# Patient Record
Sex: MALE | Race: WHITE | Employment: FULL TIME | URBAN - METROPOLITAN AREA
[De-identification: names, ages, dates, MRNs, and addresses within clinical notes are randomized per-mention and may not be internally consistent; named-entity substitution may affect disease eponyms.]

---

## 2017-02-14 ENCOUNTER — HOSPITAL ENCOUNTER (OUTPATIENT)
Dept: SURGERY | Age: 62
Discharge: HOME | End: 2017-02-14
Payer: COMMERCIAL

## 2017-02-14 VITALS
BODY MASS INDEX: 29.2 KG/M2 | WEIGHT: 204 LBS | TEMPERATURE: 98.4 F | SYSTOLIC BLOOD PRESSURE: 160 MMHG | RESPIRATION RATE: 17 BRPM | HEART RATE: 106 BPM | DIASTOLIC BLOOD PRESSURE: 94 MMHG | HEIGHT: 70 IN | OXYGEN SATURATION: 97 %

## 2017-02-14 LAB — GLUCOSE BLD STRIP.AUTO-MCNC: 108 MG/DL (ref 65–100)

## 2017-02-14 PROCEDURE — 82962 GLUCOSE BLOOD TEST: CPT

## 2017-02-14 NOTE — PERIOP NOTES
Patient verified name, , and surgery as listed in [facility]. TYPE  CASE:1B  Orders per surgeon:  Received  Labs per surgeon: none  Labs per anesthesia protocol:  POC glucose  EKG  :  NA      Patient provided with handouts including guide to surgery , transfusions, pain management and hand hygiene for the family and community. Pt verbalizes understanding of all pre-op instructions . Instructed that family must be present in building at all times. Hibiclens and instructions given per hospital policy. Instructed patient to continue  previous medications as prescribed prior to surgery and  to take the following medications the day of surgery according to anesthesia guidelines : none       Original medication prescription bottles not visualized during patient appointment. Continue all previous medications unless otherwise directed. Instructed patient to hold  the following medications prior to surgery: none    Pt also instructed to wear an oversized, button down shirt on DOS to accommodate the surgical sling post operatively. Patient verbalized understanding of all instructions and provided all medical/health information to the best of their ability.

## 2017-02-20 ENCOUNTER — ANESTHESIA EVENT (OUTPATIENT)
Dept: SURGERY | Age: 62
End: 2017-02-20
Payer: COMMERCIAL

## 2017-02-20 RX ORDER — OXYCODONE HYDROCHLORIDE 5 MG/1
5 TABLET ORAL
Status: CANCELLED | OUTPATIENT
Start: 2017-02-20 | End: 2017-02-21

## 2017-02-20 RX ORDER — SODIUM CHLORIDE, SODIUM LACTATE, POTASSIUM CHLORIDE, CALCIUM CHLORIDE 600; 310; 30; 20 MG/100ML; MG/100ML; MG/100ML; MG/100ML
75 INJECTION, SOLUTION INTRAVENOUS CONTINUOUS
Status: CANCELLED | OUTPATIENT
Start: 2017-02-20

## 2017-02-20 RX ORDER — OXYCODONE HYDROCHLORIDE 5 MG/1
10 TABLET ORAL
Status: CANCELLED | OUTPATIENT
Start: 2017-02-20 | End: 2017-02-21

## 2017-02-20 RX ORDER — HYDROMORPHONE HYDROCHLORIDE 2 MG/ML
0.5 INJECTION, SOLUTION INTRAMUSCULAR; INTRAVENOUS; SUBCUTANEOUS
Status: CANCELLED | OUTPATIENT
Start: 2017-02-20

## 2017-02-21 ENCOUNTER — ANESTHESIA (OUTPATIENT)
Dept: SURGERY | Age: 62
End: 2017-02-21
Payer: COMMERCIAL

## 2017-02-21 ENCOUNTER — HOSPITAL ENCOUNTER (OUTPATIENT)
Age: 62
Setting detail: OUTPATIENT SURGERY
Discharge: HOME | End: 2017-02-21
Attending: OPHTHALMOLOGY | Admitting: OPHTHALMOLOGY
Payer: COMMERCIAL

## 2017-02-21 VITALS
OXYGEN SATURATION: 93 % | TEMPERATURE: 98.7 F | HEIGHT: 70 IN | HEART RATE: 90 BPM | DIASTOLIC BLOOD PRESSURE: 84 MMHG | RESPIRATION RATE: 16 BRPM | WEIGHT: 199.5 LBS | BODY MASS INDEX: 28.56 KG/M2 | SYSTOLIC BLOOD PRESSURE: 138 MMHG

## 2017-02-21 LAB — GLUCOSE BLD STRIP.AUTO-MCNC: 168 MG/DL (ref 65–100)

## 2017-02-21 PROCEDURE — 77030012829 HC CAUT BPLR KIRW -B: Performed by: OPHTHALMOLOGY

## 2017-02-21 PROCEDURE — 74011250636 HC RX REV CODE- 250/636: Performed by: ANESTHESIOLOGY

## 2017-02-21 PROCEDURE — 74011250636 HC RX REV CODE- 250/636

## 2017-02-21 PROCEDURE — 77030032623 HC KT VITRCMY TOT PLS ALCN -F: Performed by: OPHTHALMOLOGY

## 2017-02-21 PROCEDURE — 77030018837 HC SOL IRR OPTH ALCN -A: Performed by: OPHTHALMOLOGY

## 2017-02-21 PROCEDURE — 74011000250 HC RX REV CODE- 250: Performed by: OPHTHALMOLOGY

## 2017-02-21 PROCEDURE — 74011250637 HC RX REV CODE- 250/637: Performed by: ANESTHESIOLOGY

## 2017-02-21 PROCEDURE — 76010000160 HC OR TIME 0.5 TO 1 HR INTENSV-TIER 1: Performed by: OPHTHALMOLOGY

## 2017-02-21 PROCEDURE — 74011250636 HC RX REV CODE- 250/636: Performed by: OPHTHALMOLOGY

## 2017-02-21 PROCEDURE — 77030017621 HC NDL OPHTH1 ALCN -B: Performed by: OPHTHALMOLOGY

## 2017-02-21 PROCEDURE — 74011000258 HC RX REV CODE- 258: Performed by: OPHTHALMOLOGY

## 2017-02-21 PROCEDURE — 82962 GLUCOSE BLOOD TEST: CPT

## 2017-02-21 PROCEDURE — 76210000063 HC OR PH I REC FIRST 0.5 HR: Performed by: OPHTHALMOLOGY

## 2017-02-21 PROCEDURE — 77030008547 HC TBNG OPHTH BD -A: Performed by: OPHTHALMOLOGY

## 2017-02-21 PROCEDURE — 76210000020 HC REC RM PH II FIRST 0.5 HR: Performed by: OPHTHALMOLOGY

## 2017-02-21 PROCEDURE — 77030018846 HC SOL IRR STRL H20 ICUM -A: Performed by: OPHTHALMOLOGY

## 2017-02-21 PROCEDURE — 77030018838 HC SOL IRR OPTH ALCN -B: Performed by: OPHTHALMOLOGY

## 2017-02-21 PROCEDURE — 76060000033 HC ANESTHESIA 1 TO 1.5 HR: Performed by: OPHTHALMOLOGY

## 2017-02-21 RX ORDER — BETAMETHASONE SODIUM PHOSPHATE AND BETAMETHASONE ACETATE 3; 3 MG/ML; MG/ML
INJECTION, SUSPENSION INTRA-ARTICULAR; INTRALESIONAL; INTRAMUSCULAR; SOFT TISSUE AS NEEDED
Status: DISCONTINUED | OUTPATIENT
Start: 2017-02-21 | End: 2017-02-21 | Stop reason: HOSPADM

## 2017-02-21 RX ORDER — FENTANYL CITRATE 50 UG/ML
INJECTION, SOLUTION INTRAMUSCULAR; INTRAVENOUS AS NEEDED
Status: DISCONTINUED | OUTPATIENT
Start: 2017-02-21 | End: 2017-02-21 | Stop reason: HOSPADM

## 2017-02-21 RX ORDER — LIDOCAINE HYDROCHLORIDE 20 MG/ML
INJECTION, SOLUTION INFILTRATION; PERINEURAL AS NEEDED
Status: DISCONTINUED | OUTPATIENT
Start: 2017-02-21 | End: 2017-02-21 | Stop reason: HOSPADM

## 2017-02-21 RX ORDER — CYCLOPENTOLATE HYDROCHLORIDE 20 MG/ML
1 SOLUTION/ DROPS OPHTHALMIC
Status: DISCONTINUED | OUTPATIENT
Start: 2017-02-21 | End: 2017-02-21 | Stop reason: HOSPADM

## 2017-02-21 RX ORDER — OFLOXACIN 3 MG/ML
1 SOLUTION/ DROPS OPHTHALMIC
Status: DISCONTINUED | OUTPATIENT
Start: 2017-02-21 | End: 2017-02-21 | Stop reason: HOSPADM

## 2017-02-21 RX ORDER — TROPICAMIDE 10 MG/ML
1 SOLUTION/ DROPS OPHTHALMIC
Status: DISCONTINUED | OUTPATIENT
Start: 2017-02-21 | End: 2017-02-21 | Stop reason: HOSPADM

## 2017-02-21 RX ORDER — PHENYLEPHRINE HYDROCHLORIDE 25 MG/ML
1 SOLUTION/ DROPS OPHTHALMIC
Status: DISCONTINUED | OUTPATIENT
Start: 2017-02-21 | End: 2017-02-21 | Stop reason: HOSPADM

## 2017-02-21 RX ORDER — FAMOTIDINE 20 MG/1
20 TABLET, FILM COATED ORAL ONCE
Status: COMPLETED | OUTPATIENT
Start: 2017-02-21 | End: 2017-02-21

## 2017-02-21 RX ORDER — TETRACAINE HYDROCHLORIDE 5 MG/ML
SOLUTION OPHTHALMIC AS NEEDED
Status: DISCONTINUED | OUTPATIENT
Start: 2017-02-21 | End: 2017-02-21 | Stop reason: HOSPADM

## 2017-02-21 RX ORDER — CEFAZOLIN SODIUM 1 G/3ML
INJECTION, POWDER, FOR SOLUTION INTRAMUSCULAR; INTRAVENOUS AS NEEDED
Status: DISCONTINUED | OUTPATIENT
Start: 2017-02-21 | End: 2017-02-21 | Stop reason: HOSPADM

## 2017-02-21 RX ORDER — PROPOFOL 10 MG/ML
INJECTION, EMULSION INTRAVENOUS AS NEEDED
Status: DISCONTINUED | OUTPATIENT
Start: 2017-02-21 | End: 2017-02-21 | Stop reason: HOSPADM

## 2017-02-21 RX ORDER — MIDAZOLAM HYDROCHLORIDE 1 MG/ML
INJECTION, SOLUTION INTRAMUSCULAR; INTRAVENOUS AS NEEDED
Status: DISCONTINUED | OUTPATIENT
Start: 2017-02-21 | End: 2017-02-21 | Stop reason: HOSPADM

## 2017-02-21 RX ORDER — SODIUM CHLORIDE, SODIUM LACTATE, POTASSIUM CHLORIDE, CALCIUM CHLORIDE 600; 310; 30; 20 MG/100ML; MG/100ML; MG/100ML; MG/100ML
75 INJECTION, SOLUTION INTRAVENOUS CONTINUOUS
Status: DISCONTINUED | OUTPATIENT
Start: 2017-02-21 | End: 2017-02-21 | Stop reason: HOSPADM

## 2017-02-21 RX ORDER — MIDAZOLAM HYDROCHLORIDE 1 MG/ML
2 INJECTION, SOLUTION INTRAMUSCULAR; INTRAVENOUS ONCE
Status: DISCONTINUED | OUTPATIENT
Start: 2017-02-21 | End: 2017-02-21 | Stop reason: HOSPADM

## 2017-02-21 RX ORDER — DEXAMETHASONE SODIUM PHOSPHATE 4 MG/ML
INJECTION, SOLUTION INTRA-ARTICULAR; INTRALESIONAL; INTRAMUSCULAR; INTRAVENOUS; SOFT TISSUE AS NEEDED
Status: DISCONTINUED | OUTPATIENT
Start: 2017-02-21 | End: 2017-02-21 | Stop reason: HOSPADM

## 2017-02-21 RX ORDER — ONDANSETRON 2 MG/ML
INJECTION INTRAMUSCULAR; INTRAVENOUS AS NEEDED
Status: DISCONTINUED | OUTPATIENT
Start: 2017-02-21 | End: 2017-02-21 | Stop reason: HOSPADM

## 2017-02-21 RX ORDER — LIDOCAINE HYDROCHLORIDE 10 MG/ML
0.1 INJECTION INFILTRATION; PERINEURAL AS NEEDED
Status: DISCONTINUED | OUTPATIENT
Start: 2017-02-21 | End: 2017-02-21 | Stop reason: HOSPADM

## 2017-02-21 RX ORDER — DEXTROSE MONOHYDRATE 100 MG/ML
INJECTION, SOLUTION INTRAVENOUS AS NEEDED
Status: DISCONTINUED | OUTPATIENT
Start: 2017-02-21 | End: 2017-02-21 | Stop reason: HOSPADM

## 2017-02-21 RX ORDER — BUPIVACAINE HYDROCHLORIDE 5 MG/ML
INJECTION, SOLUTION EPIDURAL; INTRACAUDAL AS NEEDED
Status: DISCONTINUED | OUTPATIENT
Start: 2017-02-21 | End: 2017-02-21 | Stop reason: HOSPADM

## 2017-02-21 RX ORDER — FENTANYL CITRATE 50 UG/ML
100 INJECTION, SOLUTION INTRAMUSCULAR; INTRAVENOUS ONCE
Status: DISCONTINUED | OUTPATIENT
Start: 2017-02-21 | End: 2017-02-21 | Stop reason: HOSPADM

## 2017-02-21 RX ADMIN — ONDANSETRON 4 MG: 2 INJECTION INTRAMUSCULAR; INTRAVENOUS at 09:07

## 2017-02-21 RX ADMIN — FENTANYL CITRATE 50 MCG: 50 INJECTION, SOLUTION INTRAMUSCULAR; INTRAVENOUS at 08:49

## 2017-02-21 RX ADMIN — MIDAZOLAM HYDROCHLORIDE 1 MG: 1 INJECTION, SOLUTION INTRAMUSCULAR; INTRAVENOUS at 08:49

## 2017-02-21 RX ADMIN — TROPICAMIDE 1 DROP: 10 SOLUTION/ DROPS OPHTHALMIC at 07:05

## 2017-02-21 RX ADMIN — CYCLOPENTOLATE HYDROCHLORIDE 1 DROP: 20 SOLUTION/ DROPS OPHTHALMIC at 07:04

## 2017-02-21 RX ADMIN — SODIUM CHLORIDE, SODIUM LACTATE, POTASSIUM CHLORIDE, AND CALCIUM CHLORIDE 75 ML/HR: 600; 310; 30; 20 INJECTION, SOLUTION INTRAVENOUS at 07:10

## 2017-02-21 RX ADMIN — PROPOFOL 20 MG: 10 INJECTION, EMULSION INTRAVENOUS at 08:50

## 2017-02-21 RX ADMIN — FAMOTIDINE 20 MG: 20 TABLET ORAL at 07:04

## 2017-02-21 RX ADMIN — PROPOFOL 30 MG: 10 INJECTION, EMULSION INTRAVENOUS at 08:51

## 2017-02-21 RX ADMIN — DEXAMETHASONE SODIUM PHOSPHATE 4 MG: 4 INJECTION, SOLUTION INTRA-ARTICULAR; INTRALESIONAL; INTRAMUSCULAR; INTRAVENOUS; SOFT TISSUE at 09:06

## 2017-02-21 RX ADMIN — FENTANYL CITRATE 50 MCG: 50 INJECTION, SOLUTION INTRAMUSCULAR; INTRAVENOUS at 09:02

## 2017-02-21 RX ADMIN — PHENYLEPHRINE HYDROCHLORIDE 1 DROP: 25 SOLUTION/ DROPS OPHTHALMIC at 07:05

## 2017-02-21 NOTE — ANESTHESIA PREPROCEDURE EVALUATION
Anesthetic History               Review of Systems / Medical History  Patient summary reviewed and pertinent labs reviewed    Pulmonary                   Neuro/Psych              Cardiovascular    Hypertension              Exercise tolerance: >4 METS     GI/Hepatic/Renal     GERD           Endo/Other    Diabetes: type 2         Other Findings            Physical Exam    Airway  Mallampati: II  TM Distance: > 6 cm  Neck ROM: normal range of motion   Mouth opening: Normal     Cardiovascular      Rate: normal    Murmur: Grade 2, Mitral area     Dental  No notable dental hx       Pulmonary  Breath sounds clear to auscultation               Abdominal         Other Findings            Anesthetic Plan    ASA: 3  Anesthesia type: total IV anesthesia            Anesthetic plan and risks discussed with: Patient

## 2017-02-21 NOTE — BRIEF OP NOTE
BRIEF OPERATIVE NOTE    Date of Procedure: 2/21/2017   Preoperative Diagnosis: Vitreous hemorrhage of right eye ([address] [H43.11] Proliferative Diabetic Retinopathy right eye  Postoperative Diagnosis: Vitreous hemorrhage of right eye ([address] [H43.11],  Proliferative Diabetic Retinopathy right eye     Procedure(s):  VITRECTOMY POSTERIOR 25 GAUGE/ LASER/ EPIRETINAL MEMBRANE PEEL/ RIGHT  Surgeon(s) and Role:     * Natalia Santillan MD - Primary            Surgical Staff:  Circ-1: Jesse Cesar RN  Scrub Tech-1: Lupe Vazquez  Scrub Tech-2: Irineo Landaverde  Event Time In   Incision Start 0166   Incision Close 1155     Anesthesia: MAC   Estimated Blood Loss: None  Specimens: * No specimens in log *   Findings: Extensive preretinal and vitreous hemorrhage, fibrovascular proliferation right eye   Complications: None  Implants: * No implants in log *

## 2017-02-21 NOTE — IP AVS SNAPSHOT
Galilea Romero 
 
 
 [address] [facility] [address] [facility] 
[phone] Patient: Dennis Alex MRN: [id number] :1955 You are allergic to the following No active allergies Recent Documentation Height Weight BMI Smoking Status 1.778 m 90.5 kg 28.63 kg/m2 Never Smoker Emergency Contacts Name Discharge Info Relation Home Work Mobile Aylin Olson DISCHARGE CAREGIVER [3] Spouse [3] [phone] About your hospitalization You were admitted on:  2017 You last received care in the:  [place] [address] You were discharged on:  2017 Unit phone number:  [phone] Why you were hospitalized Your primary diagnosis was:  Not on File Providers Seen During Your Hospitalizations Provider Role Specialty Primary office phone Tara Pederson MD Attending Provider Ophthalmology [phone] Your Primary Care Physician (PCP) Primary Care Physician Office Phone Office Fax Charla Armendariz [phone] Follow-up Information None Current Discharge Medication List  
  
ASK your doctor about these medications Dose & Instructions Dispensing Information Comments Morning Noon Evening Bedtime  
 dulaglutide 1.5 mg/0.5 mL sub-q pen Commonly known as:  TRULICITY Your next dose is: Today, Tomorrow Other:  _________ Dose:  1.5 mg  
0.5 mL by SubCUTAneous route every seven (7) days. Quantity:  4 Pen Refills:  5  
     
   
   
   
  
 exenatide microspheres 2 mg Serr Commonly known as:  BYDUREON Your next dose is: Today, Tomorrow Other:  _________ Subcutaneous injection once weekly Quantity:  4 Each Refills:  3 SITagliptin-metFORMIN 50-1,000 mg per tablet Commonly known as:  Kyle Guardado  
   
 Your next dose is: Today, Tomorrow Other:  _________ Dose:  1 Tab Take 1 Tab by mouth two (2) times daily (with meals). Quantity:  60 Tab Refills:  5 Discharge Instructions [facility] [address] MD Rc Mann MD Rodolfo Bowen. Fareed Huddleston MD                    Herb Hem. Phil Tolliver, [address] or [phone] or [phone] or [phone] Post Operative Instructions for Retina and Vitreous Surgery Patients The following are a few guidelines that you should observe for two weeks after surgery: 1. Avoid stooping over, lifting heavy weights or bumping your head. 2.  Special positioning: Rest on an extra pillow 3. No extensive traveling except what is necessary. If a gas air bubble was put in your eye at surgery - avoid air travel until you consult your doctor. 4.  You may shave after the third day. 5.  You may watch television, read, cook and wash dishes as long as it does not interfere 
      with special positioning instructions. 6.  Alcoholic beverages may be used in moderation. 7.  No sexual intercourse. 8.  You  may bathe the eyelids daily with cotton or a tissue moistened with warm tap 
     water. Do not bathe the eyeball itself. 9.  Some discharge from the operated eye is to be expected. This should gradually  
     improve. 10. Change the eye pad at least once daily. You may discontinue the eye pad whenever 
      comfortable to do so (usually three days after the operation). Wear the eye shield or 
      glasses at all times. 11. If you experience increasing pain, decreasing vision, or pus like discharge, call the 
      Office. 12. Medication Instructions: 
       Prednisolone 1% - one drop in the operated eye __4__ times a day. Atropine 1% - one drop in the operated eye at bedtime. Tobradex Ointment - apply in operated eye 4 times a day as needed. _Ofloxacin  (antibiotic drop) - one drop in operated eye 4 times a day. BRING ALL EYE DROPS TO YOU POSTOPERATIVE APPOINTMENT! 13. Return appointment at the [place] office on: 2/22/17 at 7:35AM 
 
Voiced or demonstrated understanding:  Beronica Hughes from Nurse PATIENT INSTRUCTIONS: 
 
After general anesthesia or intravenous sedation, for 24 hours or while taking prescription Narcotics: · Limit your activities · Do not drive and operate hazardous machinery · Do not make important personal or business decisions · Do  not drink alcoholic beverages · If you have not urinated within 8 hours after discharge, please contact your surgeon on call. *  Please give a list of your current medications to your Primary Care Provider. *  Please update this list whenever your medications are discontinued, doses are 
    changed, or new medications (including over-the-counter products) are added. *  Please carry medication information at all times in case of emergency situations. These are general instructions for a healthy lifestyle: No smoking/ No tobacco products/ Avoid exposure to second hand smoke Surgeon General's Warning:  Quitting smoking now greatly reduces serious risk to your health. Obesity, smoking, and sedentary lifestyle greatly increases your risk for illness A healthy diet, regular physical exercise & weight monitoring are important for maintaining a healthy lifestyle You may be retaining fluid if you have a history of heart failure or if you experience any of the following symptoms:  Weight gain of 3 pounds or more overnight or 5 pounds in a week, increased swelling in our hands or feet or shortness of breath while lying flat in bed.   Please call your doctor as soon as you notice any of these symptoms; do not wait until your next office visit. Recognize signs and symptoms of STROKE: 
 
F-face looks uneven A-arms unable to move or move unevenly S-speech slurred or non-existent T-time-call 911 as soon as signs and symptoms begin-DO NOT go Back to bed or wait to see if you get better-TIME IS BRAIN. Discharge Orders None Introducing [facility] SERVICES! Prasanth Rodriguez introduces [employer] patient portal. Now you can access parts of your medical record, email your doctor's office, and request medication refills online. 1. In your internet browser, go to [email or web address] 2. Click on the First Time User? Click Here link in the Sign In box. You will see the New Member Sign Up page. 3. Enter your [employer] Access Code exactly as it appears below. You will not need to use this code after youve completed the sign-up process. If you do not sign up before the expiration date, you must request a new code. · [employer] Access Code: [email or web address] Expires: 5/9/2017  2:47 PM 
 
4. Enter the last four digits of your Social Security Number (xxxx) and Date of Birth (mm/dd/yyyy) as indicated and click Submit. You will be taken to the next sign-up page. 5. Create a [employer] ID. This will be your [employer] login ID and cannot be changed, so think of one that is secure and easy to remember. 6. Create a [employer] password. You can change your password at any time. 7. Enter your Password Reset Question and Answer. This can be used at a later time if you forget your password. 8. Enter your e-mail address. You will receive e-mail notification when new information is available in [address] 9. Click Sign Up. You can now view and download portions of your medical record. 10. Click the Download Summary menu link to download a portable copy of your medical information. If you have questions, please visit the Frequently Asked Questions section of the MyChart website. Remember, MyChart is NOT to be used for urgent needs. For medical emergencies, dial 911. Now available from your iPhone and Android! General Information Please provide this summary of care documentation to your next provider. Patient Signature:  ____________________________________________________________ Date:  ____________________________________________________________  
  
[place] Provider Signature:  ____________________________________________________________ Date:  ____________________________________________________________

## 2017-02-21 NOTE — DISCHARGE INSTRUCTIONS
[facility] [address] MD Lauryn Olson MD Val Parchment. MD Pedro Fitzgerald. Valentino White MD    [phone] or [phone] or [phone] or [phone]    Post Operative Instructions for Retina and Vitreous Surgery Patients    The following are a few guidelines that you should observe for two weeks after surgery:    1. Avoid stooping over, lifting heavy weights or bumping your head. 2.  Special positioning: Rest on an extra pillow    3. No extensive traveling except what is necessary. If a gas air bubble was put in your eye at surgery - avoid air travel until you consult your doctor. 4.  You may shave after the third day. 5.  You may watch television, read, cook and wash dishes as long as it does not interfere        with special positioning instructions. 6.  Alcoholic beverages may be used in moderation. 7.  No sexual intercourse. 8.  You  may bathe the eyelids daily with cotton or a tissue moistened with warm tap       water. Do not bathe the eyeball itself. 9.  Some discharge from the operated eye is to be expected. This should gradually        improve. 10. Change the eye pad at least once daily. You may discontinue the eye pad whenever        comfortable to do so (usually three days after the operation). Wear the eye shield or        glasses at all times. 11. If you experience increasing pain, decreasing vision, or pus like discharge, call the        Office. 12. Medication Instructions:         Prednisolone 1% - one drop in the operated eye __4__ times a day. Atropine 1% - one drop in the operated eye at bedtime. Tobradex Ointment - apply in operated eye 4 times a day as needed. _Ofloxacin  (antibiotic drop) - one drop in operated eye 4 times a day.          BRING ALL EYE DROPS TO YOU POSTOPERATIVE APPOINTMENT! 13. Return appointment at the [place] office on: 2/22/17 at 7:35AM    Voiced or demonstrated understanding:  Adrianna Barbour from Nurse    PATIENT INSTRUCTIONS:    After general anesthesia or intravenous sedation, for 24 hours or while taking prescription Narcotics:  · Limit your activities  · Do not drive and operate hazardous machinery  · Do not make important personal or business decisions  · Do  not drink alcoholic beverages  · If you have not urinated within 8 hours after discharge, please contact your surgeon on call. *  Please give a list of your current medications to your Primary Care Provider. *  Please update this list whenever your medications are discontinued, doses are      changed, or new medications (including over-the-counter products) are added. *  Please carry medication information at all times in case of emergency situations. These are general instructions for a healthy lifestyle:    No smoking/ No tobacco products/ Avoid exposure to second hand smoke    Surgeon General's Warning:  Quitting smoking now greatly reduces serious risk to your health. Obesity, smoking, and sedentary lifestyle greatly increases your risk for illness    A healthy diet, regular physical exercise & weight monitoring are important for maintaining a healthy lifestyle    You may be retaining fluid if you have a history of heart failure or if you experience any of the following symptoms:  Weight gain of 3 pounds or more overnight or 5 pounds in a week, increased swelling in our hands or feet or shortness of breath while lying flat in bed. Please call your doctor as soon as you notice any of these symptoms; do not wait until your next office visit.     Recognize signs and symptoms of STROKE:    F-face looks uneven    A-arms unable to move or move unevenly    S-speech slurred or non-existent    T-time-call 911 as soon as signs and symptoms begin-DO NOT go       Back to bed or wait to see if you get better-TIME IS BRAIN.

## 2017-02-21 NOTE — ANESTHESIA POSTPROCEDURE EVALUATION
Post-Anesthesia Evaluation and Assessment    Patient: Komal Pizarro MRN: [id number]  SSN: [id number]    YOB: 1955  Age: 63 y.o. Sex: male       Cardiovascular Function/Vital Signs  Visit Vitals    /84 (BP 1 Location: Right arm, BP Patient Position: At rest)    Pulse 93    Temp 37.1 °C (98.7 °F)    Resp 16    Ht 5' 10\" (1.778 m)    Wt 90.5 kg (199 lb 8 oz)    SpO2 94%    BMI 28.63 kg/m2       Patient is status post total IV anesthesia anesthesia for Procedure(s):  VITRECTOMY POSTERIOR 25 GAUGE/ LASER/ EPIRETINAL MEMBRANE PEEL/ RIGHT. Nausea/Vomiting: None    Postoperative hydration reviewed and adequate. Pain:  Pain Scale 1: Numeric (0 - 10) (02/21/17 0952)  Pain Intensity 1: 0 (02/21/17 0952)   Managed    Neurological Status:   Neuro (WDL): Within Defined Limits (02/21/17 0952)  Neuro  LUE Motor Response: Purposeful (02/21/17 0952)  LLE Motor Response: Purposeful (02/21/17 0952)  RUE Motor Response: Purposeful (02/21/17 2810)  RLE Motor Response: Purposeful (02/21/17 1322)   At baseline    Mental Status and Level of Consciousness: Arousable    Pulmonary Status:   O2 Device: Room air (02/21/17 0349)   Adequate oxygenation and airway patent    Complications related to anesthesia: None    Post-anesthesia assessment completed.  No concerns    Signed By: Jaquan Watts MD     February 21, 2017

## 2017-02-21 NOTE — OP NOTES
[address]   OPERATIVE REPORT       Name:  Analy Lee   MR#:  [id number]   :  1955   Account #:  [de-identified]   Date of Adm:  2017       DATE OF SURGERY: 2017     PREOPERATIVE DIAGNOSES   1. Nonclearing vitreous hemorrhage of the right eye. 2. Proliferative diabetic retinopathy of the right eye. POSTOPERATIVE DIAGNOSES   1. Nonclearing vitreous hemorrhage of the right eye. 2. Proliferative diabetic retinopathy of the right eye. PROCEDURES PERFORMED     1. Trans pars plana vitrectomy with panretinal photocoagulation   of the right eye. 2. Trans pars plana vitrectomy with epiretinal membrane removal   of the right eye. SURGEON: Sandy Llanes. Judy DAVILA MD    ANESTHESIA: MAC.    COMPLICATIONS: None. INDICATIONS FOR SURGERY: The patient has experienced loss of   vision in the right eye associated with vitreous hemorrhage due   to diabetic retinopathy. Surgery was recommended to remove the   hemorrhage and treat the diabetic retinopathy. The risks and   benefits of the surgical procedure were thoroughly reviewed and   the patient wished to proceed. SUMMARY OF PROCEDURE: After appropriate preoperative evaluation   and signing of operative permit, the patient was taken to   operating room and placed in the supine fashion upon the   procedure table. Timeout was then performed and all operating   room personnel confirmed the patient's identity, the surgical   site, and the procedures to be performed. The patient was then   given IV sedation as well as a lid block on the right consisting   of 5 mL of a 50/50 solution of 2% Xylocaine and 0.75% Marcaine. This was given in a modified [employer] fashion. A retrobulbar   injection consisting of 5 mL of the same solution was then   given. This was supplemented with an additional 3 mL of the same   solution.  Once an appropriate level of akinesia of the   extraocular muscles was achieved, the patient was draped and   prepped in a sterile ophthalmic fashion with 5% Betadine   solution. The conjunctiva and periorbital tissues were carefully   prepped. A 25-gauge 4 mm infusion cannula was carefully placed 4 mm   posterior to limbus at the 8 o'clock position. Once the tip of   the cannula was visualized and posterior segment found to be   clear, it was turned to the on position and the intraocular   pressure was adjusted to 30 mmHg. Two additional cannulas were   placed at the 10 and 2 o'clock positions, 4 mm posterior to the   limbus. A handheld fiberoptic tube was inserted in the nasal   cannula site while the Microvit instrument was inserted into the   temporal cannula site. Visualization of the posterior segment   was achieved using the overhead microscope and the hand-held   contact lens. Examination of the posterior segment confirmed   significant vitreous hemorrhage and preretinal hemorrhage. Fibrovascular proliferation was noted along the inferior   temporal and superior nasal arcades. A thorough vitrectomy was performed. Care was taken to trim the   vitreous to the far periphery in all quadrants. The posterior   hyaloid face was then carefully detached from the optic nerve   region and trimmed to the areas of fibrovascular proliferation. The preretinal hemorrhage was also removed with the Microvit   instrument. The fibrovascular proliferation was then carefully   trimmed along the inferior temporal arcade. This was then   subsequently removed with the Pr-[address] [facility]. Hemostasis was achieved   using the internal diathermy instrument. Also, the fibrovascular   proliferation was removed along the superonasal arcade. Additional hemorrhage was carefully removed with a flute needle. No additional leakage was noted. Laser spots were present in the   mid periphery. An additional laser was applied using the   indirect laser in all quadrants.  A total of 773 spots were   scattered throughout the mid and far periphery in all quadrants. All instruments were then removed from the eye and the trocars   were removed. Intraocular pressure was noted to be approximately   15 mmHg. A subconjunctival injection consisting of 0.5 mL of   Ancef and 0.5 mL of betamethasone was given in the inferior   temporal quadrant. Pred Forte, Vigamox and Cyclogyl were placed   in the inferior fornix. A patch and shield were then placed over   the right eye. The patient tolerated the procedure well and was   taken to the recovery room for routine postoperative care.         MD FLETCHER Diaz III   D:  02/21/2017   09:59   T:  02/21/2017   10:31   Job #:  [id number]

## 2017-02-27 ENCOUNTER — HOSPITAL ENCOUNTER (INPATIENT)
Age: 62
LOS: 14 days | Discharge: HOME HEALTH | DRG: 001 | End: 2017-03-13
Attending: INTERNAL MEDICINE | Admitting: INTERNAL MEDICINE
Payer: COMMERCIAL

## 2017-02-27 ENCOUNTER — APPOINTMENT (OUTPATIENT)
Dept: GENERAL RADIOLOGY | Age: 62
DRG: 001 | End: 2017-02-27
Attending: EMERGENCY MEDICINE
Payer: COMMERCIAL

## 2017-02-27 ENCOUNTER — APPOINTMENT (OUTPATIENT)
Dept: GENERAL RADIOLOGY | Age: 62
DRG: 001 | End: 2017-02-27
Attending: NURSE PRACTITIONER
Payer: COMMERCIAL

## 2017-02-27 ENCOUNTER — APPOINTMENT (OUTPATIENT)
Dept: ULTRASOUND IMAGING | Age: 62
DRG: 001 | End: 2017-02-27
Attending: NURSE PRACTITIONER
Payer: COMMERCIAL

## 2017-02-27 ENCOUNTER — HOSPITAL ENCOUNTER (EMERGENCY)
Age: 62
Discharge: STILL ADMITTED OR ADMINISTRATIVE | DRG: 001 | End: 2017-02-27
Attending: EMERGENCY MEDICINE
Payer: COMMERCIAL

## 2017-02-27 ENCOUNTER — ANESTHESIA EVENT (OUTPATIENT)
Dept: SURGERY | Age: 62
DRG: 001 | End: 2017-02-27
Payer: COMMERCIAL

## 2017-02-27 VITALS
SYSTOLIC BLOOD PRESSURE: 133 MMHG | BODY MASS INDEX: 28.55 KG/M2 | HEART RATE: 96 BPM | TEMPERATURE: 97.4 F | OXYGEN SATURATION: 95 % | RESPIRATION RATE: 13 BRPM | DIASTOLIC BLOOD PRESSURE: 83 MMHG | WEIGHT: 199 LBS

## 2017-02-27 DIAGNOSIS — I50.23 SYSTOLIC CHF, ACUTE ON CHRONIC (HCC): ICD-10-CM

## 2017-02-27 DIAGNOSIS — I49.01 CARDIAC ARREST WITH VENTRICULAR FIBRILLATION (HCC): ICD-10-CM

## 2017-02-27 DIAGNOSIS — I50.9 ACUTE CONGESTIVE HEART FAILURE, UNSPECIFIED CONGESTIVE HEART FAILURE TYPE: ICD-10-CM

## 2017-02-27 DIAGNOSIS — R09.89 DEPRESSED LEFT VENTRICULAR EJECTION FRACTION: Chronic | ICD-10-CM

## 2017-02-27 DIAGNOSIS — J90 PLEURAL EFFUSION: ICD-10-CM

## 2017-02-27 DIAGNOSIS — J96.00 ACUTE RESPIRATORY FAILURE, UNSPECIFIED WHETHER WITH HYPOXIA OR HYPERCAPNIA (HCC): ICD-10-CM

## 2017-02-27 DIAGNOSIS — I24.9 ACUTE CORONARY SYNDROME (HCC): Primary | ICD-10-CM

## 2017-02-27 DIAGNOSIS — J96.01 ACUTE RESPIRATORY FAILURE WITH HYPOXIA (HCC): ICD-10-CM

## 2017-02-27 DIAGNOSIS — Z99.11 WEANING FROM RESPIRATOR (HCC): ICD-10-CM

## 2017-02-27 DIAGNOSIS — K21.9 GASTROESOPHAGEAL REFLUX DISEASE WITHOUT ESOPHAGITIS: ICD-10-CM

## 2017-02-27 DIAGNOSIS — I50.22 SYSTOLIC CHF, CHRONIC (HCC): ICD-10-CM

## 2017-02-27 DIAGNOSIS — R57.0 CARDIOGENIC SHOCK (HCC): ICD-10-CM

## 2017-02-27 DIAGNOSIS — D62 POSTOPERATIVE ANEMIA DUE TO ACUTE BLOOD LOSS: ICD-10-CM

## 2017-02-27 DIAGNOSIS — I21.4 NSTEMI (NON-ST ELEVATED MYOCARDIAL INFARCTION) (HCC): ICD-10-CM

## 2017-02-27 DIAGNOSIS — I25.5 ISCHEMIC CARDIOMYOPATHY: Chronic | ICD-10-CM

## 2017-02-27 DIAGNOSIS — Z99.11 ENCOUNTER FOR WEANING FROM VENTILATOR (HCC): ICD-10-CM

## 2017-02-27 DIAGNOSIS — R77.8 ELEVATED TROPONIN I LEVEL: ICD-10-CM

## 2017-02-27 DIAGNOSIS — I46.9 CARDIAC ARREST WITH VENTRICULAR FIBRILLATION (HCC): ICD-10-CM

## 2017-02-27 DIAGNOSIS — I25.10 CAD, MULTIPLE VESSEL: Chronic | ICD-10-CM

## 2017-02-27 DIAGNOSIS — R09.02 HYPOXIA: ICD-10-CM

## 2017-02-27 DIAGNOSIS — E11.40 CONTROLLED TYPE 2 DIABETES MELLITUS WITH DIABETIC NEUROPATHY, UNSPECIFIED LONG TERM INSULIN USE STATUS: Primary | Chronic | ICD-10-CM

## 2017-02-27 DIAGNOSIS — Z95.1 S/P CABG (CORONARY ARTERY BYPASS GRAFT): ICD-10-CM

## 2017-02-27 DIAGNOSIS — I49.01 VENTRICULAR FIBRILLATION (HCC): ICD-10-CM

## 2017-02-27 PROBLEM — I20.0 UNSTABLE ANGINA (HCC): Status: ACTIVE | Noted: 2017-02-27

## 2017-02-27 PROBLEM — H43.10: Status: ACTIVE | Noted: 2017-02-27

## 2017-02-27 PROBLEM — E78.1 HYPERTRIGLYCERIDEMIA: Status: ACTIVE | Noted: 2017-02-27

## 2017-02-27 PROBLEM — R07.9 CHEST PAIN: Status: ACTIVE | Noted: 2017-02-27

## 2017-02-27 LAB
ACT BLD: 214 SECS (ref 70–128)
ALBUMIN SERPL BCP-MCNC: 3.6 G/DL (ref 3.5–5)
ALBUMIN/GLOB SERPL: 1.1 {RATIO} (ref 1.2–3.5)
ALP SERPL-CCNC: 46 U/L (ref 50–136)
ALT SERPL-CCNC: 35 U/L (ref 12–65)
ANION GAP BLD CALC-SCNC: 10 MMOL/L (ref 7–16)
AST SERPL W P-5'-P-CCNC: 25 U/L (ref 15–37)
ATRIAL RATE: 88 BPM
BACTERIA SPEC CULT: ABNORMAL
BACTERIA SPEC CULT: ABNORMAL
BASOPHILS # BLD AUTO: 0 K/UL (ref 0–0.2)
BASOPHILS # BLD: 0 % (ref 0–2)
BILIRUB SERPL-MCNC: 0.7 MG/DL (ref 0.2–1.1)
BNP SERPL-MCNC: 514 PG/ML
BUN SERPL-MCNC: 21 MG/DL (ref 6–23)
CALCIUM SERPL-MCNC: 9.5 MG/DL (ref 8.3–10.4)
CALCULATED P AXIS, ECG09: 62 DEGREES
CALCULATED R AXIS, ECG10: 58 DEGREES
CALCULATED T AXIS, ECG11: -97 DEGREES
CHLORIDE SERPL-SCNC: 101 MMOL/L (ref 98–107)
CO2 SERPL-SCNC: 23 MMOL/L (ref 21–32)
CREAT SERPL-MCNC: 1.09 MG/DL (ref 0.8–1.5)
D DIMER PPP FEU-MCNC: 0.67 UG/ML(FEU)
DIAGNOSIS, 93000: NORMAL
DIASTOLIC BP, ECG02: NORMAL MMHG
DIFFERENTIAL METHOD BLD: ABNORMAL
EOSINOPHIL # BLD: 0 K/UL (ref 0–0.8)
EOSINOPHIL NFR BLD: 0 % (ref 0.5–7.8)
ERYTHROCYTE [DISTWIDTH] IN BLOOD BY AUTOMATED COUNT: 13.4 % (ref 11.9–14.6)
GLOBULIN SER CALC-MCNC: 3.3 G/DL (ref 2.3–3.5)
GLUCOSE BLD STRIP.AUTO-MCNC: 257 MG/DL (ref 65–100)
GLUCOSE BLD STRIP.AUTO-MCNC: 263 MG/DL (ref 65–100)
GLUCOSE SERPL-MCNC: 239 MG/DL (ref 65–100)
HCT VFR BLD AUTO: 39.7 % (ref 41.1–50.3)
HGB BLD-MCNC: 13.6 G/DL (ref 13.6–17.2)
IMM GRANULOCYTES # BLD: 0 K/UL (ref 0–0.5)
IMM GRANULOCYTES NFR BLD AUTO: 0.3 % (ref 0–5)
LIPASE SERPL-CCNC: 74 U/L (ref 73–393)
LYMPHOCYTES # BLD AUTO: 13 % (ref 13–44)
LYMPHOCYTES # BLD: 0.8 K/UL (ref 0.5–4.6)
MAGNESIUM SERPL-MCNC: 1.9 MG/DL (ref 1.8–2.4)
MCH RBC QN AUTO: 30.2 PG (ref 26.1–32.9)
MCHC RBC AUTO-ENTMCNC: 34.3 G/DL (ref 31.4–35)
MCV RBC AUTO: 88 FL (ref 79.6–97.8)
MONOCYTES # BLD: 0.2 K/UL (ref 0.1–1.3)
MONOCYTES NFR BLD AUTO: 3 % (ref 4–12)
NEUTS SEG # BLD: 5.2 K/UL (ref 1.7–8.2)
NEUTS SEG NFR BLD AUTO: 84 % (ref 43–78)
P-R INTERVAL, ECG05: 154 MS
PLATELET # BLD AUTO: 208 K/UL (ref 150–450)
PMV BLD AUTO: 10.2 FL (ref 10.8–14.1)
POTASSIUM SERPL-SCNC: 4.6 MMOL/L (ref 3.5–5.1)
PROT SERPL-MCNC: 6.9 G/DL (ref 6.3–8.2)
Q-T INTERVAL, ECG07: 374 MS
QRS DURATION, ECG06: 116 MS
QTC CALCULATION (BEZET), ECG08: 452 MS
RBC # BLD AUTO: 4.51 M/UL (ref 4.23–5.67)
SERVICE CMNT-IMP: ABNORMAL
SODIUM SERPL-SCNC: 134 MMOL/L (ref 136–145)
SYSTOLIC BP, ECG01: NORMAL MMHG
TROPONIN I BLD-MCNC: 0.45 NG/ML (ref 0–0.08)
TROPONIN I BLD-MCNC: 0.46 NG/ML (ref 0–0.08)
TROPONIN I SERPL-MCNC: 1.4 NG/ML (ref 0.02–0.05)
VENTRICULAR RATE, ECG03: 88 BPM
WBC # BLD AUTO: 6.3 K/UL (ref 4.3–11.1)

## 2017-02-27 PROCEDURE — 93459 L HRT ART/GRFT ANGIO: CPT

## 2017-02-27 PROCEDURE — C1894 INTRO/SHEATH, NON-LASER: HCPCS

## 2017-02-27 PROCEDURE — 74011250636 HC RX REV CODE- 250/636: Performed by: NURSE PRACTITIONER

## 2017-02-27 PROCEDURE — 75756 ARTERY X-RAYS CHEST: CPT

## 2017-02-27 PROCEDURE — 36415 COLL VENOUS BLD VENIPUNCTURE: CPT | Performed by: NURSE PRACTITIONER

## 2017-02-27 PROCEDURE — 5A0221D ASSISTANCE WITH CARDIAC OUTPUT USING IMPELLER PUMP, CONTINUOUS: ICD-10-PCS | Performed by: INTERNAL MEDICINE

## 2017-02-27 PROCEDURE — 74011250637 HC RX REV CODE- 250/637: Performed by: NURSE PRACTITIONER

## 2017-02-27 PROCEDURE — C1769 GUIDE WIRE: HCPCS

## 2017-02-27 PROCEDURE — B2151ZZ FLUOROSCOPY OF LEFT HEART USING LOW OSMOLAR CONTRAST: ICD-10-PCS | Performed by: INTERNAL MEDICINE

## 2017-02-27 PROCEDURE — 93458 L HRT ARTERY/VENTRICLE ANGIO: CPT

## 2017-02-27 PROCEDURE — 82962 GLUCOSE BLOOD TEST: CPT

## 2017-02-27 PROCEDURE — 33990 INSJ PERQ VAD L HRT ARTERIAL: CPT

## 2017-02-27 PROCEDURE — 77030027138 HC INCENT SPIROMETER -A

## 2017-02-27 PROCEDURE — 77030004534 HC CATH ANGI DX INFN CARD -A

## 2017-02-27 PROCEDURE — 74011250636 HC RX REV CODE- 250/636: Performed by: INTERNAL MEDICINE

## 2017-02-27 PROCEDURE — 77030013687 HC GD NDL BARD -B

## 2017-02-27 PROCEDURE — 86923 COMPATIBILITY TEST ELECTRIC: CPT | Performed by: NURSE PRACTITIONER

## 2017-02-27 PROCEDURE — 83735 ASSAY OF MAGNESIUM: CPT | Performed by: NURSE PRACTITIONER

## 2017-02-27 PROCEDURE — 77030029997 HC DEV COM RDL R BND TELE -B

## 2017-02-27 PROCEDURE — 02HA3RS INSERTION OF BIVENTRICULAR SHORT-TERM EXTERNAL HEART ASSIST SYSTEM INTO HEART, PERCUTANEOUS APPROACH: ICD-10-PCS | Performed by: INTERNAL MEDICINE

## 2017-02-27 PROCEDURE — 74011250637 HC RX REV CODE- 250/637: Performed by: INTERNAL MEDICINE

## 2017-02-27 PROCEDURE — C8929 TTE W OR WO FOL WCON,DOPPLER: HCPCS

## 2017-02-27 PROCEDURE — 93880 EXTRACRANIAL BILAT STUDY: CPT

## 2017-02-27 PROCEDURE — 36247 INS CATH ABD/L-EXT ART 3RD: CPT

## 2017-02-27 PROCEDURE — 87641 MR-STAPH DNA AMP PROBE: CPT | Performed by: NURSE PRACTITIONER

## 2017-02-27 PROCEDURE — B2111ZZ FLUOROSCOPY OF MULTIPLE CORONARY ARTERIES USING LOW OSMOLAR CONTRAST: ICD-10-PCS | Performed by: INTERNAL MEDICINE

## 2017-02-27 PROCEDURE — 77030015766

## 2017-02-27 PROCEDURE — 99152 MOD SED SAME PHYS/QHP 5/>YRS: CPT

## 2017-02-27 PROCEDURE — 86900 BLOOD TYPING SEROLOGIC ABO: CPT | Performed by: NURSE PRACTITIONER

## 2017-02-27 PROCEDURE — 77030029641 HC PMP CARD PERC IMPELLA CP ABIM -L

## 2017-02-27 PROCEDURE — 74011250636 HC RX REV CODE- 250/636

## 2017-02-27 PROCEDURE — 65610000006 HC RM INTENSIVE CARE

## 2017-02-27 PROCEDURE — 77030002996 HC SUT SLK J&J -A

## 2017-02-27 PROCEDURE — 74011000250 HC RX REV CODE- 250: Performed by: INTERNAL MEDICINE

## 2017-02-27 PROCEDURE — 76937 US GUIDE VASCULAR ACCESS: CPT

## 2017-02-27 PROCEDURE — 71010 XR CHEST SNGL V: CPT

## 2017-02-27 PROCEDURE — 4A023N7 MEASUREMENT OF CARDIAC SAMPLING AND PRESSURE, LEFT HEART, PERCUTANEOUS APPROACH: ICD-10-PCS | Performed by: INTERNAL MEDICINE

## 2017-02-27 PROCEDURE — 84484 ASSAY OF TROPONIN QUANT: CPT | Performed by: NURSE PRACTITIONER

## 2017-02-27 PROCEDURE — 99153 MOD SED SAME PHYS/QHP EA: CPT

## 2017-02-27 PROCEDURE — 93005 ELECTROCARDIOGRAM TRACING: CPT | Performed by: INTERNAL MEDICINE

## 2017-02-27 PROCEDURE — 85347 COAGULATION TIME ACTIVATED: CPT

## 2017-02-27 PROCEDURE — 74011000258 HC RX REV CODE- 258: Performed by: INTERNAL MEDICINE

## 2017-02-27 PROCEDURE — 74011636637 HC RX REV CODE- 636/637: Performed by: NURSE PRACTITIONER

## 2017-02-27 RX ORDER — HEPARIN SODIUM 10000 [USP'U]/ML
2000 INJECTION, SOLUTION INTRAVENOUS; SUBCUTANEOUS ONCE
Status: COMPLETED | OUTPATIENT
Start: 2017-02-27 | End: 2017-02-27

## 2017-02-27 RX ORDER — AMIODARONE HYDROCHLORIDE 200 MG/1
400 TABLET ORAL ONCE
Status: COMPLETED | OUTPATIENT
Start: 2017-02-27 | End: 2017-02-27

## 2017-02-27 RX ORDER — SODIUM CHLORIDE 0.9 % (FLUSH) 0.9 %
5-10 SYRINGE (ML) INJECTION EVERY 8 HOURS
Status: DISCONTINUED | OUTPATIENT
Start: 2017-02-27 | End: 2017-02-28 | Stop reason: SDUPTHER

## 2017-02-27 RX ORDER — GUAIFENESIN 100 MG/5ML
324 LIQUID (ML) ORAL
Status: COMPLETED | OUTPATIENT
Start: 2017-02-27 | End: 2017-02-27

## 2017-02-27 RX ORDER — ONDANSETRON 2 MG/ML
4 INJECTION INTRAMUSCULAR; INTRAVENOUS
Status: DISCONTINUED | OUTPATIENT
Start: 2017-02-27 | End: 2017-03-03

## 2017-02-27 RX ORDER — HEPARIN SODIUM 5000 [USP'U]/100ML
12-25 INJECTION, SOLUTION INTRAVENOUS
Status: DISCONTINUED | OUTPATIENT
Start: 2017-02-27 | End: 2017-02-28 | Stop reason: SDUPTHER

## 2017-02-27 RX ORDER — PREDNISOLONE ACETATE 10 MG/ML
1 SUSPENSION/ DROPS OPHTHALMIC 4 TIMES DAILY
Status: DISCONTINUED | OUTPATIENT
Start: 2017-02-27 | End: 2017-02-28

## 2017-02-27 RX ORDER — SODIUM CHLORIDE 0.9 % (FLUSH) 0.9 %
5-10 SYRINGE (ML) INJECTION AS NEEDED
Status: DISCONTINUED | OUTPATIENT
Start: 2017-02-27 | End: 2017-02-27 | Stop reason: HOSPADM

## 2017-02-27 RX ORDER — CEFAZOLIN SODIUM IN 0.9 % NACL 2 G/50 ML
2 INTRAVENOUS SOLUTION, PIGGYBACK (ML) INTRAVENOUS
Status: COMPLETED | OUTPATIENT
Start: 2017-02-27 | End: 2017-02-28

## 2017-02-27 RX ORDER — INSULIN GLARGINE 100 [IU]/ML
20 INJECTION, SOLUTION SUBCUTANEOUS ONCE
Status: DISCONTINUED | OUTPATIENT
Start: 2017-02-27 | End: 2017-02-27

## 2017-02-27 RX ORDER — SODIUM CHLORIDE 9 MG/ML
250 INJECTION, SOLUTION INTRAVENOUS CONTINUOUS
Status: DISPENSED | OUTPATIENT
Start: 2017-02-27 | End: 2017-02-27

## 2017-02-27 RX ORDER — HEPARIN SODIUM 5000 [USP'U]/100ML
12-25 INJECTION, SOLUTION INTRAVENOUS
Status: DISCONTINUED | OUTPATIENT
Start: 2017-02-27 | End: 2017-02-27

## 2017-02-27 RX ORDER — MIDAZOLAM HYDROCHLORIDE 1 MG/ML
.5-5 INJECTION, SOLUTION INTRAMUSCULAR; INTRAVENOUS
Status: DISCONTINUED | OUTPATIENT
Start: 2017-02-27 | End: 2017-02-27 | Stop reason: HOSPADM

## 2017-02-27 RX ORDER — MUPIROCIN 20 MG/G
1 OINTMENT TOPICAL 2 TIMES DAILY
Status: DISCONTINUED | OUTPATIENT
Start: 2017-02-27 | End: 2017-02-28 | Stop reason: HOSPADM

## 2017-02-27 RX ORDER — SODIUM CHLORIDE 0.9 % (FLUSH) 0.9 %
5-10 SYRINGE (ML) INJECTION AS NEEDED
Status: DISCONTINUED | OUTPATIENT
Start: 2017-02-27 | End: 2017-02-28 | Stop reason: SDUPTHER

## 2017-02-27 RX ORDER — INSULIN LISPRO 100 [IU]/ML
INJECTION, SOLUTION INTRAVENOUS; SUBCUTANEOUS
Status: DISCONTINUED | OUTPATIENT
Start: 2017-02-27 | End: 2017-03-03

## 2017-02-27 RX ORDER — NITROGLYCERIN 0.4 MG/1
0.4 TABLET SUBLINGUAL
Status: DISCONTINUED | OUTPATIENT
Start: 2017-02-27 | End: 2017-03-03

## 2017-02-27 RX ORDER — MOXIFLOXACIN 5 MG/ML
1 SOLUTION/ DROPS OPHTHALMIC 3 TIMES DAILY
Status: ON HOLD | COMMUNITY
End: 2017-03-01 | Stop reason: ALTCHOICE

## 2017-02-27 RX ORDER — LIDOCAINE HYDROCHLORIDE AND EPINEPHRINE 10; 10 MG/ML; UG/ML
1-20 INJECTION, SOLUTION INFILTRATION; PERINEURAL
Status: DISCONTINUED | OUTPATIENT
Start: 2017-02-27 | End: 2017-02-28 | Stop reason: HOSPADM

## 2017-02-27 RX ORDER — AMIODARONE HYDROCHLORIDE 200 MG/1
400 TABLET ORAL ONCE
Status: COMPLETED | OUTPATIENT
Start: 2017-02-28 | End: 2017-02-28

## 2017-02-27 RX ORDER — MORPHINE SULFATE 10 MG/ML
5 INJECTION, SOLUTION INTRAMUSCULAR; INTRAVENOUS
Status: DISCONTINUED | OUTPATIENT
Start: 2017-02-27 | End: 2017-02-28 | Stop reason: SDUPTHER

## 2017-02-27 RX ORDER — HEPARIN SODIUM 200 [USP'U]/100ML
3 INJECTION, SOLUTION INTRAVENOUS CONTINUOUS
Status: DISCONTINUED | OUTPATIENT
Start: 2017-02-27 | End: 2017-02-27

## 2017-02-27 RX ORDER — MORPHINE SULFATE 2 MG/ML
2 INJECTION, SOLUTION INTRAMUSCULAR; INTRAVENOUS
Status: DISCONTINUED | OUTPATIENT
Start: 2017-02-27 | End: 2017-02-27

## 2017-02-27 RX ORDER — ATORVASTATIN CALCIUM 40 MG/1
80 TABLET, FILM COATED ORAL
Status: DISCONTINUED | OUTPATIENT
Start: 2017-02-27 | End: 2017-02-28 | Stop reason: SDUPTHER

## 2017-02-27 RX ORDER — GUAIFENESIN 100 MG/5ML
81 LIQUID (ML) ORAL DAILY
Status: DISCONTINUED | OUTPATIENT
Start: 2017-02-28 | End: 2017-02-28 | Stop reason: SDUPTHER

## 2017-02-27 RX ORDER — LISINOPRIL 5 MG/1
5 TABLET ORAL DAILY
Status: DISCONTINUED | OUTPATIENT
Start: 2017-02-27 | End: 2017-03-03

## 2017-02-27 RX ORDER — METOPROLOL TARTRATE 25 MG/1
25 TABLET, FILM COATED ORAL EVERY 6 HOURS
Status: DISCONTINUED | OUTPATIENT
Start: 2017-02-27 | End: 2017-02-27

## 2017-02-27 RX ORDER — CARVEDILOL 3.12 MG/1
3.12 TABLET ORAL 2 TIMES DAILY WITH MEALS
Status: DISCONTINUED | OUTPATIENT
Start: 2017-02-27 | End: 2017-03-01

## 2017-02-27 RX ORDER — HEPARIN SODIUM 5000 [USP'U]/ML
4000 INJECTION, SOLUTION INTRAVENOUS; SUBCUTANEOUS
Status: COMPLETED | OUTPATIENT
Start: 2017-02-27 | End: 2017-02-27

## 2017-02-27 RX ORDER — SODIUM CHLORIDE 0.9 % (FLUSH) 0.9 %
5-10 SYRINGE (ML) INJECTION EVERY 8 HOURS
Status: DISCONTINUED | OUTPATIENT
Start: 2017-02-27 | End: 2017-02-27 | Stop reason: HOSPADM

## 2017-02-27 RX ORDER — LIDOCAINE HYDROCHLORIDE 20 MG/ML
1-20 INJECTION, SOLUTION INFILTRATION; PERINEURAL
Status: DISCONTINUED | OUTPATIENT
Start: 2017-02-27 | End: 2017-02-27 | Stop reason: HOSPADM

## 2017-02-27 RX ORDER — HEPARIN SODIUM 5000 [USP'U]/100ML
12-25 INJECTION, SOLUTION INTRAVENOUS
Status: DISCONTINUED | OUTPATIENT
Start: 2017-02-27 | End: 2017-02-27 | Stop reason: HOSPADM

## 2017-02-27 RX ORDER — PREDNISOLONE ACETATE 10 MG/ML
1 SUSPENSION/ DROPS OPHTHALMIC 4 TIMES DAILY
COMMUNITY
End: 2017-04-11

## 2017-02-27 RX ORDER — FENTANYL CITRATE 50 UG/ML
25-100 INJECTION, SOLUTION INTRAMUSCULAR; INTRAVENOUS
Status: DISCONTINUED | OUTPATIENT
Start: 2017-02-27 | End: 2017-02-27 | Stop reason: HOSPADM

## 2017-02-27 RX ORDER — ONDANSETRON 2 MG/ML
INJECTION INTRAMUSCULAR; INTRAVENOUS
Status: COMPLETED
Start: 2017-02-27 | End: 2017-02-27

## 2017-02-27 RX ORDER — SODIUM CHLORIDE 9 MG/ML
75 INJECTION, SOLUTION INTRAVENOUS CONTINUOUS
Status: DISCONTINUED | OUTPATIENT
Start: 2017-02-27 | End: 2017-02-27

## 2017-02-27 RX ORDER — HEPARIN SODIUM 10000 [USP'U]/ML
5000 INJECTION, SOLUTION INTRAVENOUS; SUBCUTANEOUS ONCE
Status: COMPLETED | OUTPATIENT
Start: 2017-02-27 | End: 2017-02-27

## 2017-02-27 RX ORDER — MOXIFLOXACIN 5 MG/ML
1 SOLUTION/ DROPS OPHTHALMIC 3 TIMES DAILY
Status: DISCONTINUED | OUTPATIENT
Start: 2017-02-27 | End: 2017-02-28

## 2017-02-27 RX ORDER — SODIUM CHLORIDE 0.9 % (FLUSH) 0.9 %
5-10 SYRINGE (ML) INJECTION AS NEEDED
Status: DISCONTINUED | OUTPATIENT
Start: 2017-02-27 | End: 2017-03-03

## 2017-02-27 RX ADMIN — HEPARIN SODIUM 3 ML/HR: 200 INJECTION, SOLUTION INTRAVENOUS at 15:05

## 2017-02-27 RX ADMIN — SODIUM CHLORIDE 75 ML/HR: 900 INJECTION, SOLUTION INTRAVENOUS at 14:06

## 2017-02-27 RX ADMIN — HEPARIN SODIUM AND DEXTROSE 12 UNITS/KG/HR: 5000; 5 INJECTION INTRAVENOUS at 23:30

## 2017-02-27 RX ADMIN — Medication 10 ML: at 18:41

## 2017-02-27 RX ADMIN — METOPROLOL TARTRATE 25 MG: 25 TABLET ORAL at 14:09

## 2017-02-27 RX ADMIN — AMIODARONE HYDROCHLORIDE 400 MG: 200 TABLET ORAL at 17:20

## 2017-02-27 RX ADMIN — HEPARIN SODIUM 4000 UNITS: 5000 INJECTION, SOLUTION INTRAVENOUS; SUBCUTANEOUS at 10:03

## 2017-02-27 RX ADMIN — PERFLUTREN 1 ML: 6.52 INJECTION, SUSPENSION INTRAVENOUS at 18:11

## 2017-02-27 RX ADMIN — Medication 2 MG: at 17:00

## 2017-02-27 RX ADMIN — INSULIN LISPRO 6 UNITS: 100 INJECTION, SOLUTION INTRAVENOUS; SUBCUTANEOUS at 22:24

## 2017-02-27 RX ADMIN — ONDANSETRON 4 MG: 2 INJECTION INTRAMUSCULAR; INTRAVENOUS at 20:18

## 2017-02-27 RX ADMIN — Medication 5 ML: at 08:38

## 2017-02-27 RX ADMIN — ASPIRIN 81 MG CHEWABLE TABLET 324 MG: 81 TABLET CHEWABLE at 08:38

## 2017-02-27 RX ADMIN — INSULIN LISPRO 6 UNITS: 100 INJECTION, SOLUTION INTRAVENOUS; SUBCUTANEOUS at 18:40

## 2017-02-27 RX ADMIN — HEPARIN SODIUM 12.5 ML/HR: 1000 INJECTION, SOLUTION INTRAVENOUS; SUBCUTANEOUS at 20:16

## 2017-02-27 RX ADMIN — HEPARIN SODIUM 2000 UNITS: 10000 INJECTION, SOLUTION INTRAVENOUS; SUBCUTANEOUS at 15:40

## 2017-02-27 RX ADMIN — NITROGLYCERIN 1 INCH: 20 OINTMENT TOPICAL at 08:37

## 2017-02-27 RX ADMIN — LIDOCAINE HYDROCHLORIDE AND EPINEPHRINE 150 MG: 10; 10 INJECTION, SOLUTION INFILTRATION; PERINEURAL at 15:00

## 2017-02-27 RX ADMIN — NITROGLYCERIN 1 INCH: 20 OINTMENT TOPICAL at 14:08

## 2017-02-27 RX ADMIN — PREDNISOLONE ACETATE 1 DROP: 10 SUSPENSION/ DROPS OPHTHALMIC at 22:30

## 2017-02-27 RX ADMIN — CARVEDILOL 3.12 MG: 3.12 TABLET, FILM COATED ORAL at 17:20

## 2017-02-27 RX ADMIN — LISINOPRIL 5 MG: 5 TABLET ORAL at 14:09

## 2017-02-27 RX ADMIN — MUPIROCIN 1 G: 20 OINTMENT TOPICAL at 18:40

## 2017-02-27 RX ADMIN — Medication 10 ML: at 22:32

## 2017-02-27 RX ADMIN — HEPARIN SODIUM 2 ML: 10000 INJECTION, SOLUTION INTRAVENOUS; SUBCUTANEOUS at 15:05

## 2017-02-27 RX ADMIN — MOXIFLOXACIN 1 DROP: 5 SOLUTION/ DROPS OPHTHALMIC at 22:28

## 2017-02-27 RX ADMIN — HEPARIN SODIUM AND DEXTROSE 12 UNITS/KG/HR: 5000; 5 INJECTION INTRAVENOUS at 10:03

## 2017-02-27 RX ADMIN — LIDOCAINE HYDROCHLORIDE 80 MG: 20 INJECTION, SOLUTION INFILTRATION; PERINEURAL at 15:21

## 2017-02-27 RX ADMIN — MIDAZOLAM HYDROCHLORIDE 2 MG: 1 INJECTION, SOLUTION INTRAMUSCULAR; INTRAVENOUS at 15:03

## 2017-02-27 RX ADMIN — LIDOCAINE HYDROCHLORIDE 60 MG: 20 INJECTION, SOLUTION INFILTRATION; PERINEURAL at 15:04

## 2017-02-27 RX ADMIN — HEPARIN SODIUM 5000 UNITS: 10000 INJECTION, SOLUTION INTRAVENOUS; SUBCUTANEOUS at 15:28

## 2017-02-27 RX ADMIN — ATORVASTATIN CALCIUM 80 MG: 40 TABLET, FILM COATED ORAL at 22:25

## 2017-02-27 RX ADMIN — NITROGLYCERIN 1 INCH: 20 OINTMENT TOPICAL at 20:21

## 2017-02-27 RX ADMIN — FENTANYL CITRATE 50 MCG: 50 INJECTION, SOLUTION INTRAMUSCULAR; INTRAVENOUS at 15:03

## 2017-02-27 NOTE — PROGRESS NOTES
CTS- NSTEMI with severe MVCAD and profoundly depressed LV function, Impella placed in cath lab, denied CP, CABG in am, all questions answered

## 2017-02-27 NOTE — PROCEDURES
Brief Cardiac Procedure Note    Patient: Claudia Gaston MRN: [id number]  SSN: [id number]    YOB: 1955  Age: 63 y.o. Sex: male      Date of Procedure: 2/27/2017     Pre-procedure Diagnosis: Congestive Heart Failure and Coronary Artery Disease    Post-procedure Diagnosis: Congestive Heart Failure and Coronary Artery Disease    Procedure: Left Heart Catheterization    Brief Description of Procedure: See note    Performed By: Karlie Menjivar MD     Assistants: None    Anesthesia: Moderate Sedation    Estimated Blood Loss: Less than 10 mL      Specimens: None    Implants: None    Findings:   LV:  EF 20%  LM:  NML  LAD:  95% prox  LCx:  95% OM1, 99%dLCx (L)dominant  RCA:  100% small non-dominant  LIMA - Patent    Placed Impella CP    Complications: None    Recommendations: Continue medical therapy.     Signed By: Karlie Menjivar MD     February 27, 2017

## 2017-02-27 NOTE — IP AVS SNAPSHOT
Alton Marek 
 
 
 [address] [facility] [address] 
[phone] Patient: Ray Talley MRN: [id number] :1955 You are allergic to the following Allergen Reactions Tomato Swelling NO FRESH TOMATO, CAN EAT KETCHUP AND ANY CANNED/COOKED TOMATO PRODUCTS Immunizations Administered for This Admission Name Date  
 TB Skin Test (PPD) Intradermal 2017 Recent Documentation Height Weight BMI Smoking Status 1.778 m 90.9 kg 28.74 kg/m2 Never Smoker Unresulted Labs Order Current Status AFB CULTURE + SMEAR W/RFLX ID FROM CULTURE Preliminary result FUNGUS, CULTURE, MISC SOURCE Preliminary result Emergency Contacts Name Discharge Info Relation Home Work Mobile Aylin Olson DISCHARGE CAREGIVER [3] Spouse [3] [phone] About your hospitalization You were admitted on:  2017 You last received care in the:  [facility] 2 CV STEPDOWN You were discharged on:  2017 Unit phone number:  [phone] Why you were hospitalized Your primary diagnosis was:  Nstemi (Non-St Elevated Myocardial Infarction) ([facility]) Your diagnoses also included:  Essential Hypertension, Benign, Dm (Diabetes Mellitus) Type Ii Controlled, Neurological Manifestation ([facility]), Chest Pain, Elevated Troponin, Unstable Angina ([facility]), Vitreous Hemorrhage, Unspecified Eye ([facility]), Hypertriglyceridemia, Depressed Left Ventricular Ejection Fraction, Ischemic Cardiomyopathy, Cad, Multiple Vessel, Encounter For Weaning From Ventilator ([facility]), S/P Cabg (Coronary Artery Bypass Graft), Postoperative Anemia Due To Acute Blood Loss, Lvad (Left Ventricular Assist Device) Present ([facility]), Pleural Effusion, Hypoxia, Cardiogenic Shock ([facility]), Systolic Chf, Acute On Chronic ([facility]), Cardiac Arrest With Ventricular Fibrillation ([facility]), Icd (Implantable Cardioverter-Defibrillator) In Place Providers Seen During Your Hospitalizations Provider Role Specialty Primary office phone Pool Tejada MD Attending Provider Cardiology [phone] Jewell Gann MD Attending Provider Cardiothoracic Surgery [phone] Your Primary Care Physician (PCP) Primary Care Physician Office Phone Office Fax Antoni Logan [phone] Follow-up Information Follow up With Details Comments Contact Info [address]  Will call you to schedule visit for nursing and physical therapy within 48 hours [facility] [address] [facility] [address] 
[phone] Jewell Gann MD On 4/5/2017 at 2:10 [address] [facility] [address] [facility] CARDIOVASC THORACIC [address] [address] [phone] Jose Mcmillan MD On 3/21/2017 at [address] [facility] [address] 
[phone] Ene Ibanze MD  As needed [facility] [id number] 
[phone] [facility]  will contact you for appointment [address] [place] [address] [phone] Your Appointments Tuesday March 21, 2017  4:15 PM EDT TRANSITIONAL CARE MANAGEMENT with Jose Mcmillan MD  
[facility] ([address] [facility]) [address]  
[address] [place] [address]  
[phone] Wednesday April 05, 2017  2:10 PM EDT Discharge Followup with Jewell Gann MD  
[address] [facility] ([facility] [address] [place] [address] [address] [phone] Current Discharge Medication List  
  
START taking these medications Dose & Instructions Dispensing Information Comments Morning Noon Evening Bedtime  
 acetaminophen 325 mg tablet Commonly known as:  TYLENOL Your next dose is:  As needed for pain Over the counter medicine - take as needed for pain as directed on label Quantity:  1 Tab Refills:  0  
     
   
   
   
  
 aspirin delayed-release 81 mg tablet Your next dose is:  Tomorrow morning Dose:  81 mg Take 1 Tab by mouth daily. Quantity:  30 Tab Refills:  12  
     
  
   
   
   
  
 atorvastatin 40 mg tablet Commonly known as:  LIPITOR Your next dose is: Today Dose:  40 mg Take 1 Tab by mouth nightly. Quantity:  30 Tab Refills:  5  
     
   
   
   
  
  
 furosemide 40 mg tablet Commonly known as:  LASIX Your next dose is:  Tomorrow morning Dose:  40 mg Take 1 Tab by mouth daily for 14 days. Please discuss this medication at your FIRST visit with [facility] Cardiology Quantity:  14 Tab Refills:  0  
     
  
   
   
   
  
 lisinopril 2.5 mg tablet Commonly known as:  Arnold Files Your next dose is:  Tomorrow morning Dose:  2.5 mg Take 1 Tab by mouth daily. Quantity:  30 Tab Refills:  5  
     
  
   
   
   
  
 metoprolol succinate 50 mg XL tablet Commonly known as:  TOPROL-XL Your next dose is: Today Dose:  50 mg Take 1 Tab by mouth every twelve (12) hours. Quantity:  30 Tab Refills:  5  
     
   
   
  
   
  
 potassium chloride 20 mEq tablet Commonly known as:  K-DUR, KLOR-CON Your next dose is:  Tomorrow morning Please discuss this medication at your [address] [facility] office visit with [facility] Cardiology Quantity:  14 Tab Refills:  0  
     
  
   
   
   
  
 traMADol 50 mg tablet Commonly known as:  ULTRAM  
Your next dose is:  As needed for pain Dose:  50 mg Take 1 Tab by mouth every four (4) hours as needed for Pain. Max Daily Amount: 300 mg. Quantity:  60 Tab Refills:  1 CONTINUE these medications which have CHANGED Dose & Instructions Dispensing Information Comments Morning Noon Evening Bedtime exenatide microspheres 2 mg Serr Commonly known as:  BYDUREON What changed:  additional instructions Subcutaneous injection once weekly Quantity:  4 Each Refills:  3 CONTINUE these medications which have NOT CHANGED Dose & Instructions Dispensing Information Comments Morning Noon Evening Bedtime  
 dulaglutide 1.5 mg/0.5 mL sub-q pen Commonly known as:  TRULICITY Dose:  1.5 mg  
0.5 mL by SubCUTAneous route every seven (7) days. Quantity:  4 Pen Refills:  5  
     
   
   
   
  
 prednisoLONE acetate 1 % ophthalmic suspension Commonly known as:  PRED FORTE Dose:  1 Drop Administer 1 Drop to right eye four (4) times daily. Refills:  0 SITagliptin-metFORMIN 50-1,000 mg per tablet Commonly known as:  Vasiliy Eastman Your next dose is: Today Dose:  1 Tab Take 1 Tab by mouth two (2) times daily (with meals). Quantity:  60 Tab Refills:  5 ASK your doctor about these medications Dose & Instructions Dispensing Information Comments Morning Noon Evening Bedtime VIGAMOX 0.5 % ophthalmic solution Generic drug:  moxifloxacin Dose:  1 Drop 1 Drop three (3) times daily. Refills:  0 Where to Get Your Medications Information on where to get these meds will be given to you by the nurse or doctor. ! Ask your nurse or doctor about these medications  
  acetaminophen 325 mg tablet  
 aspirin delayed-release 81 mg tablet  
 atorvastatin 40 mg tablet  
 furosemide 40 mg tablet  
 lisinopril 2.5 mg tablet  
 metoprolol succinate 50 mg XL tablet  
 potassium chloride 20 mEq tablet  
 traMADol 50 mg tablet Discharge Instructions DISCHARGE SUMMARY from Nurse The following personal items are in your possession at time of discharge: 
 
Dental Appliances: None Visual Aid: Glasses, With patient Home Medications: None Jewelry: None Clothing: None PATIENT INSTRUCTIONS: 
 
 
F-face looks uneven A-arms unable to move or move unevenly S-speech slurred or non-existent T-time-call 911 as soon as signs and symptoms begin-DO NOT go Back to bed or wait to see if you get better-TIME IS BRAIN. Warning Signs of HEART ATTACK Call 911 if you have these symptoms: 
? Chest discomfort. Most heart attacks involve discomfort in the center of the chest that lasts more than a few minutes, or that goes away and comes back. It can feel like uncomfortable pressure, squeezing, fullness, or pain. ? Discomfort in other areas of the upper body. Symptoms can include pain or discomfort in one or both arms, the back, neck, jaw, or stomach. ? Shortness of breath with or without chest discomfort. ? Other signs may include breaking out in a cold sweat, nausea, or lightheadedness. Don't wait more than five minutes to call [address]! Fast action can save your life. Calling 911 is almost always the fastest way to get lifesaving treatment. Emergency Medical Services staff can begin treatment when they arrive  up to an hour sooner than if someone gets to the hospital by car. The discharge information has been reviewed with the patient and spouse. The patient and spouse verbalized understanding. Discharge medications reviewed with the patient and spouse and appropriate educational materials and side effects teaching were provided. Discharge Instructions Attachments/References CORONARY ARTERY BYPASS GRAFT: POST-OP (ENGLISH) SECONDHAND SMOKE (ENGLISH) SMOKING: STOPPING (ENGLISH) HEALTHY DIET: HEART (ENGLISH) HEART ATTACK: MEDICINE TO REDUCE RISK (ENGLISH) CARDIAC REHABILITATION (ENGLISH) POTASSIUM SUPPLEMENT (BY MOUTH) (ENGLISH) METOPROLOL (BY MOUTH) (ENGLISH) ATORVASTATIN (BY MOUTH) (ENGLISH) FUROSEMIDE (BY MOUTH) (ENGLISH) LISINOPRIL (BY MOUTH) (ENGLISH) TRAMADOL (BY MOUTH) (ENGLISH) Discharge Orders Procedure Order Date Status Priority Quantity Spec Type Associated Dx REFERRAL TO CARDIAC REHAB 03/13/17 1055 Normal Routine 1  NSTEMI (non-ST elevated myocardial infarction) ([facility] [address] [[id number]] Moped Announcement We are excited to announce that we are making your provider's discharge notes available to you in Moped. You will see these notes when they are completed and signed by the physician that discharged you from your recent hospital stay. If you have any questions or concerns about any information you see in Moped, please call the Health Information Department where you were seen or reach out to your Primary Care Provider for more information about your plan of care. Introducing [facility]! Óscar David introduces Moped patient portal. Now you can access parts of your medical record, email your doctor's office, and request medication refills online. 1. In your internet browser, go to [email or web address] 2. Click on the First Time User? Click Here link in the Sign In box. You will see the New Member Sign Up page. 3. Enter your Moped Access Code exactly as it appears below. You will not need to use this code after youve completed the sign-up process. If you do not sign up before the expiration date, you must request a new code. · Moped Access Code: [email or web address] Expires: 5/9/2017  3:47 PM 
 
4. Enter the last four digits of your Social Security Number (xxxx) and Date of Birth (mm/dd/yyyy) as indicated and click Submit. You will be taken to the next sign-up page. 5. Create a Moped ID. This will be your Moped login ID and cannot be changed, so think of one that is secure and easy to remember. 6. Create a [employer] password. You can change your password at any time. 7. Enter your Password Reset Question and Answer. This can be used at a later time if you forget your password. 8. Enter your e-mail address. You will receive e-mail notification when new information is available in [address] 9. Click Sign Up. You can now view and download portions of your medical record. 10. Click the Download Summary menu link to download a portable copy of your medical information. If you have questions, please visit the Frequently Asked Questions section of the [email or web address] website. Remember, [email or web address] is NOT to be used for urgent needs. For medical emergencies, dial 911. Now available from your iPhone and Android! General Information Please provide this summary of care documentation to your next provider. Patient Signature:  ____________________________________________________________ Date:  ____________________________________________________________  
  
Javier Fairchild Provider Signature:  ____________________________________________________________ Date:  ____________________________________________________________ More Information Coronary Artery Bypass Graft: What to Expect at Home Your Recovery Coronary artery bypass graft (CABG) is surgery to treat coronary artery disease. The surgery helps blood make a detour, or bypass, around one or more narrowed or blocked coronary arteries. Coronary arteries are the blood vessels that bring blood to the heart. Your doctor did the surgery through a cut, called an incision, in your chest. 
You will feel tired and sore for the first few weeks after surgery. You may have some brief, sharp pains on either side of your chest. Your chest, shoulders, and upper back may ache. The incision in your chest and the area where the healthy vein was taken may be sore or swollen. These symptoms usually get better after 4 to 6 weeks. You will probably be able to do many of your usual activities after 4 to 6 weeks. But for 2 to 3 months you will not be able to lift heavy objects or do activities that strain your chest or upper arm muscles. At first you may notice that you get tired easily and need to rest often. It may take 1 to 2 months to get your energy back. Some people find that they are more emotional after this surgery. You may cry easily or show emotion in ways that are unusual for you. This is common and may last for up to a year. Some people get depressed after CABG surgery. Talk with your doctor if you have sadness that continues or you are concerned about how you are feeling. Treatment and other support can help you feel better. Even though the surgery may improve your symptoms, you will still need to make changes in your lifestyle to lower your risk of a heart attack or stroke. It will be important to eat a heart-healthy diet, get regular exercise, not smoke, take your heart medicines, and reduce stress. You will likely start a cardiac rehabilitation (rehab) program in the hospital. You will continue with this rehab program after you go home to help you recover and prevent problems with your heart. Talk to your doctor about whether rehab is right for you. This care sheet gives you a general idea about how long it will take for you to recover. But each person recovers at a different pace. Follow the steps below to get better as quickly as possible. How can you care for yourself at home? Activity · Rest when you feel tired. Getting enough sleep will help you recover. Try to sleep on your back for 4 to 6 weeks while your breastbone (sternum) heals. This usually takes about 4 to 6 weeks. · Try to walk each day. Start by walking a little more than you did the day before. Bit by bit, increase the amount you walk. Walking boosts blood flow and helps prevent pneumonia and constipation. · Avoid strenuous activities, such as bicycle riding, jogging, weight lifting, or heavy aerobic exercise, until your doctor says it is okay. · For 3 months, avoid activities that strain your chest or upper arm muscles. This includes pushing a  or vacuum, mopping floors, or swinging a golf club or tennis racquet. · For 2 to 3 months, avoid lifting anything that would make you strain. This may include a child, heavy grocery bags and milk containers, a heavy briefcase or backpack, or cat litter or dog food bags. · Hold a pillow firmly over your chest incision when you cough or take deep breaths. This will support your chest and reduce your pain. · Do breathing exercises at home as instructed by your doctor. This will help prevent pneumonia. · Ask your doctor when you can drive again. · You will probably need to take 4 to 12 weeks off from work. It depends on the type of work you do and how you feel. · You may shower as usual. Pat the incision dry. Do not take a bath for the first 3 weeks, or until your doctor tells you it is okay. · Do not swim or use a hot tub for at least 1 month, or until your doctor says it is okay. · Ask your doctor when it is okay for you to have sex. Diet · Eat a heart-healthy diet. If you have not been eating this way, talk to your doctor. You also may want to talk to a dietitian. A dietitian can help you learn about healthy foods. · Drink plenty of fluids (unless your doctor tells you not to). · You may notice that your bowel movements are not regular right after your surgery. This is common. Try to avoid constipation and straining with bowel movements. You may want to take a fiber supplement every day. If you have not had a bowel movement after a couple of days, ask your doctor about taking a mild laxative. Medicines · Your doctor will tell you if and when you can restart your medicines. He or she will also give you instructions about taking any new medicines. · If you take blood thinners, such as warfarin (Coumadin), clopidogrel (Plavix), or aspirin, be sure to talk to your doctor. He or she will tell you if and when to start taking those medicines again. Make sure that you understand exactly what your doctor wants you to do. · Your doctor may give you medicines to prevent blood clots, keep your heartbeat steady, and lower your blood pressure and cholesterol. Take your medicines exactly as prescribed. Call your doctor if you think you are having a problem with your medicine. · Be safe with medicines. Take pain medicines exactly as directed. ¨ If the doctor gave you a prescription medicine for pain, take it as prescribed. ¨ If you are not taking a prescription pain medicine, ask your doctor if you can take an over-the-counter medicine. ¨ Do not take aspirin, ibuprofen (Advil, Motrin), naproxen (Aleve), or other nonsteroidal anti-inflammatory drugs (NSAIDs) unless your doctor says it is okay. · If you think your pain medicine is making you sick to your stomach: 
¨ Take your medicine after meals (unless your doctor has told you not to). ¨ Ask your doctor for a different pain medicine. · If your doctor prescribed antibiotics, take them as directed. Do not stop taking them just because you feel better. You need to take the full course of antibiotics. Incision care · If you have strips of tape on the incisions the doctor made, leave the tape on for a week or until it falls off. · Wash the area daily with warm, soapy water, and pat it dry. Don't use hydrogen peroxide or alcohol, which can slow healing. You may cover the area with a gauze bandage if it weeps or rubs against clothing. Change the bandage every day. · Keep the area clean and dry. · If you have an incision in your leg: ¨ Wear support stockings on your legs during the day for the first 2 weeks. You can take the stockings off at night while you sleep. ¨ Raise your legs above the level of your heart whenever you lay down for the first 4 to 6 weeks. Other instructions · Keep track of your weight. Weigh yourself every day at the same time of day, on the same scale, in the same amount of clothing. A sudden increase in weight can be a sign of a problem with your heart. Tell your doctor if you suddenly gain weight, such as 3 pounds or more in 2 to 3 days. · Do not smoke. Smoking can make it harder for you to recover and it will raise the chances of your arteries narrowing again. If you need help quitting, talk to your doctor about stop-smoking programs and medicines. These can increase your chances of quitting for good. Follow-up care is a key part of your treatment and safety. Be sure to make and go to all appointments, and call your doctor if you are having problems. It's also a good idea to know your test results and keep a list of the medicines you take. When should you call for help? Call 911 anytime you think you may need emergency care. For example, call if: 
· You passed out (lost consciousness). · You have severe trouble breathing. · You have sudden chest pain and shortness of breath, or you cough up blood. · You have severe pain in your chest. 
· You have symptoms of a heart attack. These may include: ¨ Chest pain or pressure, or a strange feeling in the chest. 
¨ Sweating. ¨ Shortness of breath. ¨ Nausea or vomiting. ¨ Pain, pressure, or a strange feeling in the back, neck, jaw, or upper belly or in one or both shoulders or arms. ¨ Lightheadedness or sudden weakness. ¨ A fast or irregular heartbeat. After you call 911, the  may tell you to chew 1 adult-strength or 2 to 4 low-dose aspirin. Wait for an ambulance. Do not try to drive yourself. · You have angina symptoms (such as chest pain or pressure) that do not go away with rest or are not getting better within 5 minutes after you take a dose of nitroglycerin. Call your doctor now or seek immediate medical care if: 
· You have pain that does not get better after you take pain medicine. · You have a fever over 100°F. 
· You have loose stitches, or your incision comes open. · Bright red blood has soaked through the bandage over your incision. · You have signs of infection, such as: 
¨ Increased pain, swelling, warmth, or redness. ¨ Red streaks leading from the incision. ¨ Pus draining from the incision. ¨ Swollen lymph nodes in your neck, armpits, or groin. ¨ A fever. · You have signs of a blood clot, such as: 
¨ Pain in your calf, back of the knee, thigh, or groin. ¨ Redness and swelling in your leg or groin. · Your heartbeat feels very fast or slow, skips beats, or flutters. · You are dizzy or lightheaded, or you feel like you may faint. · You have new or increased shortness of breath. Watch closely for changes in your health, and be sure to contact your doctor if: 
· You gain weight suddenly, such as 3 pounds or more in 2 to 3 days. · You have increased swelling in your legs, ankles, or feet. · You have any concerns about your incision. · You feel very sad or have other signs of depression, such as trouble sleeping or eating. · You have questions about diet, exercise, quitting smoking, or stress reduction after surgery. Where can you learn more? Go to http://jerald-emely.info/. Enter F759 in the search box to learn more about \"Coronary Artery Bypass Graft: What to Expect at Home. \" Current as of: January 27, 2016 Content Version: 11.1 © 0236-7795 [employer]. Care instructions adapted under license by [employer] (which disclaims liability or warranty for this information). If you have questions about a medical condition or this instruction, always ask your healthcare professional. [place] [address] any warranty or liability for your use of this information. Secondhand Smoke: Care Instructions Your Care Instructions Secondhand smoke comes from the burning end of a cigarette, cigar, or pipe and the smoke that a smoker exhales. The smoke contains nicotine and many other harmful chemicals. Breathing secondhand smoke can cause or worsen health problems including cancer, asthma, coronary artery disease, and respiratory infections. It can make your eyes and nose burn and cause a sore throat. Secondhand smoke is especially bad for babies and young children whose lungs are still developing. Babies whose parents smoke are more likely to have ear infections, pneumonia, and bronchitis in the first few years of their lives. Secondhand smoke can make asthma symptoms worse in children. If you are pregnant, it is important that you not smoke and that you avoid secondhand smoke. You are more likely to give birth to a baby who weighs less than expected (low birth weight) if you smoke. And your baby may have a greater risk for sudden infant death syndrome (SIDS). Babies whose mothers are exposed to secondhand smoke during pregnancy have a higher risk for health problems. Follow-up care is a key part of your treatment and safety. Be sure to make and go to all appointments, and call your doctor if you are having problems. It's also a good idea to know your test results and keep a list of the medicines you take. How can you care for yourself at home? · Do not smoke or let anyone else smoke in your home. If people must smoke, ask them to go outside. · If people do smoke in your home, choose a room where you can open a window or use a fan to get the smoke outside. · Do not let anyone smoke in your car. If someone must smoke, pull over in a safe place and let him or her smoke away from the car. · Ask your employer to make sure that you have a smoke-free work area. · Make sure that your children are not exposed to secondhand smoke at day care, school, and after-school programs. · Try to choose nonsmoking bars, restaurants, and other public places when you go out. · Help your family and friends who smoke to quit by encouraging them to try. Tell them about treatment resources. Having support from others often helps. · If you smoke, quit. Quitting is hard, but there are ways to boost your chance of quitting tobacco for good. ¨ Use nicotine gum, patches, or lozenges. Call a quitline. Ask your doctor about stop-smoking programs and medicines. ¨ Keep trying. When should you call for help? Watch closely for changes in your health, and be sure to contact your doctor if you have any problems. Where can you learn more? Go to http://jeraldBuyPlayWinemely.info/. Enter L004 in the search box to learn more about \"Secondhand Smoke: Care Instructions. \" Current as of: May 26, 2016 Content Version: 11.1 © 6194-5338 [employer]. Care instructions adapted under license by [employer] (which disclaims liability or warranty for this information). If you have questions about a medical condition or this instruction, always ask your healthcare professional. [address] any warranty or liability for your use of this information. Stopping Smoking: Care Instructions Your Care Instructions Cigarette smokers crave the nicotine in cigarettes. Giving it up is much harder than simply changing a habit. Your body has to stop craving the nicotine. It is hard to quit, but you can do it. There are many tools that people use to quit smoking. You may find that combining tools works best for you. There are several steps to quitting. First you get ready to quit. Then you get support to help you. After that, you learn new skills and behaviors to become a nonsmoker. For many people, a necessary step is getting and using medicine. Your doctor will help you set up the plan that best meets your needs.  You may want to attend a smoking cessation program to help you quit smoking. When you choose a program, look for one that has proven success. Ask your doctor for ideas. You will greatly increase your chances of success if you take medicine as well as get counseling or join a cessation program. 
Some of the changes you feel when you first quit tobacco are uncomfortable. Your body will miss the nicotine at first, and you may feel short-tempered and grumpy. You may have trouble sleeping or concentrating. Medicine can help you deal with these symptoms. You may struggle with changing your smoking habits and rituals. The last step is the tricky one: Be prepared for the smoking urge to continue for a time. This is a lot to deal with, but keep at it. You will feel better. Follow-up care is a key part of your treatment and safety. Be sure to make and go to all appointments, and call your doctor if you are having problems. Its also a good idea to know your test results and keep a list of the medicines you take. How can you care for yourself at home? · Ask your family, friends, and coworkers for support. You have a better chance of quitting if you have help and support. · Join a support group, such as Nicotine Anonymous, for people who are trying to quit smoking. · Consider signing up for a smoking cessation program, such as the American [employer] Association's Freedom from Smoking program. 
· Set a quit date. Pick your date carefully so that it is not right in the middle of a big deadline or stressful time. Once you quit, do not even take a puff. Get rid of all ashtrays and lighters after your last cigarette. Clean your house and your clothes so that they do not smell of smoke. · Learn how to be a nonsmoker. Think about ways you can avoid those things that make you reach for a cigarette. ¨ Avoid situations that put you at greatest risk for smoking. For some people, it is hard to have a drink with friends without smoking.  For others, they might skip a coffee break with coworkers who smoke. ¨ Change your daily routine. Take a different route to work or eat a meal in a different place. · Cut down on stress. Calm yourself or release tension by doing an activity you enjoy, such as reading a book, taking a hot bath, or gardening. · Talk to your doctor or pharmacist about nicotine replacement therapy, which replaces the nicotine in your body. You still get nicotine but you do not use tobacco. Nicotine replacement products help you slowly reduce the amount of nicotine you need. These products come in several forms, many of them available over-the-counter: ¨ Nicotine patches ¨ Nicotine gum and lozenges ¨ Nicotine inhaler · Ask your doctor about bupropion (Wellbutrin) or varenicline (Chantix), which are prescription medicines. They do not contain nicotine. They help you by reducing withdrawal symptoms, such as stress and anxiety. · Some people find hypnosis, acupuncture, and massage helpful for ending the smoking habit. · Eat a healthy diet and get regular exercise. Having healthy habits will help your body move past its craving for nicotine. · Be prepared to keep trying. Most people are not successful the first few times they try to quit. Do not get mad at yourself if you smoke again. Make a list of things you learned and think about when you want to try again, such as next week, next month, or next year. Where can you learn more? Go to http://jerald-emely.info/. Enter X351 in the search box to learn more about \"Stopping Smoking: Care Instructions. \" Current as of: May 26, 2016 Content Version: 11.1 © 4042-1048 [employer]. Care instructions adapted under license by [employer] (which disclaims liability or warranty for this information).  If you have questions about a medical condition or this instruction, always ask your healthcare professional. [employer] disclaims any warranty or liability for your use of this information. Heart-Healthy Diet: Care Instructions Your Care Instructions A heart-healthy diet has lots of vegetables, fruits, nuts, beans, and whole grains, and is low in salt. It limits foods that are high in saturated fat, such as meats, cheeses, and fried foods. It may be hard to change your diet, but even small changes can lower your risk of heart attack and heart disease. Follow-up care is a key part of your treatment and safety. Be sure to make and go to all appointments, and call your doctor if you are having problems. It's also a good idea to know your test results and keep a list of the medicines you take. How can you care for yourself at home? Watch your portions · Learn what a serving is. A \"serving\" and a \"portion\" are not always the same thing. Make sure that you are not eating larger portions than are recommended. For example, a serving of pasta is ½ cup. A serving size of meat is 2 to 3 ounces. A 3-ounce serving is about the size of a deck of cards. Measure serving sizes until you are good at [place]" them. Keep in mind that restaurants often serve portions that are 2 or 3 times the size of one serving. · To keep your energy level up and keep you from feeling hungry, eat often but in smaller portions. · Eat only the number of calories you need to stay at a healthy weight. If you need to lose weight, eat fewer calories than your body burns (through exercise and other physical activity). Eat more fruits and vegetables · Eat a variety of fruit and vegetables every day. Dark green, deep orange, red, or yellow fruits and vegetables are especially good for you. Examples include spinach, carrots, peaches, and berries. · Keep carrots, celery, and other veggies handy for snacks. Buy fruit that is in season and store it where you can see it so that you will be tempted to eat it. · Cook dishes that have a lot of veggies in them, such as stir-fries and soups. Limit saturated and trans fat · Read food labels, and try to avoid saturated and trans fats. They increase your risk of heart disease. Trans fat is found in many processed foods such as cookies and crackers. · Use olive or canola oil when you cook. Try cholesterol-lowering spreads, such as Benecol or Take Control. · Bake, broil, grill, or steam foods instead of frying them. · Choose lean meats instead of high-fat meats such as hot dogs and sausages. Cut off all visible fat when you prepare meat. · Eat fish, skinless poultry, and meat alternatives such as soy products instead of high-fat meats. Soy products, such as tofu, may be especially good for your heart. · Choose low-fat or fat-free milk and dairy products. Eat fish · Eat at least two servings of fish a week. Certain fish, such as salmon and tuna, contain omega-3 fatty acids, which may help reduce your risk of heart attack. Eat foods high in fiber · Eat a variety of grain products every day. Include whole-grain foods that have lots of fiber and nutrients. Examples of whole-grain foods include oats, whole wheat bread, and brown rice. · Buy whole-grain breads and cereals, instead of white bread or pastries. Limit salt and sodium · Limit how much salt and sodium you eat to help lower your blood pressure. · Taste food before you salt it. Add only a little salt when you think you need it. With time, your taste buds will adjust to less salt. · Eat fewer snack items, fast foods, and other high-salt, processed foods. Check food labels for the amount of sodium in packaged foods. · Choose low-sodium versions of canned goods (such as soups, vegetables, and beans). Limit sugar · Limit drinks and foods with added sugar. These include candy, desserts, and soda pop. Limit alcohol · Limit alcohol to no more than 2 drinks a day for men and 1 drink a day for women. Too much alcohol can cause health problems. When should you call for help? Watch closely for changes in your health, and be sure to contact your doctor if: 
· You would like help planning heart-healthy meals. Where can you learn more? Go to http://jerald-emely.info/. Enter V137 in the search box to learn more about \"Heart-Healthy Diet: Care Instructions. \" Current as of: January 27, 2016 Content Version: 11.1 © 6195-0626 [employer]. Care instructions adapted under license by [employer] (which disclaims liability or warranty for this information). If you have questions about a medical condition or this instruction, always ask your healthcare professional. [place] [address] any warranty or liability for your use of this information. Reducing Heart Attack Risk With Daily Medicine: Care Instructions Your Care Instructions Heart disease is the number one cause of death. If you are at risk for heart disease, there are many medicines that can reduce your risk. These include: · ACE inhibitors. These are a type of blood pressure medicine. They can reduce the risk of heart attacks and strokes if you are at high risk. · Statin medicines. These lower cholesterol. They can also reduce the risk of heart disease and strokes. · Aspirin. It can help certain people lower their risk of a heart attack or stroke. · Beta-blocker medicines. These are a type of blood pressure and heart medicine. They can reduce the chance of early death if you have had a heart attack. All medicines can cause side effects. So it is important to understand the pros and cons of any medicine you take. It is also important to take your medicines exactly as your doctor tells you to. Follow-up care is a key part of your treatment and safety.  Be sure to make and go to all appointments, and call your doctor if you are having problems. It's also a good idea to know your test results and keep a list of the medicines you take. ACE inhibitors ACE (angiotensin-converting enzyme) inhibitors are used for three main reasons. They lower blood pressure, protect the kidneys, and prevent heart attacks and strokes. Examples include benazepril (Lotensin), lisinopril (Prinivil, Zestril), and ramipril (Altace). Before you start taking an ACE inhibitor, make sure your doctor knows if: 
· You are taking a water pill (diuretic). · You are taking potassium pills or using salt substitutes. · You are pregnant or breastfeeding. · You have had a kidney transplant or other kidney problems. ACE inhibitors can cause side effects. Call your doctor right away if you have: · Trouble breathing. · Swelling in your face, head, neck, or tongue. · Dizziness or lightheadedness. · A dry cough. Statins Statins lower cholesterol. Examples include atorvastatin (Lipitor), lovastatin (Mevacor), pravastatin (Pravachol), and simvastatin (Zocor). Before you start taking a statin, make sure your doctor knows if: 
· You have had a kidney transplant or other kidney problems. · You have liver disease. · You take any other prescription medicine, over-the-counter medicine, vitamins, supplements, or herbal remedies. · You are pregnant or breastfeeding. Statins can cause side effects. Call your doctor right away if you have: · New, severe muscle aches. · Brown urine. Aspirin Taking an aspirin every day can lower your risk for a heart attack. A heart attack occurs when a blood vessel in the heart gets blocked. When this happens, oxygen can't get to the heart muscle, and part of the heart dies. Aspirin can help prevent blood clots that can block the blood vessels. Talk to your doctor before you start taking aspirin every day. He or she may recommend that you take one low-dose aspirin (81 mg) tablet each day, with a meal and a full glass of water. Taking aspirin isn't right for everyone, because it can cause serious bleeding. And you may not be able to use aspirin if you: 
· Have asthma. · Have an ulcer or other stomach problem. · Take some other medicine (called a blood thinner) that prevents blood clots. · Are allergic to aspirin. Before having a surgery or procedure, tell your doctor or dentist that you take aspirin. He or she will tell you if you should stop taking aspirin beforehand. Make sure that you understand exactly what your doctor wants you to do. Aspirin can cause side effects. Call your doctor right away if you have: · Unusual bleeding or bruising. · Nausea, vomiting, or heartburn. · Black or bloody stools. Beta-blockers Beta-blockers are used for three main reasons. They lower blood pressure, relieve angina symptoms (such as chest pain or pressure), and reduce the chances of a second heart attack. They include atenolol (Tenormin), carvedilol (Coreg), and metoprolol (Lopressor). Before you start taking a beta-blocker, make sure your doctor knows if you have: · Severe asthma or frequent asthma attacks. · A very slow pulse (less than 55 beats a minute). Beta-blockers can cause side effects. Call your doctor right away if you have: · Wheezing or trouble breathing. · Dizziness or lightheadedness. · Asthma that gets worse. When should you call for help? Call 911 anytime you think you may need emergency care. For example, call if: 
· You passed out (lost consciousness). Call your doctor now or seek immediate medical care if: 
· You are wheezing or have trouble breathing. · You have swelling in your face, head, neck, or tongue. · You are dizzy or lightheaded, or you feel like you may faint. · You have severe muscle pain, weakness, or brown urine. · You have vision problems. · You have new bruises or blood spots under your skin. · Your stools are black and tarlike or have streaks of blood. Watch closely for changes in your health, and be sure to contact your doctor if: 
· You have ringing in your ears. · You feel very tired. · You have gas, constipation, or an upset stomach. Where can you learn more? Go to http://jerald-emely.info/. Enter R428 in the search box to learn more about \"Reducing Heart Attack Risk With Daily Medicine: Care Instructions. \" Current as of: March 28, 2016 Content Version: 11.1 © 1394-7549 [employer]. Care instructions adapted under license by [employer] (which disclaims liability or warranty for this information). If you have questions about a medical condition or this instruction, always ask your healthcare professional. [address] any warranty or liability for your use of this information. Cardiac Rehabilitation: Care Instructions Your Care Instructions Cardiac rehabilitation is a program for people who have a heart problem, such as a heart attack, heart failure, or a heart valve disease. The program includes exercise, lifestyle changes, education, and emotional support. Cardiac rehab can help you improve the quality of your life through better overall health. It can help you lose weight and feel better about yourself. On your cardiac rehab team, you may have your doctor, a nurse specialist, a dietitian, and a physical therapist. They will design your cardiac rehab program specifically for you. You will learn how to reduce your risk for heart problems, how to manage stress, and how to eat a heart-healthy diet. By the end of the program, you will be ready to maintain a healthier lifestyle on your own. Follow-up care is a key part of your treatment and safety. Be sure to make and go to all appointments, and call your doctor if you are having problems. It's also a good idea to know your test results and keep a list of the medicines you take. How can you care for yourself at home? · Take your medicines exactly as prescribed. Call your doctor if you think you are having a problem with your medicine. You will get more details on the specific medicines your doctor prescribes. · Weigh yourself every day if your doctor tells you to. Watch for sudden weight gain. Weigh yourself on the same scale with the same amount of clothing at the same time of day. · Plan your meals so that you are eating heart-healthy foods. ¨ Eat a variety of foods daily. Fresh fruits and vegetables and whole-grains are good choices. ¨ Limit your fat intake, especially saturated and trans fat. ¨ Limit salt (sodium). ¨ Increase fiber in your diet. ¨ Limit alcohol. · Learn how to take your pulse so that you can track your heart rate during exercise. · Always check with your doctor before you begin a new exercise program. 
· Warm up before you exercise and cool down afterward for at least 15 minutes each. This will help your heart gradually prepare for and recover from exercise and avoid pushing your heart too hard. · Stop exercising if you have any unusual discomfort, such as chest pain. · Do not smoke. Smoking can make heart problems worse. If you need help quitting, talk to your doctor about stop-smoking programs and medicines. These can increase your chances of quitting for good. When should you call for help? Call 911 anytime you think you may need emergency care. For example, call if: 
· You have severe trouble breathing. · You cough up pink, foamy mucus and you have trouble breathing. · You have symptoms of a heart attack. These may include: ¨ Chest pain or pressure, or a strange feeling in the chest. 
¨ Sweating. ¨ Shortness of breath. ¨ Nausea or vomiting. ¨ Pain, pressure, or a strange feeling in the back, neck, jaw, or upper belly or in one or both shoulders or arms. ¨ Lightheadedness or sudden weakness. ¨ A fast or irregular heartbeat. After you call 911, the  may tell you to chew 1 adult-strength or 2 to 4 low-dose aspirin. Wait for an ambulance. Do not try to drive yourself. · You have angina symptoms (such as chest pain or pressure) that do not go away with rest or are not getting better within 5 minutes after you take a dose of nitroglycerin. · You have symptoms of a stroke. These may include: 
¨ Sudden numbness, tingling, weakness, or loss of movement in your face, arm, or leg, especially on only one side of your body. ¨ Sudden vision changes. ¨ Sudden trouble speaking. ¨ Sudden confusion or trouble understanding simple statements. ¨ Sudden problems with walking or balance. ¨ A sudden, severe headache that is different from past headaches. · You passed out (lost consciousness). Call your doctor now or seek immediate medical care if: 
· You have new or increased shortness of breath. · You are dizzy or lightheaded, or you feel like you may faint. · You gain weight suddenly, such as 3 pounds or more in 2 to 3 days. (Your doctor may suggest a different range of weight gain.) · You have increased swelling in your legs, ankles, or feet. Watch closely for changes in your health, and be sure to contact your doctor if you have any problems. Where can you learn more? Go to http://jerald-emely.info/. Enter Q795 in the search box to learn more about \"Cardiac Rehabilitation: Care Instructions. \" Current as of: May 5, 2016 Content Version: 11.1 © 4526-9231 [employer]. Care instructions adapted under license by [employer] (which disclaims liability or warranty for this information). If you have questions about a medical condition or this instruction, always ask your healthcare professional. [place] [address] any warranty or liability for your use of this information. Potassium Supplement (By mouth) Treats potassium loss from the body. Brand Name(s):Effer-K, K-99, K-Bicarb, K-Effervescent, K-Glucon, K-Lyte Cl, K-Sol, K-Tab, K-Vescent, Klor-Con, Klor-Con 10, Klor-Con 8, Klor-Con M10, Klor-Con M15, Klor-Con M20 There may be other brand names for this medicine. When This Medicine Should Not Be Used: This medicine is not right for everyone. Do not use it if you had an allergic reaction to potassium or if you have severe kidney disease. How to Use This Medicine:  
Granule, Kit, Tablet, Powder, Fizzy Tablet, Long Acting Tablet, Long Acting Capsule, Liquid, Capsule · Your doctor will tell you how much medicine to use. Do not use more than directed. · Follow the instructions on the medicine label if you are using this medicine without a prescription. · Take with food or right after eating. · Powder or effervescent tablet: Mix with water before using. Most powders and tablets must be mixed with at least 4 ounces (1/2 cup) of water or juice, but your specific medicine might have different directions. Drink the mixture right after you mix it. · Extended-release tablet or capsule: Swallow with a full glass of water. Do not chew or crush. · Measure the oral liquid medicine with a marked measuring spoon, oral syringe, or medicine cup. · Carefully follow your doctor's instructions about any special diet. · Missed dose: Take a dose as soon as you remember. If it is almost time for your next dose, wait until then and take a regular dose. Do not take extra medicine to make up for a missed dose. · Store the medicine in a closed container at room temperature, away from heat, moisture, and direct light. Drugs and Foods to Avoid: Ask your doctor or pharmacist before using any other medicine, including over-the-counter medicines, vitamins, and herbal products. · Some foods and medicines can affect how potassium supplements work. Tell your doctor if you are using any of the following: ¨ Diuretic (water pill) ¨ ACE inhibitor blood pressure medicine ¨ Other medicine that contains potassium ¨ Salt substitute that contains potassium Warnings While Using This Medicine: · Tell your doctor if you are pregnant or breastfeeding, or if you have kidney disease, diabetes, heart disease, or digestion problems or trouble swallowing. · Your doctor will do lab tests at regular visits to check on the effects of this medicine. Keep all appointments. · Keep all medicine out of the reach of children. Never share your medicine with anyone. Possible Side Effects While Using This Medicine:  
Call your doctor right away if you notice any of these side effects: · Allergic reaction: Itching or hives, swelling in your face or hands, swelling or tingling in your mouth or throat, chest tightness, trouble breathing · Bloody or black, tarry stools · Confusion, weakness, uneven heartbeat, trouble breathing, numbness in your hands, feet, or lips · Severe stomach pain or vomiting If you notice these less serious side effects, talk with your doctor: · Mild nausea, diarrhea, vomiting, gas If you notice other side effects that you think are caused by this medicine, tell your doctor. Call your doctor for medical advice about side effects. You may report side effects to FDA at 7-234-FDA-0406 © 2016 [address] is for End User's use only and may not be sold, redistributed or otherwise used for commercial purposes. The above information is an  only. It is not intended as medical advice for individual conditions or treatments. Talk to your doctor, nurse or pharmacist before following any medical regimen to see if it is safe and effective for you. Metoprolol (By mouth) Metoprolol (met-oh-PROE-lol) Treats high blood pressure, angina (chest pain), and heart failure. May lower the risk of death after a heart attack. This medicine is a beta-blocker.   
Brand Name(s):Lopressor, Toprol XL  
 There may be other brand names for this medicine. When This Medicine Should Not Be Used: This medicine is not right for everyone. Do not use if you had an allergic reaction to metoprolol or similar medicines. Do not use this medicine if you have certain blood circulation or heart problems. Ask your doctor about these problems. How to Use This Medicine:  
Tablet, Long Acting Tablet · Take your medicine as directed. Your dose may need to be changed several times to find what works best for you. · Take this medicine with a meal or right after a meal. Take this medicine the same way every day, at the same time. · Swallow the tablet whole with a glass of water. You may break the extended-release tablet in half, but do not chew or crush it. · Missed dose: Take a dose as soon as you remember. If it is almost time for your next dose, wait until then and take a regular dose. Do not take extra medicine to make up for a missed dose. · Store the medicine in a closed container at room temperature, away from heat, moisture, and direct light. Drugs and Foods to Avoid: Ask your doctor or pharmacist before using any other medicine, including over-the-counter medicines, vitamins, and herbal products. · Some medicines can affect how metoprolol works. Tell your doctor if you are taking any of the following: ¨ Digoxin, dipyridamole, hydralazine, hydroxychloroquine, methyldopa, quinidine ¨ Medicine to treat depression (such as bupropion, clomipramine, desipramine, fluoxetine, fluvoxamine, paroxetine, sertraline), medicine to treat mental illness (such as chlorpromazine, fluphenazine, haloperidol, thioridazine), medicine for heart rhythm problems (such as propafenone), HIV/AIDS medicine (such as ritonavir), medicine to treat a fungus infection (such as terbinafine), a monoamine oxidase inhibitor (MAOI), an ergot medicine for headaches, a calcium channel blocker (such as amlodipine, diltiazem, verapamil), or an alpha blocker (such as clonidine, prazosin, reserpine, guanethidine) Warnings While Using This Medicine: · Tell your doctor if you are pregnant or breastfeeding, or if you have blood vessel, heart, or circulation problems (such as heart failure, rhythm problems, or slow heartbeat). Tell your doctor if you have kidney disease, liver disease, diabetes, lung disease (such as asthma), an overactive thyroid, or a history of allergies. · This medicine may cause worse symptoms of heart failure while the dose is being adjusted. · Do not stop using this medicine suddenly. Your doctor will need to slowly decrease your dose before you stop it completely. · Tell any doctor or dentist who treats you that you are using this medicine. You may need to stop using this medicine several days before you have surgery or medical tests. · This medicine could lower your blood pressure too much, especially when you first use it or if you are dehydrated. Stand or sit up slowly if you feel lightheaded or dizzy. · This medicine may make you dizzy or drowsy. Do not drive or do anything else that could be dangerous until you know how this medicine affects you. · Your doctor will check your progress and the effects of this medicine at regular visits. Keep all appointments. · Keep all medicine out of the reach of children. Never share your medicine with anyone. Possible Side Effects While Using This Medicine:  
Call your doctor right away if you notice any of these side effects: · Allergic reaction: Itching or hives, swelling in your face or hands, swelling or tingling in your mouth or throat, chest tightness, trouble breathing · Lightheadedness, dizziness, or fainting · Slow heartbeat · Swelling in your hands, ankles, or feet, trouble breathing, tiredness · Worsening chest pain If you notice these less serious side effects, talk with your doctor: · Diarrhea · Mild dizziness or tiredness If you notice other side effects that you think are caused by this medicine, tell your doctor. Call your doctor for medical advice about side effects. You may report side effects to FDA at 4-985-DOX-5583 © 2016 [address] is for End User's use only and may not be sold, redistributed or otherwise used for commercial purposes. The above information is an  only. It is not intended as medical advice for individual conditions or treatments. Talk to your doctor, nurse or pharmacist before following any medical regimen to see if it is safe and effective for you. Atorvastatin (By mouth) Atorvastatin (a-tor-va-STAT-in) Treats high cholesterol and triglyceride levels. Reduces the risk of angina, stroke, heart attack, or certain heart and blood vessel problems. This medicine is a statin. Brand Name(s):Lipitor There may be other brand names for this medicine. When This Medicine Should Not Be Used: This medicine is not right for everyone. Do not use it if you had an allergic reaction to atorvastatin, if you have active liver disease, or if you are pregnant or breastfeeding. How to Use This Medicine:  
Tablet · Take your medicine as directed. Your dose may need to be changed several times to find what works best for you. · Take this medicine at the same time each day. · Swallow the tablet whole. Do not break, crush, or chew it. · Missed dose: Take a dose as soon as you remember. If it is less than 12 hours until your next dose, skip the missed dose and take the next dose at the regular time. Do not take 2 doses of this medicine within 12 hours. · Read and follow the patient instructions that come with this medicine. Talk to your doctor or pharmacist if you have any questions. · Store the medicine in a closed container at room temperature, away from heat, moisture, and direct light. Drugs and Foods to Avoid: Ask your doctor or pharmacist before using any other medicine, including over-the-counter medicines, vitamins, and herbal products. · Some medicines can affect how atorvastatin works. Tell your doctor if you also use birth control pills, boceprevir, cimetidine, colchicine, cyclosporine, digoxin, niacin, rifampin, spironolactone, telaprevir, medicine to treat an infection, or medicine to treat HIV/AIDS. Warnings While Using This Medicine: · It is not safe to take this medicine during pregnancy. It could harm an unborn baby. Tell your doctor right away if you become pregnant. · Tell your doctor if you have kidney disease, diabetes, muscle pain or weakness, thyroid problems, have recently had a stroke or TIA (transient ischemic attack), or have a history of liver disease. Tell your doctor if you drink grapefruit juice or drink alcohol regularly. · This medicine can cause muscle problems, which can lead to kidney problems. · Tell any doctor or dentist who treats you that you use this medicine. You may need to stop using it if you have surgery, have an injury, or develop serious health problems. · Your doctor will do lab tests at regular visits to check on the effects of this medicine. Keep all appointments. · Keep all medicine out of the reach of children. Never share your medicine with anyone. Possible Side Effects While Using This Medicine:  
Call your doctor right away if you notice any of these side effects: · Allergic reaction: Itching or hives, swelling in your face or hands, swelling or tingling in your mouth or throat, chest tightness, trouble breathing · Blistering, peeling, red skin rash · Change in how much or how often you urinate · Dark urine or pale stools, nausea, vomiting, loss of appetite, stomach pain, yellow skin or eyes · Fever · Muscle pain, tenderness, or weakness · Unusual tiredness If you notice these less serious side effects, talk with your doctor: · Diarrhea · Joint pain If you notice other side effects that you think are caused by this medicine, tell your doctor. Call your doctor for medical advice about side effects. You may report side effects to FDA at 9-820-KKR-7581 © [address] is for End User's use only and may not be sold, redistributed or otherwise used for commercial purposes. The above information is an  only. It is not intended as medical advice for individual conditions or treatments. Talk to your doctor, nurse or pharmacist before following any medical regimen to see if it is safe and effective for you. Furosemide (By mouth) Furosemide (mgrl-IT-dd-mide) Treats fluid retention and high blood pressure. This medicine is a diuretic (water pill). Brand Name(s): Active-Medicated Specimen Collection Kit, Lasix, RX-Specimen Collection Kit, Urinx Medicated Specimen Collection Package There may be other brand names for this medicine. When This Medicine Should Not Be Used: This medicine is not right for everyone. Do not use it if you had an allergic reaction to furosemide or if you are not able to urinate. How to Use This Medicine:  
Liquid, Tablet · Take your medicine as directed. Your dose may need to be changed several times to find what works best for you. · Measure the oral liquid medicine with a marked measuring spoon, oral syringe, or medicine cup. · You may take this medicine with food if it upsets your stomach. · Missed dose: Take a dose as soon as you remember. If it is almost time for your next dose, wait until then and take a regular dose. Do not take extra medicine to make up for a missed dose. · Store the medicine in a closed container at room temperature, away from heat, moisture, and direct light. Drugs and Foods to Avoid: Ask your doctor or pharmacist before using any other medicine, including over-the-counter medicines, vitamins, and herbal products. · Some medicines can affect how furosemide works. Tell your doctor if you are also using cisplatin, cyclosporine, digoxin, ethacrynic acid, indomethacin, licorice, lithium, mecamylamine, methotrexate, or phenytoin. · Also tell your doctor if you are also using an adrenocorticotropic hormone (ACTH), a laxative, medicine to treat an infection, other medicine for high blood pressure, a steroid medicine (such as hydrocortisone, methylprednisolone, prednisone, prednisolone, dexamethasone), or an NSAID pain or arthritis medicine (such as aspirin, diclofenac, ibuprofen, naproxen). · If you also take sucralfate, allow at least 2 hours between the time you take furosemide and the time you take sucralfate. Warnings While Using This Medicine: · Tell your doctor if you are pregnant or breastfeeding, or if you have kidney disease, liver disease, anemia, diabetes, gout, hearing problems, low blood pressure, lupus, an enlarged prostate, trouble urinating, or an allergy to sulfa drugs. Tell your doctor if you drink alcohol. · This medicine may cause the following problems:  
¨ Hypokalemia (low potassium in your blood) ¨ Changes in blood sugar levels ¨ Hearing problems · Make sure any doctor or dentist who treats you knows that you are using this medicine. · This medicine could lower your blood pressure too much, especially when you first use it or if you are dehydrated. Stand or sit up slowly if you feel lightheaded or dizzy. · Drink plenty of fluids if you exercise, sweat more than usual, or have diarrhea or vomiting. · This medicine may make your skin more sensitive to sunlight. Wear sunscreen. Do not use sunlamps or tanning beds. · Your doctor will do lab tests at regular visits to check on the effects of this medicine. Keep all appointments. · Keep all medicine out of the reach of children. Never share your medicine with anyone. Possible Side Effects While Using This Medicine: Call your doctor right away if you notice any of these side effects: · Allergic reaction: Itching or hives, swelling in your face or hands, swelling or tingling in your mouth or throat, chest tightness, trouble breathing · Blistering, peeling, red skin rash · Chest pain, shortness of breath · Confusion, weakness, muscle twitching · Dry mouth, increased thirst, muscle cramps, nausea or vomiting, uneven heartbeat · Sudden and severe stomach pain, nausea, vomiting, fever, lightheadedness · Hearing loss, ringing in the ears · Lightheadedness, dizziness, fainting · Severe diarrhea · Unusual bleeding or bruising · Yellow skin or eyes If you notice these less serious side effects, talk with your doctor: · Loss of appetite, stomach cramps If you notice other side effects that you think are caused by this medicine, tell your doctor. Call your doctor for medical advice about side effects. You may report side effects to FDA at 6-768-FDA-7405 © [address] is for End User's use only and may not be sold, redistributed or otherwise used for commercial purposes. The above information is an  only. It is not intended as medical advice for individual conditions or treatments. Talk to your doctor, nurse or pharmacist before following any medical regimen to see if it is safe and effective for you. Lisinopril (By mouth) Lisinopril (lye-SIN-oh-pril) Treats high blood pressure and heart failure. Also given to reduce the risk of death after a heart attack. This medicine is an ACE inhibitor. Brand Name(s):Prinivil, Zestril There may be other brand names for this medicine. When This Medicine Should Not Be Used: This medicine is not right for everyone. Do not use it if you had an allergic reaction to lisinopril or another ACE inhibitor, or if you are pregnant. How to Use This Medicine:  
Tablet · Take your medicine as directed. Your dose may need to be changed several times to find what works best for you. · Missed dose: Take a dose as soon as you remember. If it is almost time for your next dose, wait until then and take a regular dose. Do not take extra medicine to make up for a missed dose. · Store the medicine in a closed container at room temperature, away from heat, moisture, and direct light. Drugs and Foods to Avoid: Ask your doctor or pharmacist before using any other medicine, including over-the-counter medicines, vitamins, and herbal products. · Do not use this medicine together with aliskiren if you have diabetes. · Some foods and medicines may affect how lisinopril works. Tell your doctor if you are using any of the following: ¨ Aliskiren, everolimus, lithium, sirolimus, temsirolimus ¨ Another blood pressure medicine, including an angiotensin receptor blocker (ARB) ¨ Insulin or diabetes medicine ¨ Diuretic (water pills, including amiloride, spironolactone, triamterene) ¨ NSAID pain or arthritis medicine (including aspirin, celecoxib, diclofenac, ibuprofen, naproxen) · Ask your doctor before you use any medicine, supplement, or salt substitute that contains potassium. Warnings While Using This Medicine: · It is not safe to take this medicine during pregnancy. It could harm an unborn baby. Tell your doctor right away if you become pregnant. · Tell your doctor if you are breastfeeding, or if you have kidney disease, liver disease, diabetes, or heart or blood vessel disease. · This medicine may cause the following problems: ¨ Angioedema (severe swelling) ¨ Kidney problems ¨ Serious liver problems · This medicine could lower your blood pressure too much, especially when you first use it or if you are dehydrated. Stand or sit up slowly if you feel lightheaded or dizzy.  
· Do not stop using this medicine without asking your doctor, even if you feel well. This medicine will not cure your high blood pressure, but it will help keep it in a normal range. You may have to take blood pressure medicine for the rest of your life. · Tell any doctor or dentist who treats you that you are using this medicine. · Your doctor will do lab tests at regular visits to check on the effects of this medicine. Keep all appointments. · Keep all medicine out of the reach of children. Never share your medicine with anyone. Possible Side Effects While Using This Medicine:  
Call your doctor right away if you notice any of these side effects: · Allergic reaction: Itching or hives, swelling in your face or hands, swelling or tingling in your mouth or throat, chest tightness, trouble breathing · Blistering, peeling, or red skin rash · Change in how much or how often you urinate · Confusion, weakness, uneven heartbeat, trouble breathing, numbness or tingling in your hands, feet, or lips · Dark urine or pale stools, nausea, vomiting, loss of appetite, stomach pain, yellow skin or eyes · Fever, chills, sore throat, body aches · Lightheadedness, dizziness, fainting · Severe stomach pain (with or without nausea or vomiting) If you notice these less serious side effects, talk with your doctor: · Dry cough If you notice other side effects that you think are caused by this medicine, tell your doctor. Call your doctor for medical advice about side effects. You may report side effects to FDA at 0-564-FDA-9743 © [address] is for End User's use only and may not be sold, redistributed or otherwise used for commercial purposes. The above information is an  only. It is not intended as medical advice for individual conditions or treatments. Talk to your doctor, nurse or pharmacist before following any medical regimen to see if it is safe and effective for you. Tramadol (By mouth) Tramadol (TRAM-a-dol) Treats moderate to severe pain. This medicine is a narcotic pain reliever. Brand Name(s):ConZip, FusePaq Synapryn, Ryzolt, Ultram, Ultram ER, traMADol HCl There may be other brand names for this medicine. When This Medicine Should Not Be Used: This medicine is not right for everyone. Do not use if you had an allergic reaction to tramadol or other narcotic medicine. Tell your doctor if you have severe asthma or other breathing problems. How to Use This Medicine:  
Liquid, Capsule, Tablet, Dissolving Tablet, Long Acting Tablet · Take your medicine as directed. Your dose may need to be changed several times to find what works best for you. · Make sure your hands are dry before you handle the disintegrating tablet. Peel back the foil from the blister pack, then remove the tablet. Do not push the tablet through the foil. Place the tablet in your mouth. After it has melted, swallow or take a drink of water. · Swallow the extended-release tablet whole. Do not crush, break, or chew it. · Drink plenty of liquids to help avoid constipation. · Missed dose: Take a dose as soon as you remember. If it is almost time for your next dose, wait until then and take a regular dose. Do not take extra medicine to make up for a missed dose. · Store the medicine in a closed container at room temperature, away from heat, moisture, and direct light. Drugs and Foods to Avoid: Ask your doctor or pharmacist before using any other medicine, including over-the-counter medicines, vitamins, and herbal products. · Some medicines and foods can affect how tramadol works. Tell your doctor if you are using an MAO inhibitor or medicine for depression (such as amitriptyline, fluoxetine, paroxetine). · Tell your doctor if you are also using any of the following: 
¨ Raj's wort, digoxin, erythromycin, ketoconazole, linezolid, lithium, quinidine, rifampin, promethazine, carbamazepine, or cyclobenzaprine ¨ Blood thinner (such as warfarin), medicine for migraine headaches, or another narcotic medicine · Tell your doctor if you use anything else that makes you sleepy. Some examples are allergy medicine, narcotic pain medicine, and alcohol. · Do not drink alcohol while you are using this medicine. Warnings While Using This Medicine: · Tell your doctor if you are pregnant or breastfeeding, or if you have kidney disease, liver disease (including cirrhosis), or breathing problems. Tell your doctor if you have a history of head injury, seizures, drug addiction, or depression or similar emotional problems. · This medicine may cause the following problems: 
¨ High risk of overdose ¨ Serotonin syndrome · This medicine may make you dizzy or drowsy. Do not drive or doing anything else that could be dangerous until you know how this medicine affects you. · This medicine can be habit-forming. Do not use more than your prescribed dose. Call your doctor if you think your medicine is not working. · Do not stop using this medicine suddenly. Your doctor will need to slowly decrease your dose before you stop it completely. · Tell any doctor or dentist who treats you that you are using this medicine. · Some forms of this medicine contain phenylalanine (aspartame). · This medicine may cause constipation, especially with long-term use. Ask your doctor if you should use a laxative to prevent and treat constipation. · Keep all medicine out of the reach of children. Never share your medicine with anyone. Possible Side Effects While Using This Medicine:  
Call your doctor right away if you notice any of these side effects: · Allergic reaction: Itching or hives, swelling in your face or hands, swelling or tingling in your mouth or throat, chest tightness, trouble breathing · Anxiety, restlessness, fast heartbeat, fever, sweating, muscle spasms, nausea, vomiting, diarrhea, seeing or hearing things that are not there · Blistering, peeling, or red skin rash · Lightheadedness, dizziness, or fainting · Seizures · Severe sleepiness or unusual drowsiness · Trouble breathing If you notice these less serious side effects, talk with your doctor: · Feeling nervous or shaky · Headache · Mild itching · Nausea, vomiting, constipation, loss of appetite · Weakness If you notice other side effects that you think are caused by this medicine, tell your doctor. Call your doctor for medical advice about side effects. You may report side effects to FDA at 9-277-USC-3566 © [address] is for End User's use only and may not be sold, redistributed or otherwise used for commercial purposes. The above information is an  only. It is not intended as medical advice for individual conditions or treatments. Talk to your doctor, nurse or pharmacist before following any medical regimen to see if it is safe and effective for you.

## 2017-02-27 NOTE — H&P
[address] Cardiology H&P    Admitting Cardiologist:Dr. Sandra Sykes     Primary Cardiologist:none    Primary Care Physician:Dr. Shayy Blanchard    Subjective:     Sim Whalen is a 63 y.o. male with history of DM, who presented to [facility] early this am with one day history of chest fullness, associated with shortness of breath, N/ vomited x 1 last pm. Pain continued throughout night and was relieved by topical ntg in ER. ECG notes NSr with incomplete LBBB. Troponin levels are slightly elevated as well as d dimer. He denies prior cardiac hx, no prior episodes of CP, nonsmoker, no ETOH, , . Had testicular cancer in 1979 with orchiectomy and had mets to abdomen and left lung --both sites were treated with massive radiation by [employer]. Had vitreous hemmhorage with vitrectomy on 2/21--spoke with Opthamologist--DR. Anguiano--OK to heparinize. Strong family hx of CAD.       Past Medical History:   Diagnosis Date    Allergic rhinitis, cause unspecified 3/7/2014    Cancer ([facility] [address] 1979    testicular- tx with radiation    Contact dermatitis and other eczema, due to unspecified cause 3/7/2014    Esophageal reflux 3/7/2014    GERD (gastroesophageal reflux disease)     OTC pepcid 1Xd controls it    Other and unspecified hyperlipidemia 3/7/2014    Other specified idiopathic peripheral neuropathy 3/7/2014    Retinopathy of both eyes     Type II or unspecified type diabetes mellitus with neurological manifestations, uncontrolled 3/7/2014    avg fasting- 120-200; last A1C= 8.7 in Oct 2016- down from 11.4    Undiagnosed cardiac murmurs 1/1/9681    II/VI systolic murmur    Unspecified essential hypertension 3/7/2014    pt says he has never been on meds      Past Surgical History:   Procedure Laterality Date    HX HEENT  2003    SCC removal, nose    HX UROLOGICAL Left 1979    malignant tumor of testis/RAT    HX UROLOGICAL Left 2010    groin- cyst      No current facility-administered medications for this encounter. No Known Allergies   Social History   Substance Use Topics    Smoking status: Never Smoker    Smokeless tobacco: Never Used    Alcohol use No      Family History   Problem Relation Age of Onset    Diabetes Mother      type 2 -insulin dep    Heart Attack Mother 76    Heart Disease Mother     Diabetes Father      type 2; insulin dep    Heart Disease Father      CAD-pacer-carotid endart    Stroke Father     Heart Attack Father 67    Diabetes Other      grandmother    Cancer Sister      breast    No Known Problems Brother     No Known Problems Sister         Review of Systems  Gen: Denies fever, chills, malaise or fatigue. Appetite good. HEENT: Denies frequent headaches, dizzyness, visual disturbances, Neck pain or swallowing difficulty  Lungs: Denies shortness of breath, hx of COPD, breathing problems  Cardiovascular: Denies chest pain, orthopnea, PND, no syncope or near syncope  GI: Denies hememesis, dark tarry stools, No prior Hx of GI bleed, Denies constipation  : Denies dysuria, no complaints of frequency, nocturia  Heme: No prior bleeding disorders, no prior Cancer  Neuro: Denies prior CVA, TIA. Endocrine: no  thyroid disorders--+DM  Psychiatric: Denies anxiety, or other psychiatric illnesses. Objective:     Visit Vitals    /89 (BP 1 Location: Right arm, BP Patient Position: Sitting)    Pulse (!) 117    Temp 98.1 °F (36.7 °C)    Resp 20    Ht 5' 10\" (1.778 m)    Wt 90.6 kg (199 lb 12.8 oz)    SpO2 95%    BMI 28.67 kg/m2     General:Alert, cooperative, no distress, appears stated age  Head: Normocephalic, without obvious abnormality, atraumatic. Eyes: Conjunctivae/corneas clear. PERRL, EOMs intact  Nose:Nares normal. Septum midline. Mucosa normal. No drainage or sinus tenderness. Throat: Lips, mucosa, and tongue normal. Teeth and gums normal.   Neck: Supple, symmetrical, trachea midline,  no carotid bruit and no JVD.    Lungs:Clear to auscultation bilaterally. Chest wall: No tenderness or deformity. Heart: Regular rate and rhythm, S1, S2 normal, no murmur, click, rub or gallop. Abdomen:Soft, non-tender. Bowel sounds normal. No masses, No organomegaly. Extremities: Extremities normal, atraumatic, no cyanosis or edema. Pulses: 2+ and symmetric all extremities. Skin: Skin color, texture, turgor normal. No rashes or lesions  Lymph nodes: Cervical, supraclavicular, and axillary nodes normal  Neurologic:No focal deficits identified                 ECG:SR with incomplete LBBB    Data Review:     Recent Results (from the past 24 hour(s))   CBC WITH AUTOMATED DIFF    Collection Time: 02/27/17  8:00 AM   Result Value Ref Range    WBC 6.3 4.3 - 11.1 K/uL    RBC 4.51 4.23 - 5.67 M/uL    HGB 13.6 13.6 - 17.2 g/dL    HCT 39.7 (L) 41.1 - 50.3 %    MCV 88.0 79.6 - 97.8 FL    MCH 30.2 26.1 - 32.9 PG    MCHC 34.3 31.4 - 35.0 g/dL    RDW 13.4 11.9 - 14.6 %    PLATELET 721 527 - 564 K/uL    MPV 10.2 (L) 10.8 - 14.1 FL    DF AUTOMATED      NEUTROPHILS 84 (H) 43 - 78 %    LYMPHOCYTES 13 13 - 44 %    MONOCYTES 3 (L) 4.0 - 12.0 %    EOSINOPHILS 0 (L) 0.5 - 7.8 %    BASOPHILS 0 0.0 - 2.0 %    IMMATURE GRANULOCYTES 0.3 0.0 - 5.0 %    ABS. NEUTROPHILS 5.2 1.7 - 8.2 K/UL    ABS. LYMPHOCYTES 0.8 0.5 - 4.6 K/UL    ABS. MONOCYTES 0.2 0.1 - 1.3 K/UL    ABS. EOSINOPHILS 0.0 0.0 - 0.8 K/UL    ABS. BASOPHILS 0.0 0.0 - 0.2 K/UL    ABS. IMM.  GRANS. 0.0 0.0 - 0.5 K/UL   METABOLIC PANEL, COMPREHENSIVE    Collection Time: 02/27/17  8:00 AM   Result Value Ref Range    Sodium 134 (L) 136 - 145 mmol/L    Potassium 4.6 3.5 - 5.1 mmol/L    Chloride 101 98 - 107 mmol/L    CO2 23 21 - 32 mmol/L    Anion gap 10 7 - 16 mmol/L    Glucose 239 (H) 65 - 100 mg/dL    BUN 21 6 - 23 MG/DL    Creatinine 1.09 0.8 - 1.5 MG/DL    GFR est AA >60 >60 ml/min/1.73m2    GFR est non-AA >60 >60 ml/min/1.73m2    Calcium 9.5 8.3 - 10.4 MG/DL    Bilirubin, total 0.7 0.2 - 1.1 MG/DL    ALT (SGPT) 35 12 - 65 U/L AST (SGOT) 25 15 - 37 U/L    Alk. phosphatase 46 (L) 50 - 136 U/L    Protein, total 6.9 6.3 - 8.2 g/dL    Albumin 3.6 3.5 - 5.0 g/dL    Globulin 3.3 2.3 - 3.5 g/dL    A-G Ratio 1.1 (L) 1.2 - 3.5     LIPASE    Collection Time: 02/27/17  8:00 AM   Result Value Ref Range    Lipase 74 73 - 393 U/L   D DIMER    Collection Time: 02/27/17  8:00 AM   Result Value Ref Range    D DIMER 0.67 (HH) <0.55 ug/ml(FEU)   BNP    Collection Time: 02/27/17  8:00 AM   Result Value Ref Range     pg/mL   POC TROPONIN-I    Collection Time: 02/27/17  8:09 AM   Result Value Ref Range    Troponin-I (POC) 0.45 (H) 0.0 - 0.08 ng/ml   EKG, 12 LEAD, INITIAL    Collection Time: 02/27/17  8:23 AM   Result Value Ref Range    Systolic BP  mmHg    Diastolic BP  mmHg    Ventricular Rate 88 BPM    Atrial Rate 88 BPM    P-R Interval 154 ms    QRS Duration 116 ms    Q-T Interval 374 ms    QTC Calculation (Bezet) 452 ms    Calculated P Axis 62 degrees    Calculated R Axis 58 degrees    Calculated T Axis -97 degrees    Diagnosis       !! AGE AND GENDER SPECIFIC ECG ANALYSIS !! Normal sinus rhythm  Incomplete left bundle branch block  ST & T wave abnormality, consider inferior ischemia  ST & T wave abnormality, consider anterolateral ischemia  Abnormal ECG  When compared with ECG of 12-JUL-2007 08:18,  Incomplete left bundle branch block is now Present     POC TROPONIN-I    Collection Time: 02/27/17 11:09 AM   Result Value Ref Range    Troponin-I (POC) 0.46 (H) 0.0 - 0.08 ng/ml         Assessment / Plan     Principal Problem:    Chest pain (2/27/2017)--with elevated troponin levels and multiple risk factors--noted elevated BNP but no orthopnea or SOB at rest. Will plan for [facility] possible PCI today.  Also in differential to include possible PE with elevated d dimer but no hypoxia--if cath negative may need to pursue CT chest.     Active Problems:    DM (diabetes mellitus) type II controlled, neurological manifestation ([facility] [address] (3/7/2014)on Janumet at home--will place on sliding scale       Essential hypertension, benign (3/7/2014)--not on antiHTN meds at home but will start low dose ACE given DM, BB given possible ACS      Retinopathy of both eyes ()--as above, monitor vision closely. Overview: Recent surgery 2/22/17      Elevated troponin (2/27/2017)    Chilo Giles NP    Patient seen and examined by me. Agree with above note by physician extender.   Key findings are:  NSTEMI  CV- RRR without MRG  Lungs- Clear bilaterally  Abdomen- soft, non-tender, normal bowel sounds  Extremities- no edema    Plan:  [facility] today      Aida De Souza MD

## 2017-02-27 NOTE — PROGRESS NOTES
TRANSFER - OUT REPORT:    Verbal report given to rn(name) on Jean Vann  being transferred to cvicu(unit) for routine progression of care       Report consisted of patients Situation, Background, Assessment and   Recommendations(SBAR). Information from the following report(s) SBAR was reviewed with the receiving nurse. Opportunity for questions and clarification was provided.       Procedure: [facility]   Finding Summary: cabg consult/impella insertion(cath/pci/pacer settings)  Location: right groin right wrist    Closure Device: r band 10ml(yes/no/description)  Post Site Assessment: no bleeding or hematoma     Pre Procedure Meds:(if any)      Intra Procedure Meds:    Versed: 2mg  Fentanyl: 50mcg  Heparin: 7000units  Angiomax Stop Time: na  Reopro:  na  Integrelin: na  Antiplatelet: na             Peripheral IV 02/27/17 Left (Active)   Site Assessment Clean, dry, & intact 2/27/2017 12:30 PM   Phlebitis Assessment 0 2/27/2017 12:30 PM   Infiltration Assessment 0 2/27/2017 12:30 PM   Dressing Status Clean, dry, & intact 2/27/2017 12:30 PM   Dressing Type Tape;Transparent 2/27/2017 12:30 PM   Hub Color/Line Status Flushed 2/27/2017 12:30 PM       Peripheral IV 02/27/17 Left Hand (Active)   Site Assessment Clean, dry, & intact 2/27/2017 12:30 PM   Phlebitis Assessment 0 2/27/2017 12:30 PM   Infiltration Assessment 0 2/27/2017 12:30 PM   Dressing Status Clean, dry, & intact 2/27/2017 12:30 PM   Dressing Type Tape;Transparent 2/27/2017 12:30 PM   Hub Color/Line Status Infusing 2/27/2017 12:30 PM        Post-Procedure Site Assessment (1)  Wound Type: Catheter entry/exit  Location: Wrist  Orientation : Right  Hemostasis : TR Band (10ml)  Site Assessment: No bleeding, No hematoma, Dry/intact  Additional Site Assessments: Yes  Post-Procedure Site Assessment (2)  Wound Type: Catheter entry/exit  Location : Groin  Orientation : Right  Site Assessment : No bleeding, No hematoma, Dry/intact, Transparent (sutured and stat locked)                    has No Known Allergies.     Past Medical History:   Diagnosis Date    Allergic rhinitis, cause unspecified 3/7/2014    Cancer ([facility]    testicular- tx with radiation    Contact dermatitis and other eczema, due to unspecified cause 3/7/2014    Esophageal reflux 3/7/2014    GERD (gastroesophageal reflux disease)     OTC pepcid 1Xd controls it    Other and unspecified hyperlipidemia 3/7/2014    Other specified idiopathic peripheral neuropathy 3/7/2014    Retinopathy of both eyes     Type II or unspecified type diabetes mellitus with neurological manifestations, uncontrolled 3/7/2014    avg fasting- 120-200; last A1C= 8.7 in Oct 2016- down from 11.4    Undiagnosed cardiac murmurs 1/1/9329    II/VI systolic murmur    Unspecified essential hypertension 3/7/2014    pt says he has never been on meds    Vitreous hemorrhage, unspecified eye ([facility] 2/27/2017    2/21s/p vitrectomy (Dr Letha Goldmann)     Visit Vitals    BP (!) 87/69    Pulse 71    Temp 98.1 °F (36.7 °C)    Resp 16    Ht 5' 10\" (1.778 m)    Wt 90.6 kg (199 lb 12.8 oz)    SpO2 95%    BMI 28.67 kg/m2

## 2017-02-27 NOTE — CONSULTS
Cardiovascular Surgery Consult    Patient: Larry Black MRN: [id number]  SSN: [id number]    YOB: 1955  Age: 63 y.o. Sex: male      Subjective:      Larry Black is a 63 y.o. male DM, never smoker admitted today after a one day history of chest fullness, N/V and SOB. We are asked in Consultation for Coronary Artery Bypass Grafting. The patient underwent LHC with Impella placement today for admission for work up of NSTEMI/CHF and CAD. Findings from catheterization include EF 20%, critical disease of the LAD, LCx (dominant), RCA(non dominant) with a normal L main. Images reviewed by Dr Keke Guerra, Echo pending. Risk Factors include: poorly controlled Diabetes mellitus (A1c 9), Recent Vitrectomy OD, Hypertriglyceridemia untreated, HTN, History of testicular Cancer 1997 s/p orchiectomy and Rad tx along Left body neck/pelvis, Strong Family history of CAD involving both parents.          Past Medical History:   Diagnosis Date    Allergic rhinitis, cause unspecified 3/7/2014    Cancer ([address] 1979    testicular- tx with radiation    Contact dermatitis and other eczema, due to unspecified cause 3/7/2014    Depressed left ventricular ejection fraction 2/27/2017 2/27/17 20%    Esophageal reflux 3/7/2014    GERD (gastroesophageal reflux disease)     OTC pepcid 1Xd controls it    NSTEMI (non-ST elevated myocardial infarction) ([address] 2/27/2017    Other and unspecified hyperlipidemia 3/7/2014    Other specified idiopathic peripheral neuropathy 3/7/2014    Retinopathy of both eyes     Type II or unspecified type diabetes mellitus with neurological manifestations, uncontrolled 3/7/2014    avg fasting- 120-200; last A1C= 8.7 in Oct 2016- down from 11.4    Undiagnosed cardiac murmurs 0/2/1241    II/VI systolic murmur    Unspecified essential hypertension 3/7/2014    pt says he has never been on meds    Vitreous hemorrhage, unspecified eye ([address] 2/27/2017 2/21s/p vitrectomy (Dr Billy Webb) Past Surgical History:   Procedure Laterality Date    HX HEENT  2003    SCC removal, nose    HX UROLOGICAL Left 1979    malignant tumor of testis/RAT    HX UROLOGICAL Left 2010    groin- cyst      Family History   Problem Relation Age of Onset    Diabetes Mother      type 2 -insulin dep    Heart Attack Mother 76    Heart Disease Mother     Diabetes Father      type 2; insulin dep    Heart Disease Father      CAD-pacer-carotid endart    Stroke Father     Heart Attack Father 67    Diabetes Other      grandmother    Cancer Sister      breast    No Known Problems Brother     No Known Problems Sister      Social History   Substance Use Topics    Smoking status: Never Smoker    Smokeless tobacco: Never Used    Alcohol use No      Current Facility-Administered Medications   Medication Dose Route Frequency    moxifloxacin (VIGAMOX) 0.5 % ophthalmic solution 1 Drop  1 Drop Right Eye TID    prednisoLONE acetate (PRED FORTE) 1 % ophthalmic suspension 1 Drop  1 Drop Both Eyes QID    insulin lispro (HUMALOG) injection   SubCUTAneous AC&HS    lisinopril (PRINIVIL, ZESTRIL) tablet 5 mg  5 mg Oral DAILY    sodium chloride (NS) flush 5-10 mL  5-10 mL IntraVENous Q8H    sodium chloride (NS) flush 5-10 mL  5-10 mL IntraVENous PRN    [START ON 2/28/2017] aspirin chewable tablet 81 mg  81 mg Oral DAILY    nitroglycerin (NITROBID) 2 % ointment 1 Inch  1 Inch Topical Q6H    nitroglycerin (NITROSTAT) tablet 0.4 mg  0.4 mg SubLINGual Q5MIN PRN    morphine injection 2 mg  2 mg IntraVENous Q4H PRN    atorvastatin (LIPITOR) tablet 80 mg  80 mg Oral QHS    fentaNYL citrate (PF) injection  mcg   mcg IntraVENous Multiple    lidocaine (XYLOCAINE) 20 mg/mL (2 %) injection  mg  1-20 mL IntraDERMal Multiple    midazolam (VERSED) injection 0.5-5 mg  0.5-5 mg IntraVENous Multiple    ioversol (OPTIRAY) 350 mg iodine/mL contrast solution 50 mL  50 mL IntraarTERial ONCE    amiodarone (CORDARONE) tablet 400 mg  400 mg Oral ONCE    [START ON 2017] amiodarone (CORDARONE) tablet 400 mg  400 mg Oral ONCE    ceFAZolin in 0.9% NS (ANCEF) IVPB soln 2 g  2 g IntraVENous ON CALL TO OR    carvedilol (COREG) tablet 3.125 mg  3.125 mg Oral BID WITH MEALS      No Known Allergies    Review of Systems:  Pertinent items are noted in the History of Present Illness. No difficulties with General Anesthesia  Constitutional:   Negative for fevers and unexplained weight loss. Eyes:   Negative for visual disturbance. ENT:   Negative for significant hearing loss and tinnitus. No loose or infected teeth. Respiratory:   Negative for hemoptysis. Cardiovascular:   Negative except as noted in HPI. Gastrointestinal:   Negative for melena and abdominal pain. Genitourinary:   Negative for hematuria, renal stones, hx orchiectomy   Integumentary:   Negative for rash or non-healing wounds. Radiation therapy to left body   Hematologic/Lymphatic:   Negative for excessive bleeding hx or clotting disorder. No history of DVT or PE  Musculoskeletal:  Negative for active, unexplained/severe joint pain. Neurological:   Negative for stroke or TIA.    Behavioral/Psych:   Negative for suicidal ideations.    Endocrine:   Negative for uncontrolled diabetic symptoms including polyuria, polydipsia and poor wound healing. Objective:   LHC and Echo images reviewed per Dr Jackson Ward for the past 8 hrs:   BP Temp Pulse Resp SpO2 Height Weight   17 1500 (!) 87/69 - 71 16 95 % - -   17 1200 152/89 98.1 °F (36.7 °C) (!) 117 20 95 % 5' 10\" (1.778 m) 199 lb 12.8 oz (90.6 kg)     Temp (24hrs), Av.8 °F (36.6 °C), Min:97.4 °F (36.3 °C), Max:98.1 °F (36.7 °C)    Body mass index is 28.67 kg/(m^2). Physical Exam:  GENERAL: alert, cooperative, no distress, EYE: conjunctivae/corneas clear. PERRL, EOM's intact with resolving ecchymosis R,  THROAT & NECK: normal, no erythema or exudates noted.   and neck supple and symmetrical.  no JVD, no carotid bruits, LUNG: clear to auscultation bilaterally, HEART: regular rate and rhythm, ABDOMEN: soft, non-tender. Bowel sounds normal. No masses,  no organomegaly, EXTREMITIES: upper with palpable radial and brachial pulses, R groin sheath intact,  extremities atraumatic, no cyanosis or edema, feet without  SKIN: no rash or abnormalities, NEUROLOGIC: AOx3. Gait not evaluated,  Motor strength normal and symmetric. Cranial nerves 2-12, speech fluent grossly intact., PSYCHIATRIC: non focal    Assessment:     63year old male diabetic with NSTEMI and critical multi- vessel CAD, CHF with EF 20% on admission    Plan:     Dr Val Matos has seen the patient in consultation today and is recommending Coronary Artery Bypass Grafting in the AM. Will follow up ECHO. Indications for surgery as well as potential risks and complications discussed with patient who agrees with proceeding. Discussion with patient regarding STS Mortality Risk score: 2.3%. Pre op CABG checklist in progress. Patient to view pre op DVD. Wife updated with plan. Continue Heparin until on call to OR.      Signed By: Seth Naylor NP     February 27, 2017

## 2017-02-27 NOTE — PROGRESS NOTES
TRANSFER - OUT REPORT:    Verbal report given to cath lab RN on Jean Vann  being transferred to cath lab for ordered procedure       Report consisted of patients Situation, Background, Assessment and   Recommendations(SBAR). Information from the following report(s) Kardex, Intake/Output and MAR was reviewed with the receiving nurse. Lines:   Peripheral IV 02/27/17 Left (Active)   Site Assessment Clean, dry, & intact 2/27/2017  8:13 AM   Phlebitis Assessment 0 2/27/2017  8:13 AM   Infiltration Assessment 0 2/27/2017  8:13 AM       Peripheral IV 02/27/17 Left Hand (Active)   Site Assessment Clean, dry, & intact 2/27/2017 11:13 AM   Phlebitis Assessment 0 2/27/2017 11:13 AM   Infiltration Assessment 0 2/27/2017 11:13 AM        Opportunity for questions and clarification was provided.

## 2017-02-27 NOTE — PROGRESS NOTES
TRANSFER - IN REPORT:    Verbal report received from [facility] on Roula Hernandez  being received from [facility](unit) for urgent transfer      Report consisted of patients Situation, Background, Assessment and   Recommendations(SBAR). Information from the following report(s) SBAR, Kardex, Procedure Summary, Intake/Output, MAR, Recent Results, Med Rec Status and Cardiac Rhythm nsr was reviewed with the receiving nurse. Opportunity for questions and clarification was provided. Assessment completed upon patients arrival to unit and care assumed.

## 2017-02-27 NOTE — PROGRESS NOTES
TRANSFER - OUT REPORT:    Verbal report given to [address] on Larry Black  being transferred to [facility] for urgent transfer       Report consisted of patients Situation, Background, Assessment and   Recommendations(SBAR). Information from the following report(s) Kardex, Intake/Output and MAR was reviewed with the receiving nurse. Lines:   Peripheral IV 02/27/17 Left (Active)   Site Assessment Clean, dry, & intact 2/27/2017 12:30 PM   Phlebitis Assessment 0 2/27/2017 12:30 PM   Infiltration Assessment 0 2/27/2017 12:30 PM   Dressing Status Clean, dry, & intact 2/27/2017 12:30 PM   Dressing Type Tape;Transparent 2/27/2017 12:30 PM   Hub Color/Line Status Flushed 2/27/2017 12:30 PM       Peripheral IV 02/27/17 Left Hand (Active)   Site Assessment Clean, dry, & intact 2/27/2017 12:30 PM   Phlebitis Assessment 0 2/27/2017 12:30 PM   Infiltration Assessment 0 2/27/2017 12:30 PM   Dressing Status Clean, dry, & intact 2/27/2017 12:30 PM   Dressing Type Tape;Transparent 2/27/2017 12:30 PM   Hub Color/Line Status Infusing 2/27/2017 12:30 PM        Opportunity for questions and clarification was provided.

## 2017-02-27 NOTE — ED TRIAGE NOTES
Pt states he started feeling SOB yesterday. Pt states he feels \"bloated yet I have had 3 BM\". Pt states he feels like he has to work to breathe and feels slightly dizzy when he stands.

## 2017-02-27 NOTE — ED PROVIDER NOTES
Patient is a 63 y.o. male presenting with shortness of breath. The history is provided by the patient. Shortness of Breath   This is a new problem. The average episode lasts 1 day. The problem occurs continuously. The current episode started 12 to 24 hours ago. The problem has not changed since onset. Associated symptoms include orthopnea and abdominal pain (fullness in  the upper quadrants). Pertinent negatives include no fever, no headaches, no coryza, no rhinorrhea, no sore throat, no swollen glands, no ear pain, no neck pain, no cough, no sputum production, no hemoptysis, no wheezing, no PND, no chest pain, no syncope, no vomiting, no rash, no leg pain, no leg swelling and no claudication. It is unknown what precipitated the problem. He has tried nothing for the symptoms. The treatment provided no relief. He has had no prior hospitalizations. He has had no prior ED visits. He has had no prior ICU admissions. Associated medical issues include recent surgery (right eye surgery one week ago). Associated medical issues do not include asthma, COPD, pneumonia, chronic lung disease, PE, CAD, heart failure, past MI or DVT.         Past Medical History:   Diagnosis Date    Allergic rhinitis, cause unspecified 3/7/2014    Cancer ([facility] [address] 1979    testicular- tx with radiation    Contact dermatitis and other eczema, due to unspecified cause 3/7/2014    Esophageal reflux 3/7/2014    GERD (gastroesophageal reflux disease)     OTC pepcid 1Xd controls it    Other and unspecified hyperlipidemia 3/7/2014    Other specified idiopathic peripheral neuropathy 3/7/2014    Retinopathy of both eyes     Type II or unspecified type diabetes mellitus with neurological manifestations, uncontrolled 3/7/2014    avg fasting- 120-200; last A1C= 8.7 in Oct 2016- down from 11.4    Undiagnosed cardiac murmurs 4/6/2212    II/VI systolic murmur    Unspecified essential hypertension 3/7/2014    pt says he has never been on meds       Past Surgical History:   Procedure Laterality Date    HX HEENT  2003    SCC removal, nose    HX UROLOGICAL Left 1979    malignant tumor of testis/RAT    HX UROLOGICAL Left 2010    groin- cyst         Family History:   Problem Relation Age of Onset    Diabetes Mother      type 2 -insulin dep    Heart Attack Mother 76    Heart Disease Mother     Diabetes Father      type 2; insulin dep    Heart Disease Father      CAD-pacer-carotid endart    Stroke Father     Heart Attack Father 67    Diabetes Other      grandmother    Cancer Sister      breast    No Known Problems Brother     No Known Problems Sister        Social History     Social History    Marital status:      Spouse name: Zuleyka Riley Number of children: 1    Years of education: N/A     Occupational History          [employer]     Social History Main Topics    Smoking status: Never Smoker    Smokeless tobacco: Never Used    Alcohol use No    Drug use: No    Sexual activity: Not on file     Other Topics Concern    Not on file     Social History Narrative    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 Review of Systems   Constitutional: Negative for chills and fever. HENT: Negative for ear pain, rhinorrhea and sore throat. Respiratory: Positive for shortness of breath. Negative for cough, hemoptysis, sputum production and wheezing. Cardiovascular: Positive for orthopnea. Negative for chest pain, claudication, leg swelling, syncope and PND. Gastrointestinal: Positive for abdominal pain (fullness in  the upper quadrants). Negative for vomiting. Musculoskeletal: Negative for neck pain. Skin: Negative for rash. Neurological: Negative for headaches. All other systems reviewed and are negative.       Vitals:    02/27/17 0719   BP: 140/80   Pulse: (!) 106   Resp: 20   Temp: 97.4 °F (36.3 °C) SpO2: 97%            Physical Exam   Constitutional: He is oriented to person, place, and time. He appears well-developed and well-nourished. No distress. HENT:   Head: Normocephalic and atraumatic. Right Ear: Tympanic membrane and external ear normal.   Left Ear: Tympanic membrane and external ear normal.   Mouth/Throat: Oropharynx is clear and moist.   Eyes: Conjunctivae and EOM are normal. Pupils are equal, round, and reactive to light. Neck: Normal range of motion. Neck supple. No tracheal deviation present. Cardiovascular: Regular rhythm and intact distal pulses. Tachycardia present. Exam reveals no gallop and no friction rub. Murmur heard. Crescendo systolic murmur is present with a grade of 4/6   Pulmonary/Chest: Effort normal and breath sounds normal. No respiratory distress. He has no wheezes. Abdominal: Soft. Bowel sounds are normal. He exhibits no distension and no mass. There is no hepatosplenomegaly. There is no tenderness. There is no rebound and no guarding. Musculoskeletal: Normal range of motion. He exhibits no edema. Lymphadenopathy:     He has no cervical adenopathy. Neurological: He is alert and oriented to person, place, and time. He displays normal reflexes. No cranial nerve deficit. Skin: Skin is warm and dry. No rash noted. He is not diaphoretic. No erythema. Psychiatric: He has a normal mood and affect. Nursing note and vitals reviewed.        MDM  Number of Diagnoses or Management Options     Amount and/or Complexity of Data Reviewed  Clinical lab tests: ordered and reviewed  Tests in the radiology section of CPT®: ordered and reviewed  Decide to obtain previous medical records or to obtain history from someone other than the patient: yes  Review and summarize past medical records: yes (Brief Op Note  Date of Service: 02/21/17 Gio Goodson 64., MD   Ophthalmology        Hide copied text  Hover for attribution information     BRIEF OPERATIVE NOTE     Date of Procedure: 2/21/2017   Preoperative Diagnosis: Vitreous hemorrhage of right eye (HCC) (H43.11) Proliferative Diabetic Retinopathy right eye  Postoperative Diagnosis: Vitreous hemorrhage of right eye (HCC) (H43.11), Proliferative Diabetic Retinopathy right eye  Procedure(s):  VITRECTOMY POSTERIOR 25 GAUGE/ LASER/ EPIRETINAL MEMBRANE PEEL/ RIGHT  Surgeon(s) and Role:  * Esther Romero III, MD - Primary         Surgical Staff:  Circ-1: Ingrid Mckeon RN  Scrub Tech-1: Laurie Mayes  Scrub Tech-2: Norman Mensah    Event Time In  Incision Start 5375  Incision Close 5384     Anesthesia: MAC   Estimated Blood Loss: None  Specimens: * No specimens in log *   Findings: Extensive preretinal and vitreous hemorrhage, fibrovascular proliferation right eye   Complications: None  Implants: * No implants in log *      )  Discuss the patient with other providers: yes  Independent visualization of images, tracings, or specimens: yes    Risk of Complications, Morbidity, and/or Mortality  Presenting problems: high  Diagnostic procedures: moderate  Management options: high    Patient Progress  Patient progress: stable    ED Course       Procedures    The patient was observed in the ED. Symptoms improved with nitroglycerin Application to the chest wall. Cardiology review the EKG and did not feel the patient was currently suffering from a STEMI, will accept in transfer downtown for possible intervention. As the patient has no lower extremity swelling, and symptoms are relieved by nitroglycerin, likelihood of pulmonary embolus is low.     Results Reviewed:      Recent Results (from the past 24 hour(s))   CBC WITH AUTOMATED DIFF    Collection Time: 02/27/17  8:00 AM   Result Value Ref Range    WBC 6.3 4.3 - 11.1 K/uL    RBC 4.51 4.23 - 5.67 M/uL    HGB 13.6 13.6 - 17.2 g/dL    HCT 39.7 (L) 41.1 - 50.3 %    MCV 88.0 79.6 - 97.8 FL    MCH 30.2 26.1 - 32.9 PG    MCHC 34.3 31.4 - 35.0 g/dL    RDW 13.4 11.9 - 14.6 %    PLATELET 246 381 - 450 K/uL    MPV 10.2 (L) 10.8 - 14.1 FL    DF AUTOMATED      NEUTROPHILS 84 (H) 43 - 78 %    LYMPHOCYTES 13 13 - 44 %    MONOCYTES 3 (L) 4.0 - 12.0 %    EOSINOPHILS 0 (L) 0.5 - 7.8 %    BASOPHILS 0 0.0 - 2.0 %    IMMATURE GRANULOCYTES 0.3 0.0 - 5.0 %    ABS. NEUTROPHILS 5.2 1.7 - 8.2 K/UL    ABS. LYMPHOCYTES 0.8 0.5 - 4.6 K/UL    ABS. MONOCYTES 0.2 0.1 - 1.3 K/UL    ABS. EOSINOPHILS 0.0 0.0 - 0.8 K/UL    ABS. BASOPHILS 0.0 0.0 - 0.2 K/UL    ABS. IMM. GRANS. 0.0 0.0 - 0.5 K/UL   METABOLIC PANEL, COMPREHENSIVE    Collection Time: 02/27/17  8:00 AM   Result Value Ref Range    Sodium 134 (L) 136 - 145 mmol/L    Potassium 4.6 3.5 - 5.1 mmol/L    Chloride 101 98 - 107 mmol/L    CO2 23 21 - 32 mmol/L    Anion gap 10 7 - 16 mmol/L    Glucose 239 (H) 65 - 100 mg/dL    BUN 21 6 - 23 MG/DL    Creatinine 1.09 0.8 - 1.5 MG/DL    GFR est AA >60 >60 ml/min/1.73m2    GFR est non-AA >60 >60 ml/min/1.73m2    Calcium 9.5 8.3 - 10.4 MG/DL    Bilirubin, total 0.7 0.2 - 1.1 MG/DL    ALT (SGPT) 35 12 - 65 U/L    AST (SGOT) 25 15 - 37 U/L    Alk.  phosphatase 46 (L) 50 - 136 U/L    Protein, total 6.9 6.3 - 8.2 g/dL    Albumin 3.6 3.5 - 5.0 g/dL    Globulin 3.3 2.3 - 3.5 g/dL    A-G Ratio 1.1 (L) 1.2 - 3.5     LIPASE    Collection Time: 02/27/17  8:00 AM   Result Value Ref Range    Lipase 74 73 - 393 U/L   D DIMER    Collection Time: 02/27/17  8:00 AM   Result Value Ref Range    D DIMER 0.67 (HH) <0.55 ug/ml(FEU)   BNP    Collection Time: 02/27/17  8:00 AM   Result Value Ref Range     pg/mL   POC TROPONIN-I    Collection Time: 02/27/17  8:09 AM   Result Value Ref Range    Troponin-I (POC) 0.45 (H) 0.0 - 0.08 ng/ml

## 2017-02-27 NOTE — PROGRESS NOTES
[facility] to room from [facility] [place] [address] [facility]. Placed on monitor, NSR, 90. No current complaints of pain or shortness of breath. Nitropaste noted to left chest and heparin infusing into IV in left hand at 12 units/kg/hr. Assessment per doc flowsheet. Patient oriented to room and instructed to call for assistance; verbalized understanding. Will continue to monitor. Dual skin assessment with another RN reveals: intact skin, no breakdown noted.

## 2017-02-27 NOTE — PROGRESS NOTES
Applied R-band to rught wrist with 10 mL of air in band. Site without bleeding or hematoma. Capillary refill distal to the site was less than 2 seconds. Patient instructed to limit movement of affected wrist. Patient verbalized understanding. Right groin site covered with sterile tegaderm, noted to be clean, dry, and intact without bleeding or hematoma. Patient instructed to keep right leg straight and still and head on pillow. Patient verbalizes understanding.

## 2017-02-27 NOTE — PROGRESS NOTES
15 ml of Lidocaine with epi was placed at the Impella site with a reduction of oozing from a steady light flow to a very slight ooze. The patient has no additional complaints at this time. The boarders were marked by CVICU staff with pressure also being applied by staff. With no additional help needed at this time cath lab staff left CVICU.

## 2017-02-27 NOTE — IP AVS SNAPSHOT
Current Discharge Medication List  
  
Take these medications at their scheduled times Dose & Instructions Dispensing Information Comments Morning Noon Evening Bedtime  
 aspirin delayed-release 81 mg tablet Dose:  81 mg Take 1 Tab by mouth daily. Quantity:  30 Tab Refills:  12  
     
  
   
   
   
  
 atorvastatin 40 mg tablet Commonly known as:  LIPITOR Dose:  40 mg Take 1 Tab by mouth nightly. Quantity:  30 Tab Refills:  5  
     
   
   
   
  
  
 dulaglutide 1.5 mg/0.5 mL sub-q pen Commonly known as:  TRULICITY Dose:  1.5 mg  
0.5 mL by SubCUTAneous route every seven (7) days. Quantity:  4 Pen Refills:  5  
     
   
   
   
  
 furosemide 40 mg tablet Commonly known as:  LASIX Dose:  40 mg Take 1 Tab by mouth daily for 14 days. Please discuss this medication at your FIRST visit with [facility] [address] [facility] [address] Cardiology Quantity:  14 Tab Refills:  0  
     
  
   
   
   
  
 lisinopril 2.5 mg tablet Commonly known as:  [place] Dose:  2.5 mg Take 1 Tab by mouth daily. Quantity:  30 Tab Refills:  5  
     
  
   
   
   
  
 metoprolol succinate 50 mg XL tablet Commonly known as:  TOPROL-XL Dose:  50 mg Take 1 Tab by mouth every twelve (12) hours. Quantity:  30 Tab Refills:  5  
     
   
   
  
   
  
 prednisoLONE acetate 1 % ophthalmic suspension Commonly known as:  PRED FORTE Dose:  1 Drop Administer 1 Drop to right eye four (4) times daily. Refills:  0 SITagliptin-metFORMIN 50-1,000 mg per tablet Commonly known as:  Phuc De Leon Dose:  1 Tab Take 1 Tab by mouth two (2) times daily (with meals). Quantity:  60 Tab Refills:  5 Take these medications as needed Dose & Instructions Dispensing Information Comments Morning Noon Evening Bedtime  
 traMADol 50 mg tablet Commonly known as:  ULTRAM  
   
 Dose:  50 mg  
 Take 1 Tab by mouth every four (4) hours as needed for Pain. Max Daily Amount: 300 mg. Quantity:  60 Tab Refills:  1 Take these medications as directed Dose & Instructions Dispensing Information Comments Morning Noon Evening Bedtime  
 acetaminophen 325 mg tablet Commonly known as:  TYLENOL Over the counter medicine - take as needed for pain as directed on label Quantity:  1 Tab Refills:  0  
     
   
   
   
  
 exenatide microspheres 2 mg Serr Commonly known as:  BYDUREON Subcutaneous injection once weekly Quantity:  4 Each Refills:  3  
     
   
   
   
  
 potassium chloride 20 mEq tablet Commonly known as:  K-DUR, KLOR-CON Please discuss this medication at your [address] [facility] office visit with [facility] Cardiology Quantity:  14 Tab Refills:  0 ASK your doctor about these medications Dose & Instructions Dispensing Information Comments Morning Noon Evening Bedtime VIGAMOX 0.5 % ophthalmic solution Generic drug:  moxifloxacin Dose:  1 Drop 1 Drop three (3) times daily. Refills:  0 Where to Get Your Medications Information about where to get these medications is not yet available ! Ask your nurse or doctor about these medications  
  acetaminophen 325 mg tablet  
 aspirin delayed-release 81 mg tablet  
 atorvastatin 40 mg tablet  
 furosemide 40 mg tablet  
 lisinopril 2.5 mg tablet  
 metoprolol succinate 50 mg XL tablet  
 potassium chloride 20 mEq tablet  
 traMADol 50 mg tablet

## 2017-02-28 ENCOUNTER — ANESTHESIA (OUTPATIENT)
Dept: SURGERY | Age: 62
DRG: 001 | End: 2017-02-28
Payer: COMMERCIAL

## 2017-02-28 ENCOUNTER — APPOINTMENT (OUTPATIENT)
Dept: GENERAL RADIOLOGY | Age: 62
DRG: 001 | End: 2017-02-28
Attending: THORACIC SURGERY (CARDIOTHORACIC VASCULAR SURGERY)
Payer: COMMERCIAL

## 2017-02-28 PROBLEM — I25.5 ISCHEMIC CARDIOMYOPATHY: Chronic | Status: ACTIVE | Noted: 2017-02-28

## 2017-02-28 PROBLEM — I25.10 CAD, MULTIPLE VESSEL: Chronic | Status: ACTIVE | Noted: 2017-02-28

## 2017-02-28 PROBLEM — Z95.1 S/P CABG (CORONARY ARTERY BYPASS GRAFT): Status: ACTIVE | Noted: 2017-02-28

## 2017-02-28 PROBLEM — I25.5 ISCHEMIC CARDIOMYOPATHY: Status: ACTIVE | Noted: 2017-02-28

## 2017-02-28 PROBLEM — Z99.11 WEANING FROM RESPIRATOR (HCC): Status: ACTIVE | Noted: 2017-02-28

## 2017-02-28 PROBLEM — E78.1 HYPERTRIGLYCERIDEMIA: Chronic | Status: ACTIVE | Noted: 2017-02-27

## 2017-02-28 LAB
ANION GAP BLD CALC-SCNC: 12 MMOL/L (ref 7–16)
ANION GAP BLD CALC-SCNC: 13 MMOL/L (ref 7–16)
APTT PPP: 29.9 SEC (ref 23.5–31.7)
APTT PPP: 34.9 SEC (ref 23.5–31.7)
ARTERIAL PATENCY WRIST A: ABNORMAL
ARTERIAL PATENCY WRIST A: ABNORMAL
ATRIAL RATE: 100 BPM
ATRIAL RATE: 76 BPM
BASE DEFICIT BLD-SCNC: 3 MMOL/L
BASE DEFICIT BLD-SCNC: 4 MMOL/L
BASE DEFICIT BLD-SCNC: 4 MMOL/L
BASE DEFICIT BLD-SCNC: 5 MMOL/L
BASE DEFICIT BLD-SCNC: 5 MMOL/L
BASE DEFICIT BLDA-SCNC: 5.5 MMOL/L (ref 0–2)
BASE DEFICIT BLDA-SCNC: 8 MMOL/L (ref 0–2)
BASE EXCESS BLD CALC-SCNC: 1 MMOL/L
BASOPHILS # BLD AUTO: 0 K/UL (ref 0–0.2)
BASOPHILS # BLD: 0 % (ref 0–2)
BDY SITE: ABNORMAL
BDY SITE: ABNORMAL
BNP SERPL-MCNC: 616 PG/ML
BUN SERPL-MCNC: 23 MG/DL (ref 6–23)
BUN SERPL-MCNC: 27 MG/DL (ref 6–23)
CA-I BLD-MCNC: 1.01 MMOL/L (ref 1.12–1.32)
CA-I BLD-MCNC: 1.02 MMOL/L (ref 1.12–1.32)
CA-I BLD-MCNC: 1.05 MMOL/L (ref 1.12–1.32)
CA-I BLD-MCNC: 1.14 MMOL/L (ref 1.12–1.32)
CA-I BLD-MCNC: 1.19 MMOL/L (ref 1.12–1.32)
CA-I BLD-MCNC: 1.19 MMOL/L (ref 1.12–1.32)
CA-I BLD-SCNC: 1.19 MMOL/L (ref 1–1.3)
CALCIUM SERPL-MCNC: 7.8 MG/DL (ref 8.3–10.4)
CALCIUM SERPL-MCNC: 8.8 MG/DL (ref 8.3–10.4)
CALCULATED P AXIS, ECG09: 64 DEGREES
CALCULATED P AXIS, ECG09: 70 DEGREES
CALCULATED R AXIS, ECG10: 71 DEGREES
CALCULATED R AXIS, ECG10: 77 DEGREES
CALCULATED T AXIS, ECG11: -100 DEGREES
CALCULATED T AXIS, ECG11: -149 DEGREES
CHLORIDE BLDA-SCNC: 107 MMOL/L (ref 98–106)
CHLORIDE SERPL-SCNC: 108 MMOL/L (ref 98–107)
CHLORIDE SERPL-SCNC: 113 MMOL/L (ref 98–107)
CHOLEST SERPL-MCNC: 143 MG/DL
CO2 SERPL-SCNC: 21 MMOL/L (ref 21–32)
CO2 SERPL-SCNC: 23 MMOL/L (ref 21–32)
COHGB MFR BLD: 0.3 % (ref 0.5–1.5)
COHGB MFR BLD: 0.3 % (ref 0.5–1.5)
CREAT SERPL-MCNC: 1.38 MG/DL (ref 0.8–1.5)
CREAT SERPL-MCNC: 1.49 MG/DL (ref 0.8–1.5)
DIAGNOSIS, 93000: NORMAL
DIAGNOSIS, 93000: NORMAL
DIASTOLIC BP, ECG02: NORMAL MMHG
DIASTOLIC BP, ECG02: NORMAL MMHG
DIFFERENTIAL METHOD BLD: ABNORMAL
DO-HGB BLD-MCNC: 4 % (ref 0–5)
DO-HGB BLD-MCNC: 4 % (ref 0–5)
EOSINOPHIL # BLD: 0 K/UL (ref 0–0.8)
EOSINOPHIL NFR BLD: 0 % (ref 0.5–7.8)
ERYTHROCYTE [DISTWIDTH] IN BLOOD BY AUTOMATED COUNT: 13.6 % (ref 11.9–14.6)
ERYTHROCYTE [DISTWIDTH] IN BLOOD BY AUTOMATED COUNT: 13.6 % (ref 11.9–14.6)
FIBRINOGEN PPP-MCNC: 178 MG/DL (ref 172–437)
FIO2 ON VENT: 45 %
FIO2 ON VENT: 60 %
GAS FLOW.O2 O2 DELIVERY SYS: 40 L/MIN
GLUCOSE BLD STRIP.AUTO-MCNC: 109 MG/DL (ref 65–100)
GLUCOSE BLD STRIP.AUTO-MCNC: 121 MG/DL (ref 65–100)
GLUCOSE BLD STRIP.AUTO-MCNC: 122 MG/DL (ref 65–100)
GLUCOSE BLD STRIP.AUTO-MCNC: 134 MG/DL (ref 65–100)
GLUCOSE BLD STRIP.AUTO-MCNC: 137 MG/DL (ref 65–100)
GLUCOSE BLD STRIP.AUTO-MCNC: 140 MG/DL (ref 65–100)
GLUCOSE BLD STRIP.AUTO-MCNC: 149 MG/DL (ref 65–100)
GLUCOSE BLD STRIP.AUTO-MCNC: 154 MG/DL (ref 65–100)
GLUCOSE BLD STRIP.AUTO-MCNC: 156 MG/DL (ref 65–100)
GLUCOSE BLD STRIP.AUTO-MCNC: 158 MG/DL (ref 65–100)
GLUCOSE BLD STRIP.AUTO-MCNC: 165 MG/DL (ref 65–100)
GLUCOSE BLD STRIP.AUTO-MCNC: 176 MG/DL (ref 70–105)
GLUCOSE BLD STRIP.AUTO-MCNC: 185 MG/DL (ref 70–105)
GLUCOSE BLD STRIP.AUTO-MCNC: 193 MG/DL (ref 70–105)
GLUCOSE BLD STRIP.AUTO-MCNC: 194 MG/DL (ref 65–100)
GLUCOSE BLD STRIP.AUTO-MCNC: 198 MG/DL (ref 70–105)
GLUCOSE BLD STRIP.AUTO-MCNC: 199 MG/DL (ref 70–105)
GLUCOSE BLD STRIP.AUTO-MCNC: 207 MG/DL (ref 70–105)
GLUCOSE SERPL-MCNC: 157 MG/DL (ref 65–100)
GLUCOSE SERPL-MCNC: 198 MG/DL (ref 65–100)
HCO3 BLD-SCNC: 20.8 MMOL/L (ref 22–26)
HCO3 BLD-SCNC: 21.2 MMOL/L (ref 22–26)
HCO3 BLD-SCNC: 21.9 MMOL/L (ref 22–26)
HCO3 BLD-SCNC: 22.4 MMOL/L (ref 22–26)
HCO3 BLD-SCNC: 22.7 MMOL/L (ref 22–26)
HCO3 BLD-SCNC: 25.7 MMOL/L (ref 22–26)
HCO3 BLDA-SCNC: 18 MMOL/L (ref 22–26)
HCO3 BLDA-SCNC: 19 MMOL/L (ref 22–26)
HCT VFR BLD AUTO: 23.6 % (ref 41.1–50.3)
HCT VFR BLD AUTO: 27.7 % (ref 41.1–50.3)
HCT VFR BLD AUTO: 30.1 % (ref 41.1–50.3)
HCT VFR BLD AUTO: 36.2 % (ref 41.1–50.3)
HDLC SERPL-MCNC: 33 MG/DL (ref 40–60)
HDLC SERPL: 4.3 {RATIO}
HGB BLD-MCNC: 12.5 G/DL (ref 13.6–17.2)
HGB BLD-MCNC: 7.9 G/DL (ref 13.6–17.2)
HGB BLD-MCNC: 9.3 G/DL (ref 13.6–17.2)
HGB BLD-MCNC: 9.9 G/DL (ref 13.6–17.2)
HGB BLDMV-MCNC: 10.9 GM/DL (ref 11.7–15)
HGB BLDMV-MCNC: 8.7 GM/DL (ref 11.7–15)
IMM GRANULOCYTES # BLD: 0 K/UL (ref 0–0.5)
IMM GRANULOCYTES NFR BLD AUTO: 0.5 % (ref 0–5)
INR PPP: 1.1 (ref 0.9–1.2)
LDLC SERPL CALC-MCNC: 71 MG/DL
LIPID PROFILE,FLP: ABNORMAL
LYMPHOCYTES # BLD AUTO: 12 % (ref 13–44)
LYMPHOCYTES # BLD: 1 K/UL (ref 0.5–4.6)
MAGNESIUM SERPL-MCNC: 2 MG/DL (ref 1.8–2.4)
MAGNESIUM SERPL-MCNC: 2.3 MG/DL (ref 1.8–2.4)
MAGNESIUM SERPL-MCNC: 2.5 MG/DL (ref 1.8–2.4)
MAGNESIUM SERPL-MCNC: 3.1 MG/DL (ref 1.8–2.4)
MCH RBC QN AUTO: 29.1 PG (ref 26.1–32.9)
MCH RBC QN AUTO: 30.9 PG (ref 26.1–32.9)
MCHC RBC AUTO-ENTMCNC: 32.9 G/DL (ref 31.4–35)
MCHC RBC AUTO-ENTMCNC: 34.5 G/DL (ref 31.4–35)
MCV RBC AUTO: 88.5 FL (ref 79.6–97.8)
MCV RBC AUTO: 89.4 FL (ref 79.6–97.8)
METHGB MFR BLD: 0.5 % (ref 0–1.5)
METHGB MFR BLD: 0.7 % (ref 0–1.5)
MONOCYTES # BLD: 0.9 K/UL (ref 0.1–1.3)
MONOCYTES NFR BLD AUTO: 10 % (ref 4–12)
NEUTS SEG # BLD: 6.6 K/UL (ref 1.7–8.2)
NEUTS SEG NFR BLD AUTO: 78 % (ref 43–78)
OXYHGB MFR BLDA: 95.2 % (ref 94–97)
OXYHGB MFR BLDA: 95.7 % (ref 94–97)
P-R INTERVAL, ECG05: 142 MS
P-R INTERVAL, ECG05: 160 MS
PCO2 BLD: 38.1 MMHG (ref 35–45)
PCO2 BLD: 39.8 MMHG (ref 35–45)
PCO2 BLD: 41.6 MMHG (ref 35–45)
PCO2 BLD: 42.2 MMHG (ref 35–45)
PCO2 BLD: 43.4 MMHG (ref 35–45)
PCO2 BLD: 45.7 MMHG (ref 35–45)
PCO2 BLDA: 33 MMHG (ref 35–45)
PCO2 BLDA: 39 MMHG (ref 35–45)
PEEP RESPIRATORY: 8 CM[H2O]
PH BLD: 7.3 [PH] (ref 7.35–7.45)
PH BLD: 7.31 [PH] (ref 7.35–7.45)
PH BLD: 7.31 [PH] (ref 7.35–7.45)
PH BLD: 7.35 [PH] (ref 7.35–7.45)
PH BLD: 7.38 [PH] (ref 7.35–7.45)
PH BLD: 7.38 [PH] (ref 7.35–7.45)
PH BLDA: 7.29 [PH] (ref 7.35–7.45)
PH BLDA: 7.38 [PH] (ref 7.35–7.45)
PLATELET # BLD AUTO: 131 K/UL (ref 150–450)
PLATELET # BLD AUTO: 205 K/UL (ref 150–450)
PMV BLD AUTO: 9.5 FL (ref 10.8–14.1)
PMV BLD AUTO: 9.8 FL (ref 10.8–14.1)
PO2 BLD: 256 MMHG (ref 80–105)
PO2 BLD: 286 MMHG (ref 80–105)
PO2 BLD: 292 MMHG (ref 80–105)
PO2 BLD: 347 MMHG (ref 80–105)
PO2 BLD: 412 MMHG (ref 80–105)
PO2 BLD: 89 MMHG (ref 80–105)
PO2 BLDA: 104 MMHG (ref 75–100)
PO2 BLDA: 99 MMHG (ref 75–100)
POTASSIUM BLD-SCNC: 3.4 MMOL/L (ref 3.5–5.1)
POTASSIUM BLD-SCNC: 3.8 MMOL/L (ref 3.5–5.1)
POTASSIUM BLD-SCNC: 4.4 MMOL/L (ref 3.5–5.1)
POTASSIUM BLD-SCNC: 4.7 MMOL/L (ref 3.5–5.1)
POTASSIUM BLD-SCNC: 4.9 MMOL/L (ref 3.5–5.1)
POTASSIUM BLD-SCNC: 5.1 MMOL/L (ref 3.5–5.1)
POTASSIUM BLDA-SCNC: 3.5 MMOL/L (ref 3.5–5.3)
POTASSIUM SERPL-SCNC: 3.5 MMOL/L (ref 3.5–5.1)
POTASSIUM SERPL-SCNC: 3.9 MMOL/L (ref 3.5–5.1)
POTASSIUM SERPL-SCNC: 4 MMOL/L (ref 3.5–5.1)
POTASSIUM SERPL-SCNC: 4.5 MMOL/L (ref 3.5–5.1)
PROTHROMBIN TIME: 12.2 SEC (ref 9.6–12)
Q-T INTERVAL, ECG07: 380 MS
Q-T INTERVAL, ECG07: 438 MS
QRS DURATION, ECG06: 112 MS
QRS DURATION, ECG06: 114 MS
QTC CALCULATION (BEZET), ECG08: 490 MS
QTC CALCULATION (BEZET), ECG08: 492 MS
RBC # BLD AUTO: 3.4 M/UL (ref 4.23–5.67)
RBC # BLD AUTO: 4.05 M/UL (ref 4.23–5.67)
RESP RATE: 15
SAO2 % BLD: 100 % (ref 95–98)
SAO2 % BLD: 96 % (ref 92–98.5)
SAO2 % BLD: 97 % (ref 92–98.5)
SAO2 % BLD: 97 % (ref 95–98)
SERVICE CMNT-IMP: ABNORMAL
SERVICE CMNT-IMP: ABNORMAL
SODIUM BLD-SCNC: 135 MMOL/L (ref 138–146)
SODIUM BLD-SCNC: 137 MMOL/L (ref 138–146)
SODIUM BLD-SCNC: 138 MMOL/L (ref 138–146)
SODIUM BLD-SCNC: 139 MMOL/L (ref 138–146)
SODIUM BLD-SCNC: 142 MMOL/L (ref 138–146)
SODIUM BLD-SCNC: 143 MMOL/L (ref 138–146)
SODIUM BLDA-SCNC: 141.3 MMOL/L (ref 135–148)
SODIUM SERPL-SCNC: 141 MMOL/L (ref 136–145)
SODIUM SERPL-SCNC: 149 MMOL/L (ref 136–145)
SYSTOLIC BP, ECG01: NORMAL MMHG
SYSTOLIC BP, ECG01: NORMAL MMHG
TRIGL SERPL-MCNC: 195 MG/DL (ref 35–150)
VENTILATION MODE VENT: ABNORMAL
VENTILATION MODE VENT: ABNORMAL
VENTRICULAR RATE, ECG03: 100 BPM
VENTRICULAR RATE, ECG03: 76 BPM
VLDLC SERPL CALC-MCNC: 39 MG/DL (ref 6–23)
VT SETTING VENT: 500 ML
WBC # BLD AUTO: 17.2 K/UL (ref 4.3–11.1)
WBC # BLD AUTO: 8.5 K/UL (ref 4.3–11.1)

## 2017-02-28 PROCEDURE — 74011250637 HC RX REV CODE- 250/637: Performed by: NURSE PRACTITIONER

## 2017-02-28 PROCEDURE — 77030025646 HC AUTOTRNSFUS KT TERU -C: Performed by: THORACIC SURGERY (CARDIOTHORACIC VASCULAR SURGERY)

## 2017-02-28 PROCEDURE — 83735 ASSAY OF MAGNESIUM: CPT | Performed by: NURSE PRACTITIONER

## 2017-02-28 PROCEDURE — 77030002966 HC SUT PDS J&J -A: Performed by: THORACIC SURGERY (CARDIOTHORACIC VASCULAR SURGERY)

## 2017-02-28 PROCEDURE — C1751 CATH, INF, PER/CENT/MIDLINE: HCPCS | Performed by: ANESTHESIOLOGY

## 2017-02-28 PROCEDURE — 77030006994: Performed by: THORACIC SURGERY (CARDIOTHORACIC VASCULAR SURGERY)

## 2017-02-28 PROCEDURE — 74011000250 HC RX REV CODE- 250: Performed by: THORACIC SURGERY (CARDIOTHORACIC VASCULAR SURGERY)

## 2017-02-28 PROCEDURE — 85027 COMPLETE CBC AUTOMATED: CPT | Performed by: THORACIC SURGERY (CARDIOTHORACIC VASCULAR SURGERY)

## 2017-02-28 PROCEDURE — 77030018548 HC SUT ETHBND2 J&J -B: Performed by: THORACIC SURGERY (CARDIOTHORACIC VASCULAR SURGERY)

## 2017-02-28 PROCEDURE — 77030018571 HC SUT PROL1 J&J -B: Performed by: THORACIC SURGERY (CARDIOTHORACIC VASCULAR SURGERY)

## 2017-02-28 PROCEDURE — 80051 ELECTROLYTE PANEL: CPT

## 2017-02-28 PROCEDURE — 74011000250 HC RX REV CODE- 250

## 2017-02-28 PROCEDURE — 85730 THROMBOPLASTIN TIME PARTIAL: CPT | Performed by: THORACIC SURGERY (CARDIOTHORACIC VASCULAR SURGERY)

## 2017-02-28 PROCEDURE — 74011000272 HC RX REV CODE- 272: Performed by: THORACIC SURGERY (CARDIOTHORACIC VASCULAR SURGERY)

## 2017-02-28 PROCEDURE — 83735 ASSAY OF MAGNESIUM: CPT | Performed by: THORACIC SURGERY (CARDIOTHORACIC VASCULAR SURGERY)

## 2017-02-28 PROCEDURE — 36415 COLL VENOUS BLD VENIPUNCTURE: CPT | Performed by: NURSE PRACTITIONER

## 2017-02-28 PROCEDURE — 77030002996 HC SUT SLK J&J -A: Performed by: THORACIC SURGERY (CARDIOTHORACIC VASCULAR SURGERY)

## 2017-02-28 PROCEDURE — 77030005424 HC CATH THOR GTNG -A: Performed by: THORACIC SURGERY (CARDIOTHORACIC VASCULAR SURGERY)

## 2017-02-28 PROCEDURE — 83880 ASSAY OF NATRIURETIC PEPTIDE: CPT | Performed by: INTERNAL MEDICINE

## 2017-02-28 PROCEDURE — 77030034927 HC PK PROC CPB INSPIRE PERF LIVA -F: Performed by: THORACIC SURGERY (CARDIOTHORACIC VASCULAR SURGERY)

## 2017-02-28 PROCEDURE — 76060000042 HC ANESTHESIA 5.5 TO 6 HR: Performed by: THORACIC SURGERY (CARDIOTHORACIC VASCULAR SURGERY)

## 2017-02-28 PROCEDURE — 74011000258 HC RX REV CODE- 258: Performed by: THORACIC SURGERY (CARDIOTHORACIC VASCULAR SURGERY)

## 2017-02-28 PROCEDURE — 77030016564 HC BLD STRNL SAW4 CNMD -B: Performed by: THORACIC SURGERY (CARDIOTHORACIC VASCULAR SURGERY)

## 2017-02-28 PROCEDURE — 77030011640 HC PAD GRND REM COVD -A: Performed by: THORACIC SURGERY (CARDIOTHORACIC VASCULAR SURGERY)

## 2017-02-28 PROCEDURE — 82962 GLUCOSE BLOOD TEST: CPT

## 2017-02-28 PROCEDURE — 77030002986 HC SUT PROL J&J -A: Performed by: THORACIC SURGERY (CARDIOTHORACIC VASCULAR SURGERY)

## 2017-02-28 PROCEDURE — 99254 IP/OBS CNSLTJ NEW/EST MOD 60: CPT | Performed by: INTERNAL MEDICINE

## 2017-02-28 PROCEDURE — 5A1221Z PERFORMANCE OF CARDIAC OUTPUT, CONTINUOUS: ICD-10-PCS | Performed by: THORACIC SURGERY (CARDIOTHORACIC VASCULAR SURGERY)

## 2017-02-28 PROCEDURE — 77030003010 HC SUT SURG STL J&J -B: Performed by: THORACIC SURGERY (CARDIOTHORACIC VASCULAR SURGERY)

## 2017-02-28 PROCEDURE — 77030020407 HC IV BLD WRMR ST 3M -A: Performed by: ANESTHESIOLOGY

## 2017-02-28 PROCEDURE — 77030005521 HC CATH URETH FOL38 BARD -B: Performed by: THORACIC SURGERY (CARDIOTHORACIC VASCULAR SURGERY)

## 2017-02-28 PROCEDURE — 77030019908 HC STETH ESOPH SIMS -A: Performed by: ANESTHESIOLOGY

## 2017-02-28 PROCEDURE — P9045 ALBUMIN (HUMAN), 5%, 250 ML: HCPCS | Performed by: THORACIC SURGERY (CARDIOTHORACIC VASCULAR SURGERY)

## 2017-02-28 PROCEDURE — 74011250636 HC RX REV CODE- 250/636: Performed by: THORACIC SURGERY (CARDIOTHORACIC VASCULAR SURGERY)

## 2017-02-28 PROCEDURE — 021109W BYPASS CORONARY ARTERY, TWO ARTERIES FROM AORTA WITH AUTOLOGOUS VENOUS TISSUE, OPEN APPROACH: ICD-10-PCS | Performed by: THORACIC SURGERY (CARDIOTHORACIC VASCULAR SURGERY)

## 2017-02-28 PROCEDURE — 85730 THROMBOPLASTIN TIME PARTIAL: CPT | Performed by: INTERNAL MEDICINE

## 2017-02-28 PROCEDURE — 77030018547 HC SUT ETHBND1 J&J -B: Performed by: THORACIC SURGERY (CARDIOTHORACIC VASCULAR SURGERY)

## 2017-02-28 PROCEDURE — 77030010939 HC CLP LIG TELE -B: Performed by: THORACIC SURGERY (CARDIOTHORACIC VASCULAR SURGERY)

## 2017-02-28 PROCEDURE — P9047 ALBUMIN (HUMAN), 25%, 50ML: HCPCS

## 2017-02-28 PROCEDURE — 85610 PROTHROMBIN TIME: CPT | Performed by: THORACIC SURGERY (CARDIOTHORACIC VASCULAR SURGERY)

## 2017-02-28 PROCEDURE — 77030010813: Performed by: THORACIC SURGERY (CARDIOTHORACIC VASCULAR SURGERY)

## 2017-02-28 PROCEDURE — 85025 COMPLETE CBC W/AUTO DIFF WBC: CPT | Performed by: NURSE PRACTITIONER

## 2017-02-28 PROCEDURE — 77030018836 HC SOL IRR NACL ICUM -A: Performed by: THORACIC SURGERY (CARDIOTHORACIC VASCULAR SURGERY)

## 2017-02-28 PROCEDURE — 76010000117 HC CV SURG 5.5 TO 6 HR: Performed by: THORACIC SURGERY (CARDIOTHORACIC VASCULAR SURGERY)

## 2017-02-28 PROCEDURE — 77030016687: Performed by: THORACIC SURGERY (CARDIOTHORACIC VASCULAR SURGERY)

## 2017-02-28 PROCEDURE — 93005 ELECTROCARDIOGRAM TRACING: CPT | Performed by: THORACIC SURGERY (CARDIOTHORACIC VASCULAR SURGERY)

## 2017-02-28 PROCEDURE — 36600 WITHDRAWAL OF ARTERIAL BLOOD: CPT

## 2017-02-28 PROCEDURE — 77030003037 HC SUT WRE STRNOTMY AEMC -B: Performed by: THORACIC SURGERY (CARDIOTHORACIC VASCULAR SURGERY)

## 2017-02-28 PROCEDURE — 77030002970 HC SUT PLEDG TELE -A: Performed by: THORACIC SURGERY (CARDIOTHORACIC VASCULAR SURGERY)

## 2017-02-28 PROCEDURE — 77030013064 HC TRNSF BLD FENW -A

## 2017-02-28 PROCEDURE — 74011000302 HC RX REV CODE- 302: Performed by: THORACIC SURGERY (CARDIOTHORACIC VASCULAR SURGERY)

## 2017-02-28 PROCEDURE — 77030012890

## 2017-02-28 PROCEDURE — 77030005537 HC CATH URETH BARD -A: Performed by: THORACIC SURGERY (CARDIOTHORACIC VASCULAR SURGERY)

## 2017-02-28 PROCEDURE — 65610000006 HC RM INTENSIVE CARE

## 2017-02-28 PROCEDURE — 77030008771 HC TU NG SALEM SUMP -A: Performed by: ANESTHESIOLOGY

## 2017-02-28 PROCEDURE — 87086 URINE CULTURE/COLONY COUNT: CPT | Performed by: NURSE PRACTITIONER

## 2017-02-28 PROCEDURE — C1729 CATH, DRAINAGE: HCPCS | Performed by: THORACIC SURGERY (CARDIOTHORACIC VASCULAR SURGERY)

## 2017-02-28 PROCEDURE — 86580 TB INTRADERMAL TEST: CPT | Performed by: THORACIC SURGERY (CARDIOTHORACIC VASCULAR SURGERY)

## 2017-02-28 PROCEDURE — 77030034888 HC SUT PROL 2 J&J -B: Performed by: THORACIC SURGERY (CARDIOTHORACIC VASCULAR SURGERY)

## 2017-02-28 PROCEDURE — 74011000258 HC RX REV CODE- 258

## 2017-02-28 PROCEDURE — 77030010512 HC APPL CLP LIG J&J -C: Performed by: THORACIC SURGERY (CARDIOTHORACIC VASCULAR SURGERY)

## 2017-02-28 PROCEDURE — 77030020751 HC FLTR TBNG TRNSFUS HAEM -A: Performed by: ANESTHESIOLOGY

## 2017-02-28 PROCEDURE — 77030013292 HC BOWL MX PRSM J&J -A: Performed by: ANESTHESIOLOGY

## 2017-02-28 PROCEDURE — 80061 LIPID PANEL: CPT | Performed by: NURSE PRACTITIONER

## 2017-02-28 PROCEDURE — 80048 BASIC METABOLIC PNL TOTAL CA: CPT | Performed by: NURSE PRACTITIONER

## 2017-02-28 PROCEDURE — B24BZZ4 ULTRASONOGRAPHY OF HEART WITH AORTA, TRANSESOPHAGEAL: ICD-10-PCS | Performed by: THORACIC SURGERY (CARDIOTHORACIC VASCULAR SURGERY)

## 2017-02-28 PROCEDURE — 74011250636 HC RX REV CODE- 250/636

## 2017-02-28 PROCEDURE — 85384 FIBRINOGEN ACTIVITY: CPT | Performed by: THORACIC SURGERY (CARDIOTHORACIC VASCULAR SURGERY)

## 2017-02-28 PROCEDURE — 77030032489 HC SLV COMPR SCD FT CUF COVD -B

## 2017-02-28 PROCEDURE — 84132 ASSAY OF SERUM POTASSIUM: CPT | Performed by: THORACIC SURGERY (CARDIOTHORACIC VASCULAR SURGERY)

## 2017-02-28 PROCEDURE — 77030015758: Performed by: THORACIC SURGERY (CARDIOTHORACIC VASCULAR SURGERY)

## 2017-02-28 PROCEDURE — 06BQ0ZZ EXCISION OF LEFT SAPHENOUS VEIN, OPEN APPROACH: ICD-10-PCS | Performed by: THORACIC SURGERY (CARDIOTHORACIC VASCULAR SURGERY)

## 2017-02-28 PROCEDURE — 77030006689 HC BLD OPHTH BVR BD -A: Performed by: THORACIC SURGERY (CARDIOTHORACIC VASCULAR SURGERY)

## 2017-02-28 PROCEDURE — 74011250636 HC RX REV CODE- 250/636: Performed by: NURSE PRACTITIONER

## 2017-02-28 PROCEDURE — 77030031139 HC SUT VCRL2 J&J -A: Performed by: THORACIC SURGERY (CARDIOTHORACIC VASCULAR SURGERY)

## 2017-02-28 PROCEDURE — 77030012489 HC INSRTN PERC EDWD -B: Performed by: THORACIC SURGERY (CARDIOTHORACIC VASCULAR SURGERY)

## 2017-02-28 PROCEDURE — 77030013797 HC KT TRNSDUC PRSSR EDWD -A: Performed by: THORACIC SURGERY (CARDIOTHORACIC VASCULAR SURGERY)

## 2017-02-28 PROCEDURE — 77010033711 HC HIGH FLOW OXYGEN

## 2017-02-28 PROCEDURE — 77030018729 HC ELECTRD DEFIB PAD CARD -B: Performed by: THORACIC SURGERY (CARDIOTHORACIC VASCULAR SURGERY)

## 2017-02-28 PROCEDURE — 77030032994 HC SOL INJ SOD CL 0.9% LFCR 500ML

## 2017-02-28 PROCEDURE — C1769 GUIDE WIRE: HCPCS | Performed by: ANESTHESIOLOGY

## 2017-02-28 PROCEDURE — 77030014007 HC SPNG HEMSTAT J&J -B: Performed by: THORACIC SURGERY (CARDIOTHORACIC VASCULAR SURGERY)

## 2017-02-28 PROCEDURE — 77030013861 HC PNCH AORT CLNCUT QUES -B: Performed by: THORACIC SURGERY (CARDIOTHORACIC VASCULAR SURGERY)

## 2017-02-28 PROCEDURE — 85018 HEMOGLOBIN: CPT | Performed by: THORACIC SURGERY (CARDIOTHORACIC VASCULAR SURGERY)

## 2017-02-28 PROCEDURE — 80048 BASIC METABOLIC PNL TOTAL CA: CPT | Performed by: THORACIC SURGERY (CARDIOTHORACIC VASCULAR SURGERY)

## 2017-02-28 PROCEDURE — 71010 XR CHEST SNGL V: CPT

## 2017-02-28 PROCEDURE — 82803 BLOOD GASES ANY COMBINATION: CPT

## 2017-02-28 PROCEDURE — 82947 ASSAY GLUCOSE BLOOD QUANT: CPT

## 2017-02-28 PROCEDURE — 77030002987 HC SUT PROL J&J -B: Performed by: THORACIC SURGERY (CARDIOTHORACIC VASCULAR SURGERY)

## 2017-02-28 PROCEDURE — 77030008477 HC STYL SATN SLP COVD -A: Performed by: ANESTHESIOLOGY

## 2017-02-28 PROCEDURE — P9016 RBC LEUKOCYTES REDUCED: HCPCS | Performed by: NURSE PRACTITIONER

## 2017-02-28 PROCEDURE — 74011636637 HC RX REV CODE- 636/637: Performed by: THORACIC SURGERY (CARDIOTHORACIC VASCULAR SURGERY)

## 2017-02-28 PROCEDURE — 36430 TRANSFUSION BLD/BLD COMPNT: CPT

## 2017-02-28 PROCEDURE — 77030003034 HC SUT WRE CRD J&J -B: Performed by: THORACIC SURGERY (CARDIOTHORACIC VASCULAR SURGERY)

## 2017-02-28 PROCEDURE — 74011250637 HC RX REV CODE- 250/637: Performed by: THORACIC SURGERY (CARDIOTHORACIC VASCULAR SURGERY)

## 2017-02-28 PROCEDURE — 51798 US URINE CAPACITY MEASURE: CPT

## 2017-02-28 PROCEDURE — 5A1223Z PERFORMANCE OF CARDIAC PACING, CONTINUOUS: ICD-10-PCS | Performed by: THORACIC SURGERY (CARDIOTHORACIC VASCULAR SURGERY)

## 2017-02-28 PROCEDURE — 02100Z9 BYPASS CORONARY ARTERY, ONE ARTERY FROM LEFT INTERNAL MAMMARY, OPEN APPROACH: ICD-10-PCS | Performed by: THORACIC SURGERY (CARDIOTHORACIC VASCULAR SURGERY)

## 2017-02-28 PROCEDURE — 76010000155 HC AUTO TRANSFUSION/CELL SAVER: Performed by: THORACIC SURGERY (CARDIOTHORACIC VASCULAR SURGERY)

## 2017-02-28 PROCEDURE — 77030009995: Performed by: THORACIC SURGERY (CARDIOTHORACIC VASCULAR SURGERY)

## 2017-02-28 PROCEDURE — 77030004870 HC CATH CV SWN EDWD -C: Performed by: ANESTHESIOLOGY

## 2017-02-28 PROCEDURE — 77030013794 HC KT TRNSDUC BLD EDWD -B: Performed by: ANESTHESIOLOGY

## 2017-02-28 PROCEDURE — 77030008703 HC TU ET UNCUF COVD -A: Performed by: ANESTHESIOLOGY

## 2017-02-28 PROCEDURE — 84132 ASSAY OF SERUM POTASSIUM: CPT

## 2017-02-28 PROCEDURE — 77030002888 HC SUT CHRMC J&J -A: Performed by: THORACIC SURGERY (CARDIOTHORACIC VASCULAR SURGERY)

## 2017-02-28 PROCEDURE — 77030002520 HC INSRT CLMP LATIS STLTH AMR -B: Performed by: THORACIC SURGERY (CARDIOTHORACIC VASCULAR SURGERY)

## 2017-02-28 PROCEDURE — 77030009355 HC CRDPLG DEL SET QUES -C: Performed by: THORACIC SURGERY (CARDIOTHORACIC VASCULAR SURGERY)

## 2017-02-28 RX ORDER — PROTAMINE SULFATE 10 MG/ML
INJECTION, SOLUTION INTRAVENOUS AS NEEDED
Status: DISCONTINUED | OUTPATIENT
Start: 2017-02-28 | End: 2017-02-28 | Stop reason: HOSPADM

## 2017-02-28 RX ORDER — MIDAZOLAM HYDROCHLORIDE 1 MG/ML
INJECTION, SOLUTION INTRAMUSCULAR; INTRAVENOUS AS NEEDED
Status: DISCONTINUED | OUTPATIENT
Start: 2017-02-28 | End: 2017-02-28 | Stop reason: HOSPADM

## 2017-02-28 RX ORDER — DEXTROSE 50 % IN WATER (D50W) INTRAVENOUS SYRINGE
25 AS NEEDED
Status: DISCONTINUED | OUTPATIENT
Start: 2017-02-28 | End: 2017-03-03

## 2017-02-28 RX ORDER — SODIUM CHLORIDE, SODIUM LACTATE, POTASSIUM CHLORIDE, CALCIUM CHLORIDE 600; 310; 30; 20 MG/100ML; MG/100ML; MG/100ML; MG/100ML
INJECTION, SOLUTION INTRAVENOUS
Status: DISCONTINUED | OUTPATIENT
Start: 2017-02-28 | End: 2017-02-28 | Stop reason: HOSPADM

## 2017-02-28 RX ORDER — HEPARIN SODIUM 1000 [USP'U]/ML
INJECTION, SOLUTION INTRAVENOUS; SUBCUTANEOUS AS NEEDED
Status: DISCONTINUED | OUTPATIENT
Start: 2017-02-28 | End: 2017-02-28 | Stop reason: HOSPADM

## 2017-02-28 RX ORDER — ETOMIDATE 2 MG/ML
INJECTION INTRAVENOUS AS NEEDED
Status: DISCONTINUED | OUTPATIENT
Start: 2017-02-28 | End: 2017-02-28 | Stop reason: HOSPADM

## 2017-02-28 RX ORDER — OXYCODONE AND ACETAMINOPHEN 5; 325 MG/1; MG/1
1 TABLET ORAL
Status: DISCONTINUED | OUTPATIENT
Start: 2017-02-28 | End: 2017-03-13 | Stop reason: HOSPADM

## 2017-02-28 RX ORDER — HEPARIN SODIUM 1000 [USP'U]/ML
INJECTION, SOLUTION INTRAVENOUS; SUBCUTANEOUS
Status: DISCONTINUED | OUTPATIENT
Start: 2017-02-28 | End: 2017-02-28

## 2017-02-28 RX ORDER — ATORVASTATIN CALCIUM 40 MG/1
80 TABLET, FILM COATED ORAL
Status: DISCONTINUED | OUTPATIENT
Start: 2017-02-28 | End: 2017-03-03

## 2017-02-28 RX ORDER — TRAMADOL HYDROCHLORIDE 50 MG/1
100 TABLET ORAL
Status: DISCONTINUED | OUTPATIENT
Start: 2017-02-28 | End: 2017-03-05

## 2017-02-28 RX ORDER — HYDROMORPHONE HYDROCHLORIDE 1 MG/ML
0.5 INJECTION, SOLUTION INTRAMUSCULAR; INTRAVENOUS; SUBCUTANEOUS
Status: DISCONTINUED | OUTPATIENT
Start: 2017-02-28 | End: 2017-03-03

## 2017-02-28 RX ORDER — POTASSIUM CHLORIDE 14.9 MG/ML
20 INJECTION INTRAVENOUS ONCE
Status: COMPLETED | OUTPATIENT
Start: 2017-02-28 | End: 2017-03-04

## 2017-02-28 RX ORDER — FAMOTIDINE 10 MG/ML
20 INJECTION INTRAVENOUS EVERY 12 HOURS
Status: DISCONTINUED | OUTPATIENT
Start: 2017-02-28 | End: 2017-03-03

## 2017-02-28 RX ORDER — MUPIROCIN 20 MG/G
OINTMENT TOPICAL 2 TIMES DAILY
Status: DISCONTINUED | OUTPATIENT
Start: 2017-02-28 | End: 2017-03-03

## 2017-02-28 RX ORDER — MIDAZOLAM HYDROCHLORIDE 1 MG/ML
1 INJECTION, SOLUTION INTRAMUSCULAR; INTRAVENOUS
Status: DISCONTINUED | OUTPATIENT
Start: 2017-02-28 | End: 2017-03-03

## 2017-02-28 RX ORDER — PREDNISOLONE ACETATE 10 MG/ML
1 SUSPENSION/ DROPS OPHTHALMIC 4 TIMES DAILY
Status: DISCONTINUED | OUTPATIENT
Start: 2017-03-01 | End: 2017-03-03 | Stop reason: SDUPTHER

## 2017-02-28 RX ORDER — SODIUM CHLORIDE 9 MG/ML
INJECTION, SOLUTION INTRAVENOUS
Status: DISCONTINUED | OUTPATIENT
Start: 2017-02-28 | End: 2017-02-28 | Stop reason: HOSPADM

## 2017-02-28 RX ORDER — VECURONIUM BROMIDE FOR INJECTION 1 MG/ML
INJECTION, POWDER, LYOPHILIZED, FOR SOLUTION INTRAVENOUS AS NEEDED
Status: DISCONTINUED | OUTPATIENT
Start: 2017-02-28 | End: 2017-02-28 | Stop reason: HOSPADM

## 2017-02-28 RX ORDER — CEFAZOLIN SODIUM IN 0.9 % NACL 2 G/50 ML
2 INTRAVENOUS SOLUTION, PIGGYBACK (ML) INTRAVENOUS EVERY 8 HOURS
Status: COMPLETED | OUTPATIENT
Start: 2017-02-28 | End: 2017-03-04

## 2017-02-28 RX ORDER — CHLORHEXIDINE GLUCONATE 1.2 MG/ML
10 RINSE ORAL 2 TIMES DAILY
Status: DISCONTINUED | OUTPATIENT
Start: 2017-02-28 | End: 2017-03-01

## 2017-02-28 RX ORDER — METOPROLOL TARTRATE 25 MG/1
25 TABLET, FILM COATED ORAL EVERY 12 HOURS
Status: DISCONTINUED | OUTPATIENT
Start: 2017-03-01 | End: 2017-03-03

## 2017-02-28 RX ORDER — SODIUM CHLORIDE 9 MG/ML
25 INJECTION, SOLUTION INTRAVENOUS CONTINUOUS
Status: DISCONTINUED | OUTPATIENT
Start: 2017-02-28 | End: 2017-03-03

## 2017-02-28 RX ORDER — SODIUM BICARBONATE 1 MEQ/ML
50 SYRINGE (ML) INTRAVENOUS AS NEEDED
Status: DISCONTINUED | OUTPATIENT
Start: 2017-02-28 | End: 2017-03-03

## 2017-02-28 RX ORDER — PAPAVERINE HYDROCHLORIDE 30 MG/ML
INJECTION INTRAMUSCULAR; INTRAVENOUS AS NEEDED
Status: DISCONTINUED | OUTPATIENT
Start: 2017-02-28 | End: 2017-02-28 | Stop reason: HOSPADM

## 2017-02-28 RX ORDER — SODIUM CHLORIDE 0.9 % (FLUSH) 0.9 %
5-10 SYRINGE (ML) INJECTION EVERY 8 HOURS
Status: DISCONTINUED | OUTPATIENT
Start: 2017-02-28 | End: 2017-03-03

## 2017-02-28 RX ORDER — GUAIFENESIN 100 MG/5ML
81 LIQUID (ML) ORAL DAILY
Status: DISCONTINUED | OUTPATIENT
Start: 2017-03-01 | End: 2017-03-03

## 2017-02-28 RX ORDER — AMIODARONE HYDROCHLORIDE 200 MG/1
200 TABLET ORAL EVERY 12 HOURS
Status: DISCONTINUED | OUTPATIENT
Start: 2017-02-28 | End: 2017-03-03

## 2017-02-28 RX ORDER — HEPARIN SODIUM 5000 [USP'U]/ML
INJECTION, SOLUTION INTRAVENOUS; SUBCUTANEOUS AS NEEDED
Status: DISCONTINUED | OUTPATIENT
Start: 2017-02-28 | End: 2017-02-28 | Stop reason: HOSPADM

## 2017-02-28 RX ORDER — SODIUM CHLORIDE 0.9 % (FLUSH) 0.9 %
5-10 SYRINGE (ML) INJECTION AS NEEDED
Status: DISCONTINUED | OUTPATIENT
Start: 2017-02-28 | End: 2017-02-28 | Stop reason: SDUPTHER

## 2017-02-28 RX ORDER — SUFENTANIL CITRATE 50 UG/ML
INJECTION EPIDURAL; INTRAVENOUS AS NEEDED
Status: DISCONTINUED | OUTPATIENT
Start: 2017-02-28 | End: 2017-02-28 | Stop reason: HOSPADM

## 2017-02-28 RX ORDER — SUCCINYLCHOLINE CHLORIDE 20 MG/ML
INJECTION INTRAMUSCULAR; INTRAVENOUS AS NEEDED
Status: DISCONTINUED | OUTPATIENT
Start: 2017-02-28 | End: 2017-02-28 | Stop reason: HOSPADM

## 2017-02-28 RX ORDER — INSULIN GLARGINE 100 [IU]/ML
60 INJECTION, SOLUTION SUBCUTANEOUS ONCE
Status: COMPLETED | OUTPATIENT
Start: 2017-02-28 | End: 2017-02-28

## 2017-02-28 RX ORDER — HYDROMORPHONE HYDROCHLORIDE 1 MG/ML
INJECTION, SOLUTION INTRAMUSCULAR; INTRAVENOUS; SUBCUTANEOUS
Status: ACTIVE
Start: 2017-02-28 | End: 2017-03-01

## 2017-02-28 RX ORDER — SODIUM CHLORIDE, SODIUM LACTATE, POTASSIUM CHLORIDE, CALCIUM CHLORIDE 600; 310; 30; 20 MG/100ML; MG/100ML; MG/100ML; MG/100ML
75 INJECTION, SOLUTION INTRAVENOUS CONTINUOUS
Status: CANCELLED | OUTPATIENT
Start: 2017-02-28 | End: 2017-03-01

## 2017-02-28 RX ORDER — HEPARIN SODIUM 5000 [USP'U]/100ML
12-25 INJECTION, SOLUTION INTRAVENOUS
Status: DISCONTINUED | OUTPATIENT
Start: 2017-02-27 | End: 2017-03-03

## 2017-02-28 RX ORDER — ALBUMIN HUMAN 50 G/1000ML
SOLUTION INTRAVENOUS
Status: DISCONTINUED | OUTPATIENT
Start: 2017-02-28 | End: 2017-02-28 | Stop reason: HOSPADM

## 2017-02-28 RX ORDER — MAGNESIUM SULFATE 1 G/100ML
1 INJECTION INTRAVENOUS AS NEEDED
Status: DISCONTINUED | OUTPATIENT
Start: 2017-02-28 | End: 2017-03-03

## 2017-02-28 RX ORDER — SODIUM CHLORIDE 9 MG/ML
250 INJECTION, SOLUTION INTRAVENOUS AS NEEDED
Status: DISCONTINUED | OUTPATIENT
Start: 2017-02-28 | End: 2017-03-03

## 2017-02-28 RX ORDER — NALOXONE HYDROCHLORIDE 0.4 MG/ML
0.4 INJECTION, SOLUTION INTRAMUSCULAR; INTRAVENOUS; SUBCUTANEOUS AS NEEDED
Status: DISCONTINUED | OUTPATIENT
Start: 2017-02-28 | End: 2017-03-03

## 2017-02-28 RX ORDER — MIDAZOLAM HYDROCHLORIDE 1 MG/ML
2 INJECTION, SOLUTION INTRAMUSCULAR; INTRAVENOUS
Status: CANCELLED | OUTPATIENT
Start: 2017-02-28

## 2017-02-28 RX ORDER — LIDOCAINE HCL/PF 100 MG/5ML
50-100 SYRINGE (ML) INTRAVENOUS
Status: DISCONTINUED | OUTPATIENT
Start: 2017-02-28 | End: 2017-03-03

## 2017-02-28 RX ORDER — NITROGLYCERIN 20 MG/100ML
INJECTION INTRAVENOUS
Status: DISCONTINUED | OUTPATIENT
Start: 2017-02-28 | End: 2017-02-28 | Stop reason: HOSPADM

## 2017-02-28 RX ORDER — MORPHINE SULFATE 10 MG/ML
3-5 INJECTION, SOLUTION INTRAMUSCULAR; INTRAVENOUS
Status: DISCONTINUED | OUTPATIENT
Start: 2017-02-28 | End: 2017-03-03

## 2017-02-28 RX ORDER — ROCURONIUM BROMIDE 10 MG/ML
INJECTION, SOLUTION INTRAVENOUS AS NEEDED
Status: DISCONTINUED | OUTPATIENT
Start: 2017-02-28 | End: 2017-02-28 | Stop reason: HOSPADM

## 2017-02-28 RX ORDER — DEXTROSE, SODIUM CHLORIDE, AND POTASSIUM CHLORIDE 5; .45; .15 G/100ML; G/100ML; G/100ML
25 INJECTION INTRAVENOUS CONTINUOUS
Status: DISCONTINUED | OUTPATIENT
Start: 2017-02-28 | End: 2017-03-03

## 2017-02-28 RX ORDER — ALBUMIN HUMAN 50 G/1000ML
25 SOLUTION INTRAVENOUS ONCE
Status: COMPLETED | OUTPATIENT
Start: 2017-02-28 | End: 2017-03-04

## 2017-02-28 RX ORDER — LIDOCAINE HYDROCHLORIDE 20 MG/ML
INJECTION, SOLUTION EPIDURAL; INFILTRATION; INTRACAUDAL; PERINEURAL AS NEEDED
Status: DISCONTINUED | OUTPATIENT
Start: 2017-02-28 | End: 2017-02-28 | Stop reason: HOSPADM

## 2017-02-28 RX ORDER — POTASSIUM CHLORIDE 14.9 MG/ML
10 INJECTION INTRAVENOUS AS NEEDED
Status: ACTIVE | OUTPATIENT
Start: 2017-02-28 | End: 2017-03-01

## 2017-02-28 RX ORDER — NITROGLYCERIN 20 MG/100ML
10-100 INJECTION INTRAVENOUS
Status: DISCONTINUED | OUTPATIENT
Start: 2017-02-28 | End: 2017-03-03

## 2017-02-28 RX ORDER — OXYCODONE AND ACETAMINOPHEN 10; 325 MG/1; MG/1
1 TABLET ORAL
Status: DISCONTINUED | OUTPATIENT
Start: 2017-02-28 | End: 2017-03-13 | Stop reason: HOSPADM

## 2017-02-28 RX ADMIN — AMIODARONE HYDROCHLORIDE 200 MG: 200 TABLET ORAL at 20:09

## 2017-02-28 RX ADMIN — PROTAMINE SULFATE 250 MG: 10 INJECTION, SOLUTION INTRAVENOUS at 11:54

## 2017-02-28 RX ADMIN — TRAMADOL HYDROCHLORIDE 100 MG: 50 TABLET, FILM COATED ORAL at 22:05

## 2017-02-28 RX ADMIN — SUFENTANIL CITRATE 25 MCG: 50 INJECTION EPIDURAL; INTRAVENOUS at 09:59

## 2017-02-28 RX ADMIN — MORPHINE SULFATE 3 MG: 10 INJECTION INTRAMUSCULAR; INTRAVENOUS; SUBCUTANEOUS at 19:08

## 2017-02-28 RX ADMIN — FAMOTIDINE 20 MG: 10 INJECTION, SOLUTION INTRAVENOUS at 20:10

## 2017-02-28 RX ADMIN — ROCURONIUM BROMIDE 50 MG: 10 INJECTION, SOLUTION INTRAVENOUS at 08:15

## 2017-02-28 RX ADMIN — HEPARIN SODIUM 35000 UNITS: 1000 INJECTION, SOLUTION INTRAVENOUS; SUBCUTANEOUS at 09:44

## 2017-02-28 RX ADMIN — MUPIROCIN: 20 OINTMENT TOPICAL at 18:14

## 2017-02-28 RX ADMIN — ATORVASTATIN CALCIUM 80 MG: 40 TABLET, FILM COATED ORAL at 20:09

## 2017-02-28 RX ADMIN — CEFAZOLIN 2 G: 1 INJECTION, POWDER, FOR SOLUTION INTRAMUSCULAR; INTRAVENOUS; PARENTERAL at 19:55

## 2017-02-28 RX ADMIN — PREDNISOLONE ACETATE 1 DROP: 10 SUSPENSION/ DROPS OPHTHALMIC at 21:23

## 2017-02-28 RX ADMIN — MIDAZOLAM HYDROCHLORIDE 1 MG: 1 INJECTION, SOLUTION INTRAMUSCULAR; INTRAVENOUS at 07:20

## 2017-02-28 RX ADMIN — PREDNISOLONE ACETATE 1 DROP: 10 SUSPENSION/ DROPS OPHTHALMIC at 18:14

## 2017-02-28 RX ADMIN — POTASSIUM CHLORIDE 20 MEQ: 14.9 INJECTION, SOLUTION INTRAVENOUS at 15:40

## 2017-02-28 RX ADMIN — NOREPINEPHRINE BITARTRATE 4.56 MCG/MIN: 1 INJECTION INTRAVENOUS at 23:02

## 2017-02-28 RX ADMIN — MIDAZOLAM HYDROCHLORIDE 1 MG: 1 INJECTION, SOLUTION INTRAMUSCULAR; INTRAVENOUS at 07:32

## 2017-02-28 RX ADMIN — HYDROMORPHONE HYDROCHLORIDE 0.5 MG: 1 INJECTION, SOLUTION INTRAMUSCULAR; INTRAVENOUS; SUBCUTANEOUS at 22:42

## 2017-02-28 RX ADMIN — INSULIN GLARGINE 60 UNITS: 100 INJECTION, SOLUTION SUBCUTANEOUS at 21:19

## 2017-02-28 RX ADMIN — NITROGLYCERIN 1 INCH: 20 OINTMENT TOPICAL at 02:51

## 2017-02-28 RX ADMIN — SUFENTANIL CITRATE 15 MCG: 50 INJECTION EPIDURAL; INTRAVENOUS at 09:16

## 2017-02-28 RX ADMIN — HEPARIN SODIUM 1000 ML: 1000 INJECTION, SOLUTION INTRAVENOUS; SUBCUTANEOUS at 08:46

## 2017-02-28 RX ADMIN — MUPIROCIN 1 G: 20 OINTMENT TOPICAL at 06:02

## 2017-02-28 RX ADMIN — ROCURONIUM BROMIDE 50 MG: 10 INJECTION, SOLUTION INTRAVENOUS at 10:57

## 2017-02-28 RX ADMIN — SUFENTANIL CITRATE 15 MCG: 50 INJECTION EPIDURAL; INTRAVENOUS at 08:53

## 2017-02-28 RX ADMIN — TUBERCULIN PURIFIED PROTEIN DERIVATIVE 5 UNITS: 5 INJECTION INTRADERMAL at 21:15

## 2017-02-28 RX ADMIN — ALBUMIN (HUMAN) 25 G: 12.5 INJECTION, SOLUTION INTRAVENOUS at 18:10

## 2017-02-28 RX ADMIN — SODIUM CHLORIDE 25 ML/HR: 900 INJECTION, SOLUTION INTRAVENOUS at 19:22

## 2017-02-28 RX ADMIN — SODIUM CHLORIDE 25 ML/HR: 900 INJECTION, SOLUTION INTRAVENOUS at 14:01

## 2017-02-28 RX ADMIN — Medication 5 ML: at 22:00

## 2017-02-28 RX ADMIN — AMIODARONE HYDROCHLORIDE 400 MG: 200 TABLET ORAL at 04:15

## 2017-02-28 RX ADMIN — SODIUM CHLORIDE, SODIUM LACTATE, POTASSIUM CHLORIDE, CALCIUM CHLORIDE: 600; 310; 30; 20 INJECTION, SOLUTION INTRAVENOUS at 07:10

## 2017-02-28 RX ADMIN — OXYCODONE HYDROCHLORIDE AND ACETAMINOPHEN 1 TABLET: 5; 325 TABLET ORAL at 16:18

## 2017-02-28 RX ADMIN — VECURONIUM BROMIDE FOR INJECTION 5 MG: 1 INJECTION, POWDER, LYOPHILIZED, FOR SOLUTION INTRAVENOUS at 09:03

## 2017-02-28 RX ADMIN — Medication 10 ML: at 14:17

## 2017-02-28 RX ADMIN — SUFENTANIL CITRATE 20 MCG: 50 INJECTION EPIDURAL; INTRAVENOUS at 08:04

## 2017-02-28 RX ADMIN — SODIUM CHLORIDE: 9 INJECTION, SOLUTION INTRAVENOUS at 11:29

## 2017-02-28 RX ADMIN — LIDOCAINE HYDROCHLORIDE 100 MG: 20 INJECTION, SOLUTION EPIDURAL; INFILTRATION; INTRACAUDAL; PERINEURAL at 08:04

## 2017-02-28 RX ADMIN — MIDAZOLAM HYDROCHLORIDE 1 MG: 1 INJECTION, SOLUTION INTRAMUSCULAR; INTRAVENOUS at 07:48

## 2017-02-28 RX ADMIN — PHENYLEPHRINE HYDROCHLORIDE 80 MCG/MIN: 10 INJECTION INTRAMUSCULAR; INTRAVENOUS; SUBCUTANEOUS at 19:52

## 2017-02-28 RX ADMIN — SODIUM CHLORIDE, SODIUM LACTATE, POTASSIUM CHLORIDE, CALCIUM CHLORIDE: 600; 310; 30; 20 INJECTION, SOLUTION INTRAVENOUS at 07:40

## 2017-02-28 RX ADMIN — SODIUM CHLORIDE: 9 INJECTION, SOLUTION INTRAVENOUS at 07:40

## 2017-02-28 RX ADMIN — SUCCINYLCHOLINE CHLORIDE 160 MG: 20 INJECTION INTRAMUSCULAR; INTRAVENOUS at 08:04

## 2017-02-28 RX ADMIN — VECURONIUM BROMIDE FOR INJECTION 5 MG: 1 INJECTION, POWDER, LYOPHILIZED, FOR SOLUTION INTRAVENOUS at 09:45

## 2017-02-28 RX ADMIN — DEXTROSE MONOHYDRATE, SODIUM CHLORIDE, AND POTASSIUM CHLORIDE 25 ML/HR: 50; 4.5; 1.49 INJECTION, SOLUTION INTRAVENOUS at 14:06

## 2017-02-28 RX ADMIN — CEFAZOLIN 2 G: 1 INJECTION, POWDER, FOR SOLUTION INTRAMUSCULAR; INTRAVENOUS; PARENTERAL at 12:00

## 2017-02-28 RX ADMIN — OXYCODONE HYDROCHLORIDE AND ACETAMINOPHEN 1 TABLET: 10; 325 TABLET ORAL at 20:10

## 2017-02-28 RX ADMIN — NITROGLYCERIN 10 MCG/MIN: 20 INJECTION INTRAVENOUS at 08:10

## 2017-02-28 RX ADMIN — MIDAZOLAM HYDROCHLORIDE 2 MG: 1 INJECTION, SOLUTION INTRAMUSCULAR; INTRAVENOUS at 07:13

## 2017-02-28 RX ADMIN — SODIUM CHLORIDE: 9 INJECTION, SOLUTION INTRAVENOUS at 12:44

## 2017-02-28 RX ADMIN — MIDAZOLAM HYDROCHLORIDE 5 MG: 1 INJECTION, SOLUTION INTRAMUSCULAR; INTRAVENOUS at 10:57

## 2017-02-28 RX ADMIN — Medication 10 ML: at 23:09

## 2017-02-28 RX ADMIN — SODIUM CHLORIDE 6 UNITS/HR: 900 INJECTION, SOLUTION INTRAVENOUS at 22:44

## 2017-02-28 RX ADMIN — ETOMIDATE 20 MG: 2 INJECTION INTRAVENOUS at 08:04

## 2017-02-28 RX ADMIN — CEFAZOLIN 2 G: 1 INJECTION, POWDER, FOR SOLUTION INTRAMUSCULAR; INTRAVENOUS; PARENTERAL at 08:28

## 2017-02-28 NOTE — PROGRESS NOTES
Interdisciplinary team rounds were held 2/28/2017 with the following team members:Nursing, Physician, Respiratory Therapy and Clinical Coordinator . Recover per unit standard, routine. Plan of care discussed. See clinical pathway and/or care plan for interventions and desired outcomes. Wife seen: educated, reassured, and questions answered.

## 2017-02-28 NOTE — PERIOP NOTES
Patient arrived with a hematoma located at right groin area around the Impella site. Patient also presented with bruised scrotum.

## 2017-02-28 NOTE — ANESTHESIA PREPROCEDURE EVALUATION
Anesthetic History   No history of anesthetic complications            Review of Systems / Medical History  Patient summary reviewed and pertinent labs reviewed    Pulmonary  Within defined limits                 Neuro/Psych   Within defined limits           Cardiovascular    Hypertension: well controlled          Past MI (NSTEMI this admission) and CAD    Exercise tolerance: >4 METS  Comments: Cath showed severe multivessel disease (LAD 95% Prox, LCx 95-99%, %) with severely impaired LVEF. Impella was placed at the time of heart cath. Echo pending.    GI/Hepatic/Renal     GERD: well controlled           Endo/Other    Diabetes (Hgb A1c of 9.0): poorly controlled, type 2         Other Findings              Physical Exam    Airway  Mallampati: II  TM Distance: 4 - 6 cm  Neck ROM: normal range of motion   Mouth opening: Normal     Cardiovascular  Regular rate and rhythm,  S1 and S2 normal,  no murmur, click, rub, or gallop  Rhythm: regular  Rate: normal         Dental  No notable dental hx       Pulmonary  Breath sounds clear to auscultation               Abdominal  GI exam deferred       Other Findings            Anesthetic Plan    ASA: 4  Anesthesia type: general    Monitoring Plan: Arterial line, CVP, CARLOS, BIS and [place]      Induction: Intravenous  Anesthetic plan and risks discussed with: Patient

## 2017-02-28 NOTE — PROGRESS NOTES
1st chlorhexidine bath given. Continuer to monitor hematoma to r  Groin. Doesn't appear to have increased. Heparin drip to be started. Consents signed. Aware to be NPO after midnight. Denies chest pain at this time or SOB. No acute distress noted. TR band removed at 2130. Toloreted well. No bleeding noted. Will monitor with heparin being started.

## 2017-02-28 NOTE — PROGRESS NOTES
Spoke with blood bank 3 units of PRBCs available. 2 units of FFP and plts.  All available for surgery this am.

## 2017-02-28 NOTE — PROGRESS NOTES
Impella alarming incorrect position. Dr. Tootie Leos notified. At bedside. Hematoma noted at insertion site. Dr. Tootie Leos repositioned impella. CXR ordered STAT. Replaced line to 94 cm. Oozing at site. CCL RN injected xylocaine to site to help with bleeding. Manual pressure held at site afterwards for 15 minutes. Site redressed and sandbag placed over. [address] Dr. Tootie Leos at bedside to readjust post CXR. Moved to 91 cm. Hematoma stable. Will continue to monitor.

## 2017-02-28 NOTE — OP NOTES
[facility]   OPERATIVE REPORT       Name:  Niko Munoz   MR#:  [id number]   :  1955   Account #:  [de-identified]   Date of Adm:  2017       DATE OF SURGERY: 2017      PREOPERATIVE DIAGNOSES   1. Non-ST segment elevation myocardial infarction. 2. Severe multi-vessel atherosclerotic coronary artery disease. 3. Profound ischemic cardiomyopathy with an ejection fraction of   10% to 15%. POSTOPERATIVE DIAGNOSES   1. Non-ST segment elevation myocardial infarction. 2. Severe multi-vessel atherosclerotic coronary artery disease. 3. Profound ischemic cardiomyopathy with an ejection fraction of   10% to 15%. PROCEDURE: Coronary artery bypass grafting x3. Grafts consisting of   1. Left internal mammary artery to the left anterior descending. 2. Reversed saphenous vein graft to obtuse marginal.   3. Reversed saphenous vein graft to the left posterior descending   artery. SURGEON: Digna Bryant. Payton Dandy, MD      ANESTHESIA: General endotracheal.      OPERATIVE FINDINGS: Saphenous vein 3 to 4 mm, LIMA 2.5 mm. Aorta   was soft, no palpable plaque. The patient was noted to have   massive cardiomegaly. The LAD is 1.4 mm, severe distal disease. OM is 1.4 mm, severe distal disease. Left PDA is 1.3 mm, severe   distal disease. INDICATIONS: Mr. Janes Kahn is a very pleasant 61-year-old gentleman   who presented to the hospital yesterday with complaints of chest   fullness and very significant exertional dyspnea. He underwent   left heart cath which confirmed severe multi-vessel disease with   markedly reduced left ventricular function. An echo obtained   after the cath showed an EF of 10% to 15%. COMPLICATIONS: None. COUNTS: All instrument and sponge counts were correct at the end   of the case. DESCRIPTION OF PROCEDURE: After informed consent was obtained   for the above-mentioned procedure, the patient was then taken to   the operating room.  Appropriate monitoring lines were placed by   the Anesthesia Department. After administration of general   endotracheal anesthesia, the patient was prepped and draped in   the usual fashion with Betadine scrub and paint and [place]   cardiovascular drapes. The chest was then entered through a   standard mediastinotomy incision while simultaneous harvesting   of peripheral conduit was undertaken. After harvesting the   conduit, it was inspected and noted to be adequate. The   incisions were subsequently closed in a 2-layer fashion of 3-0   and 4-0 Vicryl. The left internal mammary harvest was then   undertaken in pedicle fashion and was injected with Papaverine   solution. After administration of heparin, the distal pedicle   was incised and noted to bleed briskly. A thoracostomy tube was   then placed. This was brought out through a separate stab   incision inferiorly and affixed to the skin. The pericardium was   then opened and tacked up into a pericardial well, revealing the   heart. Pursestrings were then placed into the aorta, the right   atrial appendage, and the right atrium. After adequate ACT was   obtained the patient was then cannulated through these   pursestrings, at which point cardiopulmonary bypass was started. Target vessels were identified and marked. A crossclamp was then   applied. Antegrade and retrograde cardioplegia was administered   initially and then given intermittently throughout the case. A   latticed heart support was placed to assist with the distal   anastomoses, which were performed by using 7-0 Prolene for vein   to artery anastomosis and 8-0 Prolene for artery to artery   anastomosis. Proximal anastomoses to the aorta were also placed   using 6-0 Prolene. At the conclusion of the final proximal   anastomosis,the aorta and right ventricle were flushed of debris   and the anastomosis was completed. The crossclamp was then   removed. The heart then underwent meticulous de-airing.  The   patient was warmed and weaned from the cardiopulmonary bypass. The distal anastomoses were visualized and noted to be   hemostatic. All grafts were dopplered and noted to have an   excellent flow. The venous cannula was then removed. Ventricular   and atrial pacing wires were then placed upon the heart, brought   out through stab wounds inferiorly and affixed to the skin. A   single straight mediastinal tube was placed, brought out through   a separate stab incision inferiorly, and also affixed to the   skin. After meticulous hemostasis was made the sternum was   reapproximated with heavy gauge stainless steel wire. The   midline fascia was subsequently closed separately. Vicryl was   then used in a running fashion for subcutaneous tissue and skin. Dressings were then applied to all wounds. The patient was then   transported with monitors to the intensive care unit intubated   and ventilated.           MD RAJINDER Pyle / Rad Hogan   D:  02/28/2017   12:43   T:  02/28/2017   13:00   Job #:  [id number]

## 2017-02-28 NOTE — PROGRESS NOTES
Attempting to urinate 8 hours post procedure. Unable to. Bladder scan done over 700cc of urine noted. Flores inserted. U/A obtained. tolerated well. Hematoma remains unchanged with heparin infusing. No bleeding noted from R wrist post cath site.

## 2017-02-28 NOTE — PROGRESS NOTES
Respiratory Mechanics completed and are as follows:     Patient extubated to a 40L 40% AIRVO NC. Patient is able to communicate and is negative for stridor. Breath sounds are clear. No complications with extubation.      [address], [place]

## 2017-02-28 NOTE — PROGRESS NOTES
Patient out from operating room and placed on the ventilator on documented settings. Patient is orally intubated with a # 8.0 ET Tube secured at the 24 cm chacha at the lip. Breath sounds are clear. Trachea is midline. neg for subcutaneous air, chest excursion is symmetric. Negative for pitting edema. Patient is also Negative for cyanosis. All alarms are set and audible. Resuscitation bag is at the head of the bed. Ventilator Settings  Mode FIO2 Rate Tidal Volume Pressure PEEP I:E Ratio   PRVC  61 %    500 ml     8 cm H20  1:3.45      Peak airway pressure: 20.6 cm H2O   Minute ventilation: 8.4 l/min     ABG: No results for input(s): PH, PCO2, PO2, HCO3 in the last 72 hours.       [address], [place]

## 2017-02-28 NOTE — PROGRESS NOTES
TRANSFER - IN REPORT:    [facility]  being received from OR(unit) for routine post - op      Report consisted of patients Situation, Background, Assessment and   Recommendations(SBAR). Information from the following report(s) SBAR, Kardex, OR Summary, Procedure Summary, Intake/Output, MAR, Recent Results, Med Rec Status and Cardiac Rhythm NSR was reviewed with the receiving nurse. Opportunity for questions and clarification was provided. Assessment completed upon patients arrival to unit and care assumed.

## 2017-02-28 NOTE — PROGRESS NOTES
Bedside report given to [facility]. R impella site assessed. Hematoma marked. Remains stable.

## 2017-02-28 NOTE — PROGRESS NOTES
[facility] CARDIOLOGY PROGRESS NOTE           2/28/2017 1:47 PM    Admit Date: 2/27/2017    Subjective:   Patient stable post op CABG. Normal Impella function and stable hemodynamics. ROS:  UNABLE TO OBTAIN DUE TO INABILITY OF PATIENT TO COMMUNICATE SECONDARY TO CURRENT INTUBATION AND NEED FOR MECHANICAL VENTILATION. Objective:      Vitals:    02/28/17 1253 02/28/17 1258 02/28/17 1315 02/28/17 1330   BP:       Pulse:  (!) 103 95 93   Resp:       Temp: 98.6 °F (37 °C)      SpO2:  99% 96% 98%   Weight:       Height:           Physical Exam:  General-Intubated. Neck- supple, no JVD  CV- regular rate and rhythm no MRG  Lung- clear bilaterally  Abd- soft, nontender, nondistended  Ext- no edema bilaterally. Skin- warm and dry  Psychiatric:  Unable to accurately assess due to intubated and sedated status. Neurologic:  Unable to accurately assess due to intubated and sedated status.       Data Review:   Recent Labs      02/28/17   0344  02/27/17   1915  02/27/17   0800   NA  141   --   134*   K  4.5   --   4.6   MG  2.0  1.9   --    BUN  27*   --   21   CREA  1.38   --   1.09   GLU  198*   --   239*   WBC  8.5   --   6.3   HGB  12.5*   --   13.6   HCT  36.2*   --   39.7*   PLT  205   --   208   TROIQ   --   1.40*   --    CHOL  143   --    --    TGL  195*   --    --    LDLC  71   --    --    HDL  33*   --    --        TELEMETRY:  SR    Assessment/Plan:     Principal Problem:    Chest pain (2/27/2017) - Severe CAD now s/p CABG x 3    Active Problems:    DM (diabetes mellitus) type II controlled, neurological manifestation ([address] (3/7/2014) - Medical therapy appropriate      Essential hypertension, benign (3/7/2014)      Retinopathy of both eyes ()      Overview: Recent surgery 2/22/17      Elevated troponin (2/27/2017) - Patient with severe 3vCAD now s/p CABGx3      Unstable angina ([facility] (2/27/2017)      History of testicular cancer (1/1/1979)      Overview: 1979 testicular- tx with radiation      Vitreous hemorrhage, unspecified eye ([facility] (2/27/2017)      Overview: 2/21s/p vitrectomy RIGHT EYE (Dr Cyndee Cazares)      Hypertriglyceridemia (2/27/2017)      NSTEMI (non-ST elevated myocardial infarction) ([facility] (2/27/2017)      Depressed left ventricular ejection fraction (2/27/2017)      Overview: 2/27/17 20%      Ischemic cardiomyopathy (2/28/2017) - Plan medical therapy for CHF as soon as impella removed and hemodynamically stable      Overview: EF 10-15% echo 2/27/17      CAD, multiple vessel (2/28/2017)      Overview: 2/28/17 (Dr Melodie Groves) Coronary artery bypass grafting x3. Grafts       consisting of       1. Left internal mammary artery to the left anterior descending. 2. Reversed LEFT saphenous vein graft to obtuse marginal.       3. Reversed LEFT saphenous vein graft to the left posterior descending       artery.            Calli Morelos MD  2/28/2017 1:47 PM

## 2017-02-28 NOTE — PROGRESS NOTES
Cardiovascular ICU Consult Note: 2/28/2017  Admission Date: 2/27/2017   The patient's chart has been reviewed and the patient has been discussed with nursing staff. Subjective:   Patient is seen at the request of Dr. Ember Ornelas for respiratory management status post cardiac surgery. Patient had CABG X 3. He was admitted per Dr Loki Rodriguez for NSTEMI and underwent left cardiac catheterization yielding severe multivessel CAD and depressed LV function and impella placed; today, he was scheduled for revascularization per Dr. Ember Ornelas. He has no known lung disease. He does not use home oxygen, nebulizers, or CPAP. Currently, he is sedated in CV-ICU and orally intubated receiving mechanical ventilation. We have been asked to see in the CV-ICU for mechanical ventilation management and weaning. Review of Systems   Review of systems not obtained due to patient factors. sedated and mechanically ventilated. Past Medical History:   Diagnosis Date    Allergic rhinitis, cause unspecified 3/7/2014    CAD, multiple vessel 2/28/2017 2/28/17 (Dr Ember Ornelas) Coronary artery bypass grafting x3. Grafts consisting of  1. Left internal mammary artery to the left anterior descending. 2. Reversed LEFT saphenous vein graft to obtuse marginal.  3. Reversed LEFT saphenous vein graft to the left posterior descending  artery.      Cancer [facility]) 1979    testicular- tx with radiation    Contact dermatitis and other eczema, due to unspecified cause 3/7/2014    Depressed left ventricular ejection fraction 2/27/2017 2/27/17 20%    Esophageal reflux 3/7/2014    GERD (gastroesophageal reflux disease)     OTC pepcid 1Xd controls it    Ischemic cardiomyopathy 2/28/2017    EF 10-15% echo 2/27/17    NSTEMI (non-ST elevated myocardial infarction) ([facility] 2/27/2017    Other and unspecified hyperlipidemia 3/7/2014    Other specified idiopathic peripheral neuropathy 3/7/2014    Retinopathy of both eyes     Type II or unspecified type diabetes mellitus with neurological manifestations, uncontrolled 3/7/2014    avg fasting- 120-200; last A1C= 8.7 in Oct 2016- down from 11.4    Undiagnosed cardiac murmurs 0/6/5165    II/VI systolic murmur    Unspecified essential hypertension 3/7/2014    pt says he has never been on meds    Vitreous hemorrhage, unspecified eye ([address] 2/27/2017 2/21s/p vitrectomy (Dr Abdifatah Alba)     Past Surgical History:   Procedure Laterality Date    HX HEENT  2003    SCC removal, nose    HX UROLOGICAL Left 1979    malignant tumor of testis/RAT    HX UROLOGICAL Left 2010    groin- cyst     Social History     Social History    Marital status:      Spouse name: Shamir Gonzalez Number of children: 1    Years of education: N/A     Occupational History          [address]     Social History Main Topics    Smoking status: Never Smoker    Smokeless tobacco: Never Used    Alcohol use No    Drug use: No    Sexual activity: Not on file     Other Topics Concern    Not on file     Social History Narrative    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 [employer]     Family History   Problem Relation Age of Onset    Diabetes Mother      type 2 -insulin dep    Heart Attack Mother 76    Heart Disease Mother     Diabetes Father      type 2; insulin dep    Heart Disease Father      CAD-pacer-carotid endart    Stroke Father     Heart Attack Father 67    Diabetes Other      grandmother    Cancer Sister      breast    No Known Problems Brother     No Known Problems Sister      Current Facility-Administered Medications   Medication Dose Route Frequency    heparin 25,000 units in dextrose 500 mL infusion  12-25 Units/kg/hr IntraVENous TITRATE    0.9% sodium chloride infusion  25 mL/hr IntraVENous CONTINUOUS    dextrose 5% - 0.45% NaCl with KCl 20 mEq/L infusion  25 mL/hr IntraVENous CONTINUOUS    sodium chloride (NS) flush 5-10 mL  5-10 mL IntraVENous Q8H    sodium chloride (NS) flush 5-10 mL  5-10 mL IntraVENous PRN    oxyCODONE-acetaminophen (PERCOCET) 5-325 mg per tablet 1 Tab  1 Tab Oral Q4H PRN    morphine injection 3-5 mg  3-5 mg IntraVENous Q1H PRN    naloxone (NARCAN) injection 0.4 mg  0.4 mg IntraVENous PRN    mupirocin (BACTROBAN) 2 % ointment   Both Nostrils BID    ceFAZolin in 0.9% NS (ANCEF) IVPB soln 2 g  2 g IntraVENous Q8H    sodium bicarbonate 8.4 % (1 mEq/mL) injection 50 mEq  50 mEq IntraVENous PRN    EPINEPHrine (ADRENALIN) 4,000 mcg in 0.9% sodium chloride 250 mL infusion  0.05-0.1 mcg/kg/min IntraVENous TITRATE    nitroglycerin (Tridil) 200 mcg/ml infusion   mcg/min IntraVENous TITRATE    PHENYLephrine (NEOSYNEPHRINE) 30,000 mcg in 0.9% sodium chloride 250 mL infusion   mcg/min IntraVENous TITRATE    lidocaine (PF) (XYLOCAINE) 100 mg/5 mL (2 %) injection syringe  mg   mg IntraVENous ONCE PRN    amiodarone (CORDARONE) tablet 200 mg  200 mg Oral Q12H    [START ON 3/1/2017] metoprolol tartrate (LOPRESSOR) tablet 25 mg  25 mg Oral Q12H    atorvastatin (LIPITOR) tablet 80 mg  80 mg Oral QHS    insulin regular (NOVOLIN R, HUMULIN R) 100 Units in 0.9% sodium chloride 100 mL infusion  1-10 Units/hr IntraVENous TITRATE    dextrose (D50W) injection syrg 12.5 g  25 mL IntraVENous PRN    [START ON 3/1/2017] aspirin chewable tablet 81 mg  81 mg Oral DAILY    magnesium sulfate 1 g/100 ml IVPB (premix or compounded)  1 g IntraVENous PRN    potassium chloride 10 mEq in 50 ml IVPB  10 mEq IntraVENous PRN    midazolam (VERSED) injection 1 mg  1 mg IntraVENous Q1H PRN    meperidine (DEMEROL) injection 25 mg  25 mg IntraVENous Q1H PRN    chlorhexidine (PERIDEX) 0.12 % mouthwash 10 mL  10 mL Oral BID    tuberculin injection 5 Units  5 Units IntraDERMal ONCE    famotidine (PF) (PEPCID) injection 20 mg  20 mg IntraVENous Q12H    prednisoLONE acetate (PRED FORTE) 1 % ophthalmic suspension 1 Drop  1 Drop Both Eyes QID    insulin lispro (HUMALOG) injection   SubCUTAneous AC&HS    lisinopril (PRINIVIL, ZESTRIL) tablet 5 mg  5 mg Oral DAILY    sodium chloride (NS) flush 5-10 mL  5-10 mL IntraVENous Q8H    sodium chloride (NS) flush 5-10 mL  5-10 mL IntraVENous PRN    aspirin chewable tablet 81 mg  81 mg Oral DAILY    nitroglycerin (NITROBID) 2 % ointment 1 Inch  1 Inch Topical Q6H    nitroglycerin (NITROSTAT) tablet 0.4 mg  0.4 mg SubLINGual Q5MIN PRN    atorvastatin (LIPITOR) tablet 80 mg  80 mg Oral QHS    sodium chloride (NS) flush 5-10 mL  5-10 mL IntraVENous Q8H    sodium chloride (NS) flush 5-10 mL  5-10 mL IntraVENous PRN    carvedilol (COREG) tablet 3.125 mg  3.125 mg Oral BID WITH MEALS    heparin (porcine) 12,500 Units in dextrose 10% 1,000 mL for impella device  4-20 mL/hr IntraCATHeter- ARTerial TITRATE    morphine injection 5 mg  5 mg IntraVENous Q3H PRN    ondansetron (ZOFRAN) injection 4 mg  4 mg IntraVENous Q4H PRN     No Known Allergies    Objective:     Vitals:    02/28/17 1330 02/28/17 1345 02/28/17 1400 02/28/17 1415   BP:       Pulse: 93 95 95 94   Resp:       Temp:       SpO2: 98% 99% 98% 96%   Weight:       Height:           Intake and Output:   02/26 1901 - 02/28 0700  In: 654 [I.V.:728]  Out: 850 [Urine:850]  02/28 0701 - 02/28 1900  In: 2750 [I.V.:2750]  Out: 843 [Urine:564]      Physical Exam:            GEN:  awake, intubated and mechanically ventilated. HEENT:  no alar flaring or epistaxis, oral mucosa moist without cyanosis,   NECK:  no JVD, no retractions, no thyromegaly or masses,   LUNGS:synchroncous with vent  Respiratory:  Chest tubes times 2  in epigastric area without air leak. Cardiovascular:  RRR with no M,G,R;  GI:  abdomen soft with no tenderness; positive bowel sounds present  Musculoskeletal:  warm with no cyanosis, no edema.   [place] site XXX  SKIN:  no jaundice or ecchymosis, midline sternal dressing CDI  NEURO: opens eyes, grossly non-focal    Drips- NTG, brionna, epinephrine, insulin     Lines and Tubes-ET, OG, [place], Arterial Line, [place], Chest Tube, impella    CI: 2.6    Ventilator Settings  Mode FIO2 Rate Tidal Volume Pressure PEEP   PRVC  61 % (weaned to 50)    500 ml     8 cm H20      Peak airway pressure: 20.6 cm H2O   Minute ventilation: 8.4 l/min       ABG:   Recent Labs      02/28/17   1300   PH  7.29*   PCO2  39   PO2  104*   HCO3  18*       LAB  Recent Labs      02/28/17   1300  02/28/17   0344  02/27/17   0800   WBC  17.2*  8.5  6.3   HGB  9.9*  12.5*  13.6   HCT  30.1*  36.2*  39.7*   PLT  131*  205  208     Recent Labs      02/28/17   1300  02/28/17   0344  02/27/17   1915  02/27/17   0800   NA  149*  141   --   134*   K  3.5  4.5   --   4.6   CL  113*  108*   --   101   CO2  23  21   --   23   GLU  157*  198*   --   239*   BUN  23  27*   --   21   CREA  1.49  1.38   --   1.09   MG  3.1*  2.0  1.9   --    INR  1.1   --    --    --        CHEST XRAY:        Assessment:     Hospital Problems  Date Reviewed: 2/28/2017          Codes Class Noted POA    Ischemic cardiomyopathy (Chronic) ICD-10-CM: I25.5  ICD-9-CM: 414.8  2/28/2017 Yes    Overview Signed 2/28/2017  1:26 PM by Roberth Cuello NP     EF 10-15% echo 2/27/17  On impella           CAD, multiple vessel (Chronic) ICD-10-CM: I25.10  ICD-9-CM: 414.00  2/28/2017 Yes    Overview Signed 2/28/2017  1:28 PM by Roberth Cuello NP     2/28/17 (Dr Lady Canales) Coronary artery bypass grafting x3. Grafts consisting of   1. Left internal mammary artery to the left anterior descending. 2. Reversed LEFT saphenous vein graft to obtuse marginal.   3. Reversed LEFT saphenous vein graft to the left posterior descending   artery.               Encounter for weaning from ventilator [facility]) ICD-10-CM: Z99.11  ICD-9-CM: V46.13  2/28/2017 Yes    Weaning per vent weaning    S/P CABG (coronary artery bypass graft) ICD-10-CM: Z95.1  ICD-9-CM: V45.81  2/28/2017 Yes        * (Principal)Chest pain ICD-10-CM: R07.9  ICD-9-CM: 786.50  2/27/2017 Yes Elevated troponin ICD-10-CM: R79.89  ICD-9-CM: 790.6  2/27/2017 Yes        Unstable angina (HCC) ICD-10-CM: I20.0  ICD-9-CM: 411.1  2/27/2017 Yes        Vitreous hemorrhage, unspecified eye ([address] ICD-10-CM: H43.10  ICD-9-CM: 379.23  2/27/2017 No    Overview Addendum 2/27/2017  3:33 PM by Lori Joseph NP     2/21s/p vitrectomy RIGHT EYE (Dr Zara More)             Hypertriglyceridemia (Chronic) ICD-10-CM: E78.1  ICD-9-CM: 272.1  2/27/2017 Yes        NSTEMI (non-ST elevated myocardial infarction) [facility]) ICD-10-CM: I21.4  ICD-9-CM: 410.70  2/27/2017 Yes        Depressed left ventricular ejection fraction (Chronic) ICD-10-CM: R93.1  ICD-9-CM: 794.39  2/27/2017 Yes    Overview Signed 2/27/2017  4:07 PM by Lori Joseph NP     2/27/17 20%             DM (diabetes mellitus) type II controlled, neurological manifestation (HCC) (Chronic) ICD-10-CM: E11.49  ICD-9-CM: 250.60  3/7/2014 Yes        Essential hypertension, benign (Chronic) ICD-10-CM: I10  ICD-9-CM: 401.1  3/7/2014 Yes                Plan:      1. Wean mechanical ventilation per tolerance, on Impella  2. Incentive spirometry Q 1 hour once extubated  3. Review CXR         Thank you for this referral.  We appreciate the opportunity to participate in this patient's care. Will follow along with you. Thao Doan NP    More than 50% of time documented was spent in face-to-face contact with the patient and in the care of the patient on the floor/unit where the patient is located. Lungs:  clear  Heart:  RRR with no Murmur/Rubs/Gallops    Additional Comments: Will be extubated per protocol even on Impella     I have spoken with and examined the patient. I agree with the above assessment and plan as documented.     Teodora Brennan MD

## 2017-02-28 NOTE — PROGRESS NOTES
Dr Verlin Koyanagi at bedside and stated that he tried to find wife post op in all waiting rooms. Attempted to call wife on cell and home phone, both went into voice mail.

## 2017-02-28 NOTE — PROGRESS NOTES
C/O nausea. Vomited approximetly 300 cc green emesis. Zofran given. No further C/O nausea. Impella monitored. Hematoma to site marked and continue to monitor. Not larger at this time. Staph and MRSA screen sent. Reviewed C&DB. Heart pillow given. IS reviewed. Able to demonstrate correct procedure. 2000 plus up. Open heart video unavailable at this time. TR band being deflated slowly. No bleeding or hematoma noted.

## 2017-02-28 NOTE — PROGRESS NOTES
Spoke to Dr. Melodie Groves last night about heparin. Instructed not to bolus this am regardless of PTT results.

## 2017-02-28 NOTE — PROGRESS NOTES
Attempting to draw ACTs X5  Unable to get reading. Spoke with Ayan Wang NP, made aware unable to getting ready and pt is on impella. Change to weight base heparin. Heparin started at lowest dose as per protocol.

## 2017-02-28 NOTE — PROGRESS NOTES
RT at bedside. Pt able to meet requirements for extubation (physical mechanics and NIF). Procedure explained to Pt. Pt nodded understanding. VSS. Pt extubated at this time. Mouth and throat suctioned as Pt coughed to removed all secretions. BBS , no stridor noted. Pt able to verbalize name and cough effectively. Pt tolerated procedure well, no acute distress noted. Pt placed on 40L and 40% O2  via Airvo, O2 sat 98  %. Will continue to monitor.

## 2017-02-28 NOTE — ANESTHESIA PROCEDURE NOTES
Central Line Placement    Start time: 2/28/2017 7:24 AM  End time: 2/28/2017 7:44 AM  Performed by: Tyson Valera  Authorized by: Tyson Valera     Indications: vascular access, central pressure monitoring and need for vasopressors  Preanesthetic Checklist: patient identified, risks and benefits discussed, anesthesia consent, site marked, patient being monitored and timeout performed    Timeout Time: 07:24       Pre-procedure: All elements of maximal sterile barrier technique followed? Yes    Maximal barrier precautions followed, 2% Chlorhexidine for cutaneous antisepsis, Hand hygiene performed prior to catheter insertion and Ultrasound guidance            Procedure:   Prep:  Chlorhexidine  Location:  Internal jugular  Orientation:  Right  Patient position:  Trendelenburg  Catheter type:  Double lumen  Catheter size:  9 Fr    Number of attempts:  2  Successful placement: Yes      Assessment:   Post-procedure:  Catheter secured and sterile dressing applied  Assessment:  Blood return through all ports and free fluid flow  Insertion:  Complicated  Patient tolerance:  Patient tolerated the procedure well with no immediate complications  R IJ cannulated Seldinger Technique. Wire placement required deep Trendelenburg, traction on R Arm and wire insertion with expiration. Oximetric PA catheter floated. Wedge at 55 cms pulled back to 52 cms.

## 2017-02-28 NOTE — PROCEDURES
[facility] [address]       Name:  Maryse Gruber   MR#:  [id number]   :  1955   Account #:  [de-identified]   Date of Adm:  2017       DATE OF PROCEDURE: 2017    PRIMARY CARDIOLOGIST: None. PRIMARY CARE PHYSICIAN: Yvon Petersen. MD Terry    BRIEF HISTORY: Mr. Piper Gomez is a very pleasant 72-year-old   gentleman who was admitted with 24 hours of on and off chest   discomfort, shortness of breath, primarily a squeezing sensation   in his chest. He was brought over urgently due to low level   positive troponin for coronary angiography and intervention. PROCEDURES    1. Right radial access. 2. Left coronary injection. 3. Right coronary injection. 4. Left ventriculography. 5. Left internal mammary artery injection. 6. Ultrasound-guided right femoral artery access. 7. Impella CP insertion. PROCEDURE: After informed consent, he was prepped and draped in   the usual sterile fashion. The right wrist was injected   with lidocaine. The right radial artery was accessed via   micropuncture technique. A 6-Djiboutian sheath was advanced without   complications. A 5-D[place]ib[place]ian Tiger catheter was utilized for left   and right coronary injections, and a 5-Djiboutian angled pigtail for   left ventriculography. A 5-D[place]iboutian left internal mammary artery   catheter was utilized for left internal mammary artery   injection. CONTRAST: Isovue. CONSCIOUS SEDATION: 2 mg of Versed and 50 mcg of fentanyl. The   patient was monitored for 45 minutes per the conscious sedation   protocol. FINDINGS    1. Left ventricle: Severely dilated left ventricular cavity with   severe LV systolic dysfunction, ejection fraction 20% to 25%. There was no significant mitral regurgitation. There was no aortic   valve gradient. Left end-diastolic pressure is measured at 30   mmHg. 2. Left main: Essentially no left main, separate ostia for the   LAD and left circumflex systems.    3. Left anterior descending coronary: Heavily calcified, has a   30% to 40% proximal stenosis, 90% mid stenosis, gives rise to 2   small diagonals. The rest of the LAD appears angiographically   normal.   4. Left circumflex coronary artery: This is a large anatomically   dominant vessel, gives rise to very small first obtuse marginal,   has diffuse 50% to 60% stenosis. A larger second obtuse marginal   has a 50% to 60% proximal stenosis, and a 95% mid stenosis. The   third obtuse marginal is small but normal. Just distal to the   third obtuse marginal, the distal circumflex is subtotally   occluded at 99%, gives rise to a cascade of 2 to 3   posterolateral branches and a left posterior descending branch. 5. Right coronary: This is a small, nondominant vessel, is occluded   proximally. 6. Left internal mammary artery: This is a large artery,   good proximal takeoff. No significant disease and excellent course   across the anterior chest wall. IMPELLA IMPLANTATION    DETAILS: The patient underwent ultrasound-guided access of a   high bifurcating right common femoral artery. The access was   just above the bifurcation as confirmed by angiography. The 6-  British sheath was then advanced without complications. Over the   0.035 wire, 8-British and 12-British dilators were utilized, and   then a 14-British Impella sheath was advanced without   complications. Utilizing a LIMA catheter, we crossed the aortic   valve, and over a wire. The Impella CP was placed in the left   ventricle without complications. The patient was receiving 3 L   of support in the lab. He had been anticoagulated with systemic   heparin with adequate ACT prior to Impella implantation. Following completion of the Impella implantation and confirming   appropriate function, the 14-British sheath was then removed,   split, and the repositioning sheath without complications. This was   sutured in place, and the patient was stable for transfer to   the CV ICU. Consultation with Dr. Arnav Hernandez confirmed severe LV systolic   dysfunction, critical 3-vessel atherosclerotic coronary disease   best suited for surgical revascularization. Dr. Arnav Hernandez has   agreed to proceed with coronary bypass grafting tomorrow   morning. CONCLUSIONS    1. Severe left ventricular systolic dysfunction with elevated   end-diastolic pressure and hypotension. 2. Severe 3-vessel atherosclerotic coronary disease. 3. Successful Impella implantation due to hypotension and severe   systolic heart failure with underlying ischemic mediated   cardiomyopathy. RECOMMENDATIONS: Proceed with urgent coronary bypass grafting   tomorrow morning or sooner if the patient becomes unstable. Thank you for allowing us to participate in care of this   patient. If questions or concerns, please feel free to contact   me.         MD Joseph Jaquez / Iqra   D:  02/27/2017   16:07   T:  02/27/2017   16:42   Job #:  [id number]

## 2017-02-28 NOTE — PROGRESS NOTES
TRANSFER - OUT REPORT:    Verbal report given to Josie George RN(name) on [facility]  being transferred to OR(unit) for routine progression of care       Report consisted of patients Situation, Background, Assessment and   Recommendations(SBAR). Information from the following report(s) Kardex, Recent Results and Cardiac Rhythm SR was reviewed with the receiving nurse. Lines:   Double Lumen 02/28/17 Right Internal jugular (Active)       Royanne Emms Dual 02/28/17 Right Neck (Active)       Peripheral IV 02/27/17 Left (Active)   Site Assessment Clean, dry, & intact 2/27/2017  7:51 PM   Phlebitis Assessment 0 2/27/2017  7:51 PM   Infiltration Assessment 0 2/27/2017  7:51 PM   Dressing Status Clean, dry, & intact 2/27/2017  7:51 PM   Dressing Type Transparent 2/27/2017  7:51 PM   Hub Color/Line Status Flushed;Patent 2/27/2017  7:51 PM   Action Taken Open ports on tubing capped 2/27/2017  7:51 PM       Peripheral IV 02/27/17 Left Hand (Active)   Site Assessment Clean, dry, & intact 2/27/2017  7:51 PM   Phlebitis Assessment 0 2/27/2017  7:51 PM   Infiltration Assessment 0 2/27/2017  7:51 PM   Dressing Status Clean, dry, & intact 2/27/2017  7:51 PM   Dressing Type Transparent 2/27/2017  7:51 PM   Hub Color/Line Status Flushed;Patent 2/27/2017  7:51 PM   Action Taken Open ports on tubing capped 2/27/2017  7:51 PM       Arterial Line 02/28/17 Left Radial artery (Active)        Opportunity for questions and clarification was provided.       Patient transported with:   Registered Nurse

## 2017-03-01 ENCOUNTER — APPOINTMENT (OUTPATIENT)
Dept: GENERAL RADIOLOGY | Age: 62
DRG: 001 | End: 2017-03-01
Attending: THORACIC SURGERY (CARDIOTHORACIC VASCULAR SURGERY)
Payer: COMMERCIAL

## 2017-03-01 PROBLEM — D62 POSTOPERATIVE ANEMIA DUE TO ACUTE BLOOD LOSS: Status: ACTIVE | Noted: 2017-03-01

## 2017-03-01 PROBLEM — Z95.811 LVAD (LEFT VENTRICULAR ASSIST DEVICE) PRESENT (HCC): Status: ACTIVE | Noted: 2017-03-01

## 2017-03-01 LAB
ANION GAP BLD CALC-SCNC: 9 MMOL/L (ref 7–16)
ARTERIAL PATENCY WRIST A: ABNORMAL
ATRIAL RATE: 94 BPM
BDY SITE: ABNORMAL
BNP SERPL-MCNC: 712 PG/ML
BUN SERPL-MCNC: 16 MG/DL (ref 6–23)
CALCIUM SERPL-MCNC: 7.2 MG/DL (ref 8.3–10.4)
CALCULATED P AXIS, ECG09: 81 DEGREES
CALCULATED R AXIS, ECG10: 70 DEGREES
CALCULATED T AXIS, ECG11: -156 DEGREES
CHLORIDE SERPL-SCNC: 116 MMOL/L (ref 98–107)
CO2 SERPL-SCNC: 24 MMOL/L (ref 21–32)
COHGB MFR BLD: 0.8 % (ref 0.5–1.5)
CREAT SERPL-MCNC: 1.01 MG/DL (ref 0.8–1.5)
DIAGNOSIS, 93000: NORMAL
DIASTOLIC BP, ECG02: NORMAL MMHG
DO-HGB BLD-MCNC: 42 % (ref 0–5)
ERYTHROCYTE [DISTWIDTH] IN BLOOD BY AUTOMATED COUNT: 13.8 % (ref 11.9–14.6)
FIO2 ON VENT: 45 %
GAS FLOW.O2 O2 DELIVERY SYS: 45 L/MIN
GLUCOSE BLD STRIP.AUTO-MCNC: 103 MG/DL (ref 65–100)
GLUCOSE BLD STRIP.AUTO-MCNC: 124 MG/DL (ref 65–100)
GLUCOSE BLD STRIP.AUTO-MCNC: 133 MG/DL (ref 65–100)
GLUCOSE BLD STRIP.AUTO-MCNC: 138 MG/DL (ref 65–100)
GLUCOSE BLD STRIP.AUTO-MCNC: 173 MG/DL (ref 65–100)
GLUCOSE BLD STRIP.AUTO-MCNC: 178 MG/DL (ref 65–100)
GLUCOSE BLD STRIP.AUTO-MCNC: 202 MG/DL (ref 65–100)
GLUCOSE BLD STRIP.AUTO-MCNC: 213 MG/DL (ref 65–100)
GLUCOSE BLD STRIP.AUTO-MCNC: 224 MG/DL (ref 65–100)
GLUCOSE BLD STRIP.AUTO-MCNC: 240 MG/DL (ref 65–100)
GLUCOSE BLD STRIP.AUTO-MCNC: 54 MG/DL (ref 65–100)
GLUCOSE SERPL-MCNC: 132 MG/DL (ref 65–100)
HCT VFR BLD AUTO: 29.3 % (ref 41.1–50.3)
HGB BLD-MCNC: 9.8 G/DL (ref 13.6–17.2)
HGB BLDMV-MCNC: 10.5 GM/DL (ref 11.7–15)
MAGNESIUM SERPL-MCNC: 2.4 MG/DL (ref 1.8–2.4)
MCH RBC QN AUTO: 29.7 PG (ref 26.1–32.9)
MCHC RBC AUTO-ENTMCNC: 33.4 G/DL (ref 31.4–35)
MCV RBC AUTO: 88.8 FL (ref 79.6–97.8)
METHGB MFR BLD: 0.4 % (ref 0–1.5)
OXYHGB MFR BLDMV: 56.6 %
P-R INTERVAL, ECG05: 140 MS
PLATELET # BLD AUTO: 167 K/UL (ref 150–450)
PMV BLD AUTO: 9.7 FL (ref 10.8–14.1)
PO2 BLDMV: <34 MMHG (ref 35–40)
POTASSIUM SERPL-SCNC: 4.6 MMOL/L (ref 3.5–5.1)
Q-T INTERVAL, ECG07: 380 MS
QRS DURATION, ECG06: 102 MS
QTC CALCULATION (BEZET), ECG08: 475 MS
RBC # BLD AUTO: 3.3 M/UL (ref 4.23–5.67)
SAO2 % BLDMV: 57 %
SERVICE CMNT-IMP: ABNORMAL
SODIUM SERPL-SCNC: 149 MMOL/L (ref 136–145)
SYSTOLIC BP, ECG01: NORMAL MMHG
VENTILATION MODE VENT: ABNORMAL
VENTRICULAR RATE, ECG03: 94 BPM
WBC # BLD AUTO: 11.3 K/UL (ref 4.3–11.1)

## 2017-03-01 PROCEDURE — 80048 BASIC METABOLIC PNL TOTAL CA: CPT | Performed by: THORACIC SURGERY (CARDIOTHORACIC VASCULAR SURGERY)

## 2017-03-01 PROCEDURE — 74011250636 HC RX REV CODE- 250/636: Performed by: INTERNAL MEDICINE

## 2017-03-01 PROCEDURE — 83880 ASSAY OF NATRIURETIC PEPTIDE: CPT | Performed by: INTERNAL MEDICINE

## 2017-03-01 PROCEDURE — 83735 ASSAY OF MAGNESIUM: CPT | Performed by: THORACIC SURGERY (CARDIOTHORACIC VASCULAR SURGERY)

## 2017-03-01 PROCEDURE — 85027 COMPLETE CBC AUTOMATED: CPT | Performed by: INTERNAL MEDICINE

## 2017-03-01 PROCEDURE — 77030032994 HC SOL INJ SOD CL 0.9% LFCR 500ML

## 2017-03-01 PROCEDURE — 74011000250 HC RX REV CODE- 250: Performed by: THORACIC SURGERY (CARDIOTHORACIC VASCULAR SURGERY)

## 2017-03-01 PROCEDURE — 74011000258 HC RX REV CODE- 258: Performed by: THORACIC SURGERY (CARDIOTHORACIC VASCULAR SURGERY)

## 2017-03-01 PROCEDURE — 36430 TRANSFUSION BLD/BLD COMPNT: CPT

## 2017-03-01 PROCEDURE — 74011250636 HC RX REV CODE- 250/636: Performed by: THORACIC SURGERY (CARDIOTHORACIC VASCULAR SURGERY)

## 2017-03-01 PROCEDURE — 71010 XR CHEST SNGL V: CPT

## 2017-03-01 PROCEDURE — P9035 PLATELET PHERES LEUKOREDUCED: HCPCS | Performed by: THORACIC SURGERY (CARDIOTHORACIC VASCULAR SURGERY)

## 2017-03-01 PROCEDURE — 74011636637 HC RX REV CODE- 636/637: Performed by: NURSE PRACTITIONER

## 2017-03-01 PROCEDURE — 99233 SBSQ HOSP IP/OBS HIGH 50: CPT | Performed by: INTERNAL MEDICINE

## 2017-03-01 PROCEDURE — 82962 GLUCOSE BLOOD TEST: CPT

## 2017-03-01 PROCEDURE — 93005 ELECTROCARDIOGRAM TRACING: CPT | Performed by: INTERNAL MEDICINE

## 2017-03-01 PROCEDURE — 74011250637 HC RX REV CODE- 250/637: Performed by: THORACIC SURGERY (CARDIOTHORACIC VASCULAR SURGERY)

## 2017-03-01 PROCEDURE — 65610000006 HC RM INTENSIVE CARE

## 2017-03-01 PROCEDURE — 82803 BLOOD GASES ANY COMBINATION: CPT

## 2017-03-01 PROCEDURE — 74011000258 HC RX REV CODE- 258: Performed by: INTERNAL MEDICINE

## 2017-03-01 PROCEDURE — 74011636637 HC RX REV CODE- 636/637: Performed by: THORACIC SURGERY (CARDIOTHORACIC VASCULAR SURGERY)

## 2017-03-01 RX ORDER — DOBUTAMINE HYDROCHLORIDE 200 MG/100ML
2-7 INJECTION INTRAVENOUS
Status: DISCONTINUED | OUTPATIENT
Start: 2017-03-01 | End: 2017-03-03

## 2017-03-01 RX ORDER — FUROSEMIDE 10 MG/ML
20 INJECTION INTRAMUSCULAR; INTRAVENOUS ONCE
Status: COMPLETED | OUTPATIENT
Start: 2017-03-01 | End: 2017-03-01

## 2017-03-01 RX ORDER — DOBUTAMINE HYDROCHLORIDE 200 MG/100ML
2-7 INJECTION INTRAVENOUS
Status: DISCONTINUED | OUTPATIENT
Start: 2017-03-01 | End: 2017-03-01 | Stop reason: SDUPTHER

## 2017-03-01 RX ADMIN — HEPARIN SODIUM 4 ML/HR: 1000 INJECTION, SOLUTION INTRAVENOUS; SUBCUTANEOUS at 21:36

## 2017-03-01 RX ADMIN — DOBUTAMINE HYDROCHLORIDE 3 MCG/KG/MIN: 200 INJECTION INTRAVENOUS at 07:47

## 2017-03-01 RX ADMIN — OXYCODONE HYDROCHLORIDE AND ACETAMINOPHEN 1 TABLET: 10; 325 TABLET ORAL at 13:13

## 2017-03-01 RX ADMIN — PREDNISOLONE ACETATE 1 DROP: 10 SUSPENSION/ DROPS OPHTHALMIC at 09:00

## 2017-03-01 RX ADMIN — Medication 10 ML: at 13:15

## 2017-03-01 RX ADMIN — Medication 10 ML: at 21:39

## 2017-03-01 RX ADMIN — EPINEPHRINE 0.03 MCG/KG/MIN: 1 INJECTION PARENTERAL at 01:43

## 2017-03-01 RX ADMIN — SODIUM CHLORIDE 2 UNITS/HR: 900 INJECTION, SOLUTION INTRAVENOUS at 06:14

## 2017-03-01 RX ADMIN — INSULIN LISPRO 4 UNITS: 100 INJECTION, SOLUTION INTRAVENOUS; SUBCUTANEOUS at 21:43

## 2017-03-01 RX ADMIN — MUPIROCIN: 20 OINTMENT TOPICAL at 09:44

## 2017-03-01 RX ADMIN — SODIUM CHLORIDE 25 ML/HR: 900 INJECTION, SOLUTION INTRAVENOUS at 04:34

## 2017-03-01 RX ADMIN — AMIODARONE HYDROCHLORIDE 200 MG: 200 TABLET ORAL at 09:37

## 2017-03-01 RX ADMIN — ASPIRIN 81 MG 81 MG: 81 TABLET ORAL at 09:37

## 2017-03-01 RX ADMIN — OXYCODONE HYDROCHLORIDE AND ACETAMINOPHEN 1 TABLET: 10; 325 TABLET ORAL at 05:22

## 2017-03-01 RX ADMIN — HYDROMORPHONE HYDROCHLORIDE 0.5 MG: 1 INJECTION, SOLUTION INTRAMUSCULAR; INTRAVENOUS; SUBCUTANEOUS at 04:21

## 2017-03-01 RX ADMIN — INSULIN LISPRO 4 UNITS: 100 INJECTION, SOLUTION INTRAVENOUS; SUBCUTANEOUS at 17:01

## 2017-03-01 RX ADMIN — FAMOTIDINE 20 MG: 10 INJECTION, SOLUTION INTRAVENOUS at 09:37

## 2017-03-01 RX ADMIN — NOREPINEPHRINE BITARTRATE 1.81 MCG/MIN: 1 INJECTION INTRAVENOUS at 10:19

## 2017-03-01 RX ADMIN — PREDNISOLONE ACETATE 1 DROP: 10 SUSPENSION/ DROPS OPHTHALMIC at 22:00

## 2017-03-01 RX ADMIN — ATORVASTATIN CALCIUM 80 MG: 40 TABLET, FILM COATED ORAL at 21:37

## 2017-03-01 RX ADMIN — MORPHINE SULFATE 3 MG: 10 INJECTION INTRAMUSCULAR; INTRAVENOUS; SUBCUTANEOUS at 14:48

## 2017-03-01 RX ADMIN — MUPIROCIN: 20 OINTMENT TOPICAL at 18:19

## 2017-03-01 RX ADMIN — NOREPINEPHRINE BITARTRATE 4.56 MCG/MIN: 1 INJECTION INTRAVENOUS at 14:53

## 2017-03-01 RX ADMIN — OXYCODONE HYDROCHLORIDE AND ACETAMINOPHEN 1 TABLET: 10; 325 TABLET ORAL at 09:37

## 2017-03-01 RX ADMIN — CEFAZOLIN 2 G: 1 INJECTION, POWDER, FOR SOLUTION INTRAMUSCULAR; INTRAVENOUS; PARENTERAL at 02:25

## 2017-03-01 RX ADMIN — Medication 10 ML: at 07:11

## 2017-03-01 RX ADMIN — NOREPINEPHRINE BITARTRATE 6.37 MCG/MIN: 1 INJECTION INTRAVENOUS at 01:37

## 2017-03-01 RX ADMIN — PHENYLEPHRINE HYDROCHLORIDE 20 MCG/MIN: 10 INJECTION INTRAMUSCULAR; INTRAVENOUS; SUBCUTANEOUS at 03:06

## 2017-03-01 RX ADMIN — AMIODARONE HYDROCHLORIDE 200 MG: 200 TABLET ORAL at 21:37

## 2017-03-01 RX ADMIN — OXYCODONE HYDROCHLORIDE AND ACETAMINOPHEN 1 TABLET: 10; 325 TABLET ORAL at 20:33

## 2017-03-01 RX ADMIN — HEPARIN SODIUM 12 ML/HR: 1000 INJECTION, SOLUTION INTRAVENOUS; SUBCUTANEOUS at 02:22

## 2017-03-01 RX ADMIN — FAMOTIDINE 20 MG: 10 INJECTION, SOLUTION INTRAVENOUS at 21:38

## 2017-03-01 RX ADMIN — PREDNISOLONE ACETATE 1 DROP: 10 SUSPENSION/ DROPS OPHTHALMIC at 18:00

## 2017-03-01 RX ADMIN — OXYCODONE HYDROCHLORIDE AND ACETAMINOPHEN 1 TABLET: 10; 325 TABLET ORAL at 00:58

## 2017-03-01 RX ADMIN — FUROSEMIDE 20 MG: 10 INJECTION, SOLUTION INTRAMUSCULAR; INTRAVENOUS at 09:37

## 2017-03-01 NOTE — PROGRESS NOTES
Clots in mediastinal. Pt turned side to side . Small clots in tubing noted. rr 40's while turning sats 96-98.

## 2017-03-01 NOTE — PROGRESS NOTES
Critical Care Daily Progress Note: 3/1/2017    Jaylon Elizabeth   Admission Date: 2/27/2017         The patient's chart is reviewed and the patient is discussed with the staff.        Subjective:     Was extubated yesterday  Was having chest pain ,erika better also breathing is better  On optiflow  Still on multiple pressors and Impella      Current Facility-Administered Medications   Medication Dose Route Frequency    heparin 25,000 units in dextrose 500 mL infusion  12-25 Units/kg/hr IntraVENous TITRATE    0.9% sodium chloride infusion  25 mL/hr IntraVENous CONTINUOUS    dextrose 5% - 0.45% NaCl with KCl 20 mEq/L infusion  25 mL/hr IntraVENous CONTINUOUS    sodium chloride (NS) flush 5-10 mL  5-10 mL IntraVENous Q8H    oxyCODONE-acetaminophen (PERCOCET) 5-325 mg per tablet 1 Tab  1 Tab Oral Q4H PRN    morphine injection 3-5 mg  3-5 mg IntraVENous Q1H PRN    naloxone (NARCAN) injection 0.4 mg  0.4 mg IntraVENous PRN    mupirocin (BACTROBAN) 2 % ointment   Both Nostrils BID    sodium bicarbonate 8.4 % (1 mEq/mL) injection 50 mEq  50 mEq IntraVENous PRN    EPINEPHrine (ADRENALIN) 4,000 mcg in 0.9% sodium chloride 250 mL infusion  0.05-0.1 mcg/kg/min IntraVENous TITRATE    nitroglycerin (Tridil) 200 mcg/ml infusion   mcg/min IntraVENous TITRATE    PHENYLephrine (NEOSYNEPHRINE) 30,000 mcg in 0.9% sodium chloride 250 mL infusion   mcg/min IntraVENous TITRATE    lidocaine (PF) (XYLOCAINE) 100 mg/5 mL (2 %) injection syringe  mg   mg IntraVENous ONCE PRN    amiodarone (CORDARONE) tablet 200 mg  200 mg Oral Q12H    metoprolol tartrate (LOPRESSOR) tablet 25 mg  25 mg Oral Q12H    atorvastatin (LIPITOR) tablet 80 mg  80 mg Oral QHS    insulin regular (NOVOLIN R, HUMULIN R) 100 Units in 0.9% sodium chloride 100 mL infusion  1-10 Units/hr IntraVENous TITRATE    dextrose (D50W) injection syrg 12.5 g  25 mL IntraVENous PRN    aspirin chewable tablet 81 mg  81 mg Oral DAILY    magnesium sulfate 1 g/100 ml IVPB (premix or compounded)  1 g IntraVENous PRN    potassium chloride 10 mEq in 50 ml IVPB  10 mEq IntraVENous PRN    midazolam (VERSED) injection 1 mg  1 mg IntraVENous Q1H PRN    meperidine (DEMEROL) injection 25 mg  25 mg IntraVENous Q1H PRN    chlorhexidine (PERIDEX) 0.12 % mouthwash 10 mL  10 mL Oral BID    famotidine (PF) (PEPCID) injection 20 mg  20 mg IntraVENous Q12H    oxyCODONE-acetaminophen (PERCOCET 10)  mg per tablet 1 Tab  1 Tab Oral Q4H PRN    prednisoLONE acetate (PRED FORTE) 1 % ophthalmic suspension 1 Drop  1 Drop Right Eye QID    traMADol (ULTRAM) tablet 100 mg  100 mg Oral Q6H PRN    0.9% sodium chloride infusion 250 mL  250 mL IntraVENous PRN    0.9% sodium chloride infusion 250 mL  250 mL IntraVENous PRN    HYDROmorphone (PF) (DILAUDID) injection 0.5 mg  0.5 mg IntraVENous Q2H PRN    NOREPINephrine (LEVOPHED) 4,000 mcg in dextrose 5% 250 mL infusion  2-16 mcg/min IntraVENous TITRATE    HYDROmorphone (PF) (DILAUDID) 1 mg/mL injection        insulin lispro (HUMALOG) injection   SubCUTAneous AC&HS    lisinopril (PRINIVIL, ZESTRIL) tablet 5 mg  5 mg Oral DAILY    sodium chloride (NS) flush 5-10 mL  5-10 mL IntraVENous PRN    nitroglycerin (NITROSTAT) tablet 0.4 mg  0.4 mg SubLINGual Q5MIN PRN    carvedilol (COREG) tablet 3.125 mg  3.125 mg Oral BID WITH MEALS    heparin (porcine) 12,500 Units in dextrose 10% 1,000 mL for impella device  4-20 mL/hr IntraCATHeter- ARTerial TITRATE    ondansetron (ZOFRAN) injection 4 mg  4 mg IntraVENous Q4H PRN       Review of Systems    Constitutional:  negative for fever, chills, sweats  Cardiovascular:  negative for chest pain, palpitations, syncope, edema  Gastrointestinal:  negative for dysphagia, reflux, vomiting, diarrhea, abdominal pain, or melena  Neurologic:  negative for focal weakness, numbness, headache      Objective:     Vitals:    03/01/17 0515 03/01/17 0530 03/01/17 0545 03/01/17 0600   BP: 115/71 109/74 90/67    Pulse: 92 94 90 88   Resp: 26 21 (!) 52 17   Temp:       SpO2: 98% 98% 94% 97%   Weight:       Height:           Intake and Output:   02/27 0701 - 02/28 1900  In: 3565 [I.V.:5478]  Out: 7355 [Urine:2218]  02/28 1901 - 03/01 0700  In: 1173   Out: 7046 [Urine:633]    Physical Exam:          Constitutional:  the patient is well developed and in no acute distress  EENMT:  Sclera clear, pupils equal, oral mucosa moist  Respiratory: crackles  Cardiovascular:  RRR without M,G,R  Gastrointestinal: soft and non-tender; with positive bowel sounds. Musculoskeletal: warm without cyanosis. There is no lower leg edema.   Skin:  no jaundice or rashes, sternal  Wound- dressing on   Neurologic: no gross neuro deficits     Psychiatric:  alert and oriented x 3    LINES:  RIJ Ionia Joseph catheter, arterial line, CT x2  ,Flores     DRIPS:   Kanu ,Epi, Insulin,     CXR:       LAB  Recent Labs      03/01/17   0413  03/01/17   0258  03/01/17   0212  03/01/17   0053  03/01/17   0007   GLUCPOC  133*  138*  54*  103*  124*      Recent Labs      03/01/17 0300 02/28/17 2145 02/28/17 1700 02/28/17   1300  02/28/17   0344   WBC  11.3*   --    --   17.2*  8.5   HGB  9.8*  7.9*  9.3*  9.9*  12.5*   HCT  29.3*  23.6*  27.7*  30.1*  36.2*   PLT  167   --    --   131*  205   INR   --    --    --   1.1   --      Recent Labs      03/01/17   0300  02/28/17   2145 02/28/17   1700  02/28/17   1300  02/28/17   0344  02/27/17   1915   02/27/17   0800   NA  149*   --    --   149*  141   --    --   134*   K  4.6  4.0  3.9  3.5  4.5   --    --   4.6   CL  116*   --    --   113*  108*   --    --   101   CO2  24   --    --   23  21   --    --   23   GLU  132*   --    --   157*  198*   --    --   239*   BUN  16   --    --   23  27*   --    --   21   CREA  1.01   --    --   1.49  1.38   --    --   1.09   MG  2.4  2.3  2.5*  3.1*  2.0  1.9   < >   --    CA  7.2*   --    --   7.8*  8.8   --    --   9.5   TROIQ   --    --    --    --    -- 1.40*   --    --    ALB   --    --    --    --    --    --    --   3.6   TBILI   --    --    --    --    --    --    --   0.7   ALT   --    --    --    --    --    --    --   35   SGOT   --    --    --    --    --    --    --   25   BNPP   --    --    --    --   616   --    --   514    < > = values in this interval not displayed. Recent Labs      02/28/17   2220  02/28/17   1300   PH  7.38  7.29*   PCO2  33*  39   PO2  99  104*   HCO3  19*  18*       Assessment:  (Medical Decision Making)     Hospital Problems  Date Reviewed: 2/28/2017          Codes Class Noted POA    Ischemic cardiomyopathy (Chronic) ICD-10-CM: I25.5  ICD-9-CM: 414.8  2/28/2017 Yes    Overview Signed 2/28/2017  1:26 PM by Alonso Yoon NP     EF 10-15% echo 2/27/17             CAD, multiple vessel (Chronic) ICD-10-CM: I25.10  ICD-9-CM: 414.00  2/28/2017 Yes    Overview Signed 2/28/2017  1:28 PM by Alonso Yoon NP     2/28/17 (Dr Eddie Oconnor) Coronary artery bypass grafting x3. Grafts consisting of   1. Left internal mammary artery to the left anterior descending. 2. Reversed LEFT saphenous vein graft to obtuse marginal.   3. Reversed LEFT saphenous vein graft to the left posterior descending   artery.               Encounter for weaning from ventilator [facility])   extubated  ICD-10-CM: Z99.11  ICD-9-CM: V46.13  2/28/2017 Yes        S/P CABG (coronary artery bypass graft) ICD-10-CM: Z95.1  ICD-9-CM: V45.81  2/28/2017 Yes        * (Principal)Chest pain ICD-10-CM: R07.9  ICD-9-CM: 786.50  2/27/2017 Yes        Unstable angina ([facility] ICD-10-CM: I20.0  ICD-9-CM: 411.1  2/27/2017 Yes        Vitreous hemorrhage, unspecified eye ([facility] ICD-10-CM: H43.10  ICD-9-CM: 379.23  2/27/2017 No    Overview Addendum 2/27/2017  3:33 PM by Alonso Yoon, NP     2/21s/p vitrectomy RIGHT EYE (Dr Rohit Peralta)             NSTEMI (non-ST elevated myocardial infarction) [facility]) ICD-10-CM: I21.4  ICD-9-CM: 410.70  2/27/2017 Yes        Depressed left ventricular ejection fraction (Chronic) ICD-10-CM: R93.1  ICD-9-CM: 794.39  2/27/2017 Yes    Overview Signed 2/27/2017  4:07 PM by Marisa Morley NP     2/27/17 20%             DM (diabetes mellitus) type II controlled, neurological manifestation (HCC) (Chronic) ICD-10-CM: E11.49  ICD-9-CM: 250.60  3/7/2014 Yes        Essential hypertension, benign (Chronic) ICD-10-CM: I10  ICD-9-CM: 401.1  3/7/2014 Yes              Plan:  (Medical Decision Making)     -- wean pressors as tolerated  -may need lasix if pressure tolerates   - Impella per CT surgery  - pulmonary toilet, IS     More than 50% of the time documented was spent in face-to-face contact with the patient and in the care of the patient on the floor/unit where the patient is located.     Joseline Mcmillan MD

## 2017-03-01 NOTE — PROGRESS NOTES
Spiritual Care Assessment/Progress Notes    Carolina Chaudhry [id number]  [id number]    1955  63 y.o.  male    Patient Telephone Number: [phone] (home)   Samaritan Affiliation: Non Pentecostalism   Language: English   Extended Emergency Contact Information  Primary Emergency Contact: [address] Phone: [phone]  Mobile Phone: [phone]  Relation: Spouse   Patient Active Problem List    Diagnosis Date Noted    Postoperative anemia due to acute blood loss 03/01/2017    LVAD (left ventricular assist device) present ([facility] 03/01/2017    Ischemic cardiomyopathy 02/28/2017    CAD, multiple vessel 02/28/2017    Encounter for weaning from ventilator ([facility] 02/28/2017    S/P CABG (coronary artery bypass graft) 02/28/2017    Chest pain 02/27/2017    Elevated troponin 02/27/2017    Unstable angina (HCC) 02/27/2017    Vitreous hemorrhage, unspecified eye ([facility] 02/27/2017    Hypertriglyceridemia 02/27/2017    NSTEMI (non-ST elevated myocardial infarction) ([facility] 02/27/2017    Depressed left ventricular ejection fraction 02/27/2017    Pure hypercholesterolemia 04/04/2016    Retinopathy of both eyes     Undiagnosed cardiac murmurs 03/07/2014    DM (diabetes mellitus) type II controlled, neurological manifestation ([facility] 03/07/2014    Allergic rhinitis, cause unspecified 03/07/2014    Contact dermatitis and other eczema, due to unspecified cause 03/07/2014    Esophageal reflux 03/07/2014    Other specified idiopathic peripheral neuropathy 03/07/2014    Essential hypertension, benign 03/07/2014    History of testicular cancer 01/01/1979        Date: 3/1/2017       Level of Samaritan/Spiritual Activity:  []         Involved in zana tradition/spiritual practice    []         Not involved in zana tradition/spiritual practice  []         Spiritually oriented    []         Claims no spiritual orientation    []         seeking spiritual identity  []         Feels alienated from Mu-ism practice/tradition  []         Feels angry about Mu-ism practice/tradition  []         Spirituality/Mu-ism tradition is a resource for coping at this time. [x]         Not able to assess due to medical condition    Services Provided Today:  []         crisis intervention    []         reading Scriptures  [x]         spiritual assessment    []         prayer  []         empathic listening/emotional support  []         rites and rituals (cite in comments)  []         life review     []         Mu-ism support  []         theological development   []         advocacy  []         ethical dialog     []         blessing  []         bereavement support    []         support to family  []         anticipatory grief support   []         help with AMD  []         spiritual guidance    []         meditation      Spiritual Care Needs  []         Emotional Support  []         Spiritual/Mandaeism Care  []         Loss/Adjustment  []         Advocacy/Referral                /Ethics  [x]         No needs expressed at               this time  []         Other: (note in               comments)  [address]  []         Follow up visits with               pt/family  []         Provide materials  []         Schedule sacraments  []         Contact Community               Clergy  [x]         Follow up as needed  []         Other: (note in               comments)     Patient was resting. This Spiritual Assessment was completed via chart.      Branden Bejarano 70  Board Certified

## 2017-03-01 NOTE — PROGRESS NOTES
Spoke with dr Radhika Willingham about pain issues, low bp and hbg 7.9. Orders received 2 units prbc 1 plt . Dilaudid prn for pain.

## 2017-03-01 NOTE — PROGRESS NOTES
rr 30-38 sats 97. Pt states cant get a good breath  only 500 on is. Will check abg. Tramadol 100 mg po given.

## 2017-03-01 NOTE — PROGRESS NOTES
Dual skin assessment performed. Allevyn pulled back and replaced. Blanchable redness to gluteal fold. Skin intact. Will monitor.

## 2017-03-01 NOTE — PROGRESS NOTES
[facility] CARDIOLOGY PROGRESS NOTE           3/1/2017 7:47 AM    Admit Date: 2/27/2017      Subjective:   Patient now extubated and hemodyamcs remain borderline. He is on low dose Epi and changing to dobutamine. Impella has been weaned to P5 and has tolerated so far. Labs are stable. ROS:  Cardiovascular:  As noted above    Objective:      Vitals:    03/01/17 0630 03/01/17 0645 03/01/17 0700 03/01/17 0715   BP: 90/69 111/65 96/65    Pulse: 88 86 92 93   Resp: 19 17 (!) 32 18   Temp:       SpO2: 98% 98% 98% 98%   Weight:       Height:           Physical Exam:  General-No Acute Distress  Neck- supple, no JVD  CV- regular rate and rhythm no MRG  Lung- diminished bilaterally R> L  Abd- soft, nontender, nondistended  Ext- no edema bilaterally. Skin- warm and dry    Data Review:   Recent Labs      03/01/17   0300  02/28/17   2145   02/28/17   1300  02/28/17   0344  02/27/17   1915   NA  149*   --    --   149*  141   --    K  4.6  4.0   < >  3.5  4.5   --    MG  2.4  2.3   < >  3.1*  2.0  1.9   BUN  16   --    --   23  27*   --    CREA  1.01   --    --   1.49  1.38   --    GLU  132*   --    --   157*  198*   --    WBC  11.3*   --    --   17.2*  8.5   --    HGB  9.8*  7.9*   < >  9.9*  12.5*   --    HCT  29.3*  23.6*   < >  30.1*  36.2*   --    PLT  167   --    --   131*  205   --    INR   --    --    --   1.1   --    --    TROIQ   --    --    --    --    --   1.40*   CHOL   --    --    --    --   143   --    TGL   --    --    --    --   195*   --    LDLC   --    --    --    --   71   --    HDL   --    --    --    --   33*   --     < > = values in this interval not displayed. Assessment/Plan:     Principal Problem:    Chest pain (2/27/2017) - Critical 3vCAD with EF 20%. He is s/p Impella assisted CABG. Impella remains in place and hemodynamics are still marginal.  Changing Epi to dobutamine and will slowly wean Impella as hemodynamics dictate. DC carvedilol as on dobutamine. Active Problems:    DM (diabetes mellitus) type II controlled, neurological manifestation ([address] (3/7/2014)      Essential hypertension, benign (3/7/2014) - Hypotensive currently and will address as becomes an issue      Elevated troponin (2/27/2017) - NSTEMI. 3vCAD s/p CABG      Unstable angina ([address] (2/27/2017)      Vitreous hemorrhage, unspecified eye ([address] (2/27/2017)      Overview: 2/21s/p vitrectomy RIGHT EYE (Dr Rosalio Oconnell)      Hypertriglyceridemia (2/27/2017)      NSTEMI (non-ST elevated myocardial infarction) ([address] (2/27/2017)      Depressed left ventricular ejection fraction (2/27/2017)      Overview: 2/27/17 20%      Ischemic cardiomyopathy (2/28/2017)      Overview: EF 10-15% echo 2/27/17      CAD, multiple vessel (2/28/2017)      Overview: 2/28/17 (Dr Jonny Blank) Coronary artery bypass grafting x3. Grafts       consisting of       1. Left internal mammary artery to the left anterior descending. 2. Reversed LEFT saphenous vein graft to obtuse marginal.       3. Reversed LEFT saphenous vein graft to the left posterior descending       artery.        Encounter for weaning from ventilator [facility]) (2/28/2017)      S/P CABG (coronary artery bypass graft) (2/28/2017)          Amber Orellana MD  3/1/2017 7:47 AM

## 2017-03-01 NOTE — PROGRESS NOTES
Report received from cam rn for contd care. gtts marco kelley reviewed. Pt resting quietly. Pt bathed,linen change. Skin assesment   done. Right groin are ecchymotic. Small hematoma noted. No redness or skin breakdown. allevyn dressing pulled back to sacral area.

## 2017-03-01 NOTE — PROGRESS NOTES
Dr. Kelsey Allen notified of Impella P level 2 and pt's stable condition per request.  Orders received to place impella on P5 and standby. Will monitor.

## 2017-03-01 NOTE — PROGRESS NOTES
POD 1 Day Post-Op    Procedure:  Procedure(s):  CORONARY ARTERY BYPASS GRAFT (CABG)x3 [id number] [facility] Saphenous Vein  ESOPHAGEAL TRANS ECHOCARDIOGRAM      Subjective:     Patient has No significant medical complaints      Objective:     Patient Vitals for the past 8 hrs:   BP Temp Pulse Resp SpO2 Weight   17 0715 - - 93 18 98 % -   17 0700 96/65 - 92 (!) 32 98 % -   17 0645 111/65 - 86 17 98 % -   17 0630 90/69 - 88 19 98 % -   17 0615 107/68 - 94 21 97 % -   17 0600 103/65 99.5 °F (37.5 °C) 88 17 97 % -   17 0545 90 - 90 (!) 52 94 % -   17 0530 109/74 - 94 21 98 % -   17 0515 115/71 - 92 26 98 % -   17 0500 95/72 - 92 22 98 % -   17 0453 - - - - - 207 lb 7.3 oz (94.1 kg)   17 0445 (!) 89/66 - 91 21 97 % -   17 0430 103/61 - 91 21 96 % -   17 0415 113/74 - 97 (!) 31 97 % -   17 0400 114/72 99.8 °F (37.7 °C) 91 23 98 % -   17 0345 111/71 - 93 29 97 % -   17 0330 114/73 - 93 25 94 % -   17 0315 108/77 - 100 28 95 % -   17 0300 109/78 99.7 °F (37.6 °C) (!) 102 22 97 % -   17 0245 130/69 - 93 23 97 % -   17 0230 105/75 - 93 28 96 % -   17 0215 115/81 - 95 (!) 31 97 % -   17 0200 120/75 99.7 °F (37.6 °C) - - - -   17 0159 - - 92 25 97 % -   17 0145 110/71 - 91 29 97 % -   17 0130 115/72 - 98 22 96 % -   17 0115 119/71 99.7 °F (37.6 °C) 97 (!) 40 96 % -   17 0100 115/68 - 100 (!) 37 96 % -   17 0045 114/76 - 93 25 97 % -   17 0030 105/72 - 91 19 97 % -   17 0015 99/66 - 95 (!) 33 97 % -   17 0000 95/61 99.8 °F (37.7 °C) 93 30 97 % -   17 2345 104/70 99.7 °F (37.6 °C) 90 30 98 % -   17 2330 97/62 - 89 23 100 % -     Temp (24hrs), Av.4 °F (37.4 °C), Min:98.5 °F (36.9 °C), Max:99.8 °F (37.7 °C)        Hemodynamics  PAP Systolic: 32 PAP  CO (l/min): 4.9 l/min CO  CI (l/min/m2): 2.3 l/min/m2 CI        1901 - 03/01 0700  In: 8773.1 [I.V.:7600.1]  Out: 2405 [Urine:2911]    CT Drainage              total of all CT's    Heart:  regular rate and rhythm, S1, S2 normal, no murmur, click, rub or gallop  Lung:  clear to auscultation bilaterally  Neuro: Grossly non focal  Incisions: Clean, dry, and intact    Labs:  Recent Results (from the past 24 hour(s))   POC CG8I    Collection Time: 02/28/17  8:15 AM   Result Value Ref Range    pH (POC) 7.378 7.35 - 7.45      pCO2 (POC) 38.1 35 - 45 MMHG    pO2 (POC) 89 80 - 105 MMHG    HCO3 (POC) 22.4 22.0 - 26.0 MMOL/L    sO2 (POC) 97 95 - 98 %    Base deficit (POC) 3 mmol/L    Sodium (POC) 137 (L) 138 - 146 MMOL/L    Potassium (POC) 4.7 3.5 - 5.1 MMOL/L    Glucose (POC) 199 (H) 70 - 105 MG/DL    Calcium, ionized (POC) 1.19 1.12 - 1.32 MMOL/L   POC CG8I    Collection Time: 02/28/17  9:48 AM   Result Value Ref Range    pH (POC) 7.349 (L) 7.35 - 7.45      pCO2 (POC) 39.8 35 - 45 MMHG    pO2 (POC) 256 (H) 80 - 105 MMHG    HCO3 (POC) 21.9 (L) 22.0 - 26.0 MMOL/L    sO2 (POC) 100 (H) 95 - 98 %    Base deficit (POC) 4 mmol/L    Sodium (POC) 138 138 - 146 MMOL/L    Potassium (POC) 3.8 3.5 - 5.1 MMOL/L    Glucose (POC) 207 (H) 70 - 105 MG/DL    Calcium, ionized (POC) 1.14 1.12 - 1.32 MMOL/L   POC CG8I    Collection Time: 02/28/17 10:29 AM   Result Value Ref Range    pH (POC) 7.310 (L) 7.35 - 7.45      pCO2 (POC) 42.2 35 - 45 MMHG    pO2 (POC) 412 (H) 80 - 105 MMHG    HCO3 (POC) 21.2 (L) 22.0 - 26.0 MMOL/L    sO2 (POC) 100 (H) 95 - 98 %    Base deficit (POC) 5 mmol/L    Sodium (POC) 135 (L) 138 - 146 MMOL/L    Potassium (POC) 5.1 3.5 - 5.1 MMOL/L    Glucose (POC) 198 (H) 70 - 105 MG/DL    Calcium, ionized (POC) 1.02 (L) 1.12 - 1.32 MMOL/L   POC CG8I    Collection Time: 02/28/17 10:55 AM   Result Value Ref Range    pH (POC) 7.308 (L) 7.35 - 7.45      pCO2 (POC) 41.6 35 - 45 MMHG    pO2 (POC) 286 (H) 80 - 105 MMHG    HCO3 (POC) 20.8 (L) 22.0 - 26.0 MMOL/L    sO2 (POC) 100 (H) 95 - 98 %    Base deficit (POC) 5 mmol/L    Sodium (POC) 139 138 - 146 MMOL/L    Potassium (POC) 4.4 3.5 - 5.1 MMOL/L    Glucose (POC) 193 (H) 70 - 105 MG/DL    Calcium, ionized (POC) 1.05 (L) 1.12 - 1.32 MMOL/L   POC CG8I    Collection Time: 02/28/17 11:24 AM   Result Value Ref Range    pH (POC) 7.379 7.35 - 7.45      pCO2 (POC) 43.4 35 - 45 MMHG    pO2 (POC) 292 (H) 80 - 105 MMHG    HCO3 (POC) 25.7 22.0 - 26.0 MMOL/L    sO2 (POC) 100 (H) 95 - 98 %    Base excess (POC) 1 mmol/L    Sodium (POC) 142 138 - 146 MMOL/L    Potassium (POC) 4.9 3.5 - 5.1 MMOL/L    Glucose (POC) 185 (H) 70 - 105 MG/DL    Calcium, ionized (POC) 1.01 (L) 1.12 - 1.32 MMOL/L   POC CG8I    Collection Time: 02/28/17 12:14 PM   Result Value Ref Range    pH (POC) 7.305 (L) 7.35 - 7.45      pCO2 (POC) 45.7 (H) 35 - 45 MMHG    pO2 (POC) 347 (H) 80 - 105 MMHG    HCO3 (POC) 22.7 22.0 - 26.0 MMOL/L    sO2 (POC) 100 (H) 95 - 98 %    Base deficit (POC) 4 mmol/L    Sodium (POC) 143 138 - 146 MMOL/L    Potassium (POC) 3.4 (L) 3.5 - 5.1 MMOL/L    Glucose (POC) 176 (H) 70 - 105 MG/DL    Calcium, ionized (POC) 1.19 1.12 - 1.32 MMOL/L   BLOOD GAS & LYTES, ARTERIAL    Collection Time: 02/28/17  1:00 PM   Result Value Ref Range    pH 7.29 (L) 7.35 - 7.45      PCO2 39 35.0 - 45.0 mmHg    PO2 104 (H) 75.0 - 100.0 mmHg    BICARBONATE 18 (L) 22.0 - 26.0 mmol/L    BASE DEFICIT 8.0 (H) 0 - 2 mmol/L    TOTAL HEMOGLOBIN 10.9 (L) 11.7 - 15.0 GM/DL    O2 SAT 96 92.0 - 98.5 %    ARTERIAL O2 HGB 95.2 94.0 - 97.0 %    CARBOXYHEMOGLOBIN 0.3 (L) 0.5 - 1.5 %    METHEMOGLOBIN 0.7 0.0 - 1.5 %    DEOXYHEMOGLOBIN 4 0.0 - 5.0 %    Sodium 141.3 135 - 148 MMOL/L    POTASSIUM 3.50 3.5 - 5.3 MMOL/L    CHLORIDE 107 (H) 98 - 106      Calcium, ionized 1.19 1.0 - 1.3 mmol/L    SITE JIMENA     ALLENS TEST NA     MODE PRVC     FIO2 60.0 %    Tidal volume 500.0      RATE 15.0      PEEP/CPAP 8.0      Respiratory comment: Dr Kriss Kaplan at 2 28 2017 1 05 12 PM. Read back.     METABOLIC PANEL, BASIC    Collection Time: 02/28/17  1:00 PM   Result Value Ref Range    Sodium 149 (H) 136 - 145 mmol/L    Potassium 3.5 3.5 - 5.1 mmol/L    Chloride 113 (H) 98 - 107 mmol/L    CO2 23 21 - 32 mmol/L    Anion gap 13 7 - 16 mmol/L    Glucose 157 (H) 65 - 100 mg/dL    BUN 23 6 - 23 MG/DL    Creatinine 1.49 0.8 - 1.5 MG/DL    GFR est AA >60 >60 ml/min/1.73m2    GFR est non-AA 51 (L) >60 ml/min/1.73m2    Calcium 7.8 (L) 8.3 - 10.4 MG/DL   MAGNESIUM    Collection Time: 02/28/17  1:00 PM   Result Value Ref Range    Magnesium 3.1 (H) 1.8 - 2.4 mg/dL   CBC W/O DIFF    Collection Time: 02/28/17  1:00 PM   Result Value Ref Range    WBC 17.2 (H) 4.3 - 11.1 K/uL    RBC 3.40 (L) 4.23 - 5.67 M/uL    HGB 9.9 (L) 13.6 - 17.2 g/dL    HCT 30.1 (L) 41.1 - 50.3 %    MCV 88.5 79.6 - 97.8 FL    MCH 29.1 26.1 - 32.9 PG    MCHC 32.9 31.4 - 35.0 g/dL    RDW 13.6 11.9 - 14.6 %    PLATELET 439 (L) 532 - 450 K/uL    MPV 9.5 (L) 10.8 - 14.1 FL   PTT    Collection Time: 02/28/17  1:00 PM   Result Value Ref Range    aPTT 29.9 23.5 - 31.7 SEC   PROTHROMBIN TIME + INR    Collection Time: 02/28/17  1:00 PM   Result Value Ref Range    Prothrombin time 12.2 (H) 9.6 - 12.0 sec    INR 1.1 0.9 - 1.2     FIBRINOGEN    Collection Time: 02/28/17  1:00 PM   Result Value Ref Range    Fibrinogen 178 172 - 437 mg/dL   GLUCOSE, POC    Collection Time: 02/28/17  1:02 PM   Result Value Ref Range    Glucose (POC) 165 (H) 65 - 100 mg/dL   EKG, 12 LEAD, INITIAL    Collection Time: 02/28/17  1:20 PM   Result Value Ref Range    Systolic BP  mmHg    Diastolic BP  mmHg    Ventricular Rate 100 BPM    Atrial Rate 100 BPM    P-R Interval 142 ms    QRS Duration 114 ms    Q-T Interval 380 ms    QTC Calculation (Bezet) 490 ms    Calculated P Axis 64 degrees    Calculated R Axis 71 degrees    Calculated T Axis -149 degrees    Diagnosis       Sinus rhythm with Premature atrial complexes  Low voltage QRS  ST & T wave abnormality, consider anterolateral ischemia  Prolonged QT  Abnormal ECG    Confirmed by ST RENETTA CONNOR MD (), OC ALEGRIA ([id number]) on 2/28/2017 2:14:52 PM     GLUCOSE, POC    Collection Time: 02/28/17  1:56 PM   Result Value Ref Range    Glucose (POC) 137 (H) 65 - 100 mg/dL   GLUCOSE, POC    Collection Time: 02/28/17  2:52 PM   Result Value Ref Range    Glucose (POC) 122 (H) 65 - 100 mg/dL   GLUCOSE, POC    Collection Time: 02/28/17  4:10 PM   Result Value Ref Range    Glucose (POC) 109 (H) 65 - 100 mg/dL   MAGNESIUM    Collection Time: 02/28/17  5:00 PM   Result Value Ref Range    Magnesium 2.5 (H) 1.8 - 2.4 mg/dL   POTASSIUM    Collection Time: 02/28/17  5:00 PM   Result Value Ref Range    Potassium 3.9 3.5 - 5.1 mmol/L   HGB & HCT    Collection Time: 02/28/17  5:00 PM   Result Value Ref Range    HGB 9.3 (L) 13.6 - 17.2 g/dL    HCT 27.7 (L) 41.1 - 50.3 %   GLUCOSE, POC    Collection Time: 02/28/17  5:05 PM   Result Value Ref Range    Glucose (POC) 121 (H) 65 - 100 mg/dL   GLUCOSE, POC    Collection Time: 02/28/17  6:01 PM   Result Value Ref Range    Glucose (POC) 140 (H) 65 - 100 mg/dL   GLUCOSE, POC    Collection Time: 02/28/17  7:01 PM   Result Value Ref Range    Glucose (POC) 149 (H) 65 - 100 mg/dL   GLUCOSE, POC    Collection Time: 02/28/17  8:03 PM   Result Value Ref Range    Glucose (POC) 158 (H) 65 - 100 mg/dL   GLUCOSE, POC    Collection Time: 02/28/17  8:55 PM   Result Value Ref Range    Glucose (POC) 156 (H) 65 - 100 mg/dL   MAGNESIUM    Collection Time: 02/28/17  9:45 PM   Result Value Ref Range    Magnesium 2.3 1.8 - 2.4 mg/dL   POTASSIUM    Collection Time: 02/28/17  9:45 PM   Result Value Ref Range    Potassium 4.0 3.5 - 5.1 mmol/L   HGB & HCT    Collection Time: 02/28/17  9:45 PM   Result Value Ref Range    HGB 7.9 (L) 13.6 - 17.2 g/dL    HCT 23.6 (L) 41.1 - 50.3 %   GLUCOSE, POC    Collection Time: 02/28/17  9:46 PM   Result Value Ref Range    Glucose (POC) 154 (H) 65 - 100 mg/dL   BLOOD GAS, ARTERIAL    Collection Time: 02/28/17 10:20 PM   Result Value Ref Range    pH 7.38 7.35 - 7.45 PCO2 33 (L) 35.0 - 45.0 mmHg    PO2 99 75.0 - 100.0 mmHg    BICARBONATE 19 (L) 22.0 - 26.0 mmol/L    BASE DEFICIT 5.5 (H) 0 - 2 mmol/L    TOTAL HEMOGLOBIN 8.7 (L) 11.7 - 15.0 GM/DL    O2 SAT 97 92.0 - 98.5 %    ARTERIAL O2 HGB 95.7 94.0 - 97.0 %    CARBOXYHEMOGLOBIN 0.3 (L) 0.5 - 1.5 %    METHEMOGLOBIN 0.5 0.0 - 1.5 %    DEOXYHEMOGLOBIN 4 0.0 - 5.0 %    SITE JIMENA     ALLENS TEST NA     MODE airvo     FIO2 45.0 %    O2 FLOW 40.00 L/min    Respiratory comment: Maria L rodriguez at 2 28 2017 10 22 39 PM. Read back.     PLATELETS, ALLOCATE    Collection Time: 02/28/17 10:45 PM   Result Value Ref Range    Unit number [id number]     Blood component type PLTPH LR,1     Unit division 00     Status of unit ISSUED    GLUCOSE, POC    Collection Time: 02/28/17 11:22 PM   Result Value Ref Range    Glucose (POC) 134 (H) 65 - 100 mg/dL   GLUCOSE, POC    Collection Time: 03/01/17 12:07 AM   Result Value Ref Range    Glucose (POC) 124 (H) 65 - 100 mg/dL   GLUCOSE, POC    Collection Time: 03/01/17 12:53 AM   Result Value Ref Range    Glucose (POC) 103 (H) 65 - 100 mg/dL   GLUCOSE, POC    Collection Time: 03/01/17  2:12 AM   Result Value Ref Range    Glucose (POC) 54 (L) 65 - 100 mg/dL   GLUCOSE, POC    Collection Time: 03/01/17  2:58 AM   Result Value Ref Range    Glucose (POC) 138 (H) 65 - 100 mg/dL   BNP    Collection Time: 03/01/17  3:00 AM   Result Value Ref Range     pg/mL   MAGNESIUM    Collection Time: 03/01/17  3:00 AM   Result Value Ref Range    Magnesium 2.4 1.8 - 2.4 mg/dL   METABOLIC PANEL, BASIC    Collection Time: 03/01/17  3:00 AM   Result Value Ref Range    Sodium 149 (H) 136 - 145 mmol/L    Potassium 4.6 3.5 - 5.1 mmol/L    Chloride 116 (H) 98 - 107 mmol/L    CO2 24 21 - 32 mmol/L    Anion gap 9 7 - 16 mmol/L    Glucose 132 (H) 65 - 100 mg/dL    BUN 16 6 - 23 MG/DL    Creatinine 1.01 0.8 - 1.5 MG/DL    GFR est AA >60 >60 ml/min/1.73m2    GFR est non-AA >60 >60 ml/min/1.73m2    Calcium 7.2 (L) 8.3 - 10.4 MG/DL   CBC W/O DIFF    Collection Time: 03/01/17  3:00 AM   Result Value Ref Range    WBC 11.3 (H) 4.3 - 11.1 K/uL    RBC 3.30 (L) 4.23 - 5.67 M/uL    HGB 9.8 (L) 13.6 - 17.2 g/dL    HCT 29.3 (L) 41.1 - 50.3 %    MCV 88.8 79.6 - 97.8 FL    MCH 29.7 26.1 - 32.9 PG    MCHC 33.4 31.4 - 35.0 g/dL    RDW 13.8 11.9 - 14.6 %    PLATELET 095 163 - 283 K/uL    MPV 9.7 (L) 10.8 - 14.1 FL   BLOOD GAS, MIXED VENOUS STUDY    Collection Time: 03/01/17  3:35 AM   Result Value Ref Range    MIXED VENOUS PO2 <34 (L) 35 - 40 mmHg    TOTAL HEMOGLOBIN 10.5 (L) 11.7 - 15.0 GM/DL    MIXED VENOUS O2 SAT. 57 %    MIXED VENOUS O2 HGB 56.6 %    CARBOXYHEMOGLOBIN 0.8 0.5 - 1.5 %    METHEMOGLOBIN 0.4 0.0 - 1.5 %    DEOXYHEMOGLOBIN 42 (H) 0.0 - 5.0 %    SITE CVC     ALLENS TEST NA     MODE airvo     FIO2 45.0 %    O2 FLOW 45.00 L/min    Respiratory comment: fei rodriguez at 3 1 2017 3 38 44 AM. Read back.     GLUCOSE, POC    Collection Time: 03/01/17  4:13 AM   Result Value Ref Range    Glucose (POC) 133 (H) 65 - 100 mg/dL   GLUCOSE, POC    Collection Time: 03/01/17  6:13 AM   Result Value Ref Range    Glucose (POC) 173 (H) 65 - 100 mg/dL   EKG, 12 LEAD, INITIAL    Collection Time: 03/01/17  6:16 AM   Result Value Ref Range    Systolic BP  mmHg    Diastolic BP  mmHg    Ventricular Rate 94 BPM    Atrial Rate 94 BPM    P-R Interval 140 ms    QRS Duration 102 ms    Q-T Interval 380 ms    QTC Calculation (Bezet) 475 ms    Calculated P Axis 81 degrees    Calculated R Axis 70 degrees    Calculated T Axis -156 degrees    Diagnosis       Normal sinus rhythm  ST & T wave abnormality, consider inferolateral ischemia  Prolonged QT  Abnormal ECG  When compared with ECG of 28-FEB-2017 13:20,  ST no longer depressed in Anterior leads         Assessment:     Principal Problem:    Chest pain (2/27/2017)    Active Problems:    DM (diabetes mellitus) type II controlled, [facility] (3/7/2014)      Essential hypertension, benign (3/7/2014)      Elevated troponin (2/27/2017)      Unstable angina ([facility] (2/27/2017)      Vitreous hemorrhage, unspecified eye ([facility] (2/27/2017)      Overview: 2/21s/p vitrectomy RIGHT EYE (Dr Zoie Mitchell)      Hypertriglyceridemia (2/27/2017)      NSTEMI (non-ST elevated myocardial infarction) ([facility] (2/27/2017)      Depressed left ventricular ejection fraction (2/27/2017)      Overview: 2/27/17 20%      Ischemic cardiomyopathy (2/28/2017)      Overview: EF 10-15% echo 2/27/17      CAD, multiple vessel (2/28/2017)      Overview: 2/28/17 (Dr Marii Orosco) Coronary artery bypass grafting x3. Grafts       consisting of       1. Left internal mammary artery to the left anterior descending. 2. Reversed LEFT saphenous vein graft to obtuse marginal.       3. Reversed LEFT saphenous vein graft to the left posterior descending       artery. Encounter for weaning from ventilator [facility]) (2/28/2017)      S/P CABG (coronary artery bypass graft) (2/28/2017)        Plan/Recommendations/Medical Decision Making:     Quiet night, wean Impella, CXR ? Effusion ?  tap    See orders

## 2017-03-02 ENCOUNTER — APPOINTMENT (OUTPATIENT)
Dept: GENERAL RADIOLOGY | Age: 62
DRG: 001 | End: 2017-03-02
Attending: THORACIC SURGERY (CARDIOTHORACIC VASCULAR SURGERY)
Payer: COMMERCIAL

## 2017-03-02 PROBLEM — I50.22 SYSTOLIC CHF, CHRONIC (HCC): Status: ACTIVE | Noted: 2017-03-02

## 2017-03-02 PROBLEM — R77.8 ELEVATED TROPONIN: Status: RESOLVED | Noted: 2017-02-27 | Resolved: 2017-03-02

## 2017-03-02 PROBLEM — R09.02 HYPOXIA: Status: ACTIVE | Noted: 2017-03-02

## 2017-03-02 PROBLEM — R07.9 CHEST PAIN: Status: RESOLVED | Noted: 2017-02-27 | Resolved: 2017-03-02

## 2017-03-02 PROBLEM — H43.10: Status: RESOLVED | Noted: 2017-02-27 | Resolved: 2017-03-02

## 2017-03-02 PROBLEM — J90 PLEURAL EFFUSION: Status: ACTIVE | Noted: 2017-03-02

## 2017-03-02 PROBLEM — R57.0 CARDIOGENIC SHOCK (HCC): Status: ACTIVE | Noted: 2017-03-02

## 2017-03-02 PROBLEM — I20.0 UNSTABLE ANGINA (HCC): Status: RESOLVED | Noted: 2017-02-27 | Resolved: 2017-03-02

## 2017-03-02 LAB
ANION GAP BLD CALC-SCNC: 7 MMOL/L (ref 7–16)
ATRIAL RATE: 76 BPM
BACTERIA SPEC CULT: NORMAL
BLD PROD TYP BPU: NORMAL
BNP SERPL-MCNC: 362 PG/ML
BPU ID: NORMAL
BUN SERPL-MCNC: 22 MG/DL (ref 6–23)
CALCIUM SERPL-MCNC: 7.1 MG/DL (ref 8.3–10.4)
CALCULATED P AXIS, ECG09: 32 DEGREES
CALCULATED R AXIS, ECG10: 44 DEGREES
CALCULATED T AXIS, ECG11: -69 DEGREES
CHLORIDE SERPL-SCNC: 109 MMOL/L (ref 98–107)
CO2 SERPL-SCNC: 25 MMOL/L (ref 21–32)
CREAT SERPL-MCNC: 1 MG/DL (ref 0.8–1.5)
DIAGNOSIS, 93000: NORMAL
DIASTOLIC BP, ECG02: NORMAL MMHG
ERYTHROCYTE [DISTWIDTH] IN BLOOD BY AUTOMATED COUNT: 14.1 % (ref 11.9–14.6)
ERYTHROCYTE [DISTWIDTH] IN BLOOD BY AUTOMATED COUNT: 14.1 % (ref 11.9–14.6)
GLUCOSE BLD STRIP.AUTO-MCNC: 154 MG/DL (ref 65–100)
GLUCOSE BLD STRIP.AUTO-MCNC: 173 MG/DL (ref 65–100)
GLUCOSE BLD STRIP.AUTO-MCNC: 190 MG/DL (ref 65–100)
GLUCOSE BLD STRIP.AUTO-MCNC: 197 MG/DL (ref 65–100)
GLUCOSE SERPL-MCNC: 202 MG/DL (ref 65–100)
HCT VFR BLD AUTO: 24.2 % (ref 41.1–50.3)
HCT VFR BLD AUTO: 24.4 % (ref 41.1–50.3)
HGB BLD-MCNC: 8 G/DL (ref 13.6–17.2)
HGB BLD-MCNC: 8.1 G/DL (ref 13.6–17.2)
MAGNESIUM SERPL-MCNC: 2.3 MG/DL (ref 1.8–2.4)
MCH RBC QN AUTO: 29.6 PG (ref 26.1–32.9)
MCH RBC QN AUTO: 30.2 PG (ref 26.1–32.9)
MCHC RBC AUTO-ENTMCNC: 33.1 G/DL (ref 31.4–35)
MCHC RBC AUTO-ENTMCNC: 33.2 G/DL (ref 31.4–35)
MCV RBC AUTO: 89.6 FL (ref 79.6–97.8)
MCV RBC AUTO: 91 FL (ref 79.6–97.8)
P-R INTERVAL, ECG05: 122 MS
PLATELET # BLD AUTO: 85 K/UL (ref 150–450)
PLATELET # BLD AUTO: 86 K/UL (ref 150–450)
PMV BLD AUTO: 9.4 FL (ref 10.8–14.1)
PMV BLD AUTO: 9.9 FL (ref 10.8–14.1)
POTASSIUM SERPL-SCNC: 4.5 MMOL/L (ref 3.5–5.1)
Q-T INTERVAL, ECG07: 412 MS
QRS DURATION, ECG06: 106 MS
QTC CALCULATION (BEZET), ECG08: 463 MS
RBC # BLD AUTO: 2.68 M/UL (ref 4.23–5.67)
RBC # BLD AUTO: 2.7 M/UL (ref 4.23–5.67)
SERVICE CMNT-IMP: NORMAL
SODIUM SERPL-SCNC: 141 MMOL/L (ref 136–145)
STATUS OF UNIT,%ST: NORMAL
SYSTOLIC BP, ECG01: NORMAL MMHG
UNIT DIVISION, %UDIV: 0
VENTRICULAR RATE, ECG03: 76 BPM
WBC # BLD AUTO: 9.3 K/UL (ref 4.3–11.1)
WBC # BLD AUTO: 9.6 K/UL (ref 4.3–11.1)

## 2017-03-02 PROCEDURE — 77010033678 HC OXYGEN DAILY

## 2017-03-02 PROCEDURE — 71010 XR CHEST PORT: CPT

## 2017-03-02 PROCEDURE — 74011250636 HC RX REV CODE- 250/636: Performed by: INTERNAL MEDICINE

## 2017-03-02 PROCEDURE — 82962 GLUCOSE BLOOD TEST: CPT

## 2017-03-02 PROCEDURE — 80048 BASIC METABOLIC PNL TOTAL CA: CPT | Performed by: INTERNAL MEDICINE

## 2017-03-02 PROCEDURE — 74011000250 HC RX REV CODE- 250: Performed by: THORACIC SURGERY (CARDIOTHORACIC VASCULAR SURGERY)

## 2017-03-02 PROCEDURE — 93005 ELECTROCARDIOGRAM TRACING: CPT | Performed by: INTERNAL MEDICINE

## 2017-03-02 PROCEDURE — P9016 RBC LEUKOCYTES REDUCED: HCPCS | Performed by: NURSE PRACTITIONER

## 2017-03-02 PROCEDURE — 85027 COMPLETE CBC AUTOMATED: CPT | Performed by: THORACIC SURGERY (CARDIOTHORACIC VASCULAR SURGERY)

## 2017-03-02 PROCEDURE — 83735 ASSAY OF MAGNESIUM: CPT | Performed by: INTERNAL MEDICINE

## 2017-03-02 PROCEDURE — 74011250636 HC RX REV CODE- 250/636: Performed by: THORACIC SURGERY (CARDIOTHORACIC VASCULAR SURGERY)

## 2017-03-02 PROCEDURE — 36600 WITHDRAWAL OF ARTERIAL BLOOD: CPT

## 2017-03-02 PROCEDURE — 74011636637 HC RX REV CODE- 636/637: Performed by: NURSE PRACTITIONER

## 2017-03-02 PROCEDURE — 99232 SBSQ HOSP IP/OBS MODERATE 35: CPT | Performed by: INTERNAL MEDICINE

## 2017-03-02 PROCEDURE — 74011250637 HC RX REV CODE- 250/637: Performed by: THORACIC SURGERY (CARDIOTHORACIC VASCULAR SURGERY)

## 2017-03-02 PROCEDURE — 83880 ASSAY OF NATRIURETIC PEPTIDE: CPT | Performed by: INTERNAL MEDICINE

## 2017-03-02 PROCEDURE — 65610000006 HC RM INTENSIVE CARE

## 2017-03-02 PROCEDURE — 02PA3RZ REMOVAL OF SHORT-TERM EXTERNAL HEART ASSIST SYSTEM FROM HEART, PERCUTANEOUS APPROACH: ICD-10-PCS | Performed by: INTERNAL MEDICINE

## 2017-03-02 PROCEDURE — 36430 TRANSFUSION BLD/BLD COMPNT: CPT

## 2017-03-02 RX ORDER — MIDAZOLAM HYDROCHLORIDE 1 MG/ML
4 INJECTION, SOLUTION INTRAMUSCULAR; INTRAVENOUS ONCE
Status: COMPLETED | OUTPATIENT
Start: 2017-03-02 | End: 2017-03-02

## 2017-03-02 RX ORDER — LIDOCAINE HYDROCHLORIDE 10 MG/ML
5 INJECTION INFILTRATION; PERINEURAL ONCE
Status: ACTIVE | OUTPATIENT
Start: 2017-03-02 | End: 2017-03-02

## 2017-03-02 RX ORDER — SODIUM CHLORIDE 9 MG/ML
250 INJECTION, SOLUTION INTRAVENOUS AS NEEDED
Status: DISCONTINUED | OUTPATIENT
Start: 2017-03-02 | End: 2017-03-03

## 2017-03-02 RX ORDER — FUROSEMIDE 10 MG/ML
20 INJECTION INTRAMUSCULAR; INTRAVENOUS ONCE
Status: COMPLETED | OUTPATIENT
Start: 2017-03-02 | End: 2017-03-02

## 2017-03-02 RX ADMIN — OXYCODONE HYDROCHLORIDE AND ACETAMINOPHEN 1 TABLET: 10; 325 TABLET ORAL at 20:44

## 2017-03-02 RX ADMIN — PREDNISOLONE ACETATE 1 DROP: 10 SUSPENSION/ DROPS OPHTHALMIC at 13:12

## 2017-03-02 RX ADMIN — MUPIROCIN: 20 OINTMENT TOPICAL at 08:41

## 2017-03-02 RX ADMIN — INSULIN LISPRO 2 UNITS: 100 INJECTION, SOLUTION INTRAVENOUS; SUBCUTANEOUS at 13:15

## 2017-03-02 RX ADMIN — ONDANSETRON 4 MG: 2 INJECTION INTRAMUSCULAR; INTRAVENOUS at 11:36

## 2017-03-02 RX ADMIN — INSULIN LISPRO 2 UNITS: 100 INJECTION, SOLUTION INTRAVENOUS; SUBCUTANEOUS at 20:58

## 2017-03-02 RX ADMIN — MUPIROCIN: 20 OINTMENT TOPICAL at 17:10

## 2017-03-02 RX ADMIN — AMIODARONE HYDROCHLORIDE 200 MG: 200 TABLET ORAL at 08:55

## 2017-03-02 RX ADMIN — OXYCODONE HYDROCHLORIDE AND ACETAMINOPHEN 1 TABLET: 10; 325 TABLET ORAL at 00:42

## 2017-03-02 RX ADMIN — FUROSEMIDE 20 MG: 10 INJECTION, SOLUTION INTRAMUSCULAR; INTRAVENOUS at 13:15

## 2017-03-02 RX ADMIN — AMIODARONE HYDROCHLORIDE 200 MG: 200 TABLET ORAL at 20:44

## 2017-03-02 RX ADMIN — INSULIN LISPRO 2 UNITS: 100 INJECTION, SOLUTION INTRAVENOUS; SUBCUTANEOUS at 06:49

## 2017-03-02 RX ADMIN — INSULIN LISPRO 2 UNITS: 100 INJECTION, SOLUTION INTRAVENOUS; SUBCUTANEOUS at 17:11

## 2017-03-02 RX ADMIN — PREDNISOLONE ACETATE 1 DROP: 10 SUSPENSION/ DROPS OPHTHALMIC at 21:00

## 2017-03-02 RX ADMIN — ASPIRIN 81 MG 81 MG: 81 TABLET ORAL at 08:54

## 2017-03-02 RX ADMIN — OXYCODONE HYDROCHLORIDE AND ACETAMINOPHEN 1 TABLET: 10; 325 TABLET ORAL at 08:55

## 2017-03-02 RX ADMIN — FAMOTIDINE 20 MG: 10 INJECTION, SOLUTION INTRAVENOUS at 08:55

## 2017-03-02 RX ADMIN — Medication 10 ML: at 13:06

## 2017-03-02 RX ADMIN — OXYCODONE HYDROCHLORIDE AND ACETAMINOPHEN 1 TABLET: 10; 325 TABLET ORAL at 04:41

## 2017-03-02 RX ADMIN — PREDNISOLONE ACETATE 1 DROP: 10 SUSPENSION/ DROPS OPHTHALMIC at 08:42

## 2017-03-02 RX ADMIN — ATORVASTATIN CALCIUM 80 MG: 40 TABLET, FILM COATED ORAL at 20:58

## 2017-03-02 RX ADMIN — FAMOTIDINE 20 MG: 10 INJECTION, SOLUTION INTRAVENOUS at 20:45

## 2017-03-02 RX ADMIN — DOBUTAMINE HYDROCHLORIDE 5 MCG/KG/MIN: 200 INJECTION INTRAVENOUS at 03:36

## 2017-03-02 RX ADMIN — MIDAZOLAM 4 MG: 1 INJECTION INTRAMUSCULAR; INTRAVENOUS at 11:06

## 2017-03-02 RX ADMIN — Medication 10 ML: at 20:59

## 2017-03-02 RX ADMIN — PREDNISOLONE ACETATE 1 DROP: 10 SUSPENSION/ DROPS OPHTHALMIC at 17:10

## 2017-03-02 RX ADMIN — Medication 10 ML: at 06:00

## 2017-03-02 NOTE — PROGRESS NOTES
Dr. Mireille Ramirez and Cath Lab, RN at bedside for Impella removal.  Versed given per order and Impella removed per MD, held manual pressure to groin for 5 min. Leg benign, cap refill<3 sec. Cath lab RN took over for Manual Pressure. Pt sleeping, no complaints, VSS. Will monitor.

## 2017-03-02 NOTE — PROGRESS NOTES
[facility] CARDIOLOGY PROGRESS NOTE           3/2/2017 6:48 AM    Admit Date: 2/27/2017      Subjective:   Patient denies any active chest pain or dyspnea. Impella placed at P5 overnight. Now on P2 and tolerating from hemodynamic standpoint. On dobutamine at 5. ROS:  GEN:  No fever or chills  Cardiovascular:  As noted above  Pulmonary:  As noted above  Neuro:  No new focal motor or sensory loss    Objective:      Vitals:    03/02/17 0245 03/02/17 0300 03/02/17 0315 03/02/17 0330   BP:  114/62  98/68   Pulse: 91 81 86 89   Resp: 18 18 19 20   Temp:  97.5 °F (36.4 °C)     SpO2: 100% 99% 99% 99%   Weight:       Height:           Physical Exam:  General-WM in NAD  Neck- supple, no JVD  CV- regular rate and rhythm no MRG  Lung- clear bilaterally  Abd- soft, nontender, nondistended  Ext- trivial edema bilaterally. Impella site with ecchymosis. Skin- warm and dry  Psychiatric:  Normal mood and affect. Neurologic:  Alert and oriented X 3      Data Review:   Labs:   Recent Labs      03/02/17   0315  03/01/17   0300  02/28/17   2145   02/28/17   1300  02/28/17   0344   NA  141  149*   --    --   149*  141   K  4.5  4.6  4.0   < >  3.5  4.5   MG  2.3  2.4  2.3   < >  3.1*  2.0   BUN  22  16   --    --   23  27*   CREA  1.00  1.01   --    --   1.49  1.38   GLU  202*  132*   --    --   157*  198*   WBC   --   11.3*   --    --   17.2*  8.5   HGB   --   9.8*  7.9*   < >  9.9*  12.5*   HCT   --   29.3*  23.6*   < >  30.1*  36.2*   PLT   --   167   --    --   131*  205   INR   --    --    --    --   1.1   --    HDL   --    --    --    --    --   33*    < > = values in this interval not displayed. No results found for: TROIQ, KAYDEN, TROPT, TNIPOC      TELEMETRY:  Sinus rhyhtm    Assessment/Plan:       Cardiogenic shock  Impella weaned to P2. Check CBC and if stable will remove later today. Wean dobutamine as tolerated.        DM (diabetes mellitus) type II controlled, neurological manifestation ([facility] (3/7/2014)  On insulin gtt      Hypertriglyceridemia (2/27/2017)  On Lipitor      NSTEMI (non-ST elevated myocardial infarction) ([facility] (2/27/2017)  Multi-vessel CAD at cath. S/P CABG. On ASA. Systolic CHF, chronic (HCC)  Severe LV dysfunction. Cannot tolerate B-blocker, ACE-I, ARB due to hypotension. [place] therapy as wean dobutamine. CAD, multiple vessel (2/28/2017)      Overview: 2/28/17 (Dr Fely Gonzales) Coronary artery bypass grafting x3. Grafts       consisting of       1. Left internal mammary artery to the left anterior descending. 2. Reversed LEFT saphenous vein graft to obtuse marginal.       3. Reversed LEFT saphenous vein graft to the left posterior descending       artery.        Postoperative anemia due to acute blood loss (3/1/2017)  CBC pending           Anabel Stanley MD  3/2/2017 6:48 AM

## 2017-03-02 NOTE — PROGRESS NOTES
Prashanth Both  Admission Date: 2/27/2017             Daily Progress Note: 3/2/2017    The patient's chart is reviewed and the patient is discussed with the staff. 67yo WM s/p CABG x 3. NSTEMI - LHC with severe multivessel CAD and depressed LV (EF 20%). Subjective:     Patient remains on optiflow, but only on 38% FiO2. Ongoing expected pain. On impella. No new pulmonary issues.     Current Facility-Administered Medications   Medication Dose Route Frequency    DOBUTamine (DOBUTREX) 500 mg/250 mL (2,000 mcg/mL) infusion  2-7 mcg/kg/min IntraVENous TITRATE    heparin 25,000 units in dextrose 500 mL infusion  12-25 Units/kg/hr IntraVENous TITRATE    0.9% sodium chloride infusion  25 mL/hr IntraVENous CONTINUOUS    dextrose 5% - 0.45% NaCl with KCl 20 mEq/L infusion  25 mL/hr IntraVENous CONTINUOUS    sodium chloride (NS) flush 5-10 mL  5-10 mL IntraVENous Q8H    oxyCODONE-acetaminophen (PERCOCET) 5-325 mg per tablet 1 Tab  1 Tab Oral Q4H PRN    morphine injection 3-5 mg  3-5 mg IntraVENous Q1H PRN    naloxone (NARCAN) injection 0.4 mg  0.4 mg IntraVENous PRN    mupirocin (BACTROBAN) 2 % ointment   Both Nostrils BID    sodium bicarbonate 8.4 % (1 mEq/mL) injection 50 mEq  50 mEq IntraVENous PRN    EPINEPHrine (ADRENALIN) 4,000 mcg in 0.9% sodium chloride 250 mL infusion  0.05-0.1 mcg/kg/min IntraVENous TITRATE    nitroglycerin (Tridil) 200 mcg/ml infusion   mcg/min IntraVENous TITRATE    PHENYLephrine (NEOSYNEPHRINE) 30,000 mcg in 0.9% sodium chloride 250 mL infusion   mcg/min IntraVENous TITRATE    lidocaine (PF) (XYLOCAINE) 100 mg/5 mL (2 %) injection syringe  mg   mg IntraVENous ONCE PRN    amiodarone (CORDARONE) tablet 200 mg  200 mg Oral Q12H    metoprolol tartrate (LOPRESSOR) tablet 25 mg  25 mg Oral Q12H    atorvastatin (LIPITOR) tablet 80 mg  80 mg Oral QHS    insulin regular (NOVOLIN R, HUMULIN R) 100 Units in 0.9% sodium chloride 100 mL infusion  1-10 Units/hr IntraVENous TITRATE    dextrose (D50W) injection syrg 12.5 g  25 mL IntraVENous PRN    aspirin chewable tablet 81 mg  81 mg Oral DAILY    magnesium sulfate 1 g/100 ml IVPB (premix or compounded)  1 g IntraVENous PRN    midazolam (VERSED) injection 1 mg  1 mg IntraVENous Q1H PRN    famotidine (PF) (PEPCID) injection 20 mg  20 mg IntraVENous Q12H    oxyCODONE-acetaminophen (PERCOCET 10)  mg per tablet 1 Tab  1 Tab Oral Q4H PRN    prednisoLONE acetate (PRED FORTE) 1 % ophthalmic suspension 1 Drop  1 Drop Right Eye QID    traMADol (ULTRAM) tablet 100 mg  100 mg Oral Q6H PRN    0.9% sodium chloride infusion 250 mL  250 mL IntraVENous PRN    0.9% sodium chloride infusion 250 mL  250 mL IntraVENous PRN    HYDROmorphone (PF) (DILAUDID) injection 0.5 mg  0.5 mg IntraVENous Q2H PRN    NOREPINephrine (LEVOPHED) 4,000 mcg in dextrose 5% 250 mL infusion  2-16 mcg/min IntraVENous TITRATE    insulin lispro (HUMALOG) injection   SubCUTAneous AC&HS    lisinopril (PRINIVIL, ZESTRIL) tablet 5 mg  5 mg Oral DAILY    sodium chloride (NS) flush 5-10 mL  5-10 mL IntraVENous PRN    nitroglycerin (NITROSTAT) tablet 0.4 mg  0.4 mg SubLINGual Q5MIN PRN    heparin (porcine) 12,500 Units in dextrose 10% 1,000 mL for impella device  4-20 mL/hr IntraCATHeter- ARTerial TITRATE    ondansetron (ZOFRAN) injection 4 mg  4 mg IntraVENous Q4H PRN       Review of Systems  Constitutional: negative for fever, chills, sweats  Cardiovascular: negative for chest pain, palpitations, syncope, edema  Gastrointestinal: negative for dysphagia, reflux, vomiting, diarrhea, abdominal pain, or melena  Neurologic: negative for focal weakness, numbness, headache    Objective:     Vitals:    03/02/17 0245 03/02/17 0300 03/02/17 0315 03/02/17 0330   BP:  114/62  98/68   Pulse: 91 81 86 89   Resp: 18 18 19 20   Temp:  97.5 °F (36.4 °C)     SpO2: 100% 99% 99% 99%   Weight:       Height:         Intake and Output:   02/28 0759 - 03/01 1900  In: 9240.8 [P.O.:350; I.V.:7717.8]  Out: 5733 [Urine:2945]  03/01 1901 - 03/02 0700  In: -   Out: 12 [Urine:156]    Physical Exam:   Constitution:  the patient is well developed and in no acute distress  EENMT:  Sclera clear, pupils equal, oral mucosa moist  Respiratory: CTA B anteriorly. Cardiovascular:  RRR without M,G,R  Gastrointestinal: soft and non-tender; with positive bowel sounds. Musculoskeletal: warm without cyanosis. There is trace B lower leg edema. Skin:  no jaundice or rashes, surgical wounds   Neurologic: no gross neuro deficits     Psychiatric:  alert and oriented x ppt    CHEST XRAY:   3/2/17  1. Stable left-sided chest tube. Small left basilar pneumothorax. 2. Bibasilar densities, likely atelectasis. LAB  No lab exists for component: [place]   Recent Labs      03/01/17   0300 02/28/17 2145 02/28/17   1700  02/28/17   1300  02/28/17   0344  02/27/17   0800   WBC  11.3*   --    --   17.2*  8.5  6.3   HGB  9.8*  7.9*  9.3*  9.9*  12.5*  13.6   HCT  29.3*  23.6*  27.7*  30.1*  36.2*  39.7*   PLT  167   --    --   131*  205  208   INR   --    --    --   1.1   --    --      Recent Labs      03/02/17   0315  03/01/17   0300  02/28/17   2145   02/28/17   1300  02/28/17   0344   02/27/17   0800   NA  141  149*   --    --   149*  141   --   134*   K  4.5  4.6  4.0   < >  3.5  4.5   --   4.6   CL  109*  116*   --    --   113*  108*   --   101   CO2  25  24   --    --   23  21   --   23   GLU  202*  132*   --    --   157*  198*   --   239*   BUN  22  16   --    --   23  27*   --   21   CREA  1.00  1.01   --    --   1.49  1.38   --   1.09   MG  2.3  2.4  2.3   < >  3.1*  2.0   < >   --    CA  7.1*  7.2*   --    --   7.8*  8.8   --   9.5   ALB   --    --    --    --    --    --    --   3.6   SGOT   --    --    --    --    --    --    --   25   BNPP   --    --    --    --    --   616   --   514    < > = values in this interval not displayed.      Recent Labs      02/28/17   7062 02/28/17   1300   PH  7.38  7.29*   PCO2  33*  39   PO2  99  104*   HCO3  19*  18*       Assessment:  (Medical Decision Making)     Hospital Problems  Date Reviewed: 2/28/2017          Codes Class Noted POA    Pleural effusion ICD-10-CM: J90  ICD-9-CM: 511.9  3/2/2017 Unknown        Hypoxia ICD-10-CM: R09.02  ICD-9-CM: 799.02  3/2/2017 Unknown        Postoperative anemia due to acute blood loss ICD-10-CM: D62  ICD-9-CM: 285.1  3/1/2017 No        LVAD (left ventricular assist device) present [facility]) ICD-10-CM: Z95.811  ICD-9-CM: V43.21  3/1/2017 No        Ischemic cardiomyopathy (Chronic) ICD-10-CM: I25.5  ICD-9-CM: 414.8  2/28/2017 Yes    Overview Signed 2/28/2017  1:26 PM by Becki Jones NP     EF 10-15% echo 2/27/17             CAD, multiple vessel (Chronic) ICD-10-CM: I25.10  ICD-9-CM: 414.00  2/28/2017 Yes    Overview Signed 2/28/2017  1:28 PM by Becki Jones NP     2/28/17 (Dr Ember Ornelas) Coronary artery bypass grafting x3. Grafts consisting of   1. Left internal mammary artery to the left anterior descending. 2. Reversed LEFT saphenous vein graft to obtuse marginal.   3. Reversed LEFT saphenous vein graft to the left posterior descending   artery.               Encounter for weaning from ventilator [facility]) ICD-10-CM: Z99.11  ICD-9-CM: V46.13  2/28/2017 Yes        S/P CABG (coronary artery bypass graft) ICD-10-CM: Z95.1  ICD-9-CM: V45.81  2/28/2017 Yes        * (Principal)Chest pain ICD-10-CM: R07.9  ICD-9-CM: 786.50  2/27/2017 Yes        Elevated troponin ICD-10-CM: R79.89  ICD-9-CM: 790.6  2/27/2017 Yes        Unstable angina (HCC) ICD-10-CM: I20.0  ICD-9-CM: 411.1  2/27/2017 Yes        Vitreous hemorrhage, unspecified eye ([address] ICD-10-CM: H43.10  ICD-9-CM: 379.23  2/27/2017 No    Overview Addendum 2/27/2017  3:33 PM by Becki Jones NP     2/21s/p vitrectomy RIGHT EYE (Dr Cohen Sensor)             Hypertriglyceridemia (Chronic) ICD-10-CM: E78.1  ICD-9-CM: 272.1  2/27/2017 Yes        NSTEMI (non-ST elevated myocardial infarction) [facility]) ICD-10-CM: I21.4  ICD-9-CM: 410.70  2/27/2017 Yes        Depressed left ventricular ejection fraction (Chronic) ICD-10-CM: R93.1  ICD-9-CM: 794.39  2/27/2017 Yes    Overview Signed 2/27/2017  4:07 PM by Kaitlynn Guzman NP     2/27/17 20%             DM (diabetes mellitus) type II controlled, neurological manifestation (HCC) (Chronic) ICD-10-CM: E11.49  ICD-9-CM: 250.60  3/7/2014 Yes        Essential hypertension, benign (Chronic) ICD-10-CM: I10  ICD-9-CM: 401.1  3/7/2014 Yes            Appears to have a layering right sided pleural effusion. This is less prominent compared to yesterday's CXR, may resolve on its own. Plan:  (Medical Decision Making)   -cont to wean o2. Can likely change to NC today.   -monitor R effusion. Cannot perform thoracentesis while on impella and having to lay flat, but if does not resolve can tap once off impella.    -IS    Patient Active Problem List   Diagnosis Code    Undiagnosed cardiac murmurs R01.1    DM (diabetes mellitus) type II controlled, neurological manifestation (HCC) E11.49    Allergic rhinitis, cause unspecified J30.9    Contact dermatitis and other eczema, due to unspecified cause L25.9    Esophageal reflux K21.9    Other specified idiopathic peripheral neuropathy G60.8    Essential hypertension, benign I10    Retinopathy of both eyes H35.00    Pure hypercholesterolemia E78.00    Chest pain R07.9    Elevated troponin R79.89    Unstable angina (HCC) I20.0    History of testicular cancer Z85.47    Vitreous hemorrhage, unspecified eye ([facility]) H43.10    Hypertriglyceridemia E78.1    NSTEMI (non-ST elevated myocardial infarction) ([facility]) I21.4    Depressed left ventricular ejection fraction R93.1    Ischemic cardiomyopathy I25.5    CAD, multiple vessel I25.10    Encounter for weaning from ventilator ([address] Z99.11    S/P CABG (coronary artery bypass graft) Z95.1    Postoperative anemia due to acute blood loss D62    LVAD (left ventricular assist device) present ([facility] [address] Z95.811    Pleural effusion J90    Hypoxia R09.02       Sarah Patel MD

## 2017-03-02 NOTE — PROGRESS NOTES
POD 2 Days Post-Op    Procedure:  Procedure(s):  CORONARY ARTERY BYPASS GRAFT (CABG)x3 [id number] [facility] Saphenous Vein  ESOPHAGEAL TRANS ECHOCARDIOGRAM      Subjective:     Patient has No significant medical complaints      Objective:     Patient Vitals for the past 8 hrs:   BP Temp Pulse Resp SpO2 Weight   17 0715 - - 85 10 100 % -   17 0700 105/70 - 76 26 99 % 213 lb 13.5 oz (97 kg)   17 0645 - - 79 10 99 % -   17 0630 105/59 - 68 11 96 % -   17 0615 - - 71 28 95 % -   17 0600 95/54 - 71 17 98 % -   17 0545 - - 77 10 100 % -   17 0530 111/70 - 88 (!) 7 100 % -   17 0515 - - 70 9 99 % -   17 0500 111 - 81 9 100 % -   17 0445 - - 75 18 99 % -   17 0430 102/ - 89 24 100 % -   17 0415 - - 79 13 98 % -   17 0400 99 - 88 16 99 % -   17 0345 - - 87 16 100 % -   17 0330 98 - 89 20 99 % -   17 0315 - - 86 19 99 % -   17 0300 114/62 97.5 °F (36.4 °C) 81 18 99 % -   17 0245 - - 91 18 100 % -   17 0230 108 - 87 21 99 % -   17 0215 - - 89 22 99 % -   17 0200 11063 - 88 20 95 % -   17 0145 - - 88 23 98 % -   17 0130 11660 - 87 22 100 % -   17 0115 - - 86 20 99 % -   17 0100 116 85 29 97 % -   17 0045 - - 79 19 99 % -   17 0030 110 88 15 99 % -   17 0015 - - 77 17 98 % -   17 0000 140/88 - 83 22 100 % -   17 2345 - - 85 17 99 % -   17 2330 117/65 - 83 19 99 % -     Temp (24hrs), Av °F (36.7 °C), Min:97.5 °F (36.4 °C), Max:98.4 °F (36.9 °C)        Hemodynamics  PAP Systolic: 42 PAP  CO (l/min): 5.8 l/min CO  CI (l/min/m2): 2.8 l/min/m2 CI        1901 -  0700  In: 4490.8 [P.O.:350;  I.V.:2967.8]  Out: 2788 [NNMSK:3549]    CT Drainage              total of all CT's    Heart:  regular rate and rhythm, S1, S2 normal, no murmur, click, rub or gallop  Lung:  clear to auscultation bilaterally  Neuro: Grossly non focal  Incisions: Clean, dry, and intact    Labs:  Recent Results (from the past 24 hour(s))   GLUCOSE, POC    Collection Time: 03/01/17  7:54 AM   Result Value Ref Range    Glucose (POC) 202 (H) 65 - 100 mg/dL   GLUCOSE, POC    Collection Time: 03/01/17 10:01 AM   Result Value Ref Range    Glucose (POC) 178 (H) 65 - 100 mg/dL   GLUCOSE, POC    Collection Time: 03/01/17  3:52 PM   Result Value Ref Range    Glucose (POC) 213 (H) 65 - 100 mg/dL   GLUCOSE, POC    Collection Time: 03/01/17  9:42 PM   Result Value Ref Range    Glucose (POC) 240 (H) 65 - 100 mg/dL   MAGNESIUM    Collection Time: 03/02/17  3:15 AM   Result Value Ref Range    Magnesium 2.3 1.8 - 2.4 mg/dL   BNP    Collection Time: 03/02/17  3:15 AM   Result Value Ref Range     pg/mL   METABOLIC PANEL, BASIC    Collection Time: 03/02/17  3:15 AM   Result Value Ref Range    Sodium 141 136 - 145 mmol/L    Potassium 4.5 3.5 - 5.1 mmol/L    Chloride 109 (H) 98 - 107 mmol/L    CO2 25 21 - 32 mmol/L    Anion gap 7 7 - 16 mmol/L    Glucose 202 (H) 65 - 100 mg/dL    BUN 22 6 - 23 MG/DL    Creatinine 1.00 0.8 - 1.5 MG/DL    GFR est AA >60 >60 ml/min/1.73m2    GFR est non-AA >60 >60 ml/min/1.73m2    Calcium 7.1 (L) 8.3 - 10.4 MG/DL   EKG, 12 LEAD, INITIAL    Collection Time: 03/02/17  6:29 AM   Result Value Ref Range    Systolic BP  mmHg    Diastolic BP  mmHg    Ventricular Rate 76 BPM    Atrial Rate 76 BPM    P-R Interval 122 ms    QRS Duration 106 ms    Q-T Interval 412 ms    QTC Calculation (Bezet) 463 ms    Calculated P Axis 32 degrees    Calculated R Axis 44 degrees    Calculated T Axis -69 degrees    Diagnosis       Normal sinus rhythm with sinus arrhythmia  Incomplete left bundle branch block  T wave abnormality, consider lateral ischemia  Prolonged QT  Abnormal ECG  When compared with ECG of 01-MAR-2017 06:16,  Incomplete left bundle branch block is now Present     GLUCOSE, POC    Collection Time: 03/02/17  6:44 AM Result Value Ref Range    Glucose (POC) 190 (H) 65 - 100 mg/dL   CBC W/O DIFF    Collection Time: 03/02/17  7:00 AM   Result Value Ref Range    WBC 9.3 4.3 - 11.1 K/uL    RBC 2.68 (L) 4.23 - 5.67 M/uL    HGB 8.1 (L) 13.6 - 17.2 g/dL    HCT 24.4 (L) 41.1 - 50.3 %    MCV 91.0 79.6 - 97.8 FL    MCH 30.2 26.1 - 32.9 PG    MCHC 33.2 31.4 - 35.0 g/dL    RDW 14.1 11.9 - 14.6 %    PLATELET 86 (L) 913 - 450 K/uL    MPV 9.9 (L) 10.8 - 14.1 FL       Assessment:     Principal Problem:    NSTEMI (non-ST elevated myocardial infarction) ([facility] [address] (2/27/2017)    Active Problems:    DM (diabetes mellitus) type II controlled, neurological manifestation ([facility] [address] (3/7/2014)      Essential hypertension, benign (3/7/2014)      Hypertriglyceridemia (2/27/2017)      Depressed left ventricular ejection fraction (2/27/2017)      Overview: 2/27/17 20%      Ischemic cardiomyopathy (2/28/2017)      Overview: EF 10-15% echo 2/27/17      CAD, multiple vessel (2/28/2017)      Overview: 2/28/17 (Dr Vel Landry) Coronary artery bypass grafting x3. Grafts       consisting of       1. Left internal mammary artery to the left anterior descending. 2. Reversed LEFT saphenous vein graft to obtuse marginal.       3. Reversed LEFT saphenous vein graft to the left posterior descending       artery.        Encounter for weaning from ventilator ([facility] [address] (2/28/2017)      S/P CABG (coronary artery bypass graft) (2/28/2017)      Postoperative anemia due to acute blood loss (3/1/2017)      LVAD (left ventricular assist device) present ([address] (3/1/2017)      Pleural effusion (3/2/2017)      Hypoxia (3/2/2017)      Cardiogenic shock ([address] (0/3/5638)      Systolic CHF, chronic ([address] (3/2/2017)        Plan/Recommendations/Medical Decision Making:     Impella down to P2, pulling, will wean dobut afterwards, OOB later, recheck Hct    See orders

## 2017-03-02 NOTE — PROGRESS NOTES
CRNA at bedside to d/c Right Brachial arterial line. Manual pressure held for 10 min. Sight benign, will monitor.

## 2017-03-02 NOTE — PROCEDURES
[facility] CARDIOLOGY    3/2/2017 10:58 AM    Procedure: Removal of Impella left ventricular assist device. CPT code 65610    Indication: Hemodynamic stabilization post infarct. Stabilization of cardiogenic shock. Technical factors: Following support after CABG, standard weaning   protocol was employed and support was reduced by 2 P levels every 1 hour. Once the P level of P2 was achieved and satisfactory hemodynamic   stability was observed, the Impella device was removed from the ventricle   by pulling the device back 20 cm. A flat motor current was noted and the   patient was monitored for hemodynamic stability for approximately 2   minutes. The device was then pulled back into the femoral artery and care   was taken to apply pressure superior to the femoral arteriotomy as the   device was removed from the patient via the femoral access site. Manual   pressure was applied for 40 minutes until successful hemostasis was   achieved. CONCLUSIONS: Successful removal of Impella left ventricular assist device.     Lanie Foley MD

## 2017-03-02 NOTE — PROGRESS NOTES
Bedrest is complete, pt bed into chair mode. Dr. Samuel Ibarra present. Orders to d/c Preston line. Preston catheter removed at this time without ectopy or complication. Pt stable, no complaint. Will monitor.

## 2017-03-03 ENCOUNTER — APPOINTMENT (OUTPATIENT)
Dept: ULTRASOUND IMAGING | Age: 62
DRG: 001 | End: 2017-03-03
Attending: INTERNAL MEDICINE
Payer: COMMERCIAL

## 2017-03-03 ENCOUNTER — APPOINTMENT (OUTPATIENT)
Dept: GENERAL RADIOLOGY | Age: 62
DRG: 001 | End: 2017-03-03
Attending: THORACIC SURGERY (CARDIOTHORACIC VASCULAR SURGERY)
Payer: COMMERCIAL

## 2017-03-03 ENCOUNTER — APPOINTMENT (OUTPATIENT)
Dept: ULTRASOUND IMAGING | Age: 62
DRG: 001 | End: 2017-03-03
Attending: THORACIC SURGERY (CARDIOTHORACIC VASCULAR SURGERY)
Payer: COMMERCIAL

## 2017-03-03 PROBLEM — I50.23 SYSTOLIC CHF, ACUTE ON CHRONIC (HCC): Status: ACTIVE | Noted: 2017-03-03

## 2017-03-03 LAB
ABO + RH BLD: NORMAL
ANION GAP BLD CALC-SCNC: 9 MMOL/L (ref 7–16)
ATRIAL RATE: 74 BPM
BLD PROD TYP BPU: NORMAL
BLOOD GROUP ANTIBODIES SERPL: NORMAL
BNP SERPL-MCNC: 587 PG/ML
BPU ID: NORMAL
BUN SERPL-MCNC: 38 MG/DL (ref 6–23)
CALCIUM SERPL-MCNC: 7.2 MG/DL (ref 8.3–10.4)
CALCULATED P AXIS, ECG09: 71 DEGREES
CALCULATED R AXIS, ECG10: 50 DEGREES
CALCULATED T AXIS, ECG11: 41 DEGREES
CHLORIDE SERPL-SCNC: 106 MMOL/L (ref 98–107)
CO2 SERPL-SCNC: 23 MMOL/L (ref 21–32)
CREAT SERPL-MCNC: 1.35 MG/DL (ref 0.8–1.5)
CROSSMATCH RESULT,%XM: NORMAL
DIAGNOSIS, 93000: NORMAL
DIASTOLIC BP, ECG02: NORMAL MMHG
ERYTHROCYTE [DISTWIDTH] IN BLOOD BY AUTOMATED COUNT: 14.3 % (ref 11.9–14.6)
GLUCOSE BLD STRIP.AUTO-MCNC: 159 MG/DL (ref 65–100)
GLUCOSE BLD STRIP.AUTO-MCNC: 161 MG/DL (ref 65–100)
GLUCOSE BLD STRIP.AUTO-MCNC: 190 MG/DL (ref 65–100)
GLUCOSE BLD STRIP.AUTO-MCNC: 203 MG/DL (ref 65–100)
GLUCOSE SERPL-MCNC: 155 MG/DL (ref 65–100)
HCT VFR BLD AUTO: 29.2 % (ref 41.1–50.3)
HGB BLD-MCNC: 9.6 G/DL (ref 13.6–17.2)
MAGNESIUM SERPL-MCNC: 2.8 MG/DL (ref 1.8–2.4)
MCH RBC QN AUTO: 30.3 PG (ref 26.1–32.9)
MCHC RBC AUTO-ENTMCNC: 32.9 G/DL (ref 31.4–35)
MCV RBC AUTO: 92.1 FL (ref 79.6–97.8)
MM INDURATION POC: NORMAL MM (ref 0–5)
P-R INTERVAL, ECG05: 156 MS
PLATELET # BLD AUTO: 105 K/UL (ref 150–450)
PMV BLD AUTO: 11.3 FL (ref 10.8–14.1)
POTASSIUM SERPL-SCNC: 5.1 MMOL/L (ref 3.5–5.1)
PPD POC: NEGATIVE NEGATIVE
Q-T INTERVAL, ECG07: 442 MS
QRS DURATION, ECG06: 112 MS
QTC CALCULATION (BEZET), ECG08: 490 MS
RBC # BLD AUTO: 3.17 M/UL (ref 4.23–5.67)
SODIUM SERPL-SCNC: 138 MMOL/L (ref 136–145)
SPECIMEN EXP DATE BLD: NORMAL
STATUS OF UNIT,%ST: NORMAL
SYSTOLIC BP, ECG01: NORMAL MMHG
UNIT DIVISION, %UDIV: 0
VENTRICULAR RATE, ECG03: 74 BPM
WBC # BLD AUTO: 11.1 K/UL (ref 4.3–11.1)

## 2017-03-03 PROCEDURE — 74011250637 HC RX REV CODE- 250/637: Performed by: THORACIC SURGERY (CARDIOTHORACIC VASCULAR SURGERY)

## 2017-03-03 PROCEDURE — 74011636637 HC RX REV CODE- 636/637: Performed by: THORACIC SURGERY (CARDIOTHORACIC VASCULAR SURGERY)

## 2017-03-03 PROCEDURE — 74011636637 HC RX REV CODE- 636/637: Performed by: NURSE PRACTITIONER

## 2017-03-03 PROCEDURE — 85027 COMPLETE CBC AUTOMATED: CPT | Performed by: THORACIC SURGERY (CARDIOTHORACIC VASCULAR SURGERY)

## 2017-03-03 PROCEDURE — 99232 SBSQ HOSP IP/OBS MODERATE 35: CPT | Performed by: INTERNAL MEDICINE

## 2017-03-03 PROCEDURE — 80048 BASIC METABOLIC PNL TOTAL CA: CPT | Performed by: INTERNAL MEDICINE

## 2017-03-03 PROCEDURE — 65660000004 HC RM CVT STEPDOWN

## 2017-03-03 PROCEDURE — 74011000250 HC RX REV CODE- 250: Performed by: THORACIC SURGERY (CARDIOTHORACIC VASCULAR SURGERY)

## 2017-03-03 PROCEDURE — 83735 ASSAY OF MAGNESIUM: CPT | Performed by: INTERNAL MEDICINE

## 2017-03-03 PROCEDURE — 97161 PT EVAL LOW COMPLEX 20 MIN: CPT

## 2017-03-03 PROCEDURE — 74011250637 HC RX REV CODE- 250/637: Performed by: NURSE PRACTITIONER

## 2017-03-03 PROCEDURE — 82962 GLUCOSE BLOOD TEST: CPT

## 2017-03-03 PROCEDURE — 93926 LOWER EXTREMITY STUDY: CPT

## 2017-03-03 PROCEDURE — 93005 ELECTROCARDIOGRAM TRACING: CPT | Performed by: INTERNAL MEDICINE

## 2017-03-03 PROCEDURE — 71010 XR CHEST PORT: CPT

## 2017-03-03 PROCEDURE — 83880 ASSAY OF NATRIURETIC PEPTIDE: CPT | Performed by: INTERNAL MEDICINE

## 2017-03-03 RX ORDER — POTASSIUM CHLORIDE 20 MEQ/1
40 TABLET, EXTENDED RELEASE ORAL
Status: DISCONTINUED | OUTPATIENT
Start: 2017-03-03 | End: 2017-03-04

## 2017-03-03 RX ORDER — FAMOTIDINE 20 MG/1
20 TABLET, FILM COATED ORAL 2 TIMES DAILY
Status: DISCONTINUED | OUTPATIENT
Start: 2017-03-03 | End: 2017-03-04

## 2017-03-03 RX ORDER — ACETAMINOPHEN 325 MG/1
650 TABLET ORAL
Status: DISCONTINUED | OUTPATIENT
Start: 2017-03-03 | End: 2017-03-13 | Stop reason: HOSPADM

## 2017-03-03 RX ORDER — ATORVASTATIN CALCIUM 40 MG/1
80 TABLET, FILM COATED ORAL
Status: DISCONTINUED | OUTPATIENT
Start: 2017-03-03 | End: 2017-03-13 | Stop reason: HOSPADM

## 2017-03-03 RX ORDER — LISINOPRIL 5 MG/1
2.5 TABLET ORAL DAILY
Status: DISCONTINUED | OUTPATIENT
Start: 2017-03-03 | End: 2017-03-04

## 2017-03-03 RX ORDER — ASPIRIN 81 MG/1
81 TABLET ORAL DAILY
Status: DISCONTINUED | OUTPATIENT
Start: 2017-03-04 | End: 2017-03-13 | Stop reason: HOSPADM

## 2017-03-03 RX ORDER — ONDANSETRON 2 MG/ML
4 INJECTION INTRAMUSCULAR; INTRAVENOUS
Status: DISCONTINUED | OUTPATIENT
Start: 2017-03-03 | End: 2017-03-13 | Stop reason: HOSPADM

## 2017-03-03 RX ORDER — BACLOFEN 10 MG/1
10 TABLET ORAL
Status: DISCONTINUED | OUTPATIENT
Start: 2017-03-03 | End: 2017-03-13 | Stop reason: HOSPADM

## 2017-03-03 RX ORDER — LANOLIN ALCOHOL/MO/W.PET/CERES
400 CREAM (GRAM) TOPICAL
Status: DISCONTINUED | OUTPATIENT
Start: 2017-03-03 | End: 2017-03-04

## 2017-03-03 RX ORDER — SODIUM CHLORIDE 0.9 % (FLUSH) 0.9 %
5-10 SYRINGE (ML) INJECTION EVERY 8 HOURS
Status: DISCONTINUED | OUTPATIENT
Start: 2017-03-03 | End: 2017-03-06

## 2017-03-03 RX ORDER — NITROGLYCERIN 400 UG/1
1 SPRAY ORAL
Status: DISCONTINUED | OUTPATIENT
Start: 2017-03-03 | End: 2017-03-13 | Stop reason: HOSPADM

## 2017-03-03 RX ORDER — SODIUM CHLORIDE 0.9 % (FLUSH) 0.9 %
5-10 SYRINGE (ML) INJECTION AS NEEDED
Status: DISCONTINUED | OUTPATIENT
Start: 2017-03-03 | End: 2017-03-06

## 2017-03-03 RX ORDER — AMIODARONE HYDROCHLORIDE 200 MG/1
200 TABLET ORAL EVERY 12 HOURS
Status: DISCONTINUED | OUTPATIENT
Start: 2017-03-03 | End: 2017-03-13 | Stop reason: HOSPADM

## 2017-03-03 RX ORDER — PREDNISOLONE ACETATE 10 MG/ML
1 SUSPENSION/ DROPS OPHTHALMIC 4 TIMES DAILY
Status: DISCONTINUED | OUTPATIENT
Start: 2017-03-03 | End: 2017-03-04

## 2017-03-03 RX ORDER — FUROSEMIDE 40 MG/1
40 TABLET ORAL DAILY
Status: DISCONTINUED | OUTPATIENT
Start: 2017-03-04 | End: 2017-03-05

## 2017-03-03 RX ORDER — POTASSIUM CHLORIDE 750 MG/1
10 TABLET, EXTENDED RELEASE ORAL DAILY
Status: DISCONTINUED | OUTPATIENT
Start: 2017-03-04 | End: 2017-03-04

## 2017-03-03 RX ORDER — MUPIROCIN 20 MG/G
OINTMENT TOPICAL EVERY 12 HOURS
Status: COMPLETED | OUTPATIENT
Start: 2017-03-03 | End: 2017-03-07

## 2017-03-03 RX ORDER — POTASSIUM CHLORIDE 20 MEQ/1
20 TABLET, EXTENDED RELEASE ORAL
Status: DISCONTINUED | OUTPATIENT
Start: 2017-03-03 | End: 2017-03-04

## 2017-03-03 RX ORDER — CARVEDILOL 3.12 MG/1
3.12 TABLET ORAL EVERY 12 HOURS
Status: DISCONTINUED | OUTPATIENT
Start: 2017-03-03 | End: 2017-03-04

## 2017-03-03 RX ORDER — MAG HYDROX/ALUMINUM HYD/SIMETH 200-200-20
30 SUSPENSION, ORAL (FINAL DOSE FORM) ORAL
Status: DISCONTINUED | OUTPATIENT
Start: 2017-03-03 | End: 2017-03-13 | Stop reason: HOSPADM

## 2017-03-03 RX ORDER — DOCUSATE SODIUM 100 MG/1
100 CAPSULE, LIQUID FILLED ORAL DAILY
Status: DISCONTINUED | OUTPATIENT
Start: 2017-03-03 | End: 2017-03-13 | Stop reason: HOSPADM

## 2017-03-03 RX ADMIN — Medication 10 ML: at 05:58

## 2017-03-03 RX ADMIN — AMIODARONE HYDROCHLORIDE 200 MG: 200 TABLET ORAL at 20:06

## 2017-03-03 RX ADMIN — INSULIN LISPRO 2 UNITS: 100 INJECTION, SOLUTION INTRAVENOUS; SUBCUTANEOUS at 05:57

## 2017-03-03 RX ADMIN — DOCUSATE SODIUM 100 MG: 100 CAPSULE, LIQUID FILLED ORAL at 17:12

## 2017-03-03 RX ADMIN — FAMOTIDINE 20 MG: 20 TABLET ORAL at 17:12

## 2017-03-03 RX ADMIN — ASPIRIN 81 MG 81 MG: 81 TABLET ORAL at 09:06

## 2017-03-03 RX ADMIN — Medication 10 ML: at 15:22

## 2017-03-03 RX ADMIN — MUPIROCIN: 20 OINTMENT TOPICAL at 09:09

## 2017-03-03 RX ADMIN — MUPIROCIN: 20 OINTMENT TOPICAL at 20:07

## 2017-03-03 RX ADMIN — PREDNISOLONE ACETATE 1 DROP: 10 SUSPENSION/ DROPS OPHTHALMIC at 17:37

## 2017-03-03 RX ADMIN — INSULIN HUMAN 5 UNITS: 100 INJECTION, SOLUTION PARENTERAL at 20:34

## 2017-03-03 RX ADMIN — FAMOTIDINE 20 MG: 10 INJECTION, SOLUTION INTRAVENOUS at 09:08

## 2017-03-03 RX ADMIN — Medication 10 ML: at 20:07

## 2017-03-03 RX ADMIN — OXYCODONE HYDROCHLORIDE AND ACETAMINOPHEN 1 TABLET: 5; 325 TABLET ORAL at 17:12

## 2017-03-03 RX ADMIN — BACLOFEN 10 MG: 10 TABLET ORAL at 22:58

## 2017-03-03 RX ADMIN — ATORVASTATIN CALCIUM 80 MG: 40 TABLET, FILM COATED ORAL at 20:06

## 2017-03-03 RX ADMIN — OXYCODONE HYDROCHLORIDE AND ACETAMINOPHEN 1 TABLET: 5; 325 TABLET ORAL at 09:06

## 2017-03-03 RX ADMIN — OXYCODONE HYDROCHLORIDE AND ACETAMINOPHEN 1 TABLET: 5; 325 TABLET ORAL at 23:00

## 2017-03-03 RX ADMIN — LISINOPRIL 2.5 MG: 5 TABLET ORAL at 17:18

## 2017-03-03 RX ADMIN — AMIODARONE HYDROCHLORIDE 200 MG: 200 TABLET ORAL at 09:06

## 2017-03-03 RX ADMIN — PREDNISOLONE ACETATE 1 DROP: 10 SUSPENSION/ DROPS OPHTHALMIC at 20:25

## 2017-03-03 RX ADMIN — INSULIN HUMAN 10 UNITS: 100 INJECTION, SOLUTION PARENTERAL at 17:12

## 2017-03-03 RX ADMIN — BACLOFEN 10 MG: 10 TABLET ORAL at 18:40

## 2017-03-03 NOTE — PROGRESS NOTES
Pt vitals stable post chest tube removal. No sub Q air noted and tolerating room air with sat 92-95. Denies any c/o.

## 2017-03-03 NOTE — PROGRESS NOTES
Prashanth Both  Admission Date: 2/27/2017             Daily Progress Note: 3/3/2017    The patient's chart is reviewed and the patient is discussed with the staff. 67yo WM s/p CABG x 3. NSTEMI - LHC with severe multivessel CAD and depressed LV (EF 20%). Subjective: On Nasal canula and up in chair, had a good night  Impella removed.   Current Facility-Administered Medications   Medication Dose Route Frequency    0.9% sodium chloride infusion 250 mL  250 mL IntraVENous PRN    DOBUTamine (DOBUTREX) 500 mg/250 mL (2,000 mcg/mL) infusion  2-7 mcg/kg/min IntraVENous TITRATE    heparin 25,000 units in dextrose 500 mL infusion  12-25 Units/kg/hr IntraVENous TITRATE    0.9% sodium chloride infusion  25 mL/hr IntraVENous CONTINUOUS    dextrose 5% - 0.45% NaCl with KCl 20 mEq/L infusion  25 mL/hr IntraVENous CONTINUOUS    sodium chloride (NS) flush 5-10 mL  5-10 mL IntraVENous Q8H    oxyCODONE-acetaminophen (PERCOCET) 5-325 mg per tablet 1 Tab  1 Tab Oral Q4H PRN    morphine injection 3-5 mg  3-5 mg IntraVENous Q1H PRN    naloxone (NARCAN) injection 0.4 mg  0.4 mg IntraVENous PRN    mupirocin (BACTROBAN) 2 % ointment   Both Nostrils BID    sodium bicarbonate 8.4 % (1 mEq/mL) injection 50 mEq  50 mEq IntraVENous PRN    EPINEPHrine (ADRENALIN) 4,000 mcg in 0.9% sodium chloride 250 mL infusion  0.05-0.1 mcg/kg/min IntraVENous TITRATE    nitroglycerin (Tridil) 200 mcg/ml infusion   mcg/min IntraVENous TITRATE    PHENYLephrine (NEOSYNEPHRINE) 30,000 mcg in 0.9% sodium chloride 250 mL infusion   mcg/min IntraVENous TITRATE    lidocaine (PF) (XYLOCAINE) 100 mg/5 mL (2 %) injection syringe  mg   mg IntraVENous ONCE PRN    amiodarone (CORDARONE) tablet 200 mg  200 mg Oral Q12H    atorvastatin (LIPITOR) tablet 80 mg  80 mg Oral QHS    insulin regular (NOVOLIN R, HUMULIN R) 100 Units in 0.9% sodium chloride 100 mL infusion  1-10 Units/hr IntraVENous TITRATE    dextrose (D50W) injection syrg 12.5 g  25 mL IntraVENous PRN    aspirin chewable tablet 81 mg  81 mg Oral DAILY    magnesium sulfate 1 g/100 ml IVPB (premix or compounded)  1 g IntraVENous PRN    midazolam (VERSED) injection 1 mg  1 mg IntraVENous Q1H PRN    famotidine (PF) (PEPCID) injection 20 mg  20 mg IntraVENous Q12H    oxyCODONE-acetaminophen (PERCOCET 10)  mg per tablet 1 Tab  1 Tab Oral Q4H PRN    prednisoLONE acetate (PRED FORTE) 1 % ophthalmic suspension 1 Drop  1 Drop Right Eye QID    traMADol (ULTRAM) tablet 100 mg  100 mg Oral Q6H PRN    0.9% sodium chloride infusion 250 mL  250 mL IntraVENous PRN    0.9% sodium chloride infusion 250 mL  250 mL IntraVENous PRN    HYDROmorphone (PF) (DILAUDID) injection 0.5 mg  0.5 mg IntraVENous Q2H PRN    NOREPINephrine (LEVOPHED) 4,000 mcg in dextrose 5% 250 mL infusion  2-16 mcg/min IntraVENous TITRATE    insulin lispro (HUMALOG) injection   SubCUTAneous AC&HS    sodium chloride (NS) flush 5-10 mL  5-10 mL IntraVENous PRN    nitroglycerin (NITROSTAT) tablet 0.4 mg  0.4 mg SubLINGual Q5MIN PRN    heparin (porcine) 12,500 Units in dextrose 10% 1,000 mL for impella device  4-20 mL/hr IntraCATHeter- ARTerial TITRATE    ondansetron (ZOFRAN) injection 4 mg  4 mg IntraVENous Q4H PRN       Review of Systems  Constitutional: negative for fever, chills, sweats  Cardiovascular: negative for chest pain, palpitations, syncope, edema  Gastrointestinal: negative for dysphagia, reflux, vomiting, diarrhea, abdominal pain, or melena  Neurologic: negative for focal weakness, numbness, headache    Objective:     Vitals:    03/03/17 0500 03/03/17 0530 03/03/17 0600 03/03/17 0630   BP: 105/57 113/71 111/64    Pulse: 73 72 73 73   Resp: 13 13 13 23   Temp:       SpO2: 98% 99% 96% 96%   Weight: 218 lb 4.1 oz (99 kg)      Height:         Intake and Output:   03/01 0701 - 03/02 1900  In: 2366 [P.O.:350;  I.V.:1711]  Out: 2006 [Urine:1411]  03/02 1901 - 03/03 0700  In: 360 [P.O.:360]  Out: 310 [Urine:250]    Physical Exam:   Constitution:  the patient is well developed and in no acute distress  EENMT:  Sclera clear, pupils equal, oral mucosa moist  Respiratory: CTA  anteriorly. Cardiovascular:  RRR without M,G,R  Gastrointestinal: soft and non-tender; with positive bowel sounds. Musculoskeletal: warm without cyanosis. There is trace B lower leg edema. Skin:  no jaundice or rashes, surgical wounds   Neurologic: no gross neuro deficits     Psychiatric:  alert and oriented x ppt    CHEST XRAY:   3/2/17  1. Stable left-sided chest tube. Small left basilar pneumothorax. 2. Bibasilar densities, likely atelectasis. Appears to have a layering right sided pleural effusion. This is less prominent compared to yesterday's CXR, may resolve on its own. LAB  No lab exists for component: [place]   Recent Labs      03/02/17   0730  03/02/17   0700  03/01/17   0300  02/28/17   2145   02/28/17   1300   WBC  9.6  9.3  11.3*   --    --   17.2*   HGB  8.0*  8.1*  9.8*  7.9*   < >  9.9*   HCT  24.2*  24.4*  29.3*  23.6*   < >  30.1*   PLT  85*  86*  167   --    --   131*   INR   --    --    --    --    --   1.1    < > = values in this interval not displayed.      Recent Labs      03/03/17   0430  03/02/17   0315  03/01/17   0300   NA  138  141  149*   K  5.1  4.5  4.6   CL  106  109*  116*   CO2  23  25  24   GLU  155*  202*  132*   BUN  38*  22  16   CREA  1.35  1.00  1.01   MG  2.8*  2.3  2.4   CA  7.2*  7.1*  7.2*     Recent Labs      02/28/17   2220  02/28/17   1300   PH  7.38  7.29*   PCO2  33*  39   PO2  99  104*   HCO3  19*  18*       Assessment:  (Medical Decision Making)     Patient Active Problem List   Diagnosis Code    Undiagnosed cardiac murmurs R01.1    DM (diabetes mellitus) type II controlled, neurological manifestation ([facility]) E11.49    Allergic rhinitis, cause unspecified J30.9    Contact dermatitis and other eczema, due to unspecified cause L25.9    Esophageal reflux K21.9  Other specified idiopathic peripheral neuropathy G60.8    Essential hypertension, benign I10    Retinopathy of both eyes H35.00    Pure hypercholesterolemia E78.00    History of testicular cancer Z85.47    Hypertriglyceridemia E78.1    [facility]STEMI (non-ST elevated [facility] infarction) [facility] I21.4    Depressed left ventricular ejection fraction R93.1    Ischemic cardiomyopathy I25.5    CAD, multiple vessel I25.10    Encounter for weaning from ventilator ([facility] Z99.11    S/P CABG (coronary artery bypass graft) Z95.1    Postoperative anemia due to acute blood loss D62    LVAD (left ventricular assist device) present ([facility]) Z95.811    Pleural effusion J90    Hypoxia R09.02    Cardiogenic shock (HCC) M78.0    Systolic CHF, chronic ([facility]) I50.22           Plan:  (Medical Decision Making)     Hospital Problems  Date Reviewed: 2/28/2017          Codes Class Noted POA    Pleural effusion ICD-10-CM: J90  ICD-9-CM: 511.9  3/2/2017 Unknown        Hypoxia ICD-10-CM: R09.02  ICD-9-CM: 799.02  3/2/2017 Unknown        Cardiogenic shock (HCC) ICD-10-CM: R57.0  ICD-9-CM: 785.51  3/2/2017 Unknown        Systolic CHF, chronic (HCC) ICD-10-CM: I50.22  ICD-9-CM: 428.22, 428.0  3/2/2017 Unknown        Postoperative anemia due to acute blood loss ICD-10-CM: D62  ICD-9-CM: 285.1  3/1/2017 No        LVAD (left ventricular assist device) present [facility]) ICD-10-CM: Z95.811  ICD-9-CM: V43.21  3/1/2017 No        Ischemic cardiomyopathy (Chronic) ICD-10-CM: I25.5  ICD-9-CM: 414.8  2/28/2017 Yes    Overview Signed 2/28/2017  1:26 PM by Marisa Morley NP     EF 10-15% echo 2/27/17             CAD, multiple vessel (Chronic) ICD-10-CM: I25.10  ICD-9-CM: 414.00  2/28/2017 Yes    Overview Signed 2/28/2017  1:28 PM by Marisa Morley NP     2/28/17 (Dr Cliff Spears) Coronary artery bypass grafting x3. Grafts consisting of   1. Left internal mammary artery to the left anterior descending.    2. Reversed LEFT saphenous vein graft to obtuse marginal.   3. Reversed LEFT saphenous vein graft to the left posterior descending   artery. Encounter for weaning from ventilator [facility]) ICD-10-CM: Z99.11  ICD-9-CM: V46.13  2/28/2017 Yes        S/P CABG (coronary artery bypass graft) ICD-10-CM: Z95.1  ICD-9-CM: V45.81  2/28/2017 Yes        Hypertriglyceridemia (Chronic) ICD-10-CM: E78.1  ICD-9-CM: 272.1  2/27/2017 Yes        * (Principal)NSTEMI (non-ST elevated myocardial infarction) ([facility] ICD-10-CM: I21.4  ICD-9-CM: 410.70  2/27/2017 Yes        Depressed left ventricular ejection fraction (Chronic) ICD-10-CM: R93.1  ICD-9-CM: 794.39  2/27/2017 Yes    Overview Signed 2/27/2017  4:07 PM by Roberth Cuello NP     2/27/17 20%             DM (diabetes mellitus) type II controlled, neurological manifestation (HCC) (Chronic) ICD-10-CM: E11.49  ICD-9-CM: 250.60  3/7/2014 Yes        Essential hypertension, benign (Chronic) ICD-10-CM: I10  ICD-9-CM: 401.1  3/7/2014 Yes            -cont to wean O2 as tolerated  -monitor R effusion.  May need thoracentesis prior to diascharge   -IS  -mobilize per CVICU protocol      Juan C Arreguin MD

## 2017-03-03 NOTE — PROGRESS NOTES
TRANSFER - OUT REPORT:    Verbal report given to stefany WILLS(name) on Jessica Escalante  being transferred to [facility](unit) for routine progression of care       Report consisted of patients Situation, Background, Assessment and   Recommendations(SBAR). Information from the following report(s) SBAR, OR Summary, Intake/Output, MAR, Recent Results and Cardiac Rhythm SR was reviewed with the receiving nurse. Lines:   Peripheral IV 02/27/17 Left Hand (Active)   Site Assessment Clean, dry, & intact 3/3/2017 10:31 AM   Phlebitis Assessment 0 3/3/2017 10:31 AM   Infiltration Assessment 0 3/3/2017 10:31 AM   Dressing Status Clean, dry, & intact; Occlusive 3/3/2017 10:31 AM   Dressing Type Bahman;Transparent 3/3/2017 10:31 AM   Hub Color/Line Status Pink;Capped; Patent; Flushed 3/3/2017 10:31 AM   Action Taken Open ports on tubing capped 3/3/2017  3:00 AM        Opportunity for questions and clarification was provided. Patient transported with:   Monitor  Tech Aware needs eye drops from wife and has slight old hematoma about 2cm round to suture site of cordis healing.

## 2017-03-03 NOTE — PROGRESS NOTES
Bedside and Verbal shift change report given to Marylen Crumbly, RN (oncoming nurse) by Oralia Tejada RN (offgoing nurse).

## 2017-03-03 NOTE — PROGRESS NOTES
Pt. Ambulated approximately 100 feet, gait steady, 30 ml chest tube output after ambulation. Pt tolerated well and is sitting up in recliner at this time.

## 2017-03-03 NOTE — PROGRESS NOTES
Problem: Mobility Impaired (Adult and Pediatric)  Goal: *Acute Goals and Plan of Care (Insert Text)  LTG:  (1.)Mr. Rickie Bennett will move from supine to sit and sit to supine , scoot up and down and roll side to side in bed with MODIFIED INDEPENDENCE within 7 day(s). (2.)Mr. Rickie Bennett will transfer from bed to chair and chair to bed with MODIFIED INDEPENDENCE using the least restrictive device within 7 day(s). (3.)Mr. Rickie Bennett will ambulate with MODIFIED INDEPENDENCE for 500+ feet with the least restrictive device within 7 day(s). (4.)Mr. Rickie Bennett will perform standing static and dynamic balance activities x 8 minutes with SUPERVISION to improve safety within 7 day(s). (5.)Mr. Rickie Bennett will ascend and descend 5 stairs using one hand rail(s) with SUPERVISION to improve functional mobility and safety within 7 day(s). (6.)Mr. Rickie Bennett will tolerate 25 minutes of therapeutic activity/exercises within 7 day(s). ________________________________________________________________________________________________      PHYSICAL THERAPY: INITIAL ASSESSMENT, PM 3/3/2017  INPATIENT: Hospital Day: 5  Payor: BLUE CHOICE / [place] / Product Type: HMO /      NAME/AGE/GENDER: Natalya Haley is a 63 y.o. male      PRIMARY DIAGNOSIS: Atherosclerosis of native coronary artery of native heart with unstable angina pectoris ([facility] [I25.110] NSTEMI (non-ST elevated myocardial infarction) ([facility] NSTEMI (non-ST elevated myocardial infarction) ([facility]  Procedure(s) (LRB):  CORONARY ARTERY BYPASS GRAFT (CABG)x3 /LIMA (N/A)  VEIN HARVEST- Greater Saphenous Vein (Left)  ESOPHAGEAL TRANS ECHOCARDIOGRAM (N/A)  3 Days Post-Op  ICD-10: Treatment Diagnosis:       · Generalized Muscle Weakness (M62.81)  · Difficulty in walking, Not elsewhere classified (R26.2)   Precaution/Allergies:  Review of patient's allergies indicates no known allergies.        ASSESSMENT:      Mr. Rickie Bennett is status post above and presents with decreased strength, impaired balance, limited activity tolerance, and overall decreased functional mobility. Patient was sitting in recliner chair on arrival to room and agreeable to PT evaluation. Patient noted to have the hiccups and was anxious (as well as his wife) to get rid of them. Patient instructed in sit to stand transfer via momentum method in which he needed CGA. Patient then ambulated in hallway for only 65 feet this time due to shortness of breath. Patient used rolling walker and returned to room to chair with needs in reach. Luckily, his hiccups decreased post mobility and patient reported only mild pain. Patient was independent and active prior to admission and is hoping to progress well. Patient would benefit from continued skilled acute PT and  PT follow up at discharge. This section established at most recent assessment   PROBLEM LIST (Impairments causing functional limitations):  1. Decreased Strength  2. Decreased Transfer Abilities  3. Decreased Ambulation Ability/Technique  4. Decreased Balance  5. Decreased Activity Tolerance  6. Increased Shortness of Breath  7. Decreased Knowledge of Precautions  8. Decreased [place] with Home Exercise Program    INTERVENTIONS PLANNED: (Benefits and precautions of physical therapy have been discussed with the patient.)  1. Balance Exercise  2. Bed Mobility  3. Family Education  4. Gait Training  5. Home Exercise Program (HEP)  6. Therapeutic Activites  7. Therapeutic Exercise/Strengthening  8. Transfer Training  9. Group Therapy      TREATMENT PLAN: Frequency/Duration: twice daily for duration of hospital stay  Rehabilitation Potential For Stated Goals: EXCELLENT      RECOMMENDED REHABILITATION/EQUIPMENT: (at time of discharge pending progress): Continue Skilled Therapy and Home Health: Physical Therapy.                    HISTORY:   History of Present Injury/Illness (Reason for Referral):  Patient is status post above  Past Medical History/Comorbidities:    Brett Morin  has a past medical history of Allergic rhinitis, cause unspecified (3/7/2014); CAD, multiple vessel (2/28/2017); [facility] ([facility]); Contact dermatitis and other eczema, due to unspecified cause (3/7/2014); Depressed left ventricular ejection fraction (2/27/2017); Esophageal reflux (3/7/2014); GERD (gastroesophageal reflux disease); Ischemic cardiomyopathy (2/28/2017); NSTEMI (non-ST elevated myocardial infarction) ([facility] (2/27/2017); Other and unspecified hyperlipidemia (3/7/2014); Other specified idiopathic peripheral neuropathy (3/7/2014); Retinopathy of both eyes; Type II or unspecified type diabetes mellitus with neurological manifestations, uncontrolled (3/7/2014); Undiagnosed cardiac murmurs (3/7/2014); Unspecified essential hypertension (3/7/2014); and Vitreous hemorrhage, unspecified eye ([facility] (2/27/2017). Mr. Brett Morin  has a past surgical history that includes urological (Left, 1979); urological (Left, 2010); and heent (2003). Social History/Living Environment:   Home Environment: Private residence  One/Two Story Residence: One story  Living Alone: No  Support Systems: Family member(s)  Patient Expects to be Discharged to[de-identified] Private residence  Current DME Used/Available at Home: None  Prior Level of Function/Work/Activity:  Patient lives with spouse in one story home with a few stairs through out the home. Patient was independent and active prior to admission. Personal Factors:          Patient had radiation for cancer   Number of Personal Factors/Comorbidities that affect the Plan of Care: 1-2: MODERATE COMPLEXITY   EXAMINATION:   Most Recent Physical Functioning:   Gross Assessment:  AROM: Within functional limits  Strength: Generally decreased, functional  Coordination: Generally decreased, functional  Sensation: Intact               Posture:  Posture (WDL): Exceptions to WDL  Posture Assessment:  Forward head, Rounded shoulders  Balance:  Sitting: Intact  Standing: Impaired  Standing - Static: Constant support; Fair  Standing - Dynamic : Fair Bed Mobility:  Rolling:  (not tested, pt up in chair on arrival to room)  Wheelchair Mobility:     Transfers:  Sit to Stand: Contact guard assistance (via momentum method)  Stand to Sit: Contact guard assistance  Gait:     Base of Support: Widened  Step Length: Right shortened;Left shortened  Gait Abnormalities: Decreased step clearance; Path deviations (forward head flexion)  Distance (ft): 65 Feet (ft)  Assistive Device: Walker, rolling  Ambulation - Level of Assistance: Contact guard assistance  Interventions: Verbal cues; Tactile cues; Safety awareness training       Body Structures Involved:  1. Heart  2. Lungs  3. Thoracic Cage Body Functions Affected:  1. Cardio  2. Movement Related Activities and Participation Affected:  1. General Tasks and Demands  2. Mobility  3. Domestic Life   Number of elements that affect the Plan of Care: 4+: HIGH COMPLEXITY   CLINICAL PRESENTATION:   Presentation: Stable and uncomplicated: LOW COMPLEXITY   CLINICAL DECISION MAKIN[address] [place] [id number] AM-PAC 6 Clicks   Basic Mobility Inpatient Short Form  How much difficulty does the patient currently have. .. Unable A Lot A Little None   1. Turning over in bed (including adjusting bedclothes, sheets and blankets)? [ ] 1   [ ] 2   [X] 3   [ ] 4   2. Sitting down on and standing up from a chair with arms ( e.g., wheelchair, bedside commode, etc.)   [ ] 1   [ ] 2   [X] 3   [ ] 4   3. Moving from lying on back to sitting on the side of the bed? [ ] 1   [ ] 2   [X] 3   [ ] 4   How much help from another person does the patient currently need. .. Total A Lot A Little None   4. Moving to and from a bed to a chair (including a wheelchair)? [ ] 1   [ ] 2   [X] 3   [ ] 4   5. Need to walk in hospital room? [ ] 1   [ ] 2   [X] 3   [ ] 4   6. Climbing 3-5 steps with a railing?    [ ] 1   [ ] 2   [X] 3   [ ] 4   © , Trustees of [address] [place] [id number], under license to [employer]. All rights reserved    Score:  Initial: 18 Most Recent: X (Date: -- )     Interpretation of Tool:  Represents activities that are increasingly more difficult (i.e. Bed mobility, Transfers, Gait). Score 24 23 22-20 19-15 14-10 9-7 6       Modifier CH CI CJ CK CL CM CN         · Mobility - Walking and Moving Around:               - CURRENT STATUS:    CK - 40%-59% impaired, limited or restricted               - GOAL STATUS:           CJ - 20%-39% impaired, limited or restricted               - D/C STATUS:                       ---------------To be determined---------------  Payor[place]UE CHO[place] / Product Type: HMO /       Medical Necessity:     · Patient demonstrates excellent rehab potential due to higher previous functional level. · Skilled intervention continues to be required due to decline in overall functional mobility. Reason for Services/Other Comments:  · Patient continues to require skilled intervention due to medical complications and patient unable to attend/participate in therapy as expected.    Use of outcome tool(s) and clinical judgement create a POC that gives a: Clear prediction of patient's progress: LOW COMPLEXITY                 TREATMENT:   (In addition to Assessment/Re-Assessment sessions the following treatments were rendered)   Pre-treatment Symptoms/Complaints:  \"I just want these hiccups to be gone\"  Pain: Initial:   Pain Intensity 1: 1  Pain Intervention(s) 1: Ambulation/Increased Activity, Nurse notified, Repositioned  Post Session:  Mild pain noted      Assessment/Reassessment only, no treatment provided today     Braces/Orthotics/Lines/Etc:   · O2 Device: Room air  Treatment/Session Assessment:    · Response to Treatment:  Tolerated well, short of breath with activity  · Interdisciplinary Collaboration:  · Physical Therapist  · Registered Nurse  · After treatment position/precautions:  · Up in chair  · Caregiver at bedside  · Call light within reach  · RN notified  · Compliance with Program/Exercises: compliant all of the time. · Recommendations/Intent for next treatment session: \"Next visit will focus on advancements to more challenging activities and reduction in assistance provided\".   Total Treatment Duration:  PT Patient Time In/Time Out  Time In: 1420  Time Out: 1444     Norma Chance DPT

## 2017-03-03 NOTE — PROGRESS NOTES
TRANSFER - IN REPORT:    Verbal report received from Wesley Nugent RN(name) on [facility]  being received from CVICU(unit) for routine progression of care      Report consisted of patients Situation, Background, Assessment and   Recommendations(SBAR). Information from the following report(s) SBAR, Kardex, OR Summary, Intake/Output, MAR and Recent Results was reviewed with the receiving nurse. Opportunity for questions and clarification was provided. Assessment completed upon patients arrival to unit and care assumed. Dual skin assessment with Walter Moore RN. Allevyn peeled back to view sacrum. No redness or breakdown noted.

## 2017-03-03 NOTE — PROGRESS NOTES
[facility] CARDIOLOGY PROGRESS NOTE           3/3/2017 6:48 AM    Admit Date: 2/27/2017      Subjective:   Patient denies any active chest pain or dyspnea. Up and ambulating with nursing. No pain at Impella site. Off dobutamine. Hemodynamics stable. ROS:  GEN:  No fever or chills  Cardiovascular:  As noted above  Pulmonary:  As noted above  Neuro:  No new focal motor or sensory loss    Objective:      Vitals:    03/03/17 0500 03/03/17 0530 03/03/17 0600 03/03/17 0630   BP: 105/57 113/71 111/64    Pulse: 73 72 73 73   Resp: 13 13 13 23   Temp:       SpO2: 98% 99% 96% 96%   Weight:       Height:           Physical Exam:  General-WM in NAD  Neck- supple, no JVD  CV- regular rate and rhythm no MRG  Lung- clear bilaterally  Abd- soft, nontender, nondistended  Ext- trivial edema bilaterally. Impella site with ecchymosis. Skin- warm and dry  Psychiatric:  Normal mood and affect. Data Review:   Labs:   Recent Labs      03/03/17   0430  03/02/17   0730  03/02/17   0700  03/02/17   0315   02/28/17   1300   NA  138   --    --   141   < >  149*   K  5.1   --    --   4.5   < >  3.5   MG  2.8*   --    --   2.3   < >  3.1*   BUN  38*   --    --   22   < >  23   CREA  1.35   --    --   1.00   < >  1.49   GLU  155*   --    --   202*   < >  157*   WBC   --   9.6  9.3   --    < >  17.2*   HGB   --   8.0*  8.1*   --    < >  9.9*   HCT   --   24.2*  24.4*   --    < >  30.1*   PLT   --   85*  86*   --    < >  131*   INR   --    --    --    --    --   1.1    < > = values in this interval not displayed. No results found for: TROIQ, KAYDEN, TROPT, TNIPOC      TELEMETRY:  Sinus rhyhtm    Assessment/Plan:       Cardiogenic shock  Impella removed. Off dobutamine. If stable will start coreg over weekend. Check ultrasound of right femoral artery to exclude pseudoaneurysm.         DM (diabetes mellitus) type II controlled, neurological manifestation ([facility] [address] (3/7/2014)  On insulin gtt      Hypertriglyceridemia (2/27/2017)  On Lipitor      NSTEMI (non-ST elevated myocardial infarction) ([facility] [address] (2/27/2017)  Multi-vessel CAD at cath. S/P CABG. On ASA. Systolic CHF, chronic (HCC)  Severe LV dysfunction. Holding Coreg/ACE-I/ARB with low BP. May be able to add over weekend. CAD, multiple vessel (2/28/2017)      Overview: 2/28/17 (Dr Ember Ornelas) Coronary artery bypass grafting x3. Grafts       consisting of       1. Left internal mammary artery to the left anterior descending. 2. Reversed LEFT saphenous vein graft to obtuse marginal.       3. Reversed LEFT saphenous vein graft to the left posterior descending       artery. Postoperative anemia due to acute blood loss (3/1/2017)  Serial CBC.             Beto Mares MD  3/3/2017 6:48 AM

## 2017-03-03 NOTE — PROGRESS NOTES
POD 3 Days Post-Op    Procedure:  Procedure(s):  CORONARY ARTERY BYPASS GRAFT (CABG)x3 /LIMA  VEIN HARVEST- Greater Saphenous Vein  ESOPHAGEAL TRANS ECHOCARDIOGRAM      Subjective:     Patient has No significant medical complaints      Objective:     Patient Vitals for the past 8 hrs:   BP Temp Pulse Resp SpO2 Weight   17 0630 - - 73 23 96 % -   17 0600 111/64 - 73 13 96 % -   17 0530 113/71 - 72 13 99 % -   17 0500 105/57 - 73 13 98 % 218 lb 4.1 oz (99 kg)   17 0430 106/58 - 74 16 97 % -   17 0400 110/74 - 76 13 97 % -   17 0330 104/74 - 60 10 98 % -   17 0300 113/65 97.1 °F (36.2 °C) 73 12 98 % -   17 0230 113/73 - 71 10 97 % -   17 0200 107/67 - 69 14 99 % -   17 0130 113/68 - 73 14 98 % -   17 0100 93/68 - 66 19 97 % -   17 0030 102/60 - (!) 58 20 98 % -   17 0000 98/59 - 74 18 98 % -   17 2330 115/67 - 71 23 95 % -     Temp (24hrs), Av.8 °F (36.6 °C), Min:97.1 °F (36.2 °C), Max:98 °F (36.7 °C)        Hemodynamics  PAP Systolic: 49 PAP  CO (l/min): 4.4 l/min CO  CI (l/min/m2): 2.1 l/min/m2 CI        1901 -  0700  In: 1530.4 [P.O.:360; I.V.:865.4]  Out: 1205 [Urine:820]    CT Drainage              total of all CT's    Heart:  regular rate and rhythm, S1, S2 normal, no murmur, click, rub or gallop  Lung:  clear to auscultation bilaterally  Neuro: Grossly non focal  Incisions: Clean, dry, and intact    Labs:  Recent Results (from the past 24 hour(s))   CBC W/O DIFF    Collection Time: 17  7:30 AM   Result Value Ref Range    WBC 9.6 4.3 - 11.1 K/uL    RBC 2.70 (L) 4.23 - 5.67 M/uL    HGB 8.0 (L) 13.6 - 17.2 g/dL    HCT 24.2 (L) 41.1 - 50.3 %    MCV 89.6 79.6 - 97.8 FL    MCH 29.6 26.1 - 32.9 PG    MCHC 33.1 31.4 - 35.0 g/dL    RDW 14.1 11.9 - 14.6 %    PLATELET 85 (L) 254 - 450 K/uL    MPV 9.4 (L) 10.8 - 14.1 FL   GLUCOSE, POC    Collection Time: 17  1:10 PM   Result Value Ref Range    Glucose (POC) 197 (H) 65 - 100 mg/dL   GLUCOSE, POC    Collection Time: 03/02/17  5:04 PM   Result Value Ref Range    Glucose (POC) 173 (H) 65 - 100 mg/dL   GLUCOSE, POC    Collection Time: 03/02/17  8:53 PM   Result Value Ref Range    Glucose (POC) 154 (H) 65 - 100 mg/dL   PLEASE READ & DOCUMENT PPD TEST IN 24 HRS    Collection Time: 03/02/17  9:00 PM   Result Value Ref Range    PPD negative Negative    mm Induration 0mm mm   MAGNESIUM    Collection Time: 03/03/17  4:30 AM   Result Value Ref Range    Magnesium 2.8 (H) 1.8 - 2.4 mg/dL   BNP    Collection Time: 03/03/17  4:30 AM   Result Value Ref Range     pg/mL   METABOLIC PANEL, BASIC    Collection Time: 03/03/17  4:30 AM   Result Value Ref Range    Sodium 138 136 - 145 mmol/L    Potassium 5.1 3.5 - 5.1 mmol/L    Chloride 106 98 - 107 mmol/L    CO2 23 21 - 32 mmol/L    Anion gap 9 7 - 16 mmol/L    Glucose 155 (H) 65 - 100 mg/dL    BUN 38 (H) 6 - 23 MG/DL    Creatinine 1.35 0.8 - 1.5 MG/DL    GFR est AA >60 >60 ml/min/1.73m2    GFR est non-AA 58 (L) >60 ml/min/1.73m2    Calcium 7.2 (L) 8.3 - 10.4 MG/DL   CBC W/O DIFF    Collection Time: 03/03/17  4:30 AM   Result Value Ref Range    WBC 11.1 4.3 - 11.1 K/uL    RBC 3.17 (L) 4.23 - 5.67 M/uL    HGB 9.6 (L) 13.6 - 17.2 g/dL    HCT 29.2 (L) 41.1 - 50.3 %    MCV 92.1 79.6 - 97.8 FL    MCH 30.3 26.1 - 32.9 PG    MCHC 32.9 31.4 - 35.0 g/dL    RDW 14.3 11.9 - 14.6 %    PLATELET 269 (L) 567 - 450 K/uL    MPV 11.3 10.8 - 14.1 FL   GLUCOSE, POC    Collection Time: 03/03/17  5:54 AM   Result Value Ref Range    Glucose (POC) 159 (H) 65 - 100 mg/dL   EKG, 12 LEAD, INITIAL    Collection Time: 03/03/17  6:36 AM   Result Value Ref Range    Systolic BP  mmHg    Diastolic BP  mmHg    Ventricular Rate 74 BPM    Atrial Rate 74 BPM    P-R Interval 156 ms    QRS Duration 112 ms    Q-T Interval 442 ms    QTC Calculation (Bezet) 490 ms    Calculated P Axis 71 degrees    Calculated R Axis 50 degrees    Calculated T Axis 41 degrees    Diagnosis Normal sinus rhythm  T wave abnormality, consider lateral ischemia  Prolonged QT  Abnormal ECG  When compared with ECG of 02-MAR-2017 06:29,  Non-specific change in ST segment in Anterior leads         Assessment:     Principal Problem:    NSTEMI (non-ST elevated myocardial infarction) ([address] (2/27/2017)    Active Problems:    DM (diabetes mellitus) type II controlled, neurological manifestation ([address] (3/7/2014)      Essential hypertension, benign (3/7/2014)      Hypertriglyceridemia (2/27/2017)      Depressed left ventricular ejection fraction (2/27/2017)      Overview: 2/27/17 20%      Ischemic cardiomyopathy (2/28/2017)      Overview: EF 10-15% echo 2/27/17      CAD, multiple vessel (2/28/2017)      Overview: 2/28/17 (Dr Verlin Koyanagi) Coronary artery bypass grafting x3. Grafts       consisting of       1. Left internal mammary artery to the left anterior descending. 2. Reversed LEFT saphenous vein graft to obtuse marginal.       3. Reversed LEFT saphenous vein graft to the left posterior descending       artery.        Encounter for weaning from ventilator [facility]) (2/28/2017)      S/P CABG (coronary artery bypass graft) (2/28/2017)      Postoperative anemia due to acute blood loss (3/1/2017)      LVAD (left ventricular assist device) present ([address] (3/1/2017)      Pleural effusion (3/2/2017)      Hypoxia (3/2/2017)      Cardiogenic shock ([address] (4/4/8796)      Systolic CHF, chronic ([address] (3/2/2017)        Plan/Recommendations/Medical Decision Making:     Discontinue:  chest tubes    See orders    Stable off all drips/Impella, to floor, HF meds, will need Lifevest

## 2017-03-04 ENCOUNTER — APPOINTMENT (OUTPATIENT)
Dept: GENERAL RADIOLOGY | Age: 62
DRG: 001 | End: 2017-03-04
Attending: INTERNAL MEDICINE
Payer: COMMERCIAL

## 2017-03-04 PROBLEM — Z95.811 LVAD (LEFT VENTRICULAR ASSIST DEVICE) PRESENT (HCC): Status: RESOLVED | Noted: 2017-03-01 | Resolved: 2017-03-04

## 2017-03-04 PROBLEM — Z99.11 ENCOUNTER FOR WEANING FROM VENTILATOR (HCC): Status: RESOLVED | Noted: 2017-02-28 | Resolved: 2017-03-04

## 2017-03-04 PROBLEM — I50.22 SYSTOLIC CHF, CHRONIC (HCC): Chronic | Status: ACTIVE | Noted: 2017-03-02

## 2017-03-04 LAB
ABO + RH BLD: NORMAL
ANION GAP BLD CALC-SCNC: 14 MMOL/L (ref 7–16)
ANION GAP BLD CALC-SCNC: 15 MMOL/L (ref 7–16)
APTT PPP: 31.3 SEC (ref 23.5–31.7)
ARTERIAL PATENCY WRIST A: ABNORMAL
ARTERIAL PATENCY WRIST A: ABNORMAL
ARTERIAL PATENCY WRIST A: POSITIVE
BASE DEFICIT BLDA-SCNC: 4.5 MMOL/L (ref 0–2)
BASE DEFICIT BLDA-SCNC: 6.6 MMOL/L (ref 0–2)
BASE DEFICIT BLDA-SCNC: 8.6 MMOL/L (ref 0–2)
BASOPHILS # BLD AUTO: 0 K/UL (ref 0–0.2)
BASOPHILS # BLD: 0 % (ref 0–2)
BDY SITE: ABNORMAL
BLOOD GROUP ANTIBODIES SERPL: NORMAL
BUN SERPL-MCNC: 43 MG/DL (ref 6–23)
BUN SERPL-MCNC: 46 MG/DL (ref 6–23)
CA-I BLD-SCNC: 1.08 MMOL/L (ref 1–1.3)
CA-I BLD-SCNC: 1.11 MMOL/L (ref 1–1.3)
CALCIUM SERPL-MCNC: 7.5 MG/DL (ref 8.3–10.4)
CALCIUM SERPL-MCNC: 7.5 MG/DL (ref 8.3–10.4)
CHLORIDE BLDA-SCNC: 100 MMOL/L (ref 98–106)
CHLORIDE BLDA-SCNC: 101 MMOL/L (ref 98–106)
CHLORIDE SERPL-SCNC: 102 MMOL/L (ref 98–107)
CHLORIDE SERPL-SCNC: 104 MMOL/L (ref 98–107)
CO2 SERPL-SCNC: 19 MMOL/L (ref 21–32)
CO2 SERPL-SCNC: 19 MMOL/L (ref 21–32)
COHGB MFR BLD: 0 % (ref 0.5–1.5)
COHGB MFR BLD: 0.3 % (ref 0.5–1.5)
COHGB MFR BLD: 0.3 % (ref 0.5–1.5)
CREAT SERPL-MCNC: 1.22 MG/DL (ref 0.8–1.5)
CREAT SERPL-MCNC: 1.63 MG/DL (ref 0.8–1.5)
DIFFERENTIAL METHOD BLD: ABNORMAL
DO-HGB BLD-MCNC: 2 % (ref 0–5)
DO-HGB BLD-MCNC: 4 % (ref 0–5)
DO-HGB BLD-MCNC: 5 % (ref 0–5)
EOSINOPHIL # BLD: 0 K/UL (ref 0–0.8)
EOSINOPHIL NFR BLD: 0 % (ref 0.5–7.8)
ERYTHROCYTE [DISTWIDTH] IN BLOOD BY AUTOMATED COUNT: 14.1 % (ref 11.9–14.6)
ERYTHROCYTE [DISTWIDTH] IN BLOOD BY AUTOMATED COUNT: 14.1 % (ref 11.9–14.6)
FIBRINOGEN PPP-MCNC: 464 MG/DL (ref 172–437)
FIO2 ON VENT: 100 %
FIO2 ON VENT: 40 %
FIO2 ON VENT: 40 %
GLUCOSE BLD STRIP.AUTO-MCNC: 141 MG/DL (ref 65–100)
GLUCOSE BLD STRIP.AUTO-MCNC: 165 MG/DL (ref 65–100)
GLUCOSE BLD STRIP.AUTO-MCNC: 174 MG/DL (ref 65–100)
GLUCOSE BLD STRIP.AUTO-MCNC: 181 MG/DL (ref 65–100)
GLUCOSE BLD STRIP.AUTO-MCNC: 190 MG/DL (ref 65–100)
GLUCOSE BLD STRIP.AUTO-MCNC: 233 MG/DL (ref 65–100)
GLUCOSE SERPL-MCNC: 130 MG/DL (ref 65–100)
GLUCOSE SERPL-MCNC: 321 MG/DL (ref 65–100)
HCO3 BLDA-SCNC: 15 MMOL/L (ref 22–26)
HCO3 BLDA-SCNC: 18 MMOL/L (ref 22–26)
HCO3 BLDA-SCNC: 19 MMOL/L (ref 22–26)
HCT VFR BLD AUTO: 29.3 % (ref 41.1–50.3)
HCT VFR BLD AUTO: 32.8 % (ref 41.1–50.3)
HGB BLD-MCNC: 10.7 G/DL (ref 13.6–17.2)
HGB BLD-MCNC: 9.9 G/DL (ref 13.6–17.2)
HGB BLDMV-MCNC: 10.4 GM/DL (ref 11.7–15)
HGB BLDMV-MCNC: 10.8 GM/DL (ref 11.7–15)
HGB BLDMV-MCNC: 11.2 GM/DL (ref 11.7–15)
IMM GRANULOCYTES # BLD: 0.1 K/UL (ref 0–0.5)
IMM GRANULOCYTES NFR BLD AUTO: 1.1 % (ref 0–5)
INR PPP: 1.1 (ref 0.9–1.2)
LYMPHOCYTES # BLD AUTO: 12 % (ref 13–44)
LYMPHOCYTES # BLD: 1.3 K/UL (ref 0.5–4.6)
MAGNESIUM SERPL-MCNC: 2.8 MG/DL (ref 1.8–2.4)
MAGNESIUM SERPL-MCNC: 3 MG/DL (ref 1.8–2.4)
MCH RBC QN AUTO: 29.2 PG (ref 26.1–32.9)
MCH RBC QN AUTO: 29.9 PG (ref 26.1–32.9)
MCHC RBC AUTO-ENTMCNC: 32.6 G/DL (ref 31.4–35)
MCHC RBC AUTO-ENTMCNC: 33.8 G/DL (ref 31.4–35)
MCV RBC AUTO: 88.5 FL (ref 79.6–97.8)
MCV RBC AUTO: 89.6 FL (ref 79.6–97.8)
METHGB MFR BLD: 0.2 % (ref 0–1.5)
METHGB MFR BLD: 0.2 % (ref 0–1.5)
METHGB MFR BLD: 0.5 % (ref 0–1.5)
MM INDURATION POC: 0 MM (ref 0–5)
MM INDURATION POC: NORMAL MM (ref 0–5)
MONOCYTES # BLD: 0.7 K/UL (ref 0.1–1.3)
MONOCYTES NFR BLD AUTO: 7 % (ref 4–12)
NEUTS SEG # BLD: 8.4 K/UL (ref 1.7–8.2)
NEUTS SEG NFR BLD AUTO: 80 % (ref 43–78)
OXYHGB MFR BLDA: 95 % (ref 94–97)
OXYHGB MFR BLDA: 96 % (ref 94–97)
OXYHGB MFR BLDA: 97.4 % (ref 94–97)
PCO2 BLDA: 27 MMHG (ref 35–45)
PCO2 BLDA: 31 MMHG (ref 35–45)
PCO2 BLDA: 31 MMHG (ref 35–45)
PEEP RESPIRATORY: 8 CM[H2O]
PEEP RESPIRATORY: 8 CM[H2O]
PH BLDA: 7.37 [PH] (ref 7.35–7.45)
PH BLDA: 7.37 [PH] (ref 7.35–7.45)
PH BLDA: 7.41 [PH] (ref 7.35–7.45)
PLATELET # BLD AUTO: 125 K/UL (ref 150–450)
PLATELET # BLD AUTO: 159 K/UL (ref 150–450)
PMV BLD AUTO: 10.6 FL (ref 10.8–14.1)
PMV BLD AUTO: 11 FL (ref 10.8–14.1)
PO2 BLDA: 131 MMHG (ref 75–100)
PO2 BLDA: 88 MMHG (ref 75–100)
PO2 BLDA: 98 MMHG (ref 75–100)
POTASSIUM BLDA-SCNC: 4.6 MMOL/L (ref 3.5–5.3)
POTASSIUM BLDA-SCNC: 4.9 MMOL/L (ref 3.5–5.3)
POTASSIUM SERPL-SCNC: 5 MMOL/L (ref 3.5–5.1)
POTASSIUM SERPL-SCNC: 5.1 MMOL/L (ref 3.5–5.1)
PPD POC: NEGATIVE NEGATIVE
PPD POC: NEGATIVE NEGATIVE
PROTHROMBIN TIME: 12.1 SEC (ref 9.6–12)
RBC # BLD AUTO: 3.31 M/UL (ref 4.23–5.67)
RBC # BLD AUTO: 3.66 M/UL (ref 4.23–5.67)
RESP RATE: 16
RESP RATE: 16
SAO2 % BLD: 95 % (ref 92–98.5)
SAO2 % BLD: 97 % (ref 92–98.5)
SAO2 % BLD: 98 % (ref 92–98.5)
SERVICE CMNT-IMP: ABNORMAL
SODIUM BLDA-SCNC: 129.5 MMOL/L (ref 135–148)
SODIUM BLDA-SCNC: 129.8 MMOL/L (ref 135–148)
SODIUM SERPL-SCNC: 135 MMOL/L (ref 136–145)
SODIUM SERPL-SCNC: 138 MMOL/L (ref 136–145)
SPECIMEN EXP DATE BLD: NORMAL
TROPONIN I SERPL-MCNC: 0.58 NG/ML (ref 0.02–0.05)
VENTILATION MODE VENT: ABNORMAL
VT SETTING VENT: 500 ML
VT SETTING VENT: 500 ML
WBC # BLD AUTO: 10.6 K/UL (ref 4.3–11.1)
WBC # BLD AUTO: 9 K/UL (ref 4.3–11.1)

## 2017-03-04 PROCEDURE — 74011250636 HC RX REV CODE- 250/636

## 2017-03-04 PROCEDURE — 74011000250 HC RX REV CODE- 250: Performed by: THORACIC SURGERY (CARDIOTHORACIC VASCULAR SURGERY)

## 2017-03-04 PROCEDURE — 93005 ELECTROCARDIOGRAM TRACING: CPT | Performed by: THORACIC SURGERY (CARDIOTHORACIC VASCULAR SURGERY)

## 2017-03-04 PROCEDURE — 74011000258 HC RX REV CODE- 258: Performed by: THORACIC SURGERY (CARDIOTHORACIC VASCULAR SURGERY)

## 2017-03-04 PROCEDURE — C8924 2D TTE W OR W/O FOL W/CON,FU: HCPCS

## 2017-03-04 PROCEDURE — 94002 VENT MGMT INPAT INIT DAY: CPT

## 2017-03-04 PROCEDURE — 71010 XR CHEST SNGL V: CPT

## 2017-03-04 PROCEDURE — 83735 ASSAY OF MAGNESIUM: CPT | Performed by: THORACIC SURGERY (CARDIOTHORACIC VASCULAR SURGERY)

## 2017-03-04 PROCEDURE — 82962 GLUCOSE BLOOD TEST: CPT

## 2017-03-04 PROCEDURE — 74011250637 HC RX REV CODE- 250/637: Performed by: NURSE PRACTITIONER

## 2017-03-04 PROCEDURE — 36600 WITHDRAWAL OF ARTERIAL BLOOD: CPT

## 2017-03-04 PROCEDURE — C1751 CATH, INF, PER/CENT/MIDLINE: HCPCS

## 2017-03-04 PROCEDURE — 77030014007 HC SPNG HEMSTAT J&J -B

## 2017-03-04 PROCEDURE — 80048 BASIC METABOLIC PNL TOTAL CA: CPT | Performed by: THORACIC SURGERY (CARDIOTHORACIC VASCULAR SURGERY)

## 2017-03-04 PROCEDURE — 82803 BLOOD GASES ANY COMBINATION: CPT

## 2017-03-04 PROCEDURE — 74011636637 HC RX REV CODE- 636/637: Performed by: THORACIC SURGERY (CARDIOTHORACIC VASCULAR SURGERY)

## 2017-03-04 PROCEDURE — 0BH17EZ INSERTION OF ENDOTRACHEAL AIRWAY INTO TRACHEA, VIA NATURAL OR ARTIFICIAL OPENING: ICD-10-PCS | Performed by: INTERNAL MEDICINE

## 2017-03-04 PROCEDURE — 97110 THERAPEUTIC EXERCISES: CPT

## 2017-03-04 PROCEDURE — 36556 INSERT NON-TUNNEL CV CATH: CPT | Performed by: INTERNAL MEDICINE

## 2017-03-04 PROCEDURE — 84484 ASSAY OF TROPONIN QUANT: CPT | Performed by: THORACIC SURGERY (CARDIOTHORACIC VASCULAR SURGERY)

## 2017-03-04 PROCEDURE — 92950 HEART/LUNG RESUSCITATION CPR: CPT

## 2017-03-04 PROCEDURE — 74011250637 HC RX REV CODE- 250/637: Performed by: THORACIC SURGERY (CARDIOTHORACIC VASCULAR SURGERY)

## 2017-03-04 PROCEDURE — 97530 THERAPEUTIC ACTIVITIES: CPT

## 2017-03-04 PROCEDURE — 80051 ELECTROLYTE PANEL: CPT

## 2017-03-04 PROCEDURE — 36415 COLL VENOUS BLD VENIPUNCTURE: CPT | Performed by: THORACIC SURGERY (CARDIOTHORACIC VASCULAR SURGERY)

## 2017-03-04 PROCEDURE — 85025 COMPLETE CBC W/AUTO DIFF WBC: CPT | Performed by: THORACIC SURGERY (CARDIOTHORACIC VASCULAR SURGERY)

## 2017-03-04 PROCEDURE — 65610000006 HC RM INTENSIVE CARE

## 2017-03-04 PROCEDURE — 93005 ELECTROCARDIOGRAM TRACING: CPT | Performed by: INTERNAL MEDICINE

## 2017-03-04 PROCEDURE — 31500 INSERT EMERGENCY AIRWAY: CPT | Performed by: INTERNAL MEDICINE

## 2017-03-04 PROCEDURE — 85730 THROMBOPLASTIN TIME PARTIAL: CPT | Performed by: THORACIC SURGERY (CARDIOTHORACIC VASCULAR SURGERY)

## 2017-03-04 PROCEDURE — 85027 COMPLETE CBC AUTOMATED: CPT | Performed by: THORACIC SURGERY (CARDIOTHORACIC VASCULAR SURGERY)

## 2017-03-04 PROCEDURE — 85384 FIBRINOGEN ACTIVITY: CPT | Performed by: THORACIC SURGERY (CARDIOTHORACIC VASCULAR SURGERY)

## 2017-03-04 PROCEDURE — 5A1935Z RESPIRATORY VENTILATION, LESS THAN 24 CONSECUTIVE HOURS: ICD-10-PCS | Performed by: INTERNAL MEDICINE

## 2017-03-04 PROCEDURE — 86900 BLOOD TYPING SEROLOGIC ABO: CPT | Performed by: THORACIC SURGERY (CARDIOTHORACIC VASCULAR SURGERY)

## 2017-03-04 PROCEDURE — 77030019605

## 2017-03-04 PROCEDURE — 85610 PROTHROMBIN TIME: CPT | Performed by: THORACIC SURGERY (CARDIOTHORACIC VASCULAR SURGERY)

## 2017-03-04 PROCEDURE — 74011250636 HC RX REV CODE- 250/636: Performed by: THORACIC SURGERY (CARDIOTHORACIC VASCULAR SURGERY)

## 2017-03-04 PROCEDURE — 5A12012 PERFORMANCE OF CARDIAC OUTPUT, SINGLE, MANUAL: ICD-10-PCS | Performed by: THORACIC SURGERY (CARDIOTHORACIC VASCULAR SURGERY)

## 2017-03-04 PROCEDURE — 74011250637 HC RX REV CODE- 250/637: Performed by: INTERNAL MEDICINE

## 2017-03-04 PROCEDURE — 74011000250 HC RX REV CODE- 250

## 2017-03-04 PROCEDURE — 99291 CRITICAL CARE FIRST HOUR: CPT | Performed by: INTERNAL MEDICINE

## 2017-03-04 PROCEDURE — B543ZZA ULTRASONOGRAPHY OF RIGHT JUGULAR VEINS, GUIDANCE: ICD-10-PCS | Performed by: INTERNAL MEDICINE

## 2017-03-04 PROCEDURE — 05HM33Z INSERTION OF INFUSION DEVICE INTO RIGHT INTERNAL JUGULAR VEIN, PERCUTANEOUS APPROACH: ICD-10-PCS | Performed by: INTERNAL MEDICINE

## 2017-03-04 PROCEDURE — 36556 INSERT NON-TUNNEL CV CATH: CPT

## 2017-03-04 PROCEDURE — 77030005521 HC CATH URETH FOL38 BARD -B

## 2017-03-04 RX ORDER — FENTANYL CITRATE 50 UG/ML
INJECTION, SOLUTION INTRAMUSCULAR; INTRAVENOUS
Status: ACTIVE
Start: 2017-03-04 | End: 2017-03-05

## 2017-03-04 RX ORDER — MIDAZOLAM HYDROCHLORIDE 1 MG/ML
1-2 INJECTION, SOLUTION INTRAMUSCULAR; INTRAVENOUS
Status: DISCONTINUED | OUTPATIENT
Start: 2017-03-04 | End: 2017-03-07

## 2017-03-04 RX ORDER — AMIODARONE HYDROCHLORIDE 150 MG/3ML
INJECTION, SOLUTION INTRAVENOUS
Status: ACTIVE
Start: 2017-03-04 | End: 2017-03-05

## 2017-03-04 RX ORDER — MORPHINE SULFATE 4 MG/ML
INJECTION, SOLUTION INTRAMUSCULAR; INTRAVENOUS
Status: COMPLETED
Start: 2017-03-04 | End: 2017-03-04

## 2017-03-04 RX ORDER — ETOMIDATE 2 MG/ML
INJECTION INTRAVENOUS
Status: COMPLETED
Start: 2017-03-04 | End: 2017-03-04

## 2017-03-04 RX ORDER — MORPHINE SULFATE 2 MG/ML
4 INJECTION, SOLUTION INTRAMUSCULAR; INTRAVENOUS
Status: ACTIVE | OUTPATIENT
Start: 2017-03-04 | End: 2017-03-05

## 2017-03-04 RX ORDER — ETOMIDATE 2 MG/ML
40 INJECTION INTRAVENOUS ONCE
Status: COMPLETED | OUTPATIENT
Start: 2017-03-04 | End: 2017-03-04

## 2017-03-04 RX ORDER — FENTANYL CITRATE 50 UG/ML
50 INJECTION, SOLUTION INTRAMUSCULAR; INTRAVENOUS ONCE
Status: COMPLETED | OUTPATIENT
Start: 2017-03-04 | End: 2017-03-04

## 2017-03-04 RX ORDER — SODIUM BICARBONATE 1 MEQ/ML
50 SYRINGE (ML) INTRAVENOUS ONCE
Status: COMPLETED | OUTPATIENT
Start: 2017-03-04 | End: 2017-03-04

## 2017-03-04 RX ORDER — CARVEDILOL 6.25 MG/1
6.25 TABLET ORAL EVERY 12 HOURS
Status: DISCONTINUED | OUTPATIENT
Start: 2017-03-04 | End: 2017-03-05

## 2017-03-04 RX ORDER — FENTANYL CITRATE 50 UG/ML
50-100 INJECTION, SOLUTION INTRAMUSCULAR; INTRAVENOUS
Status: DISCONTINUED | OUTPATIENT
Start: 2017-03-04 | End: 2017-03-07

## 2017-03-04 RX ORDER — PROPOFOL 10 MG/ML
5-50 VIAL (ML) INTRAVENOUS
Status: DISCONTINUED | OUTPATIENT
Start: 2017-03-04 | End: 2017-03-06

## 2017-03-04 RX ORDER — PROPOFOL 10 MG/ML
5 VIAL (ML) INTRAVENOUS
Status: DISCONTINUED | OUTPATIENT
Start: 2017-03-04 | End: 2017-03-04

## 2017-03-04 RX ORDER — MIDAZOLAM HYDROCHLORIDE 1 MG/ML
INJECTION, SOLUTION INTRAMUSCULAR; INTRAVENOUS
Status: COMPLETED
Start: 2017-03-04 | End: 2017-03-04

## 2017-03-04 RX ORDER — MORPHINE SULFATE 2 MG/ML
4 INJECTION, SOLUTION INTRAMUSCULAR; INTRAVENOUS ONCE
Status: ACTIVE | OUTPATIENT
Start: 2017-03-04 | End: 2017-03-05

## 2017-03-04 RX ORDER — SODIUM BICARBONATE 1 MEQ/ML
SYRINGE (ML) INTRAVENOUS
Status: COMPLETED
Start: 2017-03-04 | End: 2017-03-04

## 2017-03-04 RX ORDER — MIDAZOLAM HYDROCHLORIDE 1 MG/ML
2 INJECTION, SOLUTION INTRAMUSCULAR; INTRAVENOUS ONCE
Status: COMPLETED | OUTPATIENT
Start: 2017-03-04 | End: 2017-03-04

## 2017-03-04 RX ORDER — FAMOTIDINE 10 MG/ML
20 INJECTION INTRAVENOUS EVERY 12 HOURS
Status: DISCONTINUED | OUTPATIENT
Start: 2017-03-04 | End: 2017-03-08

## 2017-03-04 RX ADMIN — MUPIROCIN: 20 OINTMENT TOPICAL at 08:17

## 2017-03-04 RX ADMIN — ETOMIDATE 40 MG: 2 INJECTION INTRAVENOUS at 13:57

## 2017-03-04 RX ADMIN — MIDAZOLAM 2 MG: 1 INJECTION INTRAMUSCULAR; INTRAVENOUS at 14:15

## 2017-03-04 RX ADMIN — PROPOFOL 5 MCG/KG/MIN: 10 INJECTION, EMULSION INTRAVENOUS at 15:34

## 2017-03-04 RX ADMIN — ASPIRIN 81 MG: 81 TABLET, COATED ORAL at 08:17

## 2017-03-04 RX ADMIN — MORPHINE SULFATE 4 MG: 4 INJECTION, SOLUTION INTRAMUSCULAR; INTRAVENOUS at 13:55

## 2017-03-04 RX ADMIN — SODIUM CHLORIDE 3 UNITS/HR: 900 INJECTION, SOLUTION INTRAVENOUS at 20:37

## 2017-03-04 RX ADMIN — FENTANYL CITRATE 50 MCG: 50 INJECTION, SOLUTION INTRAMUSCULAR; INTRAVENOUS at 14:51

## 2017-03-04 RX ADMIN — FUROSEMIDE 40 MG: 40 TABLET ORAL at 08:16

## 2017-03-04 RX ADMIN — Medication 50 MEQ: at 15:37

## 2017-03-04 RX ADMIN — MUPIROCIN: 20 OINTMENT TOPICAL at 21:39

## 2017-03-04 RX ADMIN — FAMOTIDINE 20 MG: 10 INJECTION, SOLUTION INTRAVENOUS at 21:35

## 2017-03-04 RX ADMIN — MIDAZOLAM HYDROCHLORIDE 2 MG: 1 INJECTION, SOLUTION INTRAMUSCULAR; INTRAVENOUS at 14:15

## 2017-03-04 RX ADMIN — PERFLUTREN 1 ML: 6.52 INJECTION, SUSPENSION INTRAVENOUS at 15:19

## 2017-03-04 RX ADMIN — MORPHINE SULFATE 4 MG: 4 INJECTION, SOLUTION INTRAMUSCULAR; INTRAVENOUS at 13:56

## 2017-03-04 RX ADMIN — FAMOTIDINE 20 MG: 20 TABLET ORAL at 08:16

## 2017-03-04 RX ADMIN — NOREPINEPHRINE BITARTRATE 0.02 MCG/KG/MIN: 1 INJECTION INTRAVENOUS at 15:05

## 2017-03-04 RX ADMIN — NOREPINEPHRINE BITARTRATE 0.01 MCG/KG/MIN: 1 INJECTION INTRAVENOUS at 15:32

## 2017-03-04 RX ADMIN — ETOMIDATE 40 MG: 2 INJECTION, SOLUTION INTRAVENOUS at 13:57

## 2017-03-04 RX ADMIN — Medication 10 ML: at 14:29

## 2017-03-04 RX ADMIN — DOCUSATE SODIUM 100 MG: 100 CAPSULE, LIQUID FILLED ORAL at 08:17

## 2017-03-04 RX ADMIN — BACLOFEN 10 MG: 10 TABLET ORAL at 08:17

## 2017-03-04 RX ADMIN — Medication 10 ML: at 21:36

## 2017-03-04 RX ADMIN — CARVEDILOL 6.25 MG: 6.25 TABLET, FILM COATED ORAL at 08:16

## 2017-03-04 RX ADMIN — AMIODARONE HYDROCHLORIDE 1 MG/MIN: 50 INJECTION, SOLUTION INTRAVENOUS at 15:31

## 2017-03-04 RX ADMIN — SODIUM BICARBONATE 50 MEQ: 84 INJECTION, SOLUTION INTRAVENOUS at 15:37

## 2017-03-04 RX ADMIN — INSULIN HUMAN 5 UNITS: 100 INJECTION, SOLUTION PARENTERAL at 11:40

## 2017-03-04 RX ADMIN — LISINOPRIL 2.5 MG: 5 TABLET ORAL at 08:17

## 2017-03-04 RX ADMIN — INSULIN HUMAN 10 UNITS: 100 INJECTION, SOLUTION PARENTERAL at 19:06

## 2017-03-04 RX ADMIN — Medication 10 ML: at 05:01

## 2017-03-04 RX ADMIN — AMIODARONE HYDROCHLORIDE 200 MG: 200 TABLET ORAL at 08:16

## 2017-03-04 NOTE — PROGRESS NOTES
[facility] CARDIOLOGY PROGRESS NOTE           3/4/2017 7:30 AM    Admit Date: 2/27/2017      Subjective:   Patient resting now. Hemodynamics stable. Complains of severe hiccups. ROS:  Cardiovascular:  As noted above    Objective:      Vitals:    03/03/17 1845 03/03/17 2006 03/03/17 2230 03/04/17 0230   BP: 114/63 107/66 100/62 113/63   Pulse: 72 69 63 72   Resp: 18  18 18   Temp: 97.6 °F (36.4 °C)  96.8 °F (36 °C) 97.8 °F (36.6 °C)   SpO2: 91%  91% 92%   Weight:    101.1 kg (222 lb 14.4 oz)   Height:           Physical Exam:  General-No Acute Distress  Neck- supple, no JVD  CV- regular rate and rhythm no MRG  Lung- diminished bilaterally  Abd- soft, nontender, nondistended  Ext- no edema bilaterally. Skin- warm and dry    Data Review:   Recent Labs      03/04/17   0440  03/03/17   0430   NA  138  138   K  5.0  5.1   MG  3.0*  2.8*   BUN  43*  38*   CREA  1.22  1.35   GLU  130*  155*   WBC  9.0  11.1   HGB  10.7*  9.6*   HCT  32.8*  29.2*   PLT  125*  105*       Assessment/Plan:     Principal Problem:    NSTEMI (non-ST elevated myocardial infarction) ([facility] (2/27/2017) - S/P CABG. Hemodynamics are stable. Medications are appropriate. Active Problems:    DM (diabetes mellitus) type II controlled, neurological manifestation ([facility] (3/7/2014) - management per primary team      Essential hypertension, benign (3/7/2014) - This is well controlled and will increase carvedilol to 6.25mg BID      Hypertriglyceridemia (2/27/2017) - On statin therapy      Depressed left ventricular ejection fraction (2/27/2017) - Medical therapy appropriate. Volume stable. On Lasix. Increased carvedilol      Overview: 2/27/17 20%      Ischemic cardiomyopathy (2/28/2017)      Overview: EF 10-15% echo 2/27/17      CAD, multiple vessel (2/28/2017)      Overview: 2/28/17 (Dr Payton Dandy) Coronary artery bypass grafting x3. Grafts       consisting of       1.  Left internal mammary artery to the left anterior descending. 2. Reversed LEFT saphenous vein graft to obtuse marginal.       3. Reversed LEFT saphenous vein graft to the left posterior descending       artery.        Encounter for weaning from ventilator [facility]) (2/28/2017)      S/P CABG (coronary artery bypass graft) (2/28/2017)      Postoperative anemia due to acute blood loss (3/1/2017)      LVAD (left ventricular assist device) present ([address] (3/1/2017) - S/P explant      Pleural effusion (3/2/2017) - Monitored by Pulmonary      Hypoxia (3/2/2017)      Cardiogenic shock ([address] (1/3/4578)      Systolic CHF, chronic ([address] (2/2/0864)      Systolic CHF, acute on chronic ([address] (3/3/2017)          Guerrero Griffith MD  3/4/2017 7:30 AM

## 2017-03-04 NOTE — PROGRESS NOTES
TRANSFER - OUT REPORT:    Verbal report given to ELMA Saini(name) on Jeff Sepulveda  being transferred to CVICU(unit) for change in patient condition( PostCardiac Arrest with NSR)       Report consisted of patients Situation, Background, Assessment and   Recommendations(SBAR). Information from the following report(s) SBAR, Kardex, [place] and Recent Results was reviewed with the receiving nurse. Lines:   Triple Lumen 03/04/17 Right Internal jugular (Active)       Peripheral IV 02/27/17 Left Hand (Active)   Site Assessment Clean, dry, & intact 3/4/2017  2:53 AM   Phlebitis Assessment 0 3/4/2017  2:53 AM   Infiltration Assessment 0 3/4/2017  2:53 AM   Dressing Status Clean, dry, & intact 3/4/2017  2:53 AM   Dressing Type Transparent 3/4/2017  2:53 AM   Hub Color/Line Status Capped 3/4/2017  2:53 AM   Action Taken Open ports on tubing capped 3/3/2017  3:00 AM       Peripheral IV 03/04/17 Right Other(comment) (Active)       Arterial Line 03/04/17 Left Femoral artery (Active)   Site Assessment Bleeding 3/4/2017  3:30 PM   Dressing Status Clean, dry, & intact 3/4/2017  3:30 PM   Dressing Type Transparent;Tape;Disk with Chlorhexadine gluconate (CHG); Bacteriocidal 3/4/2017  3:30 PM   Line Status Intact and in place 3/4/2017  3:30 PM   Treatment Zeroed or re-zeroed 3/4/2017  3:30 PM        Opportunity for questions and clarification was provided.       Patient transported with:   Monitor  Registered Nurse

## 2017-03-04 NOTE — PROCEDURES
Procedure: Endotracheal intubation    Indication: Acute respiratory failure 518.81 with code blue for VF arrest    Anesthesia:  Etomidate 40mg   Morphine 8mg    After assessing the airway, the patient underwent preoxygenation with 100% FiO2 for 5 min. Fentanyl was then given and after 3 min, etomidate and were given sequentially in rapid IV push. The Sellick maneuver was performed throughout the entire sequence. After adequate sedation and paralysis intubation was performed. A Carlos A 4.0 laryngoscope was used to visualize the epiglottis and vocal cords. After positive identification of epiglottis and the vocal cords, a size 8.0 ET tube was placed into the trachea with direct visualization. Confirmation of endotracheal positionin. The ET tube was directly visualized passing through the vocal cords. 2.  CO2 colorimetry was employed immediately to verify tube in airway with appropriate color change indicating detection/lack of CO2.   3.  Water vapor was seen within the ET tube, and auscultation of the abdomen revealed no bubbling sounds. 4.  Auscultation  And inspection of the chest after intubation showed symmetric chest excursion and symmetric air entry bilaterally. 5.  Chest X-ray has been ordered and is pending. The tube was secured at 25cm at the teeth with a tube bowden. The patient has been placed on a mechanical ventilator. There were no complications.     Doug Sutton MD

## 2017-03-04 NOTE — PROGRESS NOTES
Pacing wires pulled per Dr. Sita Mane. Patient instructed to 1 hour bedrest with every 15 min blood pressure checks for 1 hour. Patient voices understanding.

## 2017-03-04 NOTE — PROGRESS NOTES
Patient was intubated with a number 7.5 ET Tube. Tube placement verified by auscultation and ETCO2 monitor. ET Tube is secured at the 24 cm chacha at the lip and on the right side. Patient was intubated by Dr. Meryl Barriga on the 1 attempt. Breath sounds are coarse. Patient is Negative for subcutaneous air and chest excursion is symmetric. Trachea is midline. Patient is also Negative for cyanosis and is Negative for pitting edema. Patient placed on ventilator on documented settings. All alarms are set and audible. Resuscitation bag is at the head of the bed.       Ventilator Settings  Mode FIO2 Rate Tidal Volume Pressure PEEP I:E Ratio   SIMV, PRVC, Pressure support  40 %  16  500 ml  8 cm H2O  8 cm H20  1:3.45      Peak airway pressure: 18 cm H2O   Minute ventilation: 11 l/min     ABG:   Recent Labs      03/04/17   1358   PH  7.37   PCO2  27*   PO2  131*   HCO3  15*

## 2017-03-04 NOTE — PROGRESS NOTES
Spiritual Care Assessment/Progress Notes    Natalya Haley [id number]  [id number]    1955  63 y.o.  male    Patient Telephone Number: [phone] (home)   Buddhist Affiliation: Non Scientology   Language: English   Extended Emergency Contact Information  Primary Emergency Contact: [address] Phone: [phone]  Mobile Phone: [phone]  Relation: Spouse   Patient Active Problem List    Diagnosis Date Noted    Systolic CHF, acute on chronic ([address] 03/03/2017    Pleural effusion 03/02/2017    Hypoxia 03/02/2017    Cardiogenic shock ([address] 81/74/5447    Systolic CHF, chronic ([facility] 03/02/2017    Postoperative anemia due to acute blood loss 03/01/2017    Ischemic cardiomyopathy 02/28/2017    CAD, multiple vessel 02/28/2017    S/P CABG (coronary artery bypass graft) 02/28/2017    Hypertriglyceridemia 02/27/2017    NSTEMI (non-ST elevated myocardial infarction) ([facility] 02/27/2017    Depressed left ventricular ejection fraction 02/27/2017    Pure hypercholesterolemia 04/04/2016    Retinopathy of both eyes     Undiagnosed cardiac murmurs 03/07/2014    DM (diabetes mellitus) type II controlled, neurological manifestation ([facility] 03/07/2014    Allergic rhinitis, cause unspecified 03/07/2014    Contact dermatitis and other eczema, due to unspecified cause 03/07/2014    Esophageal reflux 03/07/2014    Other specified idiopathic peripheral neuropathy 03/07/2014    Essential hypertension, benign 03/07/2014    History of testicular cancer 01/01/1979        Date: 3/4/2017       Level of Buddhist/Spiritual Activity:  []         Involved in zana tradition/spiritual practice    []         Not involved in zana tradition/spiritual practice  []         Spiritually oriented    []         Claims no spiritual orientation    []         seeking spiritual identity  []         Feels alienated from Mandaen practice/tradition  []         Feels angry about Mandaen practice/tradition  []         Spirituality/Sabianism tradition is a resource for coping at this time. [x]         Not able to assess due to medical condition    Services Provided Today:  []         crisis intervention    []         reading Scriptures  [x]         spiritual assessment    []         prayer  []         empathic listening/emotional support  []         rites and rituals (cite in comments)  []         life review     []         Sabianism support  []         theological development   []         advocacy  []         ethical dialog     []         blessing  []         bereavement support    [x]         support to family  []         anticipatory grief support   []         help with AMD  []         spiritual guidance    []         meditation      Spiritual Care Needs  []         Emotional Support  []         Spiritual/Voodoo Care  []         Loss/Adjustment  []         Advocacy/Referral                /Ethics  []         No needs expressed at               this time  []         Other: (note in               comments)  [address]  []         Follow up visits with               pt/family  []         Provide materials  []         Schedule sacraments  []         Contact Community               Clergy  []         Follow up as needed  []         Other: (note in               comments)     Comments:   Spoke with spouse in lobby. Thankful for our support.     Joel Cabello

## 2017-03-04 NOTE — PROGRESS NOTES
CV Progress Note    Subjective:   Admit Date: 2017    Surgery Date:  4 Days Post-Op      Procedure:  Procedure(s):  CORONARY ARTERY BYPASS GRAFT (CABG)x3 [address] [facility]- Greater Saphenous Vein  ESOPHAGEAL TRANS ECHOCARDIOGRAM    Incisional pain:  minimal  Appetite:  good   Activity: Ambulating well   Complains of hiccups, no nausea, +flatus and bowel movement      Objective:     Vitals:  Blood pressure 125/76, pulse 74, temperature 97.5 °F (36.4 °C), resp. rate 18, height 5' 10\" (1.778 m), weight 222 lb 14.4 oz (101.1 kg), SpO2 94 %. Temp (24hrs), Av.4 °F (36.3 °C), Min:96.8 °F (36 °C), Max:97.8 °F (36.6 °C)  Neuro in tact  RRR  Lungs clear  Belly soft  Trace leg edema      Plan/Recommendations/Medical Decision Making:     Principal Problem:    NSTEMI (non-ST elevated myocardial infarction) ([facility] (2017)    Active Problems:    DM (diabetes mellitus) type II controlled, neurological manifestation ([address] (3/7/2014)      Essential hypertension, benign (3/7/2014)      Hypertriglyceridemia (2017)      Depressed left ventricular ejection fraction (2017)      Overview: 17 20%      Ischemic cardiomyopathy (2017)      Overview: EF 10-15% echo 17      CAD, multiple vessel (2017)      Overview: 17 (Dr Samuel Ibarra) Coronary artery bypass grafting x3. Grafts       consisting of       1. Left internal mammary artery to the left anterior descending. 2. Reversed LEFT saphenous vein graft to obtuse marginal.       3. Reversed LEFT saphenous vein graft to the left posterior descending       artery. S/P CABG (coronary artery bypass graft) (2017)      Postoperative anemia due to acute blood loss (3/1/2017)      Pleural effusion (3/2/2017)      Hypoxia (3/2/2017)      Cardiogenic shock (HCC) (4029)      Systolic CHF, acute on chronic ([address] (3/3/2017)      Good progress. Continue lasix. Titrate up coreg. Mobilize. Baclofen for hiccups.     Continue present treatment    See orders for details. Cherelle Gonzalez.  Alexandre Roblero MD

## 2017-03-04 NOTE — PROGRESS NOTES
Informed patients wife Telma Mujica of what happened and patient condition/ status.  also with wife.

## 2017-03-04 NOTE — PROCEDURES
Procedure:   US GUIDED CENTRAL LINE PLACEMENT    Indication:   VF Arrest  Shock    Summary:  Informed consent was obtained. A time-out was performed. Using the Sonosite ultrasound with the 5-10 MHz vascular probe transducer and sterile technique, the right IJ vein was identified and under direct real time visualization was cannulated. The CVC kit guide wire was then inserted and its position within the right IJ vein verified in short and long axis views on vascular probe US. Using Seldinger technique, a 3 lumen catheter was then placed in the right IJ  vein, after dilation of entry port without difficulty. There was no significant bleeding during the procedure and good flush and drawback was noted. The CVC was then affixed with supplied 2.0 silk sutures via the proximal and distal limiters, with the insertion point affixed at 17 cm. A biostatic disc was applied to the entry port followed by a transparent dressing. A chest x-ray confirmed proper placement of CVC/ is pending. There were no immediate complications.     EBL: 2    Omaira Claros MD

## 2017-03-04 NOTE — PROGRESS NOTES
Responded to rapid response/code blue  Called wife during code   She arrived within minutes and updated her 's status till staff could talk with her. Wife seemed to cope with change of patient ok. Will continue care when patient arrives in cvicu.   Signed by arun Salguerolain

## 2017-03-04 NOTE — PROGRESS NOTES
Humberto Alu  Admission Date: 2/27/2017             Daily Progress Note: 3/4/2017    The patient's chart is reviewed and the patient is discussed with the staff. 64 yo male with a history of chronic testicular CA, GERD, DM, HTN, HL, and sCHF who presented with NSTEMI - LHC with severe multivessel CAD and depressed LV (EF 20%). Initially required Impella. Now s/p CABG x 3 on 2/28/17 per Dr. Rody Reagan. Impella was removed 3/2/17. Subjective:     Currently on RA with o2 sat 94%. + cough productive of multicolored - green/bloody mucus. Using IS and drawing 750-1000 ml. Incisional pain controlled with pain medications. + c/o Hiccups.     Current Facility-Administered Medications   Medication Dose Route Frequency    carvedilol (COREG) tablet 6.25 mg  6.25 mg Oral Q12H    sodium chloride (NS) flush 5-10 mL  5-10 mL IntraVENous Q8H    sodium chloride (NS) flush 5-10 mL  5-10 mL IntraVENous PRN    furosemide (LASIX) tablet 40 mg  40 mg Oral DAILY    docusate sodium (COLACE) capsule 100 mg  100 mg Oral DAILY    alum-mag hydroxide-simeth (MYLANTA) oral suspension 30 mL  30 mL Oral Q4H PRN    famotidine (PEPCID) tablet 20 mg  20 mg Oral BID    ondansetron (ZOFRAN) injection 4 mg  4 mg IntraVENous Q6H PRN    acetaminophen (TYLENOL) tablet 650 mg  650 mg Oral Q4H PRN    atorvastatin (LIPITOR) tablet 80 mg  80 mg Oral QHS    insulin regular (NOVOLIN R, HUMULIN R) injection   SubCUTAneous AC&HS    amiodarone (CORDARONE) tablet 200 mg  200 mg Oral Q12H    mupirocin (BACTROBAN) 2 % ointment   Both Nostrils Q12H    aspirin delayed-release tablet 81 mg  81 mg Oral DAILY    lisinopril (PRINIVIL, ZESTRIL) tablet 2.5 mg  2.5 mg Oral DAILY    nitroglycerin (NITROLINGUAL) sublingual 0.4 mg/spray  1 Spray SubLINGual Q5MIN PRN    baclofen (LIORESAL) tablet 10 mg  10 mg Oral TID PRN    prednisoLONE acetate (PRED FORTE) 1 % ophthalmic suspension 1 Drop  1 Drop Right Eye QID    oxyCODONE-acetaminophen (PERCOCET) 5-325 mg per tablet 1 Tab  1 Tab Oral Q4H PRN    oxyCODONE-acetaminophen (PERCOCET 10)  mg per tablet 1 Tab  1 Tab Oral Q4H PRN    traMADol (ULTRAM) tablet 100 mg  100 mg Oral Q6H PRN       Review of Systems  Constitutional: negative for fever, chills, sweats  Cardiovascular: negative for chest pain, palpitations, syncope, edema  Gastrointestinal:  negative for dysphagia, reflux, vomiting, diarrhea, abdominal pain, or melena  Neurologic:  negative for focal weakness, numbness, headache    Objective:     Vitals:    03/03/17 1845 03/03/17 2006 03/03/17 2230 03/04/17 0230   BP: 114/63 107/66 100/62 113/63   Pulse: 72 69 63 72   Resp: 18  18 18   Temp: 97.6 °F (36.4 °C)  96.8 °F (36 °C) 97.8 °F (36.6 °C)   SpO2: 91%  91% 92%   Weight:    222 lb 14.4 oz (101.1 kg)   Height:         Intake and Output:   03/02 1901 - 03/04 0700  In: 2070 [P.O.:2070]  Out: 800 [Urine:700]       Physical Exam:   Constitution:  the patient is well developed and in no acute distress  EENMT:  Sclera clear, pupils equal, oral mucosa moist  Respiratory: crackles bilateral posterior bases, RA  Cardiovascular:  RRR with II/ VI SM  Gastrointestinal: soft and non-tender; with positive bowel sounds. Musculoskeletal: warm without cyanosis. There is no lower leg edema.   Skin:  no jaundice or rashes, midline sternal incision without redness, swelling, or drainage   Neurologic: no gross neuro deficits     Psychiatric:  alert and oriented x 3    CHEST XRAY:   3/3      LAB  Recent Labs      03/04/17   0558  03/03/17   2028  03/03/17   1600  03/03/17   1101  03/03/17   0554   GLUCPOC  141*  161*  203*  190*  159*      Recent Labs      03/04/17   0440  03/03/17   0430  03/02/17   0730  03/02/17   0700   WBC  9.0  11.1  9.6  9.3   HGB  10.7*  9.6*  8.0*  8.1*   HCT  32.8*  29.2*  24.2*  24.4*   PLT  125*  105*  85*  86*     Recent Labs      03/04/17   0440  03/03/17   0430  03/02/17   0315   NA  138  138  141   K 5.0  5.1  4.5   CL  104  106  109*   CO2  19*  23  25   GLU  130*  155*  202*   BUN  43*  38*  22   CREA  1.22  1.35  1.00   MG  3.0*  2.8*  2.3   CA  7.5*  7.2*  7.1*       Assessment:  (Medical Decision Making)     Hospital Problems  Date Reviewed: 3/4/2017          Codes Class Noted POA    Systolic CHF, acute on chronic [facility]) ICD-10-CM: I50.23  ICD-9-CM: 428.23, 428.0  3/3/2017 Unknown        Pleural effusion ICD-10-CM: J90  ICD-9-CM: 511.9  3/2/2017 Yes        Hypoxia ICD-10-CM: R09.02  ICD-9-CM: 799.02  3/2/2017 Unknown    On RA      Cardiogenic shock (HCC) ICD-10-CM: R57.0  ICD-9-CM: 785.51  3/2/2017 Unknown    Per Cardiology will need lifevest at discharge      Postoperative anemia due to acute blood loss ICD-10-CM: D62  ICD-9-CM: 285.1  3/1/2017 No    Hgb 10.7      Ischemic cardiomyopathy (Chronic) ICD-10-CM: I25.5  ICD-9-CM: 414.8  2/28/2017 Yes     EF 10-15% echo 2/27/17         CAD, multiple vessel (Chronic) ICD-10-CM: I25.10  ICD-9-CM: 414.00  2/28/2017 Yes     2/28/17 (Dr Yesica Lopez) Coronary artery bypass grafting x3. Grafts consisting of   1. Left internal mammary artery to the left anterior descending. 2. Reversed LEFT saphenous vein graft to obtuse marginal.   3. Reversed LEFT saphenous vein graft to the left posterior descending   artery.           S/P CABG (coronary artery bypass graft) ICD-10-CM: Z95.1  ICD-9-CM: V45.81  2/28/2017 Yes        Hypertriglyceridemia (Chronic) ICD-10-CM: E78.1  ICD-9-CM: 272.1  2/27/2017 Yes        * (Principal)NSTEMI (non-ST elevated myocardial infarction) [facility] ICD-10-CM: I21.4  ICD-9-CM: 410.70  2/27/2017 Yes        Depressed left ventricular ejection fraction (Chronic) ICD-10-CM: R93.1  ICD-9-CM: 794.39  2/27/2017 Yes     2/27/17 20%         DM (diabetes mellitus) type II controlled, neurological manifestation (HCC) (Chronic) ICD-10-CM: E11.49  ICD-9-CM: 250.60  3/7/2014 Yes    BS range 141-203  SSI      Essential hypertension, benign (Chronic) ICD-10-CM: I10  ICD-9-CM: 401.1  3/7/2014 Yes             Plan:  (Medical Decision Making)     --continue IS  --PT following for mobility - ambulated 72' yesterday    More than 50% of the time documented was spent in face-to-face contact with the patient and in the care of the patient on the floor/unit where the patient is located. Erinn Carlson NP  I have spoken with and examined the patient. I agree with the above assessment and plan as documented. Patient seen and examined after code blue for VF. See additional notes for details of this. Mr. Mee White has now been transferred to CV-ICU on amiodarone drip for VF.       Will f/u CXR and follow in CV-ICU    Amanda Mensah MD

## 2017-03-04 NOTE — PROGRESS NOTES
Physical Therapy Note:    Therapist is discontinuing physical therapy at this time due to decline in medical status/transfer to CVICU. Mr. Olson's physical therapy goals were not met. Please reorder PT when our services are again appropriate. Thank you.     Jerald Gill DPT  3/4/2017

## 2017-03-04 NOTE — PROGRESS NOTES
Problem: Mobility Impaired (Adult and Pediatric)  Goal: *Acute Goals and Plan of Care (Insert Text)  LTG:  (1.)Mr. Manoj Her will move from supine to sit and sit to supine , scoot up and down and roll side to side in bed with MODIFIED INDEPENDENCE within 7 day(s). (2.)Mr. Manoj Her will transfer from bed to chair and chair to bed with MODIFIED INDEPENDENCE using the least restrictive device within 7 day(s). (3.)Mr. Manoj Her will ambulate with MODIFIED INDEPENDENCE for 500+ feet with the least restrictive device within 7 day(s). (4.)Mr. Manoj Her will perform standing static and dynamic balance activities x 8 minutes with SUPERVISION to improve safety within 7 day(s). (5.)Mr. Manoj Her will ascend and descend 5 stairs using one hand rail(s) with SUPERVISION to improve functional mobility and safety within 7 day(s). (6.)Mr. Manoj Her will tolerate 25 minutes of therapeutic activity/exercises within 7 day(s). ________________________________________________________________________________________________      PHYSICAL THERAPY: Daily Note, Treatment Day: 1st, AM and PM 3/4/2017  INPATIENT: Hospital Day: 6  Payor: BLUE CHOICE / [place] / Product Type: HMO /      NAME/AGE/GENDER: Dennis Alex is a 63 y.o. male      PRIMARY DIAGNOSIS: Atherosclerosis of native coronary artery of native heart with unstable angina pectoris ([facility] [I25.110] NSTEMI (non-ST elevated myocardial infarction) ([facility] NSTEMI (non-ST elevated myocardial infarction) ([facility]  Procedure(s) (LRB):  CORONARY ARTERY BYPASS GRAFT (CABG)x3 /LIMA (N/A)  VEIN HARVEST- Greater Saphenous Vein (Left)  ESOPHAGEAL TRANS ECHOCARDIOGRAM (N/A)  4 Days Post-Op  ICD-10: Treatment Diagnosis:       · Generalized Muscle Weakness (M62.81)  · Difficulty in walking, Not elsewhere classified (R26.2)   Precaution/Allergies:  Review of patient's allergies indicates no known allergies.        ASSESSMENT:      Mr. Manoj Her is status post above and presents with decreased strength, impaired balance, limited activity tolerance, and overall decreased functional mobility. Patient was sitting in recliner chair on arrival to room and agreeable to PT treatment this am.   Patient noted to have the hiccups,  Patient instructed in sit to stand transfer via momentum method. Supervision provided. Patient then ambulated with rolling walker x 90 feet with some SOB noted. Patient returned to room and to recliner chair where he was instructed in therapeutic exercises and issued written copy. Good session today. Progress demonstrated. Patient was independent and active prior to admission and is hoping to progress well. Patient would benefit from continued skilled acute PT and  PT follow up at discharge. This section established at most recent assessment   PROBLEM LIST (Impairments causing functional limitations):  1. Decreased Strength  2. Decreased Transfer Abilities  3. Decreased Ambulation Ability/Technique  4. Decreased Balance  5. Decreased Activity Tolerance  6. Increased Shortness of Breath  7. Decreased Knowledge of Precautions  8. Decreased [place] with Home Exercise Program    INTERVENTIONS PLANNED: (Benefits and precautions of physical therapy have been discussed with the patient.)  1. Balance Exercise  2. Bed Mobility  3. Family Education  4. Gait Training  5. Home Exercise Program (HEP)  6. Therapeutic Activites  7. Therapeutic Exercise/Strengthening  8. Transfer Training  9. Group Therapy      TREATMENT PLAN: Frequency/Duration: twice daily for duration of hospital stay  Rehabilitation Potential For Stated Goals: EXCELLENT      RECOMMENDED REHABILITATION/EQUIPMENT: (at time of discharge pending progress): Continue Skilled Therapy and Home Health: Physical Therapy.                    HISTORY:   History of Present Injury/Illness (Reason for Referral):  Patient is status post above  Past Medical History/Comorbidities:   Mr. Sacha Loya  has a past medical history of Allergic rhinitis, cause unspecified (3/7/2014); CAD, multiple vessel (2/28/2017); [facility] ([facility]); Contact dermatitis and other eczema, due to unspecified cause (3/7/2014); Depressed left ventricular ejection fraction (2/27/2017); Esophageal reflux (3/7/2014); GERD (gastroesophageal reflux disease); Ischemic cardiomyopathy (2/28/2017); NSTEMI (non-ST elevated myocardial infarction) ([facility] (2/27/2017); Other and unspecified hyperlipidemia (3/7/2014); Other specified idiopathic peripheral neuropathy (3/7/2014); Retinopathy of both eyes; Type II or unspecified type diabetes mellitus with neurological manifestations, uncontrolled (3/7/2014); Undiagnosed cardiac murmurs (3/7/2014); Unspecified essential hypertension (3/7/2014); and Vitreous hemorrhage, unspecified eye ([facility] (2/27/2017). Mr. Beryle Primmer  has a past surgical history that includes urological (Left, 1979); urological (Left, 2010); and heent (2003). Social History/Living Environment:   Home Environment: Private residence  One/Two Story Residence: One story  Living Alone: No  Support Systems: Family member(s)  Patient Expects to be Discharged to[de-identified] Private residence  Current DME Used/Available at Home: None  Prior Level of Function/Work/Activity:  Patient lives with spouse in one story home with a few stairs through out the home. Patient was independent and active prior to admission.   Personal Factors:          Patient had radiation for cancer   Number of Personal Factors/Comorbidities that affect the Plan of Care: 1-2: MODERATE COMPLEXITY   EXAMINATION:   Most Recent Physical Functioning:   Gross Assessment:                  Posture:     Balance:  Sitting: Intact  Standing: Intact  Standing - Static: Good  Standing - Dynamic : Fair Bed Mobility:     Wheelchair Mobility:     Transfers:  Sit to Stand: Supervision  Stand to Sit: Supervision  Gait:     Base of Support: Center of gravity altered  Distance (ft): 90 Feet (ft)  Assistive Device: Walker, rolling Body Structures Involved:  1. Heart  2. Lungs  3. Thoracic Cage Body Functions Affected:  1. Cardio  2. Movement Related Activities and Participation Affected:  1. General Tasks and Demands  2. Mobility  3. Domestic Life   Number of elements that affect the Plan of Care: 4+: HIGH COMPLEXITY   CLINICAL PRESENTATION:   Presentation: Stable and uncomplicated: LOW COMPLEXITY   CLINICAL DECISION MAKIN[address] [place] [address] AM-PAC 6 Clicks   Basic Mobility Inpatient Short Form  How much difficulty does the patient currently have. .. Unable A Lot A Little None   1. Turning over in bed (including adjusting bedclothes, sheets and blankets)? [ ] 1   [ ] 2   [X] 3   [ ] 4   2. Sitting down on and standing up from a chair with arms ( e.g., wheelchair, bedside commode, etc.)   [ ] 1   [ ] 2   [X] 3   [ ] 4   3. Moving from lying on back to sitting on the side of the bed? [ ] 1   [ ] 2   [X] 3   [ ] 4   How much help from another person does the patient currently need. .. Total A Lot A Little None   4. Moving to and from a bed to a chair (including a wheelchair)? [ ] 1   [ ] 2   [X] 3   [ ] 4   5. Need to walk in hospital room? [ ] 1   [ ] 2   [X] 3   [ ] 4   6. Climbing 3-5 steps with a railing? [ ] 1   [ ] 2   [X] 3   [ ] 4   © , Trustees of [address] [place] [address], under license to [employer]. All rights reserved    Score:  Initial: 18 Most Recent: X (Date: -- )     Interpretation of Tool:  Represents activities that are increasingly more difficult (i.e. Bed mobility, Transfers, Gait).        Score 24 23 22-20 19-15 14-10 9-7 6       Modifier CH CI CJ CK CL CM CN         · Mobility - Walking and Moving Around:               - CURRENT STATUS:    CK - 40%-59% impaired, limited or restricted               - GOAL STATUS:           CJ - 20%-39% impaired, limited or restricted               - D/C STATUS:                       ---------------To be determined---------------  Payor[place] BLUE CHOICE [place] / Product Type: HMO /       Medical Necessity:     · Patient demonstrates excellent rehab potential due to higher previous functional level. · Skilled intervention continues to be required due to decline in overall functional mobility. Reason for Services/Other Comments:  · Patient continues to require skilled intervention due to medical complications and patient unable to attend/participate in therapy as expected. Use of outcome tool(s) and clinical judgement create a POC that gives a: Clear prediction of patient's progress: LOW COMPLEXITY                 TREATMENT:      Pre-treatment Symptoms/Complaints:  \"I just want these hiccups to be gone\"  Pain: Initial:      Post Session:  No c/o pain      Therapeutic Activity: (    10 minutes) Therapeutic activities including transfer, gait training and balance activities to improve mobility, strength, balance and coordination. Required min assistance    to promote static and dynamic balance in standing and promote coordination of bilateral, lower extremity(s). Date:  3/4/17 Date:   Date:     ACTIVITY/EXERCISE AM PM AM PM AM PM   Ankle pumps 30        Shoulder shrugs 30        Gluteal sets 30        LAQ 30        Hip flexion 30        Hip abduction 30        Knee extension 30        B = bilateral; AA = active assistive; A = active; P = passive  Therapeutic Exercise: ( 15 minutes)  Exercises per grid below to improve mobility, strength, balance and coordination. Required min   visual cues to promote proper body breathing techniques and safe performance of exercises. Progressed repetitions as indicated.          Braces/Orthotics/Lines/Etc:   · O2 Device: Room air  Treatment/Session Assessment:    · Response to Treatment:  Tolerated well, short of breath with activity  · Interdisciplinary Collaboration:  · Physical Therapy Assistant and Registered Nurse  · After treatment position/precautions:  · Up in chair  · Caregiver at bedside  · Call light within reach  · RN notified  · Compliance with Program/Exercises: compliant all of the time. · Recommendations/Intent for next treatment session: \"Next visit will focus on advancements to more challenging activities and reduction in assistance provided\".   Total Treatment Duration:  PT Patient Time In/Time Out  Time In: 0845  Time Out: 6314     Jaquita Primrose, MYNOR

## 2017-03-04 NOTE — PROGRESS NOTES
CTS: called to bedside for code in progress. Patient was apparently eating lunch, had sinus bradycardia in the 40s, asymptomatic. Then several seconds later had coarse vfib on monitor, no pulse, CPR started x approximately 5minutes and patient received 1 shock at 200J -> sinus rhythm and ROSC. When I arrived patient had been moved to bed, was in sinus rhythm with isoelectric ST segments on monitor, sat 100% on 100% via bag mask, SBP 160s Chest wall motion symmetric, trachea not deviated, good bilateral breath sounds, good heart tones. Good peripheral pulses. Extremities warm. Patient was somnolent but responsive to yes/no questions and moving all four extremities to command. Intubation in progress. Plan to plan to place central line, start IV amiodarone, move to CVICU, Stat chest xray, echo and ECG.

## 2017-03-04 NOTE — PROGRESS NOTES
Problem: Mobility Impaired (Adult and Pediatric)  Goal: *Acute Goals and Plan of Care (Insert Text)  LTG:  (1.)Mr. Antonia Tilley will move from supine to sit and sit to supine , scoot up and down and roll side to side in bed with MODIFIED INDEPENDENCE within 7 day(s). (2.)Mr. Antonia Tilley will transfer from bed to chair and chair to bed with MODIFIED INDEPENDENCE using the least restrictive device within 7 day(s). (3.)Mr. Antonia Tilley will ambulate with MODIFIED INDEPENDENCE for 500+ feet with the least restrictive device within 7 day(s). (4.)Mr. Antonia Tilley will perform standing static and dynamic balance activities x 8 minutes with SUPERVISION to improve safety within 7 day(s). (5.)Mr. Antonia Tilley will ascend and descend 5 stairs using one hand rail(s) with SUPERVISION to improve functional mobility and safety within 7 day(s). (6.)Mr. Antonia Tilley will tolerate 25 minutes of therapeutic activity/exercises within 7 day(s). ________________________________________________________________________________________________      PHYSICAL THERAPY: Daily Note, Treatment Day: 1st and PM 3/4/2017  INPATIENT: Hospital Day: 6  Payor: BLUE CHOICE / [place] / Product Type: HMO /      NAME/AGE/GENDER: Alysia Acosta is a 63 y.o. male      PRIMARY DIAGNOSIS: Atherosclerosis of native coronary artery of native heart with unstable angina pectoris ([facility] [I25.110] NSTEMI (non-ST elevated myocardial infarction) ([facility] NSTEMI (non-ST elevated myocardial infarction) ([facility]  Procedure(s) (LRB):  CORONARY ARTERY BYPASS GRAFT (CABG)x3 /LIMA (N/A)  VEIN HARVEST- Greater Saphenous Vein (Left)  ESOPHAGEAL TRANS ECHOCARDIOGRAM (N/A)  4 Days Post-Op  ICD-10: Treatment Diagnosis:       · Generalized Muscle Weakness (M62.81)  · Difficulty in walking, Not elsewhere classified (R26.2)   Precaution/Allergies:  Review of patient's allergies indicates no known allergies.        ASSESSMENT:      Mr. Antonia Tilley is status post above and presents with decreased strength, impaired balance, limited activity tolerance, and overall decreased functional mobility. Patient was sitting in recliner chair on arrival to room and agreeable to PT treatment this pm.  Wife present. Patient instructed in sit to stand transfer via momentum method. Supervision provided. Patient then ambulated with rolling walker x 50 feet with some SOB noted patient stops and leans over the walker and rest briefly and then proceeds back to the recliner. Patient is coached to perform pursed lip breathing and SOB decreases. After several min of rest the RN enterers the room and checks the patient (heart rate low) she provides attention to the patient and checks patient throughly. Patient is talking and responding. States that he is ok. As this writer prepares to exit the room the patient went into distress. RN activated code. This section established at most recent assessment   PROBLEM LIST (Impairments causing functional limitations):  1. Decreased Strength  2. Decreased Transfer Abilities  3. Decreased Ambulation Ability/Technique  4. Decreased Balance  5. Decreased Activity Tolerance  6. Increased Shortness of Breath  7. Decreased Knowledge of Precautions  8. Decreased Titus with Home Exercise Program    INTERVENTIONS PLANNED: (Benefits and precautions of physical therapy have been discussed with the patient.)  1. Balance Exercise  2. Bed Mobility  3. Family Education  4. Gait Training  5. Home Exercise Program (HEP)  6. Therapeutic Activites  7. Therapeutic Exercise/Strengthening  8. Transfer Training  9. Group Therapy      TREATMENT PLAN: Frequency/Duration: twice daily for duration of hospital stay  Rehabilitation Potential For Stated Goals: EXCELLENT      RECOMMENDED REHABILITATION/EQUIPMENT: (at time of discharge pending progress): Continue Skilled Therapy and Home Health: Physical Therapy.                    HISTORY:   History of Present Injury/Illness (Reason for Referral):  Patient is status post above  Past Medical History/Comorbidities:   Mr. Praful Zamarripa  has a past medical history of Allergic rhinitis, cause unspecified (3/7/2014); CAD, multiple vessel (2/28/2017); Cancer ([facility] (1979); Contact dermatitis and other eczema, due to unspecified cause (3/7/2014); Depressed left ventricular ejection fraction (2/27/2017); Esophageal reflux (3/7/2014); GERD (gastroesophageal reflux disease); Ischemic cardiomyopathy (2/28/2017); NSTEMI (non-ST elevated myocardial infarction) [facility] (2/27/2017); Other and unspecified hyperlipidemia (3/7/2014); Other specified idiopathic peripheral neuropathy (3/7/2014); Retinopathy of both eyes; Type II or unspecified type diabetes mellitus with neurological manifestations, uncontrolled (3/7/2014); Undiagnosed cardiac murmurs (3/7/2014); Unspecified essential hypertension (3/7/2014); and Vitreous hemorrhage, unspecified eye ([facility] (2/27/2017). Mr. Praful Zamarripa  has a past surgical history that includes urological (Left, 1979); urological (Left, 2010); and heent (2003). Social History/Living Environment:   Home Environment: Private residence  One/Two Story Residence: One story  Living Alone: No  Support Systems: Family member(s)  Patient Expects to be Discharged to[de-identified] Private residence  Current DME Used/Available at Home: None  Prior Level of Function/Work/Activity:  Patient lives with spouse in one story home with a few stairs through out the home. Patient was independent and active prior to admission.   Personal Factors:          Patient had radiation for cancer   Number of Personal Factors/Comorbidities that affect the Plan of Care: 1-2: MODERATE COMPLEXITY   EXAMINATION:   Most Recent Physical Functioning:   Gross Assessment:                  Posture:     Balance:  Sitting: Intact  Standing: Intact  Standing - Static: Good  Standing - Dynamic : Fair Bed Mobility:     Wheelchair Mobility:     Transfers:  Sit to Stand: Contact guard assistance;Minimum assistance  Stand to Sit: Contact guard assistance;Minimum assistance  Gait:     Base of Support: Center of gravity altered  Distance (ft): 50 Feet (ft)  Assistive Device: Walker, rolling       Body Structures Involved:  1. Heart  2. Lungs  3. Thoracic Cage Body Functions Affected:  1. Cardio  2. Movement Related Activities and Participation Affected:  1. General Tasks and Demands  2. Mobility  3. Domestic Life   Number of elements that affect the Plan of Care: 4+: HIGH COMPLEXITY   CLINICAL PRESENTATION:   Presentation: Stable and uncomplicated: LOW COMPLEXITY   CLINICAL DECISION MAKIN[address] [place] [address] AM-PAC 6 Clicks   Basic Mobility Inpatient Short Form  How much difficulty does the patient currently have. .. Unable A Lot A Little None   1. Turning over in bed (including adjusting bedclothes, sheets and blankets)? [ ] 1   [ ] 2   [X] 3   [ ] 4   2. Sitting down on and standing up from a chair with arms ( e.g., wheelchair, bedside commode, etc.)   [ ] 1   [ ] 2   [X] 3   [ ] 4   3. Moving from lying on back to sitting on the side of the bed? [ ] 1   [ ] 2   [X] 3   [ ] 4   How much help from another person does the patient currently need. .. Total A Lot A Little None   4. Moving to and from a bed to a chair (including a wheelchair)? [ ] 1   [ ] 2   [X] 3   [ ] 4   5. Need to walk in hospital room? [ ] 1   [ ] 2   [X] 3   [ ] 4   6. Climbing 3-5 steps with a railing? [ ] 1   [ ] 2   [X] 3   [ ] 4   © , Trustees of [address] [place] [address], under license to [employer]. All rights reserved    Score:  Initial: 18 Most Recent: X (Date: -- )     Interpretation of Tool:  Represents activities that are increasingly more difficult (i.e. Bed mobility, Transfers, Gait).        Score 24 23 22-20 19-15 14-10 9-7 6       Modifier CH CI CJ CK CL CM CN         · Mobility - Walking and Moving Around:               - CURRENT STATUS:    CK - 40%-59% impaired, limited or restricted               - GOAL STATUS:           CJ - 20%-39% impaired, limited or restricted               - D/C STATUS:                       ---------------To be determined---------------  Payor[place]UE CHO[place] / Product Type: HMO /       Medical Necessity:     · Patient demonstrates excellent rehab potential due to higher previous functional level. · Skilled intervention continues to be required due to decline in overall functional mobility. Reason for Services/Other Comments:  · Patient continues to require skilled intervention due to medical complications and patient unable to attend/participate in therapy as expected. Use of outcome tool(s) and clinical judgement create a POC that gives a: Clear prediction of patient's progress: LOW COMPLEXITY                 TREATMENT:      Pre-treatment Symptoms/Complaints:  \"OK\"  Wife present  Pain: Initial:   Pain Intensity 1: 0  Post Session:  No c/o pain reported      Therapeutic Activity: (    15 minutes) Therapeutic activities including transfer, gait training and balance activities to improve mobility, strength, balance and coordination. Required min assistance    to promote static and dynamic balance in standing and promote coordination of bilateral, lower extremity(s). Date:  3/4/17 Date:   Date:     ACTIVITY/EXERCISE AM PM AM PM AM PM   Ankle pumps 30        Shoulder shrugs 30        Gluteal sets 30        LAQ 30        Hip flexion 30        Hip abduction 30        Knee extension 30        B = bilateral; AA = active assistive; A = active; P = passive  Therapeutic Exercise: (  minutes)  Exercises per grid below to improve mobility, strength, balance and coordination. Required min   visual cues to promote proper body breathing techniques and safe performance of exercises. Progressed repetitions as indicated.          Braces/Orthotics/Lines/Etc:   · O2 Device: Room air  Treatment/Session Assessment:    · Response to Treatment:  Tolerated well, short of breath with activity  No hiccups noted. · Interdisciplinary Collaboration:  · Physical Therapy Assistant and Registered Nurse  · After treatment position/precautions:  · Up in chair  · Caregiver at bedside  · Call light within reach  · RN notified  · Compliance with Program/Exercises: compliant all of the time. · Recommendations/Intent for next treatment session: \"Next visit will focus on advancements to more challenging activities and reduction in assistance provided\".   Total Treatment Duration:  PT Patient Time In/Time Out  Time In: 1320  Time Out: 1435     Ivanna Paulson-Gregorio, PTA

## 2017-03-04 NOTE — PROGRESS NOTES
CODE BLUE NOTE  1340  Upon arrival patient receiving chest compressions on floor of room appearing gray and unresponsive, no IV access, being connected to portable cardiac monitor with pads being placed. Cardiac rhythm was coarse VF and 200J unsynchronized defib administered with ROSC. Upon positioning in bed on board, pulse palpable with HR 60.  BP now stable without administration of ACLS protocol medications and patient was intubated (morphine/etomidate), R IJ central line placed prior to transfer to CV-ICU.   33 min of critical care time spent in care of patient  Michelle Salvador MD

## 2017-03-04 NOTE — PROGRESS NOTES
Patient noted to be in VF, unresponsive, no pulse. Code blue called, transferred patient onto floor and immediatly began chest compressions until code team arrived.

## 2017-03-04 NOTE — PROGRESS NOTES
Patient heart rate 44, patient asymptomatic /57. Called monitor room to look at all leads, no heart block noted. PTA at bedside working with patient. Will continue to monitor.

## 2017-03-04 NOTE — PROGRESS NOTES
1440-Patient arrived via bed with RT bagging and 2 staff members. Dual skin assessment reveals healing surgical wounds, diffuse ecchymosis at groin, small amount of bruising around right eye, no breakdown at sacrum, allevyn placed. Patient incontinent of small amount of brown stool. Patient arrived with right IJ tripple lumen, ET tube, and right shoulder peripheral line. Flores placed upon arrival to unit to monitor I&O. Rock Caldwell at bedside to place arterial line.

## 2017-03-05 ENCOUNTER — APPOINTMENT (OUTPATIENT)
Dept: GENERAL RADIOLOGY | Age: 62
DRG: 001 | End: 2017-03-05
Attending: THORACIC SURGERY (CARDIOTHORACIC VASCULAR SURGERY)
Payer: COMMERCIAL

## 2017-03-05 PROBLEM — I49.01 CARDIAC ARREST WITH VENTRICULAR FIBRILLATION (HCC): Status: ACTIVE | Noted: 2017-03-05

## 2017-03-05 PROBLEM — I46.9 CARDIAC ARREST WITH VENTRICULAR FIBRILLATION (HCC): Status: ACTIVE | Noted: 2017-03-05

## 2017-03-05 LAB
ANION GAP BLD CALC-SCNC: 10 MMOL/L (ref 7–16)
ARTERIAL PATENCY WRIST A: ABNORMAL
ARTERIAL PATENCY WRIST A: ABNORMAL
ATRIAL RATE: 62 BPM
ATRIAL RATE: 62 BPM
BASE DEFICIT BLDA-SCNC: 0.9 MMOL/L (ref 0–2)
BASE EXCESS BLDA CALC-SCNC: 2.2 MMOL/L (ref 0–3)
BDY SITE: ABNORMAL
BDY SITE: ABNORMAL
BUN SERPL-MCNC: 38 MG/DL (ref 6–23)
CA-I BLD-SCNC: 1.08 MMOL/L (ref 1–1.3)
CA-I BLD-SCNC: 1.08 MMOL/L (ref 1–1.3)
CALCIUM SERPL-MCNC: 7.6 MG/DL (ref 8.3–10.4)
CALCULATED P AXIS, ECG09: 64 DEGREES
CALCULATED P AXIS, ECG09: 75 DEGREES
CALCULATED R AXIS, ECG10: 59 DEGREES
CALCULATED R AXIS, ECG10: 60 DEGREES
CALCULATED T AXIS, ECG11: 30 DEGREES
CALCULATED T AXIS, ECG11: 43 DEGREES
CHLORIDE BLDA-SCNC: 101 MMOL/L (ref 98–106)
CHLORIDE BLDA-SCNC: 102 MMOL/L (ref 98–106)
CHLORIDE SERPL-SCNC: 105 MMOL/L (ref 98–107)
CO2 SERPL-SCNC: 23 MMOL/L (ref 21–32)
COHGB MFR BLD: 0.3 % (ref 0.5–1.5)
COHGB MFR BLD: 1.3 % (ref 0.5–1.5)
CREAT SERPL-MCNC: 1.12 MG/DL (ref 0.8–1.5)
DIAGNOSIS, 93000: NORMAL
DIAGNOSIS, 93000: NORMAL
DIASTOLIC BP, ECG02: NORMAL MMHG
DIASTOLIC BP, ECG02: NORMAL MMHG
DO-HGB BLD-MCNC: 2 % (ref 0–5)
DO-HGB BLD-MCNC: 3 % (ref 0–5)
ERYTHROCYTE [DISTWIDTH] IN BLOOD BY AUTOMATED COUNT: 14.1 % (ref 11.9–14.6)
FIO2 ON VENT: 35 %
GAS FLOW.O2 O2 DELIVERY SYS: 10 L/MIN
GLUCOSE BLD STRIP.AUTO-MCNC: 101 MG/DL (ref 65–100)
GLUCOSE BLD STRIP.AUTO-MCNC: 103 MG/DL (ref 65–100)
GLUCOSE BLD STRIP.AUTO-MCNC: 122 MG/DL (ref 65–100)
GLUCOSE BLD STRIP.AUTO-MCNC: 126 MG/DL (ref 65–100)
GLUCOSE BLD STRIP.AUTO-MCNC: 127 MG/DL (ref 65–100)
GLUCOSE BLD STRIP.AUTO-MCNC: 129 MG/DL (ref 65–100)
GLUCOSE BLD STRIP.AUTO-MCNC: 146 MG/DL (ref 65–100)
GLUCOSE BLD STRIP.AUTO-MCNC: 146 MG/DL (ref 65–100)
GLUCOSE BLD STRIP.AUTO-MCNC: 189 MG/DL (ref 65–100)
GLUCOSE SERPL-MCNC: 125 MG/DL (ref 65–100)
HCO3 BLDA-SCNC: 22 MMOL/L (ref 22–26)
HCO3 BLDA-SCNC: 25 MMOL/L (ref 22–26)
HCT VFR BLD AUTO: 29.5 % (ref 41.1–50.3)
HGB BLD-MCNC: 10 G/DL (ref 13.6–17.2)
HGB BLDMV-MCNC: 10.9 GM/DL (ref 11.7–15)
HGB BLDMV-MCNC: 11.2 GM/DL (ref 11.7–15)
MAGNESIUM SERPL-MCNC: 2.6 MG/DL (ref 1.8–2.4)
MCH RBC QN AUTO: 30.3 PG (ref 26.1–32.9)
MCHC RBC AUTO-ENTMCNC: 33.9 G/DL (ref 31.4–35)
MCV RBC AUTO: 89.4 FL (ref 79.6–97.8)
METHGB MFR BLD: 0.3 % (ref 0–1.5)
METHGB MFR BLD: 0.5 % (ref 0–1.5)
OXYHGB MFR BLDA: 96.1 % (ref 94–97)
OXYHGB MFR BLDA: 96.7 % (ref 94–97)
P-R INTERVAL, ECG05: 164 MS
P-R INTERVAL, ECG05: 170 MS
PCO2 BLDA: 30 MMHG (ref 35–45)
PCO2 BLDA: 32 MMHG (ref 35–45)
PEEP RESPIRATORY: 8 CM[H2O]
PH BLDA: 7.48 [PH] (ref 7.35–7.45)
PH BLDA: 7.5 [PH] (ref 7.35–7.45)
PLATELET # BLD AUTO: 182 K/UL (ref 150–450)
PMV BLD AUTO: 10.5 FL (ref 10.8–14.1)
PO2 BLDA: 122 MMHG (ref 75–100)
PO2 BLDA: 92 MMHG (ref 75–100)
POTASSIUM BLDA-SCNC: 4.35 MMOL/L (ref 3.5–5.3)
POTASSIUM BLDA-SCNC: 4.38 MMOL/L (ref 3.5–5.3)
POTASSIUM SERPL-SCNC: 4.4 MMOL/L (ref 3.5–5.1)
Q-T INTERVAL, ECG07: 502 MS
Q-T INTERVAL, ECG07: 518 MS
QRS DURATION, ECG06: 114 MS
QRS DURATION, ECG06: 118 MS
QTC CALCULATION (BEZET), ECG08: 509 MS
QTC CALCULATION (BEZET), ECG08: 525 MS
RBC # BLD AUTO: 3.3 M/UL (ref 4.23–5.67)
SAO2 % BLD: 97 % (ref 92–98.5)
SAO2 % BLD: 99 % (ref 92–98.5)
SERVICE CMNT-IMP: ABNORMAL
SERVICE CMNT-IMP: ABNORMAL
SODIUM BLDA-SCNC: 130.7 MMOL/L (ref 135–148)
SODIUM BLDA-SCNC: 134.2 MMOL/L (ref 135–148)
SODIUM SERPL-SCNC: 138 MMOL/L (ref 136–145)
SYSTOLIC BP, ECG01: NORMAL MMHG
SYSTOLIC BP, ECG01: NORMAL MMHG
VENTILATION MODE VENT: ABNORMAL
VENTILATION MODE VENT: ABNORMAL
VENTRICULAR RATE, ECG03: 62 BPM
VENTRICULAR RATE, ECG03: 62 BPM
VT SETTING VENT: 500 ML
WBC # BLD AUTO: 10.1 K/UL (ref 4.3–11.1)

## 2017-03-05 PROCEDURE — 71010 XR CHEST PORT: CPT

## 2017-03-05 PROCEDURE — 83735 ASSAY OF MAGNESIUM: CPT | Performed by: THORACIC SURGERY (CARDIOTHORACIC VASCULAR SURGERY)

## 2017-03-05 PROCEDURE — 99233 SBSQ HOSP IP/OBS HIGH 50: CPT | Performed by: INTERNAL MEDICINE

## 2017-03-05 PROCEDURE — 74011000258 HC RX REV CODE- 258: Performed by: THORACIC SURGERY (CARDIOTHORACIC VASCULAR SURGERY)

## 2017-03-05 PROCEDURE — 36600 WITHDRAWAL OF ARTERIAL BLOOD: CPT

## 2017-03-05 PROCEDURE — 74011250637 HC RX REV CODE- 250/637: Performed by: THORACIC SURGERY (CARDIOTHORACIC VASCULAR SURGERY)

## 2017-03-05 PROCEDURE — 82803 BLOOD GASES ANY COMBINATION: CPT

## 2017-03-05 PROCEDURE — 80051 ELECTROLYTE PANEL: CPT

## 2017-03-05 PROCEDURE — 85027 COMPLETE CBC AUTOMATED: CPT | Performed by: THORACIC SURGERY (CARDIOTHORACIC VASCULAR SURGERY)

## 2017-03-05 PROCEDURE — 94003 VENT MGMT INPAT SUBQ DAY: CPT

## 2017-03-05 PROCEDURE — 74011250636 HC RX REV CODE- 250/636: Performed by: INTERNAL MEDICINE

## 2017-03-05 PROCEDURE — 80048 BASIC METABOLIC PNL TOTAL CA: CPT | Performed by: THORACIC SURGERY (CARDIOTHORACIC VASCULAR SURGERY)

## 2017-03-05 PROCEDURE — 74011000250 HC RX REV CODE- 250: Performed by: THORACIC SURGERY (CARDIOTHORACIC VASCULAR SURGERY)

## 2017-03-05 PROCEDURE — 74011250636 HC RX REV CODE- 250/636: Performed by: THORACIC SURGERY (CARDIOTHORACIC VASCULAR SURGERY)

## 2017-03-05 PROCEDURE — 92950 HEART/LUNG RESUSCITATION CPR: CPT | Performed by: INTERNAL MEDICINE

## 2017-03-05 PROCEDURE — 65610000006 HC RM INTENSIVE CARE

## 2017-03-05 RX ORDER — CYCLOBENZAPRINE HCL 10 MG
5 TABLET ORAL ONCE
Status: COMPLETED | OUTPATIENT
Start: 2017-03-05 | End: 2017-03-05

## 2017-03-05 RX ORDER — ATROPINE SULFATE 0.1 MG/ML
1 INJECTION INTRAVENOUS
Status: ACTIVE | OUTPATIENT
Start: 2017-03-05 | End: 2017-03-06

## 2017-03-05 RX ORDER — ADENOSINE 3 MG/ML
6 INJECTION, SOLUTION INTRAVENOUS
Status: ACTIVE | OUTPATIENT
Start: 2017-03-05 | End: 2017-03-06

## 2017-03-05 RX ORDER — SODIUM CHLORIDE 9 MG/ML
1 INJECTION, SOLUTION INTRAVENOUS
Status: ACTIVE | OUTPATIENT
Start: 2017-03-05 | End: 2017-03-06

## 2017-03-05 RX ORDER — PROCAINAMIDE HYDROCHLORIDE 500 MG/ML
500 INJECTION INTRAMUSCULAR; INTRAVENOUS
Status: ACTIVE | OUTPATIENT
Start: 2017-03-05 | End: 2017-03-06

## 2017-03-05 RX ORDER — AMIODARONE HYDROCHLORIDE 150 MG/3ML
150 INJECTION, SOLUTION INTRAVENOUS
Status: ACTIVE | OUTPATIENT
Start: 2017-03-05 | End: 2017-03-06

## 2017-03-05 RX ORDER — NALOXONE HYDROCHLORIDE 0.4 MG/ML
0.4 INJECTION, SOLUTION INTRAMUSCULAR; INTRAVENOUS; SUBCUTANEOUS
Status: ACTIVE | OUTPATIENT
Start: 2017-03-05 | End: 2017-03-06

## 2017-03-05 RX ORDER — VERAPAMIL HYDROCHLORIDE 2.5 MG/ML
2.5 INJECTION, SOLUTION INTRAVENOUS
Status: ACTIVE | OUTPATIENT
Start: 2017-03-05 | End: 2017-03-06

## 2017-03-05 RX ORDER — CYCLOBENZAPRINE HCL 10 MG
5 TABLET ORAL
Status: DISCONTINUED | OUTPATIENT
Start: 2017-03-05 | End: 2017-03-13 | Stop reason: HOSPADM

## 2017-03-05 RX ORDER — MAGNESIUM SULFATE HEPTAHYDRATE 40 MG/ML
2 INJECTION, SOLUTION INTRAVENOUS
Status: ACTIVE | OUTPATIENT
Start: 2017-03-05 | End: 2017-03-06

## 2017-03-05 RX ORDER — CALCIUM CHLORIDE INJECTION 100 MG/ML
1 INJECTION, SOLUTION INTRAVENOUS
Status: ACTIVE | OUTPATIENT
Start: 2017-03-05 | End: 2017-03-06

## 2017-03-05 RX ORDER — DEXTROSE MONOHYDRATE 50 MG/ML
50 INJECTION, SOLUTION INTRAVENOUS
Status: ACTIVE | OUTPATIENT
Start: 2017-03-05 | End: 2017-03-06

## 2017-03-05 RX ORDER — FUROSEMIDE 10 MG/ML
60 INJECTION INTRAMUSCULAR; INTRAVENOUS EVERY 12 HOURS
Status: DISCONTINUED | OUTPATIENT
Start: 2017-03-05 | End: 2017-03-07

## 2017-03-05 RX ORDER — LIDOCAINE HYDROCHLORIDE ANHYDROUS AND DEXTROSE MONOHYDRATE .4; 5 G/100ML; G/100ML
1-4 INJECTION, SOLUTION INTRAVENOUS
Status: ACTIVE | OUTPATIENT
Start: 2017-03-05 | End: 2017-03-06

## 2017-03-05 RX ORDER — EPINEPHRINE 0.1 MG/ML
1 INJECTION INTRACARDIAC; INTRAVENOUS
Status: ACTIVE | OUTPATIENT
Start: 2017-03-05 | End: 2017-03-06

## 2017-03-05 RX ORDER — DOPAMINE HYDROCHLORIDE 320 MG/100ML
2-5 INJECTION, SOLUTION INTRAVENOUS
Status: ACTIVE | OUTPATIENT
Start: 2017-03-05 | End: 2017-03-06

## 2017-03-05 RX ORDER — LIDOCAINE HCL/PF 100 MG/5ML
1-1.5 SYRINGE (ML) INTRAVENOUS
Status: ACTIVE | OUTPATIENT
Start: 2017-03-05 | End: 2017-03-06

## 2017-03-05 RX ORDER — DEXTROSE 50 % IN WATER (D50W) INTRAVENOUS SYRINGE
25
Status: ACTIVE | OUTPATIENT
Start: 2017-03-05 | End: 2017-03-06

## 2017-03-05 RX ORDER — CARVEDILOL 3.12 MG/1
3.12 TABLET ORAL EVERY 12 HOURS
Status: DISCONTINUED | OUTPATIENT
Start: 2017-03-05 | End: 2017-03-06

## 2017-03-05 RX ORDER — SODIUM BICARBONATE 1 MEQ/ML
50 SYRINGE (ML) INTRAVENOUS
Status: ACTIVE | OUTPATIENT
Start: 2017-03-05 | End: 2017-03-06

## 2017-03-05 RX ORDER — LIDOCAINE HYDROCHLORIDE 20 MG/ML
JELLY TOPICAL
Status: ACTIVE | OUTPATIENT
Start: 2017-03-05 | End: 2017-03-06

## 2017-03-05 RX ORDER — FUROSEMIDE 10 MG/ML
40 INJECTION INTRAMUSCULAR; INTRAVENOUS ONCE
Status: COMPLETED | OUTPATIENT
Start: 2017-03-05 | End: 2017-03-05

## 2017-03-05 RX ADMIN — MUPIROCIN: 20 OINTMENT TOPICAL at 20:10

## 2017-03-05 RX ADMIN — CYCLOBENZAPRINE HYDROCHLORIDE 5 MG: 10 TABLET, FILM COATED ORAL at 18:20

## 2017-03-05 RX ADMIN — OXYCODONE HYDROCHLORIDE AND ACETAMINOPHEN 1 TABLET: 10; 325 TABLET ORAL at 23:32

## 2017-03-05 RX ADMIN — AMIODARONE HYDROCHLORIDE 200 MG: 200 TABLET ORAL at 09:10

## 2017-03-05 RX ADMIN — ATROPINE SULFATE 0.5 MG: 0.1 INJECTION, SOLUTION INTRAVENOUS at 23:00

## 2017-03-05 RX ADMIN — FUROSEMIDE 40 MG: 10 INJECTION, SOLUTION INTRAMUSCULAR; INTRAVENOUS at 05:18

## 2017-03-05 RX ADMIN — DOCUSATE SODIUM 100 MG: 100 CAPSULE, LIQUID FILLED ORAL at 09:11

## 2017-03-05 RX ADMIN — CYCLOBENZAPRINE HYDROCHLORIDE 5 MG: 10 TABLET, FILM COATED ORAL at 09:14

## 2017-03-05 RX ADMIN — AMIODARONE HYDROCHLORIDE 200 MG: 200 TABLET ORAL at 20:08

## 2017-03-05 RX ADMIN — MUPIROCIN: 20 OINTMENT TOPICAL at 09:11

## 2017-03-05 RX ADMIN — OXYCODONE HYDROCHLORIDE AND ACETAMINOPHEN 1 TABLET: 10; 325 TABLET ORAL at 13:40

## 2017-03-05 RX ADMIN — AMIODARONE HYDROCHLORIDE 0.5 MG/MIN: 50 INJECTION, SOLUTION INTRAVENOUS at 01:00

## 2017-03-05 RX ADMIN — FAMOTIDINE 20 MG: 10 INJECTION, SOLUTION INTRAVENOUS at 20:09

## 2017-03-05 RX ADMIN — ASPIRIN 81 MG: 81 TABLET, COATED ORAL at 09:10

## 2017-03-05 RX ADMIN — Medication 10 ML: at 15:00

## 2017-03-05 RX ADMIN — CARVEDILOL 3.12 MG: 3.12 TABLET, FILM COATED ORAL at 20:08

## 2017-03-05 RX ADMIN — AMIODARONE HYDROCHLORIDE 0.5 MG/MIN: 50 INJECTION, SOLUTION INTRAVENOUS at 17:01

## 2017-03-05 RX ADMIN — FUROSEMIDE 60 MG: 10 INJECTION, SOLUTION INTRAMUSCULAR; INTRAVENOUS at 20:09

## 2017-03-05 RX ADMIN — OXYCODONE HYDROCHLORIDE AND ACETAMINOPHEN 1 TABLET: 10; 325 TABLET ORAL at 19:19

## 2017-03-05 RX ADMIN — Medication 10 ML: at 20:10

## 2017-03-05 RX ADMIN — FAMOTIDINE 20 MG: 10 INJECTION, SOLUTION INTRAVENOUS at 09:10

## 2017-03-05 RX ADMIN — Medication 10 ML: at 05:22

## 2017-03-05 RX ADMIN — ATORVASTATIN CALCIUM 80 MG: 40 TABLET, FILM COATED ORAL at 20:08

## 2017-03-05 NOTE — PROGRESS NOTES
Spoke with spouse in lobby. She talked about the strained relationship with the patient's daughter. She said patient did not want her to give information to daughter. Noted the previous note from primary nurse  and will use this to guide our care for this family.   Signed by chaplain Kit

## 2017-03-05 NOTE — PROGRESS NOTES
Bedside shift change report given to Arlen Amaya RN (oncoming nurse) by Aliya Mcdaniel RN (offgoing nurse). Report included the following information SBAR, Kardex, ED Summary, OR Summary, Procedure Summary, Intake/Output, MAR, Recent Results, Med Rec Status and Cardiac Rhythm of NSR.

## 2017-03-05 NOTE — PROGRESS NOTES
Ventilator check complete; patient has a #7.5 ET tube secured at the 24 at the lip. Patient is sedated. Patient is able to follow commands. Breath sounds are coarse. Trachea is midline, Negative for subcutaneous air, and chest excursion is symmetric. Patient is also Negative for cyanosis and is Negative for pitting edema. All alarms are set and audible. Resuscitation bag is at the head of the bed.       Ventilator Settings  Mode FIO2 Rate Tidal Volume Pressure PEEP I:E Ratio   SIMV, PRVC, Pressure support  35 %    500 ml  8 cm H2O  8 cm H20  1:3.45      Peak airway pressure: 23 cm H2O   Minute ventilation: 7.6 l/min     ABG:   Recent Labs      03/04/17   1812  03/04/17   1512  03/04/17   1358   PH  7.41  7.37  7.37   PCO2  31*  31*  27*   PO2  98  88  131*   HCO3  19*  18*  15*         Storm Bennett, RT

## 2017-03-05 NOTE — PROGRESS NOTES
Care Daily Progress Note: 3/5/2017  Admission Date: 2/27/2017     The patient's chart is reviewed and the patient is discussed with the staff. 64 yo WM with severe ischemic CMP (LVEF 10%) s/p CABG. Had VF arrest yesterday and now intubated. Subjective:     Remains on PRVC. On amio drip with no recurrent arrhythmias. Awake and FC.      Current Facility-Administered Medications   Medication Dose Route Frequency    carvedilol (COREG) tablet 6.25 mg  6.25 mg Oral Q12H    amiodarone (CORDARONE) 450 mg in dextrose 5% 250 mL infusion  0.5-1 mg/min IntraVENous TITRATE    famotidine (PF) (PEPCID) injection 20 mg  20 mg IntraVENous Q12H    fentaNYL citrate (PF) injection  mcg   mcg IntraVENous Q2H PRN    midazolam (VERSED) injection 1-2 mg  1-2 mg IntraVENous Q2H PRN    NOREPINephrine (LEVOPHED) 4,000 mcg in dextrose 5% 250 mL infusion  0.05-0.2 mcg/kg/min IntraVENous TITRATE    propofol (DIPRIVAN) infusion  5-50 mcg/kg/min IntraVENous TITRATE    insulin regular (NOVOLIN R, HUMULIN R) 100 Units in 0.9% sodium chloride 100 mL infusion  1 Units/hr IntraVENous TITRATE    sodium chloride (NS) flush 5-10 mL  5-10 mL IntraVENous Q8H    sodium chloride (NS) flush 5-10 mL  5-10 mL IntraVENous PRN    furosemide (LASIX) tablet 40 mg  40 mg Oral DAILY    docusate sodium (COLACE) capsule 100 mg  100 mg Oral DAILY    alum-mag hydroxide-simeth (MYLANTA) oral suspension 30 mL  30 mL Oral Q4H PRN    ondansetron (ZOFRAN) injection 4 mg  4 mg IntraVENous Q6H PRN    acetaminophen (TYLENOL) tablet 650 mg  650 mg Oral Q4H PRN    atorvastatin (LIPITOR) tablet 80 mg  80 mg Oral QHS    amiodarone (CORDARONE) tablet 200 mg  200 mg Oral Q12H    mupirocin (BACTROBAN) 2 % ointment   Both Nostrils Q12H    aspirin delayed-release tablet 81 mg  81 mg Oral DAILY    nitroglycerin (NITROLINGUAL) sublingual 0.4 mg/spray  1 Spray SubLINGual Q5MIN PRN    baclofen (LIORESAL) tablet 10 mg  10 mg Oral TID PRN    oxyCODONE-acetaminophen (PERCOCET) 5-325 mg per tablet 1 Tab  1 Tab Oral Q4H PRN    oxyCODONE-acetaminophen (PERCOCET 10)  mg per tablet 1 Tab  1 Tab Oral Q4H PRN    traMADol (ULTRAM) tablet 100 mg  100 mg Oral Q6H PRN       Review of Systems    Constitutional:  negative for fever, chills, sweats  Cardiovascular:  negative for chest pain, palpitations, syncope, edema  Gastrointestinal:  negative for dysphagia, reflux, vomiting, diarrhea, abdominal pain, or melena  Neurologic:  negative for focal weakness, numbness, headache        Objective:     Vitals:    03/05/17 0309 03/05/17 0315 03/05/17 0330 03/05/17 0345   BP:  108/66 115/68 117/68   Pulse: 66 66 66 67   Resp: 21 17 14 15   Temp:       SpO2: 100% 99% 98% 99%   Weight:  222 lb 10.6 oz (101 kg)     Height:  5' 10\" (1.778 m)         Intake and Output:   03/03 0701 - 03/04 1900  In: 2959 [P.O.:1830]  Out: 865 [Urine:825]  03/04 1901 - 03/05 0700  In: -   Out: 356 [Urine:604]    Physical Exam:          Constitutional:  intubated and mechanically ventilated.   EENMT:  Sclera clear, pupils equal, oral mucosa moist  Respiratory: rhonchi  Cardiovascular:  RRR with no M,G,R;  Gastrointestinal:  soft with no tenderness; positive bowel sounds present  Musculoskeletal:  warm with no cyanosis, trace lower leg edema  Skin:  no jaundice or ecchymosis  Neurologic: no gross neuro deficits     Psychiatric:  alert and FC    LINES:  ETT, smalls, CVL    DRIPS:  Amio, propofol, levophed, insulin    CXR:  Basilar pulm congestion present; image not yet available      Ventilator Settings  Mode FIO2 Rate Tidal Volume Pressure PEEP   SIMV, PRVC, Pressure support  35 %    500 ml  8 cm H2O  8 cm H20      Peak airway pressure: 18 cm H2O   Minute ventilation: 7.9 l/min     ABG:   Recent Labs      03/05/17   0310  03/04/17   1812  03/04/17   1512   PH  7.48*  7.41  7.37   PCO2  30*  31*  31*   PO2  92  98  88   HCO3  22  19*  18*        LAB  Recent Labs      03/05/17   0316  03/05/17 0151  03/05/17   0059  03/04/17   2355  03/04/17   2244   GLUCPOC  129*  103*  101*  122*  181*     Recent Labs      03/05/17   0315  03/04/17   1400  03/04/17   0440   WBC  10.1  10.6  9.0   HGB  10.0*  9.9*  10.7*   HCT  29.5*  29.3*  32.8*   PLT  182  159  125*   INR   --   1.1   --      Recent Labs      03/05/17 0315  03/04/17   1400  03/04/17   0440   NA  138  135*  138   K  4.4  5.1  5.0   CL  105  102  104   CO2  23  19*  19*   GLU  125*  321*  130*   BUN  38*  46*  43*   CREA  1.12  1.63*  1.22   MG  2.6*  2.8*  3.0*   CA  7.6*  7.5*  7.5*     No results for input(s): LCAD, LAC in the last 72 hours. Assessment:  (Medical Decision Making)     Hospital Problems  Date Reviewed: 3/4/2017          Codes Class Noted POA    Systolic CHF, acute on chronic [facility]) ICD-10-CM: I50.23  ICD-9-CM: 428.23, 428.0  3/3/2017 Unknown    Severe    Pleural effusion ICD-10-CM: J90  ICD-9-CM: 511.9  3/2/2017 Yes    Minimal on CXR today    Hypoxia ICD-10-CM: R09.02  ICD-9-CM: 799.02  3/2/2017 Unknown    Ongoing    Cardiogenic shock ([facility] ICD-10-CM: R57.0  ICD-9-CM: 785.51  3/2/2017 Unknown    Still on pressors. Postoperative anemia due to acute blood loss ICD-10-CM: D62  ICD-9-CM: 285.1  3/1/2017 No        Ischemic cardiomyopathy (Chronic) ICD-10-CM: I25.5  ICD-9-CM: 414.8  2/28/2017 Yes    Overview Signed 2/28/2017  1:26 PM by Anthony Cao NP     EF 10-15% echo 2/27/17         Severe. CAD, multiple vessel (Chronic) ICD-10-CM: I25.10  ICD-9-CM: 414.00  2/28/2017 Yes    Overview Signed 2/28/2017  1:28 PM by Anthony Cao, MINE     2/28/17 (Dr Yesica Lopez) Coronary artery bypass grafting x3. Grafts consisting of   1. Left internal mammary artery to the left anterior descending. 2. Reversed LEFT saphenous vein graft to obtuse marginal.   3. Reversed LEFT saphenous vein graft to the left posterior descending   artery.               S/P CABG (coronary artery bypass graft) ICD-10-CM: Z95.1  ICD-9-CM: V45.81  2/28/2017 Yes        Hypertriglyceridemia (Chronic) ICD-10-CM: E78.1  ICD-9-CM: 272.1  2/27/2017 Yes        * (Principal)NSTEMI (non-ST elevated myocardial infarction) [facility] ICD-10-CM: I21.4  ICD-9-CM: 410.70  2/27/2017 Yes        Depressed left ventricular ejection fraction (Chronic) ICD-10-CM: R93.1  ICD-9-CM: 794.39  2/27/2017 Yes    Overview Signed 2/27/2017  4:07 PM by Manda Baez NP     2/27/17 20%             DM (diabetes mellitus) type II controlled, neurological manifestation (HCC) (Chronic) ICD-10-CM: E11.49  ICD-9-CM: 250.60  3/7/2014 Yes        Essential hypertension, benign (Chronic) ICD-10-CM: I10  ICD-9-CM: 401.1  3/7/2014 Yes              Plan:  (Medical Decision Making)     Change to PSV. Diurese. Wean pressors as tolerated. --    More than 50% of the time documented was spent in face-to-face contact with the patient and in the care of the patient on the floor/unit where the patient is located.     Freedom Patterson MD

## 2017-03-05 NOTE — PROGRESS NOTES
Respiratory Mechanics completed and are as follows:  Weaning Parameters  Spontaneous Breathing Trial Complete: Yes  Resp Rate Observed: 25  Ve: 10.5  VT: 620  RSBI: 40  NIF: -20 (done on Vent)  Frances Agitation Sedation Scale (RASS): Drowsy  Patient extubated to a 4L NC. Patient is able to communicate and is negative for stridor. Breath sounds are coarse. No complications with extubation.      Yessica Tejeda, RT

## 2017-03-05 NOTE — PROGRESS NOTES
CV Progress Note    Subjective:   Admit Date: 2017    Surgery Date:  5 Days Post-Op      Procedure:  Procedure(s):  CORONARY ARTERY BYPASS GRAFT (CABG)x3 /LIMA  VEIN HARVEST- Greater Saphenous Vein  ESOPHAGEAL TRANS ECHOCARDIOGRAM    Vfib arrest yesterday->NSR and ROSC with defibx1. Just extubated and neurologically in tact. Off pressors. Still complains of hiccups. No chest pain      Objective:     Vitals:  Blood pressure 109/66, pulse 72, temperature 99.2 °F (37.3 °C), resp. rate 16, height 5' 10\" (1.778 m), weight 222 lb 10.6 oz (101 kg), SpO2 98 %. Temp (24hrs), Av.6 °F (37 °C), Min:97.6 °F (36.4 °C), Max:99.2 °F (37.3 °C)  Neuro in tact  RRR  Breath sounds diminshed on right  Belly soft  2+Leg edema      Plan/Recommendations/Medical Decision Making:     Principal Problem:    NSTEMI (non-ST elevated myocardial infarction) ([facility] (2017)    Active Problems:    DM (diabetes mellitus) type II controlled, neurological manifestation ([address] (3/7/2014)      Essential hypertension, benign (3/7/2014)      Hypertriglyceridemia (2017)      Depressed left ventricular ejection fraction (2017)      Overview: 17 20%      Ischemic cardiomyopathy (2017)      Overview: EF 10-15% echo 17      CAD, multiple vessel (2017)      Overview: 17 (Dr Eddie Oconnor) Coronary artery bypass grafting x3. Grafts       consisting of       1. Left internal mammary artery to the left anterior descending. 2. Reversed LEFT saphenous vein graft to obtuse marginal.       3. Reversed LEFT saphenous vein graft to the left posterior descending       artery. S/P CABG (coronary artery bypass graft) (2017)      Postoperative anemia due to acute blood loss (3/1/2017)      Pleural effusion (3/2/2017)      Hypoxia (3/2/2017)      Cardiogenic shock (HCC) (1663)      Systolic CHF, acute on chronic ([facility] (3/3/2017)      HD stable, neuro in tact. No recurrent arrhythmia. Continue amio drip.  Will need device prior to discharge  Resume beta blocker as BP tolerates  DC femoral arterial line  US and thoracentesis R chest  Diurese  Monitor K/Mg  Keep in ICU today      Continue present treatment    See orders for details. Carlos Turner.  Gustavo Torres MD

## 2017-03-05 NOTE — PROGRESS NOTES
Report at bedside. Pt alert and cooperative but affect flat and appears fearful to move, stiff. Stated he feels sore all over. Reassured, educated. Call light in reach. Left groin benign, dressing d&i.

## 2017-03-05 NOTE — PROGRESS NOTES
Ventilator check complete; patient has a #7.5 ET tube secured at the 24 at the lip. Patient is not sedated. Patient is able to follow commands. Breath sounds are coarse. Trachea is midline, Negative for subcutaneous air, and chest excursion is symmetric. Patient is also Negative for cyanosis and is Negative for pitting edema. All alarms are set and audible. Resuscitation bag is at the head of the bed.       Ventilator Settings  Mode FIO2 Rate Tidal Volume Pressure PEEP I:E Ratio   Pressure support  35 %    500 ml  8 cm H2O (weaned)  8 cm H20  1:3.45      Peak airway pressure: 17 cm H2O   Minute ventilation: 11.1 l/min     ABG:   Recent Labs      03/05/17   0310  03/04/17   1812  03/04/17   1512   PH  7.48*  7.41  7.37   PCO2  30*  31*  31*   PO2  92  98  88   HCO3  22  19*  18*         Joe Braswell, RT

## 2017-03-05 NOTE — PROGRESS NOTES
Pt has been resting/sleeping comfortably for about 2 hours. Wife in room and pt awake. No hiccups noted while pt sleeping.

## 2017-03-05 NOTE — PROGRESS NOTES
[facility] CARDIOLOGY PROGRESS NOTE           3/5/2017 8:02 AM    Admit Date: 2/27/2017      Subjective:   Patient with VF arrest yesterday and required intubated and defibrillation. He quickly recovered and was transferred to ICU. He has been stable overnight and now extubated this AM.  No additional arrhythmias and on amiodarone. Patient struggling with hiccups. ROS:  Cardiovascular:  As noted above    Objective:      Vitals:    03/05/17 0700 03/05/17 0714 03/05/17 0715 03/05/17 0753   BP: 116/68  109/66    Pulse: 68 70 68 72   Resp: 21 20 20 16   Temp: 99.2 °F (37.3 °C)      SpO2: 100% 100% 99% 98%   Weight:       Height:           Physical Exam:  General-No Acute Distress  Neck- supple, no JVD  CV- regular rate and rhythm no MRG  Lung- crackles bilaterally  Abd- soft, nontender, nondistended  Ext- no edema bilaterally. Skin- warm and dry    Data Review:   Recent Labs      03/05/17   0315  03/04/17   1400   NA  138  135*   K  4.4  5.1   MG  2.6*  2.8*   BUN  38*  46*   CREA  1.12  1.63*   GLU  125*  321*   WBC  10.1  10.6   HGB  10.0*  9.9*   HCT  29.5*  29.3*   PLT  182  159   INR   --   1.1   TROIQ   --   0.58*       Assessment/Plan:     Principal Problem:    NSTEMI (non-ST elevated myocardial infarction) ([facility] (2/27/2017) - Complex 3v CAD and now s/p CABG. Active Problems:    DM (diabetes mellitus) type II controlled, neurological manifestation ([facility] (3/7/2014) - Medical management appropriate      Essential hypertension, benign (3/7/2014) - BP is now stable and will resume carvedilol      Hypertriglyceridemia (2/27/2017) - On statin therapy      Depressed left ventricular ejection fraction (2/27/2017) - EF 20-25%. On carvedilol and appears volume overloaded.   Change lasix to 60mg IV BID      Overview: 2/27/17 20%      Ischemic cardiomyopathy (2/28/2017)      Overview: EF 10-15% echo 2/27/17      CAD, multiple vessel (2/28/2017) - Now s/p CABG      Overview: 2/28/17 ( David) Coronary artery bypass grafting x3. Grafts       consisting of       1. Left internal mammary artery to the left anterior descending. 2. Reversed LEFT saphenous vein graft to obtuse marginal.       3. Reversed LEFT saphenous vein graft to the left posterior descending       artery.        S/P CABG (coronary artery bypass graft) (2/28/2017)      Postoperative anemia due to acute blood loss (3/1/2017)      Pleural effusion (3/2/2017)      Hypoxia (3/2/2017)      Cardiogenic shock (HCC) (7/0/9456)      Systolic CHF, acute on chronic ([facility] (3/3/2017)      VF arrest - S/P resuscitation and will need EP to assess on Monday and consider ICD or LifeVest prior to Mary Jeff MD  3/5/2017 8:02 AM

## 2017-03-05 NOTE — PROGRESS NOTES
Left femoral arterial line removed. Manual pressure held for 15 minutes. Hemostasis achieved and dressing applied. Compression device placed afterwards. Pt tolerated well. Will continue to monitor.

## 2017-03-05 NOTE — PROGRESS NOTES
Pt extubated by RT to NC @ 4 L/min. Bilateral breath sounds auscultated. No stridor noted. No other complications noted at this time. Will continue to monitor.

## 2017-03-05 NOTE — PROGRESS NOTES
Bedside shift change report given to Byron Vallecillo RN (oncoming nurse) by Ronald Garcia RN (offgoing nurse). Report included the following information SBAR, Kardex, ED Summary, OR Summary, Procedure Summary, Intake/Output, MAR, Recent Results, Med Rec Status and Cardiac Rhythm of NSR.

## 2017-03-05 NOTE — PROGRESS NOTES
Patient awake and alert. Denies pain but is restless in bed. Requests medication to help with rest. Propofol rate increased at this time. Insulin drip initiated at 3 units/hr for elevated blood sugar.

## 2017-03-06 ENCOUNTER — ANESTHESIA (OUTPATIENT)
Dept: SURGERY | Age: 62
DRG: 001 | End: 2017-03-06
Payer: COMMERCIAL

## 2017-03-06 ENCOUNTER — APPOINTMENT (OUTPATIENT)
Dept: GENERAL RADIOLOGY | Age: 62
DRG: 001 | End: 2017-03-06
Attending: THORACIC SURGERY (CARDIOTHORACIC VASCULAR SURGERY)
Payer: COMMERCIAL

## 2017-03-06 ENCOUNTER — ANESTHESIA EVENT (OUTPATIENT)
Dept: SURGERY | Age: 62
DRG: 001 | End: 2017-03-06
Payer: COMMERCIAL

## 2017-03-06 PROBLEM — Z95.810 ICD (IMPLANTABLE CARDIOVERTER-DEFIBRILLATOR) IN PLACE: Status: ACTIVE | Noted: 2017-03-06

## 2017-03-06 LAB
ANION GAP BLD CALC-SCNC: 9 MMOL/L (ref 7–16)
BUN SERPL-MCNC: 32 MG/DL (ref 6–23)
CALCIUM SERPL-MCNC: 8 MG/DL (ref 8.3–10.4)
CHLORIDE SERPL-SCNC: 102 MMOL/L (ref 98–107)
CO2 SERPL-SCNC: 27 MMOL/L (ref 21–32)
CREAT SERPL-MCNC: 1.4 MG/DL (ref 0.8–1.5)
ERYTHROCYTE [DISTWIDTH] IN BLOOD BY AUTOMATED COUNT: 14 % (ref 11.9–14.6)
GLUCOSE BLD STRIP.AUTO-MCNC: 144 MG/DL (ref 65–100)
GLUCOSE BLD STRIP.AUTO-MCNC: 167 MG/DL (ref 65–100)
GLUCOSE BLD STRIP.AUTO-MCNC: 183 MG/DL (ref 65–100)
GLUCOSE BLD STRIP.AUTO-MCNC: 190 MG/DL (ref 65–100)
GLUCOSE SERPL-MCNC: 195 MG/DL (ref 65–100)
HCT VFR BLD AUTO: 31.2 % (ref 41.1–50.3)
HGB BLD-MCNC: 10.4 G/DL (ref 13.6–17.2)
MAGNESIUM SERPL-MCNC: 2.6 MG/DL (ref 1.8–2.4)
MCH RBC QN AUTO: 30.4 PG (ref 26.1–32.9)
MCHC RBC AUTO-ENTMCNC: 33.3 G/DL (ref 31.4–35)
MCV RBC AUTO: 91.2 FL (ref 79.6–97.8)
PLATELET # BLD AUTO: 187 K/UL (ref 150–450)
PMV BLD AUTO: 10.4 FL (ref 10.8–14.1)
POTASSIUM SERPL-SCNC: 4.8 MMOL/L (ref 3.5–5.1)
RBC # BLD AUTO: 3.42 M/UL (ref 4.23–5.67)
SODIUM SERPL-SCNC: 138 MMOL/L (ref 136–145)
WBC # BLD AUTO: 10.8 K/UL (ref 4.3–11.1)

## 2017-03-06 PROCEDURE — 02H63KZ INSERTION OF DEFIBRILLATOR LEAD INTO RIGHT ATRIUM, PERCUTANEOUS APPROACH: ICD-10-PCS | Performed by: INTERNAL MEDICINE

## 2017-03-06 PROCEDURE — 82962 GLUCOSE BLOOD TEST: CPT

## 2017-03-06 PROCEDURE — 93005 ELECTROCARDIOGRAM TRACING: CPT | Performed by: INTERNAL MEDICINE

## 2017-03-06 PROCEDURE — 74011250637 HC RX REV CODE- 250/637: Performed by: THORACIC SURGERY (CARDIOTHORACIC VASCULAR SURGERY)

## 2017-03-06 PROCEDURE — 77030031139 HC SUT VCRL2 J&J -A

## 2017-03-06 PROCEDURE — 74011250636 HC RX REV CODE- 250/636: Performed by: THORACIC SURGERY (CARDIOTHORACIC VASCULAR SURGERY)

## 2017-03-06 PROCEDURE — 74011000258 HC RX REV CODE- 258

## 2017-03-06 PROCEDURE — 74011000258 HC RX REV CODE- 258: Performed by: THORACIC SURGERY (CARDIOTHORACIC VASCULAR SURGERY)

## 2017-03-06 PROCEDURE — 0JH608Z INSERTION OF DEFIBRILLATOR GENERATOR INTO CHEST SUBCUTANEOUS TISSUE AND FASCIA, OPEN APPROACH: ICD-10-PCS | Performed by: INTERNAL MEDICINE

## 2017-03-06 PROCEDURE — 74011000250 HC RX REV CODE- 250: Performed by: THORACIC SURGERY (CARDIOTHORACIC VASCULAR SURGERY)

## 2017-03-06 PROCEDURE — 77030018579 HC SUT TICRN1 COVD -B

## 2017-03-06 PROCEDURE — 74011000250 HC RX REV CODE- 250: Performed by: INTERNAL MEDICINE

## 2017-03-06 PROCEDURE — 65610000006 HC RM INTENSIVE CARE

## 2017-03-06 PROCEDURE — C1898 LEAD, PMKR, OTHER THAN TRANS: HCPCS

## 2017-03-06 PROCEDURE — 74011000258 HC RX REV CODE- 258: Performed by: INTERNAL MEDICINE

## 2017-03-06 PROCEDURE — C1721 AICD, DUAL CHAMBER: HCPCS

## 2017-03-06 PROCEDURE — 74011250636 HC RX REV CODE- 250/636

## 2017-03-06 PROCEDURE — 33249 INSJ/RPLCMT DEFIB W/LEAD(S): CPT

## 2017-03-06 PROCEDURE — 99232 SBSQ HOSP IP/OBS MODERATE 35: CPT | Performed by: INTERNAL MEDICINE

## 2017-03-06 PROCEDURE — 74011250637 HC RX REV CODE- 250/637: Performed by: INTERNAL MEDICINE

## 2017-03-06 PROCEDURE — 02HK3KZ INSERTION OF DEFIBRILLATOR LEAD INTO RIGHT VENTRICLE, PERCUTANEOUS APPROACH: ICD-10-PCS | Performed by: INTERNAL MEDICINE

## 2017-03-06 PROCEDURE — 71010 XR CHEST PORT: CPT

## 2017-03-06 PROCEDURE — 76060000033 HC ANESTHESIA 1 TO 1.5 HR: Performed by: INTERNAL MEDICINE

## 2017-03-06 PROCEDURE — 74011250636 HC RX REV CODE- 250/636: Performed by: INTERNAL MEDICINE

## 2017-03-06 PROCEDURE — 93641 EP EVL 1/2CHMB PAC CVDFB TST: CPT

## 2017-03-06 PROCEDURE — C1892 INTRO/SHEATH,FIXED,PEEL-AWAY: HCPCS

## 2017-03-06 PROCEDURE — 74011636637 HC RX REV CODE- 636/637: Performed by: THORACIC SURGERY (CARDIOTHORACIC VASCULAR SURGERY)

## 2017-03-06 PROCEDURE — L3670 SO ACRO/CLAV CAN WEB PRE OTS: HCPCS

## 2017-03-06 PROCEDURE — C1896 LEAD, AICD, NON SING/DUAL: HCPCS

## 2017-03-06 PROCEDURE — 74011000272 HC RX REV CODE- 272: Performed by: INTERNAL MEDICINE

## 2017-03-06 PROCEDURE — 77030012935 HC DRSG AQUACEL BMS -B

## 2017-03-06 RX ORDER — LIDOCAINE HYDROCHLORIDE AND EPINEPHRINE 10; 10 MG/ML; UG/ML
50 INJECTION, SOLUTION INFILTRATION; PERINEURAL
Status: DISCONTINUED | OUTPATIENT
Start: 2017-03-06 | End: 2017-03-06

## 2017-03-06 RX ORDER — SODIUM CHLORIDE 0.9 % (FLUSH) 0.9 %
5-10 SYRINGE (ML) INJECTION AS NEEDED
Status: DISCONTINUED | OUTPATIENT
Start: 2017-03-06 | End: 2017-03-06

## 2017-03-06 RX ORDER — CEFAZOLIN SODIUM 1 G/3ML
INJECTION, POWDER, FOR SOLUTION INTRAMUSCULAR; INTRAVENOUS AS NEEDED
Status: DISCONTINUED | OUTPATIENT
Start: 2017-03-06 | End: 2017-03-06 | Stop reason: HOSPADM

## 2017-03-06 RX ORDER — SODIUM CHLORIDE 0.9 % (FLUSH) 0.9 %
5-10 SYRINGE (ML) INJECTION EVERY 8 HOURS
Status: DISCONTINUED | OUTPATIENT
Start: 2017-03-06 | End: 2017-03-13 | Stop reason: HOSPADM

## 2017-03-06 RX ORDER — SODIUM CHLORIDE, SODIUM LACTATE, POTASSIUM CHLORIDE, CALCIUM CHLORIDE 600; 310; 30; 20 MG/100ML; MG/100ML; MG/100ML; MG/100ML
INJECTION, SOLUTION INTRAVENOUS
Status: DISCONTINUED | OUTPATIENT
Start: 2017-03-06 | End: 2017-03-06 | Stop reason: HOSPADM

## 2017-03-06 RX ORDER — FENTANYL CITRATE 50 UG/ML
INJECTION, SOLUTION INTRAMUSCULAR; INTRAVENOUS AS NEEDED
Status: DISCONTINUED | OUTPATIENT
Start: 2017-03-06 | End: 2017-03-06 | Stop reason: HOSPADM

## 2017-03-06 RX ORDER — SODIUM CHLORIDE 0.9 % (FLUSH) 0.9 %
5-10 SYRINGE (ML) INJECTION EVERY 8 HOURS
Status: DISCONTINUED | OUTPATIENT
Start: 2017-03-06 | End: 2017-03-06

## 2017-03-06 RX ORDER — SODIUM CHLORIDE, SODIUM LACTATE, POTASSIUM CHLORIDE, CALCIUM CHLORIDE 600; 310; 30; 20 MG/100ML; MG/100ML; MG/100ML; MG/100ML
100 INJECTION, SOLUTION INTRAVENOUS CONTINUOUS
Status: DISCONTINUED | OUTPATIENT
Start: 2017-03-06 | End: 2017-03-06

## 2017-03-06 RX ORDER — SODIUM CHLORIDE 0.9 % (FLUSH) 0.9 %
5-10 SYRINGE (ML) INJECTION AS NEEDED
Status: DISCONTINUED | OUTPATIENT
Start: 2017-03-06 | End: 2017-03-13 | Stop reason: HOSPADM

## 2017-03-06 RX ORDER — METOPROLOL SUCCINATE 50 MG/1
50 TABLET, EXTENDED RELEASE ORAL 2 TIMES DAILY
Status: DISCONTINUED | OUTPATIENT
Start: 2017-03-06 | End: 2017-03-08

## 2017-03-06 RX ORDER — MIDAZOLAM HYDROCHLORIDE 1 MG/ML
INJECTION, SOLUTION INTRAMUSCULAR; INTRAVENOUS AS NEEDED
Status: DISCONTINUED | OUTPATIENT
Start: 2017-03-06 | End: 2017-03-06 | Stop reason: HOSPADM

## 2017-03-06 RX ORDER — CEFAZOLIN SODIUM IN 0.9 % NACL 2 G/50 ML
2 INTRAVENOUS SOLUTION, PIGGYBACK (ML) INTRAVENOUS EVERY 8 HOURS
Status: COMPLETED | OUTPATIENT
Start: 2017-03-06 | End: 2017-03-09

## 2017-03-06 RX ORDER — LIDOCAINE HYDROCHLORIDE 10 MG/ML
0.3 INJECTION INFILTRATION; PERINEURAL ONCE
Status: DISCONTINUED | OUTPATIENT
Start: 2017-03-06 | End: 2017-03-06

## 2017-03-06 RX ORDER — PROPOFOL 10 MG/ML
INJECTION, EMULSION INTRAVENOUS
Status: DISCONTINUED | OUTPATIENT
Start: 2017-03-06 | End: 2017-03-06 | Stop reason: HOSPADM

## 2017-03-06 RX ADMIN — OXYCODONE HYDROCHLORIDE AND ACETAMINOPHEN 1 TABLET: 5; 325 TABLET ORAL at 08:26

## 2017-03-06 RX ADMIN — AMIODARONE HYDROCHLORIDE 200 MG: 200 TABLET ORAL at 08:25

## 2017-03-06 RX ADMIN — FAMOTIDINE 20 MG: 10 INJECTION, SOLUTION INTRAVENOUS at 08:25

## 2017-03-06 RX ADMIN — FUROSEMIDE 60 MG: 10 INJECTION, SOLUTION INTRAMUSCULAR; INTRAVENOUS at 08:25

## 2017-03-06 RX ADMIN — CEFAZOLIN SODIUM 2 G: 1 INJECTION, POWDER, FOR SOLUTION INTRAMUSCULAR; INTRAVENOUS at 15:15

## 2017-03-06 RX ADMIN — NEOMYCIN AND POLYMYXIN B SULFATES: 40; 200000 IRRIGANT IRRIGATION at 11:00

## 2017-03-06 RX ADMIN — Medication 10 ML: at 20:52

## 2017-03-06 RX ADMIN — FENTANYL CITRATE 25 MCG: 50 INJECTION, SOLUTION INTRAMUSCULAR; INTRAVENOUS at 15:20

## 2017-03-06 RX ADMIN — Medication 10 ML: at 17:55

## 2017-03-06 RX ADMIN — INSULIN HUMAN 5 UNITS: 100 INJECTION, SOLUTION PARENTERAL at 01:13

## 2017-03-06 RX ADMIN — FENTANYL CITRATE 25 MCG: 50 INJECTION, SOLUTION INTRAMUSCULAR; INTRAVENOUS at 15:10

## 2017-03-06 RX ADMIN — Medication 10 ML: at 16:31

## 2017-03-06 RX ADMIN — AMIODARONE HYDROCHLORIDE 1 MG/MIN: 50 INJECTION, SOLUTION INTRAVENOUS at 03:50

## 2017-03-06 RX ADMIN — AMIODARONE HYDROCHLORIDE 200 MG: 200 TABLET ORAL at 20:36

## 2017-03-06 RX ADMIN — FUROSEMIDE 60 MG: 10 INJECTION, SOLUTION INTRAMUSCULAR; INTRAVENOUS at 20:57

## 2017-03-06 RX ADMIN — MIDAZOLAM HYDROCHLORIDE 1 MG: 1 INJECTION, SOLUTION INTRAMUSCULAR; INTRAVENOUS at 15:10

## 2017-03-06 RX ADMIN — METOPROLOL SUCCINATE 50 MG: 50 TABLET, FILM COATED, EXTENDED RELEASE ORAL at 18:00

## 2017-03-06 RX ADMIN — SODIUM CHLORIDE, SODIUM LACTATE, POTASSIUM CHLORIDE, CALCIUM CHLORIDE: 600; 310; 30; 20 INJECTION, SOLUTION INTRAVENOUS at 15:00

## 2017-03-06 RX ADMIN — PROPOFOL 100 MCG/KG/MIN: 10 INJECTION, EMULSION INTRAVENOUS at 15:09

## 2017-03-06 RX ADMIN — Medication 10 ML: at 06:23

## 2017-03-06 RX ADMIN — MUPIROCIN: 20 OINTMENT TOPICAL at 08:25

## 2017-03-06 RX ADMIN — AMIODARONE HYDROCHLORIDE 0.5 MG/MIN: 50 INJECTION, SOLUTION INTRAVENOUS at 21:44

## 2017-03-06 RX ADMIN — INSULIN HUMAN 5 UNITS: 100 INJECTION, SOLUTION PARENTERAL at 06:28

## 2017-03-06 RX ADMIN — LIDOCAINE HYDROCHLORIDE AND EPINEPHRINE 500 MG: 10; 10 INJECTION, SOLUTION INFILTRATION; PERINEURAL at 15:20

## 2017-03-06 RX ADMIN — INSULIN HUMAN 5 UNITS: 100 INJECTION, SOLUTION PARENTERAL at 17:49

## 2017-03-06 RX ADMIN — CEFAZOLIN 2 G: 1 INJECTION, POWDER, FOR SOLUTION INTRAMUSCULAR; INTRAVENOUS; PARENTERAL at 20:36

## 2017-03-06 RX ADMIN — FAMOTIDINE 20 MG: 10 INJECTION, SOLUTION INTRAVENOUS at 20:36

## 2017-03-06 RX ADMIN — OXYCODONE HYDROCHLORIDE AND ACETAMINOPHEN 1 TABLET: 10; 325 TABLET ORAL at 20:45

## 2017-03-06 RX ADMIN — CARVEDILOL 3.12 MG: 3.12 TABLET, FILM COATED ORAL at 08:24

## 2017-03-06 RX ADMIN — MUPIROCIN: 20 OINTMENT TOPICAL at 20:53

## 2017-03-06 RX ADMIN — AMIODARONE HYDROCHLORIDE 1 MG/MIN: 50 INJECTION, SOLUTION INTRAVENOUS at 11:23

## 2017-03-06 RX ADMIN — INSULIN HUMAN 5 UNITS: 100 INJECTION, SOLUTION PARENTERAL at 12:49

## 2017-03-06 RX ADMIN — DOCUSATE SODIUM 100 MG: 100 CAPSULE, LIQUID FILLED ORAL at 08:25

## 2017-03-06 RX ADMIN — ATORVASTATIN CALCIUM 80 MG: 40 TABLET, FILM COATED ORAL at 20:36

## 2017-03-06 NOTE — ANESTHESIA PREPROCEDURE EVALUATION
Anesthetic History   No history of anesthetic complications            Review of Systems / Medical History  Patient summary reviewed and pertinent labs reviewed    Pulmonary  Within defined limits                 Neuro/Psych   Within defined limits           Cardiovascular    Hypertension: well controlled        Dysrhythmias   Past MI (NSTEMI this admission), CAD and CABG    Exercise tolerance: <4 METS  Comments: NSTEMI, Cath showed severe multivessel disease (LAD 95% Prox, LCx 95-99%, %) with severely impaired LVEF. Impella before CABG. CABG 5 days ago, EF has improved to approximately 20%. Has had Vfib arrests and has been defibrillated twice, most recently last night.   Is having severe bradycardia on amiodarone and cardiac meds   GI/Hepatic/Renal     GERD: well controlled           Endo/Other    Diabetes (Hgb A1c of 9.0): poorly controlled, type 2         Other Findings              Physical Exam    Airway  Mallampati: II  TM Distance: 4 - 6 cm  Neck ROM: normal range of motion   Mouth opening: Normal     Cardiovascular  Regular rate and rhythm,  S1 and S2 normal,  no murmur, click, rub, or gallop  Rhythm: regular  Rate: normal         Dental  No notable dental hx       Pulmonary  Breath sounds clear to auscultation               Abdominal  GI exam deferred       Other Findings            Anesthetic Plan    ASA: 4  Anesthesia type: total IV anesthesia          Induction: Intravenous  Anesthetic plan and risks discussed with: Patient and Spouse      Has right IJ CVC, defibrillator pads on and Zoll connected to the patient

## 2017-03-06 NOTE — PROGRESS NOTES
Care Daily Progress Note: 3/6/2017  Admission Date: 2/27/2017     The patient's chart is reviewed and the patient is discussed with the staff. 64 yo WM with severe ischemic CMP (LVEF 10%) s/p CABG. Had VF arrest yesterday and now intubated. Subjective:     Extubated yesterday and was doing well but then suffered another VF arrest last night. Electrolytes OK. Now NPO for probable ICD today.      Current Facility-Administered Medications   Medication Dose Route Frequency    furosemide (LASIX) injection 60 mg  60 mg IntraVENous Q12H    carvedilol (COREG) tablet 3.125 mg  3.125 mg Oral Q12H    insulin regular (NOVOLIN R, HUMULIN R) injection   SubCUTAneous AC&HS    cyclobenzaprine (FLEXERIL) tablet 5 mg  5 mg Oral TID PRN    amiodarone (CORDARONE) 450 mg in dextrose 5% 250 mL infusion  0.5-1 mg/min IntraVENous TITRATE    famotidine (PF) (PEPCID) injection 20 mg  20 mg IntraVENous Q12H    fentaNYL citrate (PF) injection  mcg   mcg IntraVENous Q2H PRN    midazolam (VERSED) injection 1-2 mg  1-2 mg IntraVENous Q2H PRN    NOREPINephrine (LEVOPHED) 4,000 mcg in dextrose 5% 250 mL infusion  0.05-0.2 mcg/kg/min IntraVENous TITRATE    propofol (DIPRIVAN) infusion  5-50 mcg/kg/min IntraVENous TITRATE    insulin regular (NOVOLIN R, HUMULIN R) 100 Units in 0.9% sodium chloride 100 mL infusion  1 Units/hr IntraVENous TITRATE    sodium chloride (NS) flush 5-10 mL  5-10 mL IntraVENous Q8H    sodium chloride (NS) flush 5-10 mL  5-10 mL IntraVENous PRN    docusate sodium (COLACE) capsule 100 mg  100 mg Oral DAILY    alum-mag hydroxide-simeth (MYLANTA) oral suspension 30 mL  30 mL Oral Q4H PRN    ondansetron (ZOFRAN) injection 4 mg  4 mg IntraVENous Q6H PRN    acetaminophen (TYLENOL) tablet 650 mg  650 mg Oral Q4H PRN    atorvastatin (LIPITOR) tablet 80 mg  80 mg Oral QHS    amiodarone (CORDARONE) tablet 200 mg  200 mg Oral Q12H    mupirocin (BACTROBAN) 2 % ointment   Both Nostrils Q12H    aspirin delayed-release tablet 81 mg  81 mg Oral DAILY    nitroglycerin (NITROLINGUAL) sublingual 0.4 mg/spray  1 Spray SubLINGual Q5MIN PRN    baclofen (LIORESAL) tablet 10 mg  10 mg Oral TID PRN    oxyCODONE-acetaminophen (PERCOCET) 5-325 mg per tablet 1 Tab  1 Tab Oral Q4H PRN    oxyCODONE-acetaminophen (PERCOCET 10)  mg per tablet 1 Tab  1 Tab Oral Q4H PRN       Review of Systems    Constitutional:  negative for fever, chills, sweats  Cardiovascular:  negative for chest pain, palpitations, syncope, edema  Gastrointestinal:  negative for dysphagia, reflux, vomiting, diarrhea, abdominal pain, or melena  Neurologic:  negative for focal weakness, numbness, headache        Objective:     Vitals:    03/06/17 0400 03/06/17 0500 03/06/17 0553 03/06/17 0600   BP: 125/71 114/69  108/71   Pulse: 65 63  64   Resp: 14 11  18   Temp: 98.9 °F (37.2 °C)      SpO2: 98%      Weight:   216 lb 0.8 oz (98 kg)    Height:           Intake and Output:   03/04 0701 - 03/05 1900  In: 1519.4 [P.O.:620;  I.V.:899.4]  Out: 3266 [Urine:3266]  03/05 1901 - 03/06 0700  In: 334.1 [I.V.:334.1]  Out: 2194 [Urine:2194]    Physical Exam:          Constitutional:  Comfortable on NC  EENMT:  Sclera clear, pupils equal, oral mucosa moist  Respiratory: rhonchi  Cardiovascular:  RRR with no M,G,R;  Gastrointestinal:  soft with no tenderness; positive bowel sounds present  Musculoskeletal:  warm with no cyanosis, trace lower leg edema  Skin:  no jaundice or ecchymosis  Neurologic: no gross neuro deficits     Psychiatric:  alert and FC    LINES:   smalls, CVL    DRIPS:  Amiodarone    CXR:      3/5:            ABG:   Recent Labs      03/05/17   2309  03/05/17   0310  03/04/17   1812   PH  7.50*  7.48*  7.41   PCO2  32*  30*  31*   PO2  122*  92  98   HCO3  25  22  19*        LAB  Recent Labs      03/06/17   0621  03/05/17   2256  03/05/17   1643  03/05/17   1343  03/05/17   0640   GLUCPOC  183*  189*  146*  146*  127*     Recent Labs 03/05/17   2300  03/05/17   0315  03/04/17   1400   WBC  10.8  10.1  10.6   HGB  10.4*  10.0*  9.9*   HCT  31.2*  29.5*  29.3*   PLT  187  182  159   INR   --    --   1.1     Recent Labs      03/05/17   2300  03/05/17   0315  03/04/17   1400   NA  138  138  135*   K  4.8  4.4  5.1   CL  102  105  102   CO2  27  23  19*   GLU  195*  125*  321*   BUN  32*  38*  46*   CREA  1.40  1.12  1.63*   MG  2.6*  2.6*  2.8*   CA  8.0*  7.6*  7.5*     No results for input(s): LCAD, LAC in the last 72 hours. Assessment:  (Medical Decision Making)     Hospital Problems  Date Reviewed: 3/4/2017          Codes Class Noted POA    Systolic CHF, acute on chronic [facility]) ICD-10-CM: I50.23  ICD-9-CM: 428.23, 428.0  3/3/2017 Unknown    Severe    Pleural effusion ICD-10-CM: J90  ICD-9-CM: 511.9  3/2/2017 Yes    Small yesterday    Hypoxia ICD-10-CM: R09.02  ICD-9-CM: 799.02  3/2/2017 Unknown    Ongoing    Cardiogenic shock ([facility] ICD-10-CM: R57.0  ICD-9-CM: 785.51  3/2/2017 Unknown    Still on pressors. Postoperative anemia due to acute blood loss ICD-10-CM: D62  ICD-9-CM: 285.1  3/1/2017 No        Ischemic cardiomyopathy (Chronic) ICD-10-CM: I25.5  ICD-9-CM: 414.8  2/28/2017 Yes    Overview Signed 2/28/2017  1:26 PM by Corine Alonso NP     EF 10-15% echo 2/27/17         Severe with another VF arrest    CAD, multiple vessel (Chronic) ICD-10-CM: I25.10  ICD-9-CM: 414.00  2/28/2017 Yes    Overview Signed 2/28/2017  1:28 PM by Corine Alonso NP     2/28/17 (Dr Marla Mendez) Coronary artery bypass grafting x3. Grafts consisting of   1. Left internal mammary artery to the left anterior descending. 2. Reversed LEFT saphenous vein graft to obtuse marginal.   3. Reversed LEFT saphenous vein graft to the left posterior descending   artery.               S/P CABG (coronary artery bypass graft) ICD-10-CM: Z95.1  ICD-9-CM: V45.81  2/28/2017 Yes    Some sternotomy pain post CPR recently    Hypertriglyceridemia (Chronic) ICD-10-CM: E78.1  ICD-9-CM: 272.1  2/27/2017 Yes        * (Principal)NSTEMI (non-ST elevated myocardial infarction) ([facility] ICD-10-CM: I21.4  ICD-9-CM: 410.70  2/27/2017 Yes        Depressed left ventricular ejection fraction (Chronic) ICD-10-CM: R93.1  ICD-9-CM: 794.39  2/27/2017 Yes    Overview Signed 2/27/2017  4:07 PM by Roberth Cuello NP     2/27/17 20%         Needs ICD    DM (diabetes mellitus) type II controlled, neurological manifestation (HCC) (Chronic) ICD-10-CM: E11.49  ICD-9-CM: 250.60  3/7/2014 Yes        Essential hypertension, benign (Chronic) ICD-10-CM: I10  ICD-9-CM: 401.1  3/7/2014 Yes              Plan:  (Medical Decision Making)     Continue amiodarone. ICD today. --    More than 50% of the time documented was spent in face-to-face contact with the patient and in the care of the patient on the floor/unit where the patient is located.     Angel Doherty MD

## 2017-03-06 NOTE — CONSULTS
[facility] Cardiology/Electrophyiology Consult                Date of  Admission: 2/27/2017 11:54 AM         CC/Reason for consult: V. Fib arrest      Alice Mora is a 63 y.o. male admitted for Atherosclerosis of native coronary artery of native heart w*. Patient with known CAD and recently CABG. Patient is >5 days post op with recurrent V. Fib arrest. Not responding to po Amio. Changed to iv. Having periods of bradycardia. No evidence of active ischemia. Patient Active Problem List   Diagnosis Code    Undiagnosed cardiac murmurs R01.1    DM (diabetes mellitus) type II controlled, neurological manifestation ([facility]) E11.49    Allergic rhinitis, cause unspecified J30.9    Contact dermatitis and other eczema, due to unspecified cause L25.9    Esophageal reflux K21.9    Other specified idiopathic peripheral neuropathy G60.8    Essential hypertension, benign I10    Retinopathy of both eyes H35.00    Pure hypercholesterolemia E78.00    History of testicular cancer Z85.47    Hypertriglyceridemia E78.1    NSTEMI (non-ST elevated myocardial infarction) ([facility] [address] I21.4    Depressed left ventricular ejection fraction R93.1    Ischemic cardiomyopathy I25.5    CAD, multiple vessel I25.10    S/P CABG (coronary artery bypass graft) Z95.1    Postoperative anemia due to acute blood loss D62    Pleural effusion J90    Hypoxia R09.02    Cardiogenic shock ([facility]) W00.7    Systolic CHF, chronic ([facility]) R63.98    Systolic CHF, acute on chronic ([facility]) I50.23    Cardiac arrest with ventricular fibrillation ([facility]) I46.9, I49.01       Past Medical History:   Diagnosis Date    Allergic rhinitis, cause unspecified 3/7/2014    CAD, multiple vessel 2/28/2017 2/28/17 (Dr Val Matos) Coronary artery bypass grafting x3. Grafts consisting of  1. Left internal mammary artery to the left anterior descending.   2. Reversed LEFT saphenous vein graft to obtuse marginal.  3. Reversed LEFT saphenous vein graft to the left posterior descending  artery.      Cancer [facility]) 1979    testicular- tx with radiation    Contact dermatitis and other eczema, due to unspecified cause 3/7/2014    Depressed left ventricular ejection fraction 2/27/2017 2/27/17 20%    Esophageal reflux 3/7/2014    GERD (gastroesophageal reflux disease)     OTC pepcid 1Xd controls it    Ischemic cardiomyopathy 2/28/2017    EF 10-15% echo 2/27/17    NSTEMI (non-ST elevated myocardial infarction) ([facility] [address] 2/27/2017    Other and unspecified hyperlipidemia 3/7/2014    Other specified idiopathic peripheral neuropathy 3/7/2014    Retinopathy of both eyes     Type II or unspecified type diabetes mellitus with neurological manifestations, uncontrolled 3/7/2014    avg fasting- 120-200; last A1C= 8.7 in Oct 2016- down from 11.4    Undiagnosed cardiac murmurs 9/9/4134    II/VI systolic murmur    Unspecified essential hypertension 3/7/2014    pt says he has never been on meds    Vitreous hemorrhage, unspecified eye ([facility] [address] 2/27/2017 2/21s/p vitrectomy (Dr Abdifatah Alba)      Past Surgical History:   Procedure Laterality Date    HX HEENT  2003    SCC removal, nose    HX UROLOGICAL Left 1979    malignant tumor of testis/RAT    HX UROLOGICAL Left 2010    groin- cyst     No Known Allergies   Family History   Problem Relation Age of Onset    Diabetes Mother      type 2 -insulin dep    Heart Attack Mother 76    Heart Disease Mother     Diabetes Father      type 2; insulin dep    Heart Disease Father      CAD-pacer-carotid endart    Stroke Father     Heart Attack Father 67    Diabetes Other      grandmother    Cancer Sister      breast    No Known Problems Brother     No Known Problems Sister         Current Facility-Administered Medications   Medication Dose Route Frequency    furosemide (LASIX) injection 60 mg  60 mg IntraVENous Q12H    carvedilol (COREG) tablet 3.125 mg  3.125 mg Oral Q12H    insulin regular (NOVOLIN R, HUMULIN R) injection   SubCUTAneous AC&HS    cyclobenzaprine (FLEXERIL) tablet 5 mg  5 mg Oral TID PRN    amiodarone (CORDARONE) 450 mg in dextrose 5% 250 mL infusion  0.5-1 mg/min IntraVENous TITRATE    famotidine (PF) (PEPCID) injection 20 mg  20 mg IntraVENous Q12H    fentaNYL citrate (PF) injection  mcg   mcg IntraVENous Q2H PRN    midazolam (VERSED) injection 1-2 mg  1-2 mg IntraVENous Q2H PRN    NOREPINephrine (LEVOPHED) 4,000 mcg in dextrose 5% 250 mL infusion  0.05-0.2 mcg/kg/min IntraVENous TITRATE    propofol (DIPRIVAN) infusion  5-50 mcg/kg/min IntraVENous TITRATE    insulin regular (NOVOLIN R, HUMULIN R) 100 Units in 0.9% sodium chloride 100 mL infusion  1 Units/hr IntraVENous TITRATE    sodium chloride (NS) flush 5-10 mL  5-10 mL IntraVENous Q8H    sodium chloride (NS) flush 5-10 mL  5-10 mL IntraVENous PRN    docusate sodium (COLACE) capsule 100 mg  100 mg Oral DAILY    alum-mag hydroxide-simeth (MYLANTA) oral suspension 30 mL  30 mL Oral Q4H PRN    ondansetron (ZOFRAN) injection 4 mg  4 mg IntraVENous Q6H PRN    acetaminophen (TYLENOL) tablet 650 mg  650 mg Oral Q4H PRN    atorvastatin (LIPITOR) tablet 80 mg  80 mg Oral QHS    amiodarone (CORDARONE) tablet 200 mg  200 mg Oral Q12H    mupirocin (BACTROBAN) 2 % ointment   Both Nostrils Q12H    aspirin delayed-release tablet 81 mg  81 mg Oral DAILY    nitroglycerin (NITROLINGUAL) sublingual 0.4 mg/spray  1 Spray SubLINGual Q5MIN PRN    baclofen (LIORESAL) tablet 10 mg  10 mg Oral TID PRN    oxyCODONE-acetaminophen (PERCOCET) 5-325 mg per tablet 1 Tab  1 Tab Oral Q4H PRN    oxyCODONE-acetaminophen (PERCOCET 10)  mg per tablet 1 Tab  1 Tab Oral Q4H PRN       Review of Symptoms:  General: no recent weight loss/gain, weakness, fatigue, fever or chills  Skin: no rashes, lumps, or other skin changes  HEENT: no headache, dizziness, lightheadedness, vision changes, hearing changes, tinnitus, vertigo, sinus pressure/pain, bleeding gums, sore throat, or hoarseness  Neck: no swollen glands, goiter, pain or stiffness  Respiratory: no cough, sputum, hemoptysis, dyspnea, wheezing  Cardiovascular: no chest pain or discomfort, palpitations, dyspnea, orthopnea, paroxysmal nocturnal dyspnea, peripheral edema  Gastrointestinal: no trouble swallowing, heartburn, change of appetite, nausea, change in bowel habits, pain with defecation, rectal bleeding or black/tarry stools, hemorrhoids, constipation, diarrhea, abdominal pain, jaundice, liver or gallbladder problems  Urinary: no frequency, urgency , hematuria, burning/pain with urination, recent flank pain, polyuria, nocturia, or difficulty urinating  Genital: no vaginal or pelvic infections  Peripheral Vascular: no claudication, leg cramps, prior DVTs, swelling of calves, legs, or feet, color change, or swelling with redness or tenderness  Musculoskeletal: no muscle or joint pain/stiffness, joint swelling, erythema of joints, or back pain  Psychiatric: no depression, mental disorders, or excessive stress  Neurological: no sensory or motor loss, seizures, syncope, tremors, numbness, tingling, no changes in mood, attention, or speech, no changes in orientation, memory, insight, or judgment. no headache, dizziness, vertigo. Hematologic: no anemia, easy bruising or bleeding  Endocrine: no thyroid problems, heat or cold intolerance, excessive sweating, polyuria, polydipsia, or history of diabetes. Physical Exam  Vitals:    03/06/17 0500 03/06/17 0553 03/06/17 0600 03/06/17 0700   BP: 114/69  108/71 133/73   Pulse: 63  64 62   Resp: 11  18 13   Temp:    98.3 °F (36.8 °C)   SpO2:    98%   Weight:  216 lb 0.8 oz (98 kg)     Height:           Physical Exam:  General appearance - Alert, well appearing, and in no distress   Mental status - Affect appropriate to mood. Eyes - Sclerae anicteric,  ENMT - Hearing grossly normal bilaterally, Dental hygiene good. Neck - Carotids upstroke normal bilaterally, no bruits, no JVD.   Resp - Clear to auscultation, no wheezes, rales or rhonchi, symmetric air entry. Heart - Normal rate, regular rhythm, normal S1, S2, no murmurs, rubs, clicks or gallops. GI - Soft, nontender, nondistended, no masses or organomegaly. Neurological - Grossly intact - normal speech, no focal findings  Musculoskeletal - No joint tenderness, deformity or swelling, no muscular tenderness noted. Extremities - Peripheral pulses normal, no pedal edema, no clubbing or cyanosis. Skin - Normal coloration and turgor. Psych -  oriented to person, place, and time. Cardiographics    Telemetry:   ECG:  Echocardiogram:     Labs:   Recent Labs      03/05/17   2300  03/05/17   0315  03/04/17   1400   NA  138  138  135*   K  4.8  4.4  5.1   MG  2.6*  2.6*  2.8*   BUN  32*  38*  46*   CREA  1.40  1.12  1.63*   GLU  195*  125*  321*   WBC  10.8  10.1  10.6   HGB  10.4*  10.0*  9.9*   HCT  31.2*  29.5*  29.3*   PLT  187  182  159   INR   --    --   1.1        Assessment:      Principal Problem:    NSTEMI (non-ST elevated myocardial infarction) ([facility] [address] (2/27/2017)    Active Problems:    DM (diabetes mellitus) type II controlled, neurological manifestation (HCC) (3/7/2014)      Essential hypertension, benign (3/7/2014)      Hypertriglyceridemia (2/27/2017)      Depressed left ventricular ejection fraction (2/27/2017)      Overview: 2/27/17 20%      Ischemic cardiomyopathy (2/28/2017)      Overview: EF 10-15% echo 2/27/17      CAD, multiple vessel (2/28/2017)      Overview: 2/28/17 (Dr Samuel Ibarra) Coronary artery bypass grafting x3. Grafts       consisting of       1. Left internal mammary artery to the left anterior descending. 2. Reversed LEFT saphenous vein graft to obtuse marginal.       3. Reversed LEFT saphenous vein graft to the left posterior descending       artery.        S/P CABG (coronary artery bypass graft) (2/28/2017)      Postoperative anemia due to acute blood loss (3/1/2017)      Pleural effusion (3/2/2017) Hypoxia (3/2/2017)      Cardiogenic shock ([facility] [address] (0/2/0132)      Systolic CHF, acute on chronic ([facility] [address] (3/3/2017)      Cardiac arrest with ventricular fibrillation ([facility] [address] (3/5/2017)      Overview: 3/4/16           Plan:   1. V. Fib arrest  -Patient is >5 days post op with recurrent V. Fib arrest. Not responding to po Amio. Changed to iv. Having periods of bradycardia. No evidence of active ischemia. Post device implant will change coreg to Toprol XL. Will also start 48 hours of iv lidocaine if episodes continue. 2. Chronic systolic heart failure - EF pre-op was 10-15%, Post-op EF was 20-25%  3. CAD - Post CABG      Thank you very much for this referral. We appreciate the opportunity to participate in this patient's care. We will follow along with above stated plan. Susan Jarquin. Rae Cordova M.D., F.A.C.C, F.H.R.S.   Cardiology/Electrophysiology

## 2017-03-06 NOTE — PROGRESS NOTES
TRANSFER - IN REPORT:    Verbal [facility]  being received from EP(unit) for routine post - op      Report consisted of patients Situation, Background, Assessment and   Recommendations(SBAR). Information from the following report(s) OR Summary, Procedure Summary and Intake/Output was reviewed with the receiving nurse. Opportunity for questions and clarification was provided. Assessment completed upon patients arrival to unit and care assumed. Primary RN is Pauly West.

## 2017-03-06 NOTE — PROGRESS NOTES
See Dr Vernestine Gosselin note about code blue. Tanya Danielle notified, orders received. I called the wife, Melody Barrow, to update her. I left her a message to call back for an update. She has not called back yet. I will update her when I hear from her. The patient is awake and alert, doing well. Pain medicine given. Zoll monitor still attached to patient. Order for him to be NPO p MN. Will continue to monitor.

## 2017-03-06 NOTE — PROGRESS NOTES
Code Blue    Pt was doing well tonight when he spontaneously developed VF arrest. Pt defibrillated by CVICU staff with return to sinus bradycardia. Given Atropine 0.5 mg and now in NSR. Pt awake and alert. No respiratory distress. Will check ABG, BMP, Mg. This is his second VF arrest in 2 days. Cardiology will be notified and he will likely require internal defibrillator given recent events and severely reduced LV function.     Lopez Hirsch., MD

## 2017-03-06 NOTE — ADT AUTH CERT NOTES
Utilization Review           Myocardial Infarction - Care Day 8 (3/6/2017) by Woodrow Ryan RN        Review Entered Review Status       3/6/2017 Completed       Details              Care Day: 8 Care Date: 3/6/2017 Level of Care: ICU       Guideline Day 2        Clinical Status       ( ) * Hemodynamic stability       (X) * Chest pain, dyspnea, or anginal equivalent absent              Routes       (X) * Oral hydration, medications       (X) Parenteral medications              Interventions       ( ) * Oxygen absent              Medications       (X) * IV nitroglycerin absent       (X) Aspirin       (X) Beta-blocker       (X) Statin medication                                   * Milestone              Additional Notes       CONT STAY REVIEW FOR 3/6/17       CV Progress Note               Subjective:       Admit Date: 2/27/2017               Surgery Date:  6 Days Post-Op                 Procedure: Procedure(s):       CORONARY ARTERY BYPASS GRAFT (CABG)x3 Jack Gauthier       VEIN HARVEST- Greater Saphenous Vein       ESOPHAGEAL TRANS ECHOCARDIOGRAM               Incisional pain: minimal       Appetite: NPO              Recurrent vfib arrest last night, and periods of bradycardia       Neurologically in tact       Plan/Recommendations/Medical Decision Making:               Principal Problem:       NSTEMI (non-ST elevated myocardial infarction) ([facility] [address] (2/27/2017)               Active Problems:       DM (diabetes mellitus) type II controlled, neurological manifestation ([address] (3/7/2014)               Essential hypertension, benign (3/7/2014)               Hypertriglyceridemia (2/27/2017)               Depressed left ventricular ejection fraction (2/27/2017)       Overview: 2/27/17 20%               Ischemic cardiomyopathy (2/28/2017)       Overview: EF 10-15% echo 2/27/17               CAD, multiple vessel (2/28/2017)       Overview: 2/28/17 (Dr Cliff Spears) Coronary artery bypass grafting x3. Grafts        consisting of        1. Left internal mammary artery to the left anterior descending.        2. Reversed LEFT saphenous vein graft to obtuse marginal.        3. Reversed LEFT saphenous vein graft to the left posterior descending        artery.                S/P CABG (coronary artery bypass graft) (2/28/2017)               Postoperative anemia due to acute blood loss (3/1/2017)               Pleural effusion (3/2/2017)               Hypoxia (3/2/2017)               Cardiogenic shock (HCC) (3/2/2017)               Systolic CHF, acute on chronic ([facility] [address] (3/3/2017)               Cardiac arrest with ventricular fibrillation ([facility] [address] (3/5/2017)       Overview: 3/4/16        ICD today. Needs R effusion drained.  Keep in ICU        NSR, 2L NC, ERNANDEZ, ICU, DM DIET, CORDARONE DRIP AND PO, LIPITOR, COREG, PEPCID IV, LASIX IV Q 12,       INSULIN DRIP, SSI, PERCOCET, LEVO DRIP OFF,           Myocardial Infarction - Care Day 7 (3/5/2017) by Carla Jimenez RN        Review Entered Review Status       3/6/2017 Completed       Details              Care Day: 7 Care Date: 3/5/2017 Level of Care: ICU       Guideline Day 2        Clinical Status       ( ) * Hemodynamic stability       (X) * Chest pain, dyspnea, or anginal equivalent absent              Routes       ( ) * Oral hydration, medications       (X) Parenteral medications              Interventions       ( ) * Oxygen absent              Medications       (X) * IV nitroglycerin absent       (X) Aspirin       (X) Beta-blocker       (X) Statin medication                                   * Milestone              Additional Notes       CONT STAY REVIEW FOR 3/5/17       CV Progress Note               Subjective:       Admit Date: 2/27/2017               Surgery Date:  5 Days Post-Op                 Procedure: Procedure(s):       CORONARY ARTERY BYPASS GRAFT (CABG)x3 Titus Elmore       VEIN HARVEST- Greater Saphenous Vein       ESOPHAGEAL TRANS ECHOCARDIOGRAM               Vfib arrest yesterday->NSR and ROSC with defibx1. Just extubated and neurologically in tact. Off pressors. Still complains of hiccups. No chest pain       Neuro in tact       RRR       Breath sounds diminshed on right       Belly soft       2+Leg edema                       Plan/Recommendations/Medical Decision Making:               Principal Problem:       NSTEMI (non-ST elevated myocardial infarction) ([facility] [address] (2/27/2017)               Active Problems:       DM (diabetes mellitus) type II controlled, neurological manifestation ([facility] [address] (3/7/2014)               Essential hypertension, benign (3/7/2014)               Hypertriglyceridemia (2/27/2017)               Depressed left ventricular ejection fraction (2/27/2017)       Overview: 2/27/17 20%               Ischemic cardiomyopathy (2/28/2017)       Overview: EF 10-15% echo 2/27/17               CAD, multiple vessel (2/28/2017)       Overview: 2/28/17 (Dr Jennifer Mays) Coronary artery bypass grafting x3. Grafts        consisting of        1. Left internal mammary artery to the left anterior descending.        2. Reversed LEFT saphenous vein graft to obtuse marginal.        3. Reversed LEFT saphenous vein graft to the left posterior descending        artery.                S/P CABG (coronary artery bypass graft) (2/28/2017)               Postoperative anemia due to acute blood loss (3/1/2017)               Pleural effusion (3/2/2017)               Hypoxia (3/2/2017)               Cardiogenic shock (HCC) (3/2/2017)               Systolic CHF, acute on chronic ([facility] [address] (3/3/2017)                       HD stable, neuro in tact. No recurrent arrhythmia.       Continue amio drip.  Will need device prior to discharge       Resume beta blocker as BP tolerates       DC femoral arterial line       US and thoracentesis R chest       Diurese       Monitor K/Mg       Keep in ICU today        Continue present treatment              ICU, VSS, NSR, 4L NC, PAIN 6/10, ERNANDEZ, GLUC QID, CORDARONE DRIP AND PO, ASA, LIPITOR,  COREG,        PEPCID IV, LASIX IV, PERCOCET , LEVO DRIP , ATROPINE IV, INSULIN DRIP           Myocardial Infarction - Care Day 6 (3/4/2017) by Umm Miguel RN        Review Entered Review Status       3/6/2017 Completed       Details              Care Day: 6 Care Date: 3/4/2017 Level of Care: ICU       Guideline Day 2        Clinical Status       ( ) * Hemodynamic stability       ( ) * Chest pain, dyspnea, or anginal equivalent absent              Routes       ( ) * Oral hydration, medications              Interventions       ( ) * Oxygen absent              Medications       (X) * IV nitroglycerin absent       (X) Aspirin       (X) Beta-blocker       (X) Probable ACE inhibitor or angiotensin receptor blocker                                   * Milestone              Additional Notes       CONT STAY REVIEW FOR 3/4/17       Cardiothoracic Surgery Progress Notes              CTS: called to bedside for code in progress. Patient was apparently eating lunch, had sinus bradycardia in the 40s, asymptomatic. Then several seconds later had coarse vfib on monitor, no pulse, CPR started x approximately 5minutes and patient received 1 shock at 200J -> sinus rhythm and ROSC. When I arrived patient had been moved to bed, was in sinus rhythm with isoelectric ST segments on monitor, sat 100% on 100% via bag mask, SBP 160s Chest wall motion symmetric, trachea not deviated, good bilateral breath sounds, good heart tones. Good peripheral pulses. Extremities warm. Patient was somnolent but responsive to yes/no questions and moving all four extremities to command. Intubation in progress. Plan to plan to place central line, start IV amiodarone, move to CVICU, Stat chest xray, echo and ECG.       ECHO-SUMMARY:       -  Left ventricle: The ventricle was moderately to markedly dilated. Systolic       function was severely reduced. Ejection fraction was estimated in the range        of 20 % to 25 %.  There was severe diffuse hypokinesis with regional variations.       -  Aortic valve: The aortic valve is moderately calcified.  There appears to        Be decreased leaflet excursion There is restriction of the non coronary cusp.       There is likely aortic stenosis but uncertain severity with this being a       limited study and no gradients measured.       -  Pericardium: There was no pericardial effusion.       RBC: 3.31 (L)       HGB: 9.9 (L)       HCT: 29.3 (L)       Prothrombin time: 12.1 (H)       Fibrinogen: 464 (H)       Sodium: 135 (L)       Glucose: 321 (H)       BUN: 46 (H)       Creatinine: 1.63 (H)       Magnesium: 2.8 (H)       GFR est non-AA: 46 (L)       Troponin-I, Qt.: 0.58 (HH)       ICU, VENT 35%, ERNANDEZ, GLUC QID, CORDARONE DRIP, ASA, PEPCID IV, HEPARIN DRIP, INSULIN DRIP,        LEVOPHED DRIP, DIPRIVAN DRIP, AMIDATE IV, FENTANYL IV, VERSED IV, MS IV, BICARB IV,       PRINIVIL, LASIX, COREG

## 2017-03-06 NOTE — PROGRESS NOTES
Noted dark blood from mediastinal chest tube site, suture intact but has slight drainage, cleand with hibiclens and dressing applied. No discomfort reported. Pt fearful all day: to move, turn, sit up. Reassured and encouraged. Told him it is normal to be scared but this is the safest place to start moving. Flexeril working better with hiccups. Pt also stated that his soreness all over was greatly relieved with pain meds. Now smiling ang starting to move around. Sat in chair position in bed for an hour. Left groin site d&i.

## 2017-03-06 NOTE — PROGRESS NOTES
Dual skin assessment performed. No redness or skin breakdown noted. Alleyvn in place. Bruising to groin, bilateral arms. Incisions intact.

## 2017-03-06 NOTE — PROGRESS NOTES
CV Progress Note    Subjective:   Admit Date: 2017    Surgery Date:  6 Days Post-Op      Procedure:  Procedure(s):  CORONARY ARTERY BYPASS GRAFT (CABG)x3 [id number] [facility]- Greater Saphenous Vein  ESOPHAGEAL TRANS ECHOCARDIOGRAM    Incisional pain:  minimal  Appetite:  NPO    Recurrent vfib arrest last night, and periods of bradycardia  Neurologically in tact    Objective:     Vitals:  Blood pressure 133/73, pulse 62, temperature 98.3 °F (36.8 °C), resp. rate 13, height 5' 10\" (1.778 m), weight 216 lb 0.8 oz (98 kg), SpO2 98 %. Temp (24hrs), Av.1 °F (37.3 °C), Min:98.3 °F (36.8 °C), Max:99.9 °F (37.7 °C)  Neuro in tact  RRR  Breath sounds decreased R side  Belly soft  Sternum stable incision clean dry and in tact  +Leg edema      Plan/Recommendations/Medical Decision Making:     Principal Problem:    NSTEMI (non-ST elevated myocardial infarction) ([facility] (2017)    Active Problems:    DM (diabetes mellitus) type II controlled, neurological manifestation ([address] (3/7/2014)      Essential hypertension, benign (3/7/2014)      Hypertriglyceridemia (2017)      Depressed left ventricular ejection fraction (2017)      Overview: 17 20%      Ischemic cardiomyopathy (2017)      Overview: EF 10-15% echo 17      CAD, multiple vessel (2017)      Overview: 17 (Dr Melodie Groves) Coronary artery bypass grafting x3. Grafts       consisting of       1. Left internal mammary artery to the left anterior descending. 2. Reversed LEFT saphenous vein graft to obtuse marginal.       3. Reversed LEFT saphenous vein graft to the left posterior descending       artery.        S/P CABG (coronary artery bypass graft) (2017)      Postoperative anemia due to acute blood loss (3/1/2017)      Pleural effusion (3/2/2017)      Hypoxia (3/2/2017)      Cardiogenic shock (HCC) (9855)      Systolic CHF, acute on chronic ([facility] [address] (3/3/2017)      Cardiac arrest with ventricular fibrillation ([facility] [address] (3/5/2017)      Overview: 3/4/16      ICD today. Needs R effusion drained. Keep in [address]  Abeba Garcia MD

## 2017-03-06 NOTE — PROCEDURES
PRE-ELECTROPHYSIOLOGY STUDY DIAGNOSES  1. Chronic systolic heart failure, ejection fraction of 20%  2. Ischemic dilated cardiomyopathy. 3. Class II heart failure symptoms. 4. Chronic systolic heart failure for unknown duration. 5. Secondary prevention indications for defibrillator  6. Recurrent Unstable, Sustained V. Fib Cardiac Arrest > 5days post CABG  7. Life expectancy greater than 1 year. 8. Patient has indications for permanent pacing secondary to sick sinus syndrome        PROCEDURE PERFORMED  1. Insertion of an implantable cardioverter defibrillator. 2. Testing of implantable cardioverter defibrillator. Anesthesia: MAC     Estimated Blood Loss: Less than 10 mL     Specimens: * No specimens in log *       PROCEDURE IN DETAIL: The patient was brought into the EP lab in a fasting  state. The left shoulder was prepped and draped in the usual sterile  fashion. Skin anesthetized with lidocaine with epinephrine. Incision was  made in the region of the deltopectoral groove. Pocket was made for the  ICD. Access was obtained in the left subclavian vein via  the Seldinger technique x2 over which 2 separate guidewires were placed. Over the first guidewire, an 8-[place]izen [place] sheath was placed. Through this  8-[place]izen [place] sheath, a Bi[employer]ronik ICD lead, model Protego SD 65/18 was placed  at the RV apex, we achieved the following values: R waves were 20.8,  threshold was 1.0 V at 0.4 msec pulse width. This lead was then secured  to the pectoral fascia with 0 Ti-Cron x2. Over the next guidewire, a 7-Citizen [place] sheath was placed through which a Biotronik right atrial lead, model  SoliaS, 45 cm, was placed in the  right atrial appendage. We achieved the following values: P waves were  0.6mV, threshold was 1.3 V at 0.4 msec pulse width. This lead was then  secured to the pectoral fascia with 0 Ti-Cron x2. Pocket was irrigated  with triple antibiotic flush.  The leads were connected to a [employer]ik  ICD, model Iperia 7 DR-T, serial #[id number]. The leads  and ICD were then placed in the pocket area. Defibrillator  threshold testing was performed and the patient was placed into  ventricular fibrillation, was converted out with 25 joules of energy. Pocket was then closed with 2-0 Vicryl, followed by a next layer of 3-0  Vicryl, followed by a superficial layer of staples. The wound was  dressed. The patient was recovered from his sedation, transferred from  the lab in a stable condition. IMPRESSION: Successful implantation of [employer] [place] [employer] MRI implantable cardioverter defibrillator for Secondary prevention of sudden death.

## 2017-03-06 NOTE — PROGRESS NOTES
responded to [employer]. Staff reports Patient \"brought back quickly\"; Patient breathing on own. No family present. Manny Rollins. Master Garnica.  Jules Bran

## 2017-03-06 NOTE — ANESTHESIA POSTPROCEDURE EVALUATION
Post-Anesthesia Evaluation and Assessment    Patient: Saima Lipoma MRN: [id number]  SSN: [id number]    YOB: 1955  Age: 63 y.o. Sex: male       Cardiovascular Function/Vital Signs  Visit Vitals    /67    Pulse 75    Temp 37.1 °C (98.8 °F)    Resp 20    Ht 5' 10\" (1.778 m)    Wt 98 kg (216 lb 0.8 oz)    SpO2 94%    BMI 31 kg/m2       Patient is status post total IV anesthesia anesthesia for Procedure(s):  DUAL CHAMBER ICD INSERTION. Nausea/Vomiting: None    Postoperative hydration reviewed and adequate. Pain:  Pain Scale 1: Numeric (0 - 10) (03/06/17 1629)  Pain Intensity 1: 0 (03/06/17 1629)   Managed    Neurological Status:   Neuro  Neurologic State: Eyes open to voice;Drowsy (03/06/17 1629)  Orientation Level: Oriented to person;Oriented to place;Oriented to situation;Disoriented to time (03/06/17 1629)  Cognition: Follows commands;Decreased attention/concentration (03/06/17 1629)  Speech: Clear (03/06/17 1629)  Assessment L Pupil: Brisk;Round (03/06/17 1629)  Size L Pupil (mm): 2 (03/06/17 1629)  Assessment R Pupil: Brisk;Round (03/06/17 1629)  Size R Pupil (mm): 2 (03/06/17 1629)  LUE Motor Response: Purposeful (03/06/17 1629)  LLE Motor Response: Purposeful (03/06/17 1629)  RUE Motor Response: Purposeful (03/06/17 1629)  RLE Motor Response: Purposeful (03/06/17 1629)   At baseline    Mental Status and Level of Consciousness: Arousable    Pulmonary Status:   O2 Device: Oxygen mask (03/06/17 1629)   Adequate oxygenation and airway patent    Complications related to anesthesia: None    Post-anesthesia assessment completed.  No concerns    Signed By: Radha Flores MD     March 6, 2017

## 2017-03-06 NOTE — PROGRESS NOTES
TRANSFER - OUT REPORT:    Verbal report given to Nimishaguillermo MARAVILLA(name) on Pasquale Pipes  being transferred to EP Lab(unit) for ordered procedure       Report consisted of patients Situation, Background, Assessment and   Recommendations(SBAR). Information from the following report(s) SBAR, Kardex, OR Summary, Intake/Output, MAR, Recent Results, Med Rec Status and Cardiac Rhythm SB- NSR was reviewed with the receiving nurse. Lines:   Triple Lumen 03/04/17 Right Internal jugular (Active)   Central Line Being Utilized Yes 3/6/2017 11:00 AM   Criteria for Appropriate Use Hemodynamically unstable, requiring monitoring lines, vasopressors, or volume resuscitation 3/6/2017 11:00 AM   Site Assessment Clean, dry, & intact 3/6/2017 11:00 AM   Infiltration Assessment 0 3/6/2017 11:00 AM   Affected Extremity/Extremities Color distal to insertion site pink (or appropriate for race); Pulses palpable 3/6/2017 11:00 AM   Date of Last Dressing Change 03/04/17 3/6/2017 11:00 AM   Dressing Status Clean, dry, & intact 3/6/2017 11:00 AM   Dressing Type Disk with Chlorhexadine gluconate (CHG); Transparent 3/6/2017 11:00 AM   Action Taken Open ports on tubing capped 3/6/2017  3:00 AM   Proximal Hub Color/Line Status White; Infusing 3/6/2017 11:00 AM   Positive Blood Return (Medial Site) Yes 3/6/2017 11:00 AM   Medial Hub Color/Line Status Blue;Flushed;Patent 3/6/2017 11:00 AM   Positive Blood Return (Lateral Site) Yes 3/6/2017 11:00 AM   Distal Hub Color/Line Status Brown;Flushed;Patent 3/6/2017 11:00 AM   Positive Blood Return (Site #3) Yes 3/6/2017 11:00 AM   Alcohol Cap Used No 3/6/2017  3:00 AM        Opportunity for questions and clarification was provided.       Patient transported with:   Monitor  O2 @ 2 liters

## 2017-03-07 ENCOUNTER — APPOINTMENT (OUTPATIENT)
Dept: GENERAL RADIOLOGY | Age: 62
DRG: 001 | End: 2017-03-07
Attending: INTERNAL MEDICINE
Payer: COMMERCIAL

## 2017-03-07 ENCOUNTER — APPOINTMENT (OUTPATIENT)
Dept: GENERAL RADIOLOGY | Age: 62
DRG: 001 | End: 2017-03-07
Attending: THORACIC SURGERY (CARDIOTHORACIC VASCULAR SURGERY)
Payer: COMMERCIAL

## 2017-03-07 LAB
ANION GAP BLD CALC-SCNC: 11 MMOL/L (ref 7–16)
APPEARANCE FLD: NORMAL
ATRIAL RATE: 75 BPM
BUN SERPL-MCNC: 33 MG/DL (ref 6–23)
CALCIUM SERPL-MCNC: 7.6 MG/DL (ref 8.3–10.4)
CALCULATED P AXIS, ECG09: 55 DEGREES
CALCULATED R AXIS, ECG10: 49 DEGREES
CALCULATED T AXIS, ECG11: -9 DEGREES
CHLORIDE SERPL-SCNC: 100 MMOL/L (ref 98–107)
CO2 SERPL-SCNC: 28 MMOL/L (ref 21–32)
COLOR FLD: NORMAL
CREAT SERPL-MCNC: 1.17 MG/DL (ref 0.8–1.5)
DIAGNOSIS, 93000: NORMAL
DIASTOLIC BP, ECG02: NORMAL MMHG
ERYTHROCYTE [DISTWIDTH] IN BLOOD BY AUTOMATED COUNT: 14.1 % (ref 11.9–14.6)
FLUID COMMENTS, FCOM: NORMAL
GLUCOSE BLD STRIP.AUTO-MCNC: 127 MG/DL (ref 65–100)
GLUCOSE BLD STRIP.AUTO-MCNC: 132 MG/DL (ref 65–100)
GLUCOSE BLD STRIP.AUTO-MCNC: 137 MG/DL (ref 65–100)
GLUCOSE BLD STRIP.AUTO-MCNC: 155 MG/DL (ref 65–100)
GLUCOSE SERPL-MCNC: 129 MG/DL (ref 65–100)
HCT VFR BLD AUTO: 30.7 % (ref 41.1–50.3)
HGB BLD-MCNC: 10 G/DL (ref 13.6–17.2)
LYMPHOCYTES NFR FLD: 39 %
MAGNESIUM SERPL-MCNC: 2.4 MG/DL (ref 1.8–2.4)
MCH RBC QN AUTO: 28.9 PG (ref 26.1–32.9)
MCHC RBC AUTO-ENTMCNC: 32.6 G/DL (ref 31.4–35)
MCV RBC AUTO: 88.7 FL (ref 79.6–97.8)
NEUTS SEG NFR FLD: 61 %
NUC CELL # FLD: 366 /CU MM
P-R INTERVAL, ECG05: 140 MS
PLATELET # BLD AUTO: 211 K/UL (ref 150–450)
PMV BLD AUTO: 10.1 FL (ref 10.8–14.1)
POTASSIUM SERPL-SCNC: 4 MMOL/L (ref 3.5–5.1)
Q-T INTERVAL, ECG07: 434 MS
QRS DURATION, ECG06: 130 MS
QTC CALCULATION (BEZET), ECG08: 484 MS
RBC # BLD AUTO: 3.46 M/UL (ref 4.23–5.67)
RBC # FLD: NORMAL /CU MM
SODIUM SERPL-SCNC: 139 MMOL/L (ref 136–145)
SPECIMEN SOURCE FLD: NORMAL
SYSTOLIC BP, ECG01: NORMAL MMHG
VENTRICULAR RATE, ECG03: 75 BPM
WBC # BLD AUTO: 10 K/UL (ref 4.3–11.1)

## 2017-03-07 PROCEDURE — 80048 BASIC METABOLIC PNL TOTAL CA: CPT | Performed by: THORACIC SURGERY (CARDIOTHORACIC VASCULAR SURGERY)

## 2017-03-07 PROCEDURE — 74011250636 HC RX REV CODE- 250/636: Performed by: THORACIC SURGERY (CARDIOTHORACIC VASCULAR SURGERY)

## 2017-03-07 PROCEDURE — 0W9B3ZZ DRAINAGE OF LEFT PLEURAL CAVITY, PERCUTANEOUS APPROACH: ICD-10-PCS | Performed by: INTERNAL MEDICINE

## 2017-03-07 PROCEDURE — 71010 XR CHEST PORT: CPT

## 2017-03-07 PROCEDURE — 83735 ASSAY OF MAGNESIUM: CPT | Performed by: THORACIC SURGERY (CARDIOTHORACIC VASCULAR SURGERY)

## 2017-03-07 PROCEDURE — 74011000250 HC RX REV CODE- 250: Performed by: THORACIC SURGERY (CARDIOTHORACIC VASCULAR SURGERY)

## 2017-03-07 PROCEDURE — 74011250636 HC RX REV CODE- 250/636: Performed by: INTERNAL MEDICINE

## 2017-03-07 PROCEDURE — 0W993ZZ DRAINAGE OF RIGHT PLEURAL CAVITY, PERCUTANEOUS APPROACH: ICD-10-PCS | Performed by: INTERNAL MEDICINE

## 2017-03-07 PROCEDURE — 74011250637 HC RX REV CODE- 250/637: Performed by: INTERNAL MEDICINE

## 2017-03-07 PROCEDURE — 82962 GLUCOSE BLOOD TEST: CPT

## 2017-03-07 PROCEDURE — 88112 CYTOPATH CELL ENHANCE TECH: CPT | Performed by: INTERNAL MEDICINE

## 2017-03-07 PROCEDURE — 88305 TISSUE EXAM BY PATHOLOGIST: CPT | Performed by: INTERNAL MEDICINE

## 2017-03-07 PROCEDURE — 74011250637 HC RX REV CODE- 250/637: Performed by: THORACIC SURGERY (CARDIOTHORACIC VASCULAR SURGERY)

## 2017-03-07 PROCEDURE — C1729 CATH, DRAINAGE: HCPCS | Performed by: INTERNAL MEDICINE

## 2017-03-07 PROCEDURE — 87116 MYCOBACTERIA CULTURE: CPT | Performed by: INTERNAL MEDICINE

## 2017-03-07 PROCEDURE — 85027 COMPLETE CBC AUTOMATED: CPT | Performed by: THORACIC SURGERY (CARDIOTHORACIC VASCULAR SURGERY)

## 2017-03-07 PROCEDURE — 87205 SMEAR GRAM STAIN: CPT | Performed by: INTERNAL MEDICINE

## 2017-03-07 PROCEDURE — 74011636637 HC RX REV CODE- 636/637: Performed by: THORACIC SURGERY (CARDIOTHORACIC VASCULAR SURGERY)

## 2017-03-07 PROCEDURE — 83615 LACTATE (LD) (LDH) ENZYME: CPT | Performed by: INTERNAL MEDICINE

## 2017-03-07 PROCEDURE — 32555 ASPIRATE PLEURA W/ IMAGING: CPT | Performed by: INTERNAL MEDICINE

## 2017-03-07 PROCEDURE — 84157 ASSAY OF PROTEIN OTHER: CPT | Performed by: INTERNAL MEDICINE

## 2017-03-07 PROCEDURE — 89050 BODY FLUID CELL COUNT: CPT | Performed by: INTERNAL MEDICINE

## 2017-03-07 PROCEDURE — 97164 PT RE-EVAL EST PLAN CARE: CPT

## 2017-03-07 PROCEDURE — 65660000004 HC RM CVT STEPDOWN

## 2017-03-07 PROCEDURE — C1729 CATH, DRAINAGE: HCPCS

## 2017-03-07 PROCEDURE — 76040000025: Performed by: INTERNAL MEDICINE

## 2017-03-07 PROCEDURE — 82945 GLUCOSE OTHER FLUID: CPT | Performed by: INTERNAL MEDICINE

## 2017-03-07 PROCEDURE — 87102 FUNGUS ISOLATION CULTURE: CPT | Performed by: INTERNAL MEDICINE

## 2017-03-07 PROCEDURE — 99232 SBSQ HOSP IP/OBS MODERATE 35: CPT | Performed by: INTERNAL MEDICINE

## 2017-03-07 PROCEDURE — 36592 COLLECT BLOOD FROM PICC: CPT

## 2017-03-07 RX ORDER — MORPHINE SULFATE 2 MG/ML
2 INJECTION, SOLUTION INTRAMUSCULAR; INTRAVENOUS ONCE
Status: COMPLETED | OUTPATIENT
Start: 2017-03-07 | End: 2017-03-07

## 2017-03-07 RX ORDER — POTASSIUM CHLORIDE 20 MEQ/1
20 TABLET, EXTENDED RELEASE ORAL DAILY
Status: DISCONTINUED | OUTPATIENT
Start: 2017-03-08 | End: 2017-03-13 | Stop reason: HOSPADM

## 2017-03-07 RX ORDER — FUROSEMIDE 40 MG/1
40 TABLET ORAL DAILY
Status: DISCONTINUED | OUTPATIENT
Start: 2017-03-08 | End: 2017-03-13 | Stop reason: HOSPADM

## 2017-03-07 RX ADMIN — METOPROLOL SUCCINATE 50 MG: 50 TABLET, FILM COATED, EXTENDED RELEASE ORAL at 09:07

## 2017-03-07 RX ADMIN — OXYCODONE HYDROCHLORIDE AND ACETAMINOPHEN 1 TABLET: 10; 325 TABLET ORAL at 11:47

## 2017-03-07 RX ADMIN — METOPROLOL SUCCINATE 50 MG: 50 TABLET, FILM COATED, EXTENDED RELEASE ORAL at 17:14

## 2017-03-07 RX ADMIN — CEFAZOLIN 2 G: 1 INJECTION, POWDER, FOR SOLUTION INTRAMUSCULAR; INTRAVENOUS; PARENTERAL at 05:46

## 2017-03-07 RX ADMIN — Medication 10 ML: at 21:00

## 2017-03-07 RX ADMIN — OXYCODONE HYDROCHLORIDE AND ACETAMINOPHEN 1 TABLET: 10; 325 TABLET ORAL at 21:01

## 2017-03-07 RX ADMIN — AMIODARONE HYDROCHLORIDE 200 MG: 200 TABLET ORAL at 20:56

## 2017-03-07 RX ADMIN — OXYCODONE HYDROCHLORIDE AND ACETAMINOPHEN 1 TABLET: 10; 325 TABLET ORAL at 17:14

## 2017-03-07 RX ADMIN — ASPIRIN 81 MG: 81 TABLET, COATED ORAL at 09:07

## 2017-03-07 RX ADMIN — Medication 10 ML: at 14:31

## 2017-03-07 RX ADMIN — FAMOTIDINE 20 MG: 10 INJECTION, SOLUTION INTRAVENOUS at 20:57

## 2017-03-07 RX ADMIN — ATORVASTATIN CALCIUM 80 MG: 40 TABLET, FILM COATED ORAL at 20:56

## 2017-03-07 RX ADMIN — Medication 2 MG: at 15:07

## 2017-03-07 RX ADMIN — FAMOTIDINE 20 MG: 10 INJECTION, SOLUTION INTRAVENOUS at 09:07

## 2017-03-07 RX ADMIN — OXYCODONE HYDROCHLORIDE AND ACETAMINOPHEN 1 TABLET: 10; 325 TABLET ORAL at 01:08

## 2017-03-07 RX ADMIN — INSULIN HUMAN 5 UNITS: 100 INJECTION, SOLUTION PARENTERAL at 11:52

## 2017-03-07 RX ADMIN — FUROSEMIDE 60 MG: 10 INJECTION, SOLUTION INTRAMUSCULAR; INTRAVENOUS at 09:07

## 2017-03-07 RX ADMIN — Medication 10 ML: at 05:48

## 2017-03-07 RX ADMIN — DOCUSATE SODIUM 100 MG: 100 CAPSULE, LIQUID FILLED ORAL at 09:07

## 2017-03-07 RX ADMIN — AMIODARONE HYDROCHLORIDE 200 MG: 200 TABLET ORAL at 09:07

## 2017-03-07 RX ADMIN — MUPIROCIN: 20 OINTMENT TOPICAL at 09:08

## 2017-03-07 NOTE — PROGRESS NOTES
CV Progress Note    Subjective:   Admit Date: 2017    Surgery Date:  7 Days Post-Op      Procedure:  Procedure(s):  DUAL CHAMBER ICD INSERTION    Incisional pain:  minimal  Appetite:  Regular diet     Recurrent vfib arrest last over weekend, ICD placed yesterday  Remains Neurologically in tact    Objective:     Vitals:  Blood pressure 122/76, pulse 75, temperature 96.8 °F (36 °C), resp. rate 27, height 5' 10\" (1.778 m), weight 206 lb 5.6 oz (93.6 kg), SpO2 97 %. Temp (24hrs), Av °F (36.7 °C), Min:96.8 °F (36 °C), Max:98.8 °F (37.1 °C)  Neuro in tact  RRR  Breath sounds decreased R side  Belly soft  Sternum stable incision clean dry and in tact, ICD under occlusive dressing  +Leg edema      Plan/Recommendations/Medical Decision Making:     Principal Problem:    NSTEMI (non-ST elevated myocardial infarction) ([address] (2017)    Active Problems:    DM (diabetes mellitus) type II controlled, neurological manifestation ([address] (3/7/2014)      Essential hypertension, benign (3/7/2014)      Hypertriglyceridemia (2017)      Depressed left ventricular ejection fraction (2017)      Overview: 17 20%      Ischemic cardiomyopathy (2017)      Overview: EF 10-15% echo 17      CAD, multiple vessel (2017)      Overview: 17 (Dr Mackenzie Sutherland) Coronary artery bypass grafting x3. Grafts       consisting of       1. Left internal mammary artery to the left anterior descending. 2. Reversed LEFT saphenous vein graft to obtuse marginal.       3. Reversed LEFT saphenous vein graft to the left posterior descending       artery.        S/P CABG (coronary artery bypass graft) (2017)      Postoperative anemia due to acute blood loss (3/1/2017)      Pleural effusion (3/2/2017)      Hypoxia (3/2/2017)      Cardiogenic shock (HCC) (3/1/9008)      Systolic CHF, acute on chronic ([address] (3/3/2017)      Cardiac arrest with ventricular fibrillation ([facility] [address] (3/5/2017)      Overview: 3/4/16      ICD (implantable cardioverter-defibrillator) in place (3/6/2017)      Overview: 1. [employer] MRI implantable cardioverter defibrillator - March 2017      ICD placed yesterday. Needs R effusion drained- US pending by Pulmonary. Keep in ICU until [address] completed then to Stepdown per Dr Jamin Durand review.

## 2017-03-07 NOTE — ANESTHESIA POSTPROCEDURE EVALUATION
Post-Anesthesia Evaluation and Assessment    Patient: Jaylon Elizabeth MRN: [id number]  SSN: [id number]    YOB: 1955  Age: 63 y.o. Sex: male       Cardiovascular Function/Vital Signs  Visit Vitals    /76    Pulse 75    Temp 36 °C (96.8 °F)    Resp 27    Ht 5' 10\" (1.778 m)    Wt 93.6 kg (206 lb 5.6 oz)    SpO2 97%    BMI 29.61 kg/m2       Patient is status post general anesthesia for Procedure(s):  CORONARY ARTERY BYPASS GRAFT (CABG)x3 /[facility]  [facility]- Greater Saphenous Vein  ESOPHAGEAL TRANS ECHOCARDIOGRAM.    Nausea/Vomiting: None    Postoperative hydration reviewed and adequate. Pain:  Pain Scale 1: Numeric (0 - 10) (03/07/17 0700)  Pain Intensity 1: 0 (03/07/17 0700)   Managed    Neurological Status:   Neuro  Neurologic State: Alert; Appropriate for age (03/07/17 0700)  Orientation Level: Oriented X4 (03/07/17 0700)  Cognition: Appropriate decision making; Appropriate for age attention/concentration; Appropriate safety awareness; Follows commands (03/07/17 0700)  Speech: Clear (03/07/17 0700)  Assessment L Pupil: Brisk;Round (03/07/17 0700)  Size L Pupil (mm): 2 (03/07/17 0700)  Assessment R Pupil: Brisk;Round (03/07/17 0700)  Size R Pupil (mm): 2 (03/07/17 0700)  LUE Motor Response: Purposeful;Spontaneous  (03/07/17 0700)  LLE Motor Response: Purposeful;Spontaneous  (03/07/17 0700)  RUE Motor Response: Purposeful;Spontaneous  (03/07/17 0700)  RLE Motor Response: Purposeful;Spontaneous  (03/07/17 0700)   At baseline    Mental Status and Level of Consciousness: Arousable    Pulmonary Status:   O2 Device: Nasal cannula (03/06/17 1900)   Adequate oxygenation and airway patent    Complications related to anesthesia: None    Post-anesthesia assessment completed.  No concerns    Signed By: Armando Schmidt MD     March 7, 2017

## 2017-03-07 NOTE — H&P
Date of Surgery Update:  Jeff Sepulveda was seen and examined. History and physical has been reviewed. The patient has been examined.  There have been no significant clinical changes since the completion of the originally dated History and Physical.    Signed By: Cira Barrow MD     March 7, 2017 11:29 AM

## 2017-03-07 NOTE — PROGRESS NOTES
[facility] CARDIOLOGY PROGRESS NOTE           3/7/2017 3:05 PM    Admit Date: 2/27/2017    Admit Diagnosis: Atherosclerosis of native coronary artery of native heart w*      Subjective:   No complaints, no worsening chest pain or shortness of breath, appropriate post op device pocket pain    Interval History: (History of pertinent interval events obtained from nursing staff)  Cardiac device placed yesterday, no events overnight, no immediate complications    ROS:  GEN:  No fever or chills  Cardiovascular:  As noted above  Pulmonary:  As noted above  Neuro:  No new focal motor or sensory loss      Objective:     Vitals:    03/07/17 1300 03/07/17 1328 03/07/17 1350 03/07/17 1407   BP:  126/70 126/70 120/76   Pulse: 75 75 75 75   Resp: 17 16  20   Temp:       SpO2: 98% 100% 96% 95%   Weight:       Height:           Physical Exam:  General-Well Developed, Well Nourished, No Acute Distress, Alert & Oriented x 3, appropriate mood. CV- regular rate and rhythm no MRG, left infraclavicular pocket with dressing in place, no evidence of hematoma, redness, warmth or excessive drainage, central sternotomy incision well healing  Lung- coarse  Abd- soft, nontender, nondistended  Ext- trace to 1+ edema bilaterally.     Current Facility-Administered Medications   Medication Dose Route Frequency    morphine injection 2 mg  2 mg IntraVENous ONCE    tapentadol (NUCYNTA) tablet 100 mg  100 mg Oral Q6H PRN    sodium chloride (NS) flush 5-10 mL  5-10 mL IntraVENous Q8H    sodium chloride (NS) flush 5-10 mL  5-10 mL IntraVENous PRN    metoprolol succinate (TOPROL-XL) XL tablet 50 mg  50 mg Oral BID    furosemide (LASIX) injection 60 mg  60 mg IntraVENous Q12H    insulin regular (NOVOLIN R, HUMULIN R) injection   SubCUTAneous AC&HS    cyclobenzaprine (FLEXERIL) tablet 5 mg  5 mg Oral TID PRN    famotidine (PF) (PEPCID) injection 20 mg  20 mg IntraVENous Q12H    fentaNYL citrate (PF) injection  mcg   mcg IntraVENous Q2H PRN    midazolam (VERSED) injection 1-2 mg  1-2 mg IntraVENous Q2H PRN    insulin regular (NOVOLIN R, HUMULIN R) 100 Units in 0.9% sodium chloride 100 mL infusion  1 Units/hr IntraVENous TITRATE    docusate sodium (COLACE) capsule 100 mg  100 mg Oral DAILY    alum-mag hydroxide-simeth (MYLANTA) oral suspension 30 mL  30 mL Oral Q4H PRN    ondansetron (ZOFRAN) injection 4 mg  4 mg IntraVENous Q6H PRN    acetaminophen (TYLENOL) tablet 650 mg  650 mg Oral Q4H PRN    atorvastatin (LIPITOR) tablet 80 mg  80 mg Oral QHS    amiodarone (CORDARONE) tablet 200 mg  200 mg Oral Q12H    aspirin delayed-release tablet 81 mg  81 mg Oral DAILY    nitroglycerin (NITROLINGUAL) sublingual 0.4 mg/spray  1 Spray SubLINGual Q5MIN PRN    baclofen (LIORESAL) tablet 10 mg  10 mg Oral TID PRN    oxyCODONE-acetaminophen (PERCOCET) 5-325 mg per tablet 1 Tab  1 Tab Oral Q4H PRN    oxyCODONE-acetaminophen (PERCOCET 10)  mg per tablet 1 Tab  1 Tab Oral Q4H PRN     Data Review:   Recent Results (from the past 24 hour(s))   EKG, 12 LEAD, INITIAL    Collection Time: 03/06/17  4:52 PM   Result Value Ref Range    Systolic BP  mmHg    Diastolic BP  mmHg    Ventricular Rate 75 BPM    Atrial Rate 75 BPM    P-R Interval 140 ms    QRS Duration 130 ms    Q-T Interval 434 ms    QTC Calculation (Bezet) 484 ms    Calculated P Axis 55 degrees    Calculated R Axis 49 degrees    Calculated T Axis -9 degrees    Diagnosis       Electronic atrial pacemaker  Non-specific intra-ventricular conduction block  T wave abnormality, consider lateral ischemia  Abnormal ECG  a pacing newe  Confirmed by HANS HUBER (), REMINGTON ZAPATA ([id number]) on 3/7/2017 7:57:38 AM     GLUCOSE, POC    Collection Time: 03/06/17  5:47 PM   Result Value Ref Range    Glucose (POC) 167 (H) 65 - 100 mg/dL   GLUCOSE, POC    Collection Time: 03/06/17  8:50 PM   Result Value Ref Range    Glucose (POC) 144 (H) 65 - 100 mg/dL   CBC W/O DIFF    Collection Time: 03/07/17  3:55 AM   Result Value Ref Range    WBC 10.0 4.3 - 11.1 K/uL    RBC 3.46 (L) 4.23 - 5.67 M/uL    HGB 10.0 (L) 13.6 - 17.2 g/dL    HCT 30.7 (L) 41.1 - 50.3 %    MCV 88.7 79.6 - 97.8 FL    MCH 28.9 26.1 - 32.9 PG    MCHC 32.6 31.4 - 35.0 g/dL    RDW 14.1 11.9 - 14.6 %    PLATELET 468 443 - 325 K/uL    MPV 10.1 (L) 10.8 - 13.0 FL   METABOLIC PANEL, BASIC    Collection Time: 03/07/17  3:55 AM   Result Value Ref Range    Sodium 139 136 - 145 mmol/L    Potassium 4.0 3.5 - 5.1 mmol/L    Chloride 100 98 - 107 mmol/L    CO2 28 21 - 32 mmol/L    Anion gap 11 7 - 16 mmol/L    Glucose 129 (H) 65 - 100 mg/dL    BUN 33 (H) 6 - 23 MG/DL    Creatinine 1.17 0.8 - 1.5 MG/DL    GFR est AA >60 >60 ml/min/1.73m2    GFR est non-AA >60 >60 ml/min/1.73m2    Calcium 7.6 (L) 8.3 - 10.4 MG/DL   MAGNESIUM    Collection Time: 03/07/17  3:55 AM   Result Value Ref Range    Magnesium 2.4 1.8 - 2.4 mg/dL   GLUCOSE, POC    Collection Time: 03/07/17  3:55 AM   Result Value Ref Range    Glucose (POC) 137 (H) 65 - 100 mg/dL   CELL COUNT, BODY FLUID    Collection Time: 03/07/17 11:30 AM   Result Value Ref Range    BODY FLUID TYPE RIGHT      FLUID COLOR RED      FLUID APPEARANCE CLOUDY      FLUID RBC COUNT 25745 /cu mm    FLD NUCLEATED CELLS 366 /cu mm    FLD SEGS 61 %    FLD LYMPHS 39 %    FLUID COMMENT       OCCASIONAL UNIDENTIFIED LARGE MONONUCLEAR CELLS PRESENT   GLUCOSE, POC    Collection Time: 03/07/17 11:51 AM   Result Value Ref Range    Glucose (POC) 155 (H) 65 - 100 mg/dL       EKG:  (EKG has been independently visualized by me with interpretation below)  Assessment:     Principal Problem:    NSTEMI (non-ST elevated myocardial infarction) ([facility] [address] (2/27/2017)    Active Problems:    DM (diabetes mellitus) type II controlled, neurological manifestation ([facility] (3/7/2014)      Essential hypertension, benign (3/7/2014)      Hypertriglyceridemia (2/27/2017)      Depressed left ventricular ejection fraction (2/27/2017)      Overview: 2/27/17 20%      Ischemic cardiomyopathy (2/28/2017)      Overview: EF 10-15% echo 2/27/17      CAD, multiple vessel (2/28/2017)      Overview: 2/28/17 (Dr Melodie Groves) Coronary artery bypass grafting x3. Grafts       consisting of       1. Left internal mammary artery to the left anterior descending. 2. Reversed LEFT saphenous vein graft to obtuse marginal.       3. Reversed LEFT saphenous vein graft to the left posterior descending       artery. S/P CABG (coronary artery bypass graft) (2/28/2017)      Postoperative anemia due to acute blood loss (3/1/2017)      Pleural effusion (3/2/2017)      Hypoxia (3/2/2017)      Cardiogenic shock (HCC) (0/2/1218)      Systolic CHF, acute on chronic ([address] (3/3/2017)      Cardiac arrest with ventricular fibrillation ([facility] [address] (3/5/2017)      Overview: 3/4/16      ICD (implantable cardioverter-defibrillator) in place (3/6/2017)      Overview: 1. [employer] [facility] implantable cardioverter defibrillator - March 2017      Plan:   1. Cardiac device implantation: Pt device interrogation this AM reveals normal function, excellent pacing and sensing parameters, CXR without pneumothorax with excellent device position, no recognized complications  2.  V. Fib arrest -Patient is >5 days post op with recurrent V. Fib arrest. Not responding to amio now s/p ICD implantation. 2. Chronic systolic heart failure - EF pre-op was 10-15%, Post-op EF was 20-25%  3. CAD - Post CABG    Lay Galindo MD  Cardiology/Electrophysiology

## 2017-03-07 NOTE — PROGRESS NOTES
Problem: Mobility Impaired (Adult and Pediatric)  Goal: *Acute Goals and Plan of Care (Insert Text)  LTG: (reviewed and updated 3/7/17)  (1.)Mr. Autumn Monge will move from supine to sit and sit to supine , scoot up and down and roll side to side in bed with MODIFIED INDEPENDENCE within 7 day(s). (2.)Mr. Autumn Monge will transfer from bed to chair and chair to bed with MODIFIED INDEPENDENCE using the least restrictive device within 7 day(s). (3.)Mr. Autumn Monge will ambulate with MODIFIED INDEPENDENCE for 500+ feet with the least restrictive device within 7 day(s). (4.)Mr. Autumn Monge will perform standing static and dynamic balance activities x 8 minutes with SUPERVISION to improve safety within 7 day(s). (5.)Mr. Autumn Monge will ascend and descend 5 stairs using one hand rail(s) with SUPERVISION to improve functional mobility and safety within 7 day(s). (6.)Mr. Autumn Monge will tolerate 25 minutes of therapeutic activity/exercises within 7 day(s). (7.)Mr. Autumn Monge will perform transfers and ambulation without a decrease in SBP >20 and DBP >10 within 7 days. ________________________________________________________________________________________________     PHYSICAL THERAPY: Re-evaluation and PM 3/7/2017  INPATIENT: Hospital Day: 9  Payor: BLUE CHOICE / [place] / Product Type: HMO /      NAME/AGE/GENDER: Dereck Leung is a 63 y.o. male      PRIMARY DIAGNOSIS: Atherosclerosis of native coronary artery of native heart with unstable angina pectoris (HCC) [I25.110]  Pleural effusion [J90] NSTEMI (non-ST elevated myocardial infarction) ([facility] NSTEMI (non-ST elevated myocardial infarction) (HCC)  Procedure(s) (LRB):  ULTRASOUND (Bilateral)  THORACENTESIS (N/A)  Day of Surgery  ICD-10: Treatment Diagnosis:       · Generalized Muscle Weakness (M62.81)  · Difficulty in walking, Not elsewhere classified (R26.2)   Precaution/Allergies:  Review of patient's allergies indicates no known allergies.        ASSESSMENT:       Dia Mendoza presents to physical therapy for re-assessment. Since CABG 2/28/17, pt has had recurrent VF arrest and is s/p ICD placement. He is much weaker than on initial eval due to limited mobility and prolonged hospitalization d/t medical status. Agreeable to PT reassessment. Sling on LUE. Mr. Dia Mendoza reports 7/10 pain in chest/lungs after thoracentesis this morning. /70 at rest. Reports he is nervous to walk because \"it's how all this started. \" He stands from chair with CGA and without use of BUE. AMB 30' with CGA and widened LILIANE with some trunk sway noted. Reports increasing dizziness with standing/AMB. Returned to chair with all needs met. Post activity: BP 87/64, HR 75, O2 96% on 2L. RN notified of change in BP. Mr. Dia Mendoza will benefit from continued PT treatment to increase strength, balance, and activity tolerance, as his mobility has decreased since admission. This section established at most recent assessment   PROBLEM LIST (Impairments causing functional limitations):  1. Decreased Strength  2. Decreased Transfer Abilities  3. Decreased Ambulation Ability/Technique  4. Decreased Balance  5. Decreased Activity Tolerance  6. Increased Shortness of Breath  7. Decreased Knowledge of Precautions  8. Decreased Gary with Home Exercise Program    INTERVENTIONS PLANNED: (Benefits and precautions of physical therapy have been discussed with the patient.)  1. Balance Exercise  2. Bed Mobility  3. Family Education  4. Gait Training  5. Home Exercise Program (HEP)  6. Therapeutic Activites  7. Therapeutic Exercise/Strengthening  8. Transfer Training  9. Group Therapy      TREATMENT PLAN: Frequency/Duration: twice daily for duration of hospital stay  Rehabilitation Potential For Stated Goals: EXCELLENT      RECOMMENDED REHABILITATION/EQUIPMENT: (at time of discharge pending progress): Continue Skilled Therapy and Home Health: Physical Therapy.                    HISTORY:   History of Present Injury/Illness (Reason for Referral):  Patient is status post above  Past Medical History/Comorbidities:   Mr. Daisha Mo  has a past medical history of Allergic rhinitis, cause unspecified (3/7/2014); CAD, multiple vessel (2/28/2017); Cancer ([facility] ([facility]); Contact dermatitis and other eczema, due to unspecified cause (3/7/2014); Depressed left ventricular ejection fraction (2/27/2017); Esophageal reflux (3/7/2014); GERD (gastroesophageal reflux disease); Ischemic cardiomyopathy (2/28/2017); NSTEMI (non-ST elevated myocardial infarction) ([facility] (2/27/2017); Other and unspecified hyperlipidemia (3/7/2014); Other specified idiopathic peripheral neuropathy (3/7/2014); Retinopathy of both eyes; Type II or unspecified type diabetes mellitus with neurological manifestations, uncontrolled (3/7/2014); Undiagnosed cardiac murmurs (3/7/2014); Unspecified essential hypertension (3/7/2014); and Vitreous hemorrhage, unspecified eye ([facility] (2/27/2017). Mr. Daisha Mo  has a past surgical history that includes urological (Left, 1979); urological (Left, 2010); and heent (2003). Social History/Living Environment:   Home Environment: Private residence  One/Two Story Residence: One story  Living Alone: No  Support Systems: Family member(s)  Patient Expects to be Discharged to[de-identified] Private residence  Current DME Used/Available at Home: None  Prior Level of Function/Work/Activity:  Patient lives with spouse in one story home with a few stairs through out the home. Patient was independent and active prior to admission.   Personal Factors:          Patient had radiation for cancer   Number of Personal Factors/Comorbidities that affect the Plan of Care: 1-2: MODERATE COMPLEXITY   EXAMINATION:   Most Recent Physical Functioning:   Gross Assessment:  AROM: Generally decreased, functional  Strength: Generally decreased, functional  Coordination: Within functional limits  Sensation: Intact               Posture:  Posture (WDL): Exceptions to WDL  Posture Assessment: Forward head, Rounded shoulders  Balance:  Sitting: Intact  Standing: Impaired  Standing - Static: Fair;Unsupported  Standing - Dynamic : Fair Bed Mobility:     Wheelchair Mobility:     Transfers:  Sit to Stand: Contact guard assistance  Stand to Sit: Contact guard assistance  Bed to Chair: Contact guard assistance  Gait:     Base of Support: Widened  Step Length: Left shortened;Right shortened  Gait Abnormalities: Decreased step clearance;Trunk sway increased  Distance (ft): 30 Feet (ft)  Assistive Device: Other (comment) (none)  Ambulation - Level of Assistance: Contact guard assistance  Interventions: Safety awareness training;Verbal cues       Body Structures Involved:  1. Heart  2. Lungs  3. Thoracic Cage Body Functions Affected:  1. Cardio  2. Movement Related Activities and Participation Affected:  1. General Tasks and Demands  2. Mobility  3. Domestic Life   Number of elements that affect the Plan of Care: 4+: HIGH COMPLEXITY   CLINICAL PRESENTATION:   Presentation: Stable and uncomplicated: LOW COMPLEXITY   CLINICAL DECISION MAKIN[address] [place] [address] AM-PAC 6 Clicks   Basic Mobility Inpatient Short Form  How much difficulty does the patient currently have. .. Unable A Lot A Little None   1. Turning over in bed (including adjusting bedclothes, sheets and blankets)? [ ] 1   [ ] 2   [X] 3   [ ] 4   2. Sitting down on and standing up from a chair with arms ( e.g., wheelchair, bedside commode, etc.)   [ ] 1   [ ] 2   [X] 3   [ ] 4   3. Moving from lying on back to sitting on the side of the bed? [ ] 1   [ ] 2   [X] 3   [ ] 4   How much help from another person does the patient currently need. .. Total A Lot A Little None   4. Moving to and from a bed to a chair (including a wheelchair)? [ ] 1   [ ] 2   [X] 3   [ ] 4   5. Need to walk in hospital room? [ ] 1   [ ] 2   [X] 3   [ ] 4   6. Climbing 3-5 steps with a railing?    [X] 1   [ ] 2   [ ] 3   [ ] 4   © , Trustees of [address] [place] [address], under license to [employer]. All rights reserved    Score:  Initial: 18 Most Recent:16 (Date: 3/7/17 )     Interpretation of Tool:  Represents activities that are increasingly more difficult (i.e. Bed mobility, Transfers, Gait). Score 24 23 22-20 19-15 14-10 9-7 6       Modifier CH CI CJ CK CL CM CN         · Mobility - Walking and Moving Around:               - CURRENT STATUS:    CK - 40%-59% impaired, limited or restricted               - GOAL STATUS:           CJ - 20%-39% impaired, limited or restricted               - D/C STATUS:                       ---------------To be determined---------------  Payor: BLUE CHOICE [place] / Product Type: HMO /       Medical Necessity:     · Patient demonstrates excellent rehab potential due to higher previous functional level. · Skilled intervention continues to be required due to decline in overall functional mobility. Reason for Services/Other Comments:  · Patient continues to require skilled intervention due to medical complications and patient unable to attend/participate in therapy as expected.    Use of outcome tool(s) and clinical judgement create a POC that gives a: Clear prediction of patient's progress: LOW COMPLEXITY                 TREATMENT:      Pre-treatment Symptoms/Complaints:  \"Walking was how all of this started\"  Pain: Initial:   Pain Intensity 1: 7  Pain Location 1: Chest, Rib cage  Pain Intervention(s) 1: Nurse notified  Post Session:  7/10      Assessment/Reassessment only, no treatment provided today   PT Reassessment Table:   Initial Physical Functioning: Most Recent Physical Functioning:   Gross Assessment:  AROM: Within functional limits  Strength: Generally decreased, functional  Coordination: Generally decreased, functional  Sensation: Intact Gross Assessment:   AROM: Generally decreased, functional  Strength: Generally decreased, functional  Coordination: Within functional limits  Sensation: Intact Posture:   Posture (WDL): Exceptions to WDL  Posture Assessment: Forward head, Rounded shoulders Posture:   Posture (WDL): Exceptions to WDL  Posture Assessment:  Forward head;Rounded shoulders   Balance:   Sitting: Intact  Standing: Impaired  Standing - Static: Constant support, Fair  Standing - Dynamic : Fair Balance:   Sitting: Intact  Standing: Impaired  Standing - Static: Fair;Unsupported  Standing - Dynamic : Fair   Bed Mobility:   Rolling:  (not tested, pt up in chair on arrival to room) Bed Mobility:       Wheelchair Mobility:    Wheelchair Mobility:      Transfers:   Sit to Stand: Contact guard assistance (via momentum method)  Stand to Sit: Contact guard assistance  Bed to Chair: Contact guard assistance Transfers:   Sit to Stand: Contact guard assistance  Stand to Sit: Contact guard assistance  Bed to Chair: Contact guard assistance   Gait:   Base of Support: Widened  Step Length: Right shortened, Left shortened  Gait Abnormalities: Decreased step clearance, Path deviations (forward head flexion)  Ambulation - Level of Assistance: Contact guard assistance  Distance (ft): 65 Feet (ft)  Assistive Device: Walker, rolling  Interventions: Verbal cues, Tactile cues, Safety awareness training Gait:   Base of Support: Widened  Step Length: Left shortened;Right shortened  Gait Abnormalities: Decreased step clearance;Trunk sway increased  Ambulation - Level of Assistance: Contact guard assistance  Distance (ft): 30 Feet (ft)  Assistive Device: Other (comment) (none)  Interventions: Safety awareness training;Verbal cues                 Assessment/Reassessment only, no treatment provided today    Braces/Orthotics/Lines/Etc:   · smalls catheter and O2 nasal cannula  Treatment/Session Assessment:    · Response to Treatment:  Decreased ambulation distance compared to evaluation   · Interdisciplinary Collaboration:  · Physical Therapist and Registered Nurse  · After treatment position/precautions:  · Up in chair, Call light within reach and RN notified  · Compliance with Program/Exercises: compliant all of the time. · Recommendations/Intent for next treatment session: \"Next visit will focus on advancements to more challenging activities and reduction in assistance provided\".   Total Treatment Duration:  PT Patient Time In/Time Out  Time In: 1350  Time Out: [address]

## 2017-03-07 NOTE — PROGRESS NOTES
Pt given chlorhexidine bath and linens changed. Logroll in bed. Skin intact with no breakdown noted. Allevyn pulled back to assess and replaced over sacrum. Sacrum red but blanchable. Pt tolerated well. Ecchymosis noted to BUE and Bilateral groins.

## 2017-03-07 NOTE — PROGRESS NOTES
Problem: Falls - Risk of  Goal: *Absence of falls  Outcome: Progressing Towards Goal  Pt progressing towards goal. No falls since admission. Bed low and locked. Call light within reach. Side rails x 2. Gripper socks applied. Personal belongings within reach. Pt verbalizes understanding to call for assistance. Pt.  In chair; family in room

## 2017-03-07 NOTE — PROGRESS NOTES
Patient complains of pain to right mid back. Says he feels a \"gurgling sensation\" when he breaths in. Patient sat's remain 92-96% on room air. No crepitus felt around right lung. Chest xray obtained. No pneumothorax observed. Dr. Lincoln Miranda informed of patient's complaints. Orders received for pain control. Dr. Christy Dubon also made aware of patient's complaints. Orders received to place warm compress over thoracentesis puncture site.

## 2017-03-07 NOTE — PROCEDURES
PROCEDURE:    DIAGNOSTIC/THERAPEUTIC THORACENTESIS        PRE-OP DIAGNOSIS:     L PLEURAL EFFUSION    POST-OP DIAGNOSIS:    L PLEURAL EFFUSION      ANESTHESIA:    LOCAL ANESTHESIA WITH 1% LIDOCAINE 10 CC TOTAL. CHEST ULTRASOUND FINDINGS:    A Turbo-M, Sonosite ultrasound with a 5-16 mHz probe was used to image the chest and localize the pleural effusion on the Left  chest.    A moderate anechoic space was seen on the Left  consistent with an uncomplicated pleural effusion. DESCRIPTION OF PROCEDURE:    After obtaining informed consent and localizing the safest location for thoracentesis, the 8  intercostal space was marked with a blunt, plastic needle cap in the mid scapular line. An [employer] AK-0100 Pleral-Seal thoracentesis kit was used to perform the procedure. The skin was  cleansed with the supplied  chlorhexididne swab and then draped in the usual fasion. Using the previously marked location as a giude, a 22 G 1.5 inch needle was used to inject 10 cc of 1% lidocaine into the skin and subcutaneous tissue, as well as onto the underlying rib and inter-costal muscles, pleural fluid was aspirated to assure proper location, prior to removing the anesthesia needle. A 3mm  incision was then made, with the supplied scalpel in the usual fashion to facilitate the insertiopn of the thoracentesis needle. The needle with an 8French thoracentesis catheter was then introduced into the chest through the previously made incision in the usual fashion, the rib localized with the needle, and the catheter then marched over the rib into the pleural space. After aspirating fluid, the thoracentesis catheter was then placed into the chest using the needle itself as a trocar. The needle was then removed and the catheter was attached to the supplied tubing without complication. 600  cc of serosanguinous fluid, was aspirated and was not  sent for analysis.         Post procedure US confirmed complete drainage of the effusion. EBL:     1 cc       COMPLICATIONS:    None .

## 2017-03-07 NOTE — PROGRESS NOTES
Cardiac rehab: chart reviewed, appropriate for outpatient cardiac rehab. Patient qualifies for cardiac rehab with: CABG. Patient is still in CVICU and will not be seen at hospital today but will be contacted after discharge when referral to [address] [place] [address] is received.

## 2017-03-07 NOTE — PROGRESS NOTES
TRANSFER - OUT REPORT:    Verbal report given to Natalia Ko RN(name) on Claudia Gaston  being transferred to [facility](unit) for routine progression of care       Report consisted of patients Situation, Background, Assessment and   Recommendations(SBAR). Information from the following report(s) SBAR, Kardex, ED Summary, OR Summary, Procedure Summary, Intake/Output, MAR and Recent Results was reviewed with the receiving nurse. Lines:   Triple Lumen 03/04/17 Right Internal jugular (Active)   Central Line Being Utilized Yes 3/7/2017  3:19 PM   Criteria for Appropriate Use Limited/no vessel suitable for conventional peripheral access 3/7/2017  3:19 PM   Site Assessment Clean, dry, & intact 3/7/2017  3:19 PM   Infiltration Assessment 0 3/7/2017  3:19 PM   Affected Extremity/Extremities Color distal to insertion site pink (or appropriate for race) 3/7/2017  3:19 PM   Date of Last Dressing Change 03/04/17 3/7/2017  3:19 PM   Dressing Status Clean, dry, & intact 3/7/2017  3:19 PM   Dressing Type Transparent 3/7/2017  3:19 PM   Action Taken Blood drawn 3/7/2017  4:00 AM   Proximal Hub Color/Line Status Flushed;Patent 3/7/2017  3:19 PM   Positive Blood Return (Medial Site) Yes 3/7/2017  3:19 PM   Medial Hub Color/Line Status Flushed;Patent 3/7/2017  3:19 PM   Positive Blood Return (Lateral Site) Yes 3/7/2017  3:19 PM   Distal Hub Color/Line Status Flushed;Patent 3/7/2017  3:19 PM   Positive Blood Return (Site #3) Yes 3/7/2017  3:19 PM   Alcohol Cap Used No 3/6/2017  3:00 AM        Opportunity for questions and clarification was provided.       Patient transported with:   Monitor  Registered Nurse

## 2017-03-07 NOTE — PROGRESS NOTES
Pt sat up on side of bed for thoracentesis. Consent obtained. Time out performed. Pts vitals monitored throughout procedure. Bilateral ultrasound done and pic taken of pleural fluid.  ~600 ml blood tinged pleural fluid from L.  ~1350 ml blood tinged pleural fluid from R.  Pt tolerated procedure well with no adverse rxn. Specimens sent to the lab x 3 from L only per md and labeled appropriately. Site dressed appropriately and report given to pts ELMA Lange who was at bedside throughout procedure. Bilateral Lung sliding done and ultrasound findings reviewed by MD. Lisbet Awan ordered post procedure.

## 2017-03-07 NOTE — PROGRESS NOTES
Critical Care Daily Progress Note: 3/7/2017    Lam Shell   Admission Date: 2/27/2017         The patient's chart is reviewed and the patient is discussed with the staff. Subjective:     64 yo WM with severe ischemic CMP (LVEF 10%) s/p CABG.  Had VF arrest  X 2 on 4th and 5 th   icd done yesterday  He has been extubated and is up in chair    Current Facility-Administered Medications   Medication Dose Route Frequency    sodium chloride (NS) flush 5-10 mL  5-10 mL IntraVENous Q8H    sodium chloride (NS) flush 5-10 mL  5-10 mL IntraVENous PRN    metoprolol succinate (TOPROL-XL) XL tablet 50 mg  50 mg Oral BID    furosemide (LASIX) injection 60 mg  60 mg IntraVENous Q12H    insulin regular (NOVOLIN R, HUMULIN R) injection   SubCUTAneous AC&HS    cyclobenzaprine (FLEXERIL) tablet 5 mg  5 mg Oral TID PRN    amiodarone (CORDARONE) 450 mg in dextrose 5% 250 mL infusion  0.5 mg/min IntraVENous TITRATE    famotidine (PF) (PEPCID) injection 20 mg  20 mg IntraVENous Q12H    fentaNYL citrate (PF) injection  mcg   mcg IntraVENous Q2H PRN    midazolam (VERSED) injection 1-2 mg  1-2 mg IntraVENous Q2H PRN    insulin regular (NOVOLIN R, HUMULIN R) 100 Units in 0.9% sodium chloride 100 mL infusion  1 Units/hr IntraVENous TITRATE    docusate sodium (COLACE) capsule 100 mg  100 mg Oral DAILY    alum-mag hydroxide-simeth (MYLANTA) oral suspension 30 mL  30 mL Oral Q4H PRN    ondansetron (ZOFRAN) injection 4 mg  4 mg IntraVENous Q6H PRN    acetaminophen (TYLENOL) tablet 650 mg  650 mg Oral Q4H PRN    atorvastatin (LIPITOR) tablet 80 mg  80 mg Oral QHS    amiodarone (CORDARONE) tablet 200 mg  200 mg Oral Q12H    mupirocin (BACTROBAN) 2 % ointment   Both Nostrils Q12H    aspirin delayed-release tablet 81 mg  81 mg Oral DAILY    nitroglycerin (NITROLINGUAL) sublingual 0.4 mg/spray  1 Spray SubLINGual Q5MIN PRN    baclofen (LIORESAL) tablet 10 mg  10 mg Oral TID PRN    oxyCODONE-acetaminophen (PERCOCET) 5-325 mg per tablet 1 Tab  1 Tab Oral Q4H PRN    oxyCODONE-acetaminophen (PERCOCET 10)  mg per tablet 1 Tab  1 Tab Oral Q4H PRN       Review of Systems    Constitutional:  negative for fever, chills, sweats  Cardiovascular:  negative for chest pain, palpitations, syncope, edema  Gastrointestinal:  negative for dysphagia, reflux, vomiting, diarrhea, abdominal pain, or melena  Neurologic:  negative for focal weakness, numbness, headache      Objective:     Vitals:    03/07/17 0500 03/07/17 0530 03/07/17 0600 03/07/17 0630   BP: 104/70      Pulse: 70 69 81 75   Resp: 14 16 16 13   Temp:       SpO2: 96% 96%     Weight:       Height:           Intake and Output:   03/05 1901 - 03/07 0700  In: 6532 [P.O.:360; I.V.:1099]  Out: 4637 [Urine:4627]       Physical Exam:          Constitutional:  the patient is well developed and in no acute distress  EENMT:  Sclera clear, pupils equal, oral mucosa moist  Respiratory: some basilar crackles  Cardiovascular:  RRR without M,G,R  Gastrointestinal: soft and non-tender; with positive bowel sounds. Musculoskeletal: warm without cyanosis. There is no lower leg edema.   Skin:  no jaundice or rashes, sternal wounds   Neurologic: no gross neuro deficits     Psychiatric:  alert and oriented x 3    LINES:   central    DRIPS:   amio    CXR:  Hazy in lung bases, ? effusions      LAB  Recent Labs      03/07/17   0355  03/06/17   2050  03/06/17   1747  03/06/17   1246  03/06/17   0621   GLUCPOC  137*  144*  167*  190*  183*      Recent Labs      03/07/17   0355  03/05/17   2300  03/05/17   0315  03/04/17   1400   WBC  10.0  10.8  10.1  10.6   HGB  10.0*  10.4*  10.0*  9.9*   HCT  30.7*  31.2*  29.5*  29.3*   PLT  211  187  182  159   INR   --    --    --   1.1     Recent Labs      03/07/17   0355  03/05/17   2300  03/05/17   0315  03/04/17   1400   NA  139  138  138  135*   K  4.0  4.8  4.4  5.1   CL  100  102  105  102   CO2  28  27  23  19*   GLU  129* 195*  125*  321*   BUN  33*  32*  38*  46*   CREA  1.17  1.40  1.12  1.63*   MG  2.4  2.6*  2.6*  2.8*   CA  7.6*  8.0*  7.6*  7.5*   TROIQ   --    --    --   0.58*     Recent Labs      03/05/17   2309  03/05/17   0310  03/04/17   1812   PH  7.50*  7.48*  7.41   PCO2  32*  30*  31*   PO2  122*  92  98   HCO3  25  22  19*     No results for input(s): LCAD, LAC in the last 72 hours. Assessment:  (Medical Decision Making)     Hospital Problems  Date Reviewed: 3/4/2017          Codes Class Noted POA    ICD (implantable cardioverter-defibrillator) in place ICD-10-CM: Z95.810  ICD-9-CM: V45.02  3/6/2017 Unknown    Overview Signed 3/6/2017  4:19 PM by Trav Garcia MD     1. [employer] MRI implantable cardioverter defibrillator - March 2017             Cardiac arrest with ventricular fibrillation [facility]) ICD-10-CM: I46.9, I49.01  ICD-9-CM: 427.5, 427.41  3/5/2017 Unknown    Overview Signed 3/5/2017 10:28 AM by Valery Neville MD     3/4/16         X 2    Systolic CHF, acute on chronic (HCC) ICD-10-CM: I50.23  ICD-9-CM: 428.23, 428.0  3/3/2017 Unknown        Pleural effusion ICD-10-CM: J90  ICD-9-CM: 511.9  3/2/2017 Yes    Small to moderate    Hypoxia ICD-10-CM: R09.02  ICD-9-CM: 799.02  3/2/2017 Unknown    On nc    Cardiogenic shock ([address] ICD-10-CM: R57.0  ICD-9-CM: 785.51  3/2/2017 Unknown        Postoperative anemia due to acute blood loss ICD-10-CM: D62  ICD-9-CM: 285.1  3/1/2017 No        Ischemic cardiomyopathy (Chronic) ICD-10-CM: I25.5  ICD-9-CM: 414.8  2/28/2017 Yes    Overview Signed 2/28/2017  1:26 PM by Lori Joseph NP     EF 10-15% echo 2/27/17             CAD, multiple vessel (Chronic) ICD-10-CM: I25.10  ICD-9-CM: 414.00  2/28/2017 Yes    Overview Signed 2/28/2017  1:28 PM by Lori Joseph NP     2/28/17 (Dr Caren Lawler) Coronary artery bypass grafting x3. Grafts consisting of   1. Left internal mammary artery to the left anterior descending.    2. Reversed LEFT saphenous vein graft to obtuse marginal.   3. Reversed LEFT saphenous vein graft to the left posterior descending   artery. S/P CABG (coronary artery bypass graft) ICD-10-CM: Z95.1  ICD-9-CM: V45.81  2/28/2017 Yes        Hypertriglyceridemia (Chronic) ICD-10-CM: E78.1  ICD-9-CM: 272.1  2/27/2017 Yes        * (Principal)NSTEMI (non-ST elevated myocardial infarction) ([facility] [address] ICD-10-CM: I21.4  ICD-9-CM: 410.70  2/27/2017 Yes        Depressed left ventricular ejection fraction (Chronic) ICD-10-CM: R93.1  ICD-9-CM: 794.39  2/27/2017 Yes    Overview Signed 2/27/2017  4:07 PM by Ke Card NP     2/27/17 20%             DM (diabetes mellitus) type II controlled, neurological manifestation (HCC) (Chronic) ICD-10-CM: E11.49  ICD-9-CM: 250.60  3/7/2014 Yes        Essential hypertension, benign (Chronic) ICD-10-CM: I10  ICD-9-CM: 401.1  3/7/2014 Yes              Plan:  (Medical Decision Making)   1   Ultrasound chest- thoracentesis if significant fluid  --    More than 50% of the time documented was spent in face-to-face contact with the patient and in the care of the patient on the floor/unit where the patient is located.     Mir Kauffman MD

## 2017-03-07 NOTE — PROCEDURES
PROCEDURE:    DIAGNOSTIC/THERAPEUTIC         PRE-OP DIAGNOSIS:     R PLEURAL EFFUSION    POST-OP DIAGNOSIS:    R PLEURAL EFFUSION        ANESTHESIA:    LOCAL ANESTHESIA WITH 1% LIDOCAINE 10 CC TOTAL. CHEST ULTRASOUND FINDINGS:    A Turbo-M, Sonosite ultrasound with a 5-16 mHz probe was used to image the chest and localize the pleural effusion on the Left/and/Right chest.    A large  anechoic space was seen on the Right consistent with an uncomplicated pleural effusion. DESCRIPTION OF PROCEDURE:    After obtaining informed consent and localizing the safest location for thoracentesis, the  9  intercostal space was marked with a blunt, plastic needle cap in the mid scapular line. An [employer] AK-0100 Pleral-Seal thoracentesis kit was used to perform the procedure. The skin was  cleansed with the supplied  chlorhexididne swab and then draped in the usual fasion. Using the previously marked location as a giude, a 22 G 1.5 inch needle was used to inject 10 cc of 1% lidocaine into the skin and subcutaneous tissue, as well as onto the underlying rib and inter-costal muscles, pleural fluid was aspirated to assure proper location, prior to removing the anesthesia needle. A 3mm  incision was then made, with the supplied scalpel in the usual fashion to facilitate the insertiopn of the thoracentesis needle. The needle with an 8French thoracentesis catheter was then introduced into the chest through the previously made incision in the usual fashion, the rib localized with the needle, and the catheter then marched over the rib into the pleural space. After aspirating fluid, the thoracentesis catheter was then placed into the chest using the needle itself as a trocar. The needle was then removed and the catheter was attached to the supplied tubing without complication. 1350 cc of serosanguinous  fluid, was aspirated but was not  sent for analysis.         Post procedure US confirmed complete drainage of the effusion.     EBL:   2 cc       COMPLICATIONS:  None    Cira Barrow MD

## 2017-03-07 NOTE — PROGRESS NOTES
TRANSFER - IN REPORT:    Verbal report received from ELMA kay(name) on Dereck Daniellemer  being received from CVICU(unit) for routine post - op      Report consisted of patients Situation, Background, Assessment and   Recommendations(SBAR). Information from the following report(s) SBAR, Kardex, Procedure Summary, Intake/Output, MAR and Med Rec Status was reviewed with the receiving nurse. Opportunity for questions and clarification was provided. Assessment completed upon patients arrival to unit and care assumed. Dual nurse skin assessment performed. Bruising noted to bilateral groins. Sternal and L leg incision are open to air. Left subclavian incision is covered with clean, dry dressing. No areas of breakdown noted.

## 2017-03-07 NOTE — H&P
Date of Surgery Update:  Jean Vann was seen and examined. History and physical has been reviewed. The patient has been examined.  There have been no significant clinical changes since the completion of the originally dated History and Physical.    Signed By: Johanny Dobson MD     March 7, 2017 11:33 AM

## 2017-03-08 LAB
ANION GAP BLD CALC-SCNC: 9 MMOL/L (ref 7–16)
BUN SERPL-MCNC: 31 MG/DL (ref 6–23)
CALCIUM SERPL-MCNC: 8.2 MG/DL (ref 8.3–10.4)
CHLORIDE SERPL-SCNC: 99 MMOL/L (ref 98–107)
CO2 SERPL-SCNC: 29 MMOL/L (ref 21–32)
CREAT SERPL-MCNC: 1.05 MG/DL (ref 0.8–1.5)
ERYTHROCYTE [DISTWIDTH] IN BLOOD BY AUTOMATED COUNT: 14.3 % (ref 11.9–14.6)
GLUCOSE BLD STRIP.AUTO-MCNC: 124 MG/DL (ref 65–100)
GLUCOSE BLD STRIP.AUTO-MCNC: 132 MG/DL (ref 65–100)
GLUCOSE BLD STRIP.AUTO-MCNC: 161 MG/DL (ref 65–100)
GLUCOSE BLD STRIP.AUTO-MCNC: 218 MG/DL (ref 65–100)
GLUCOSE FLD-MCNC: 160 MG/DL
GLUCOSE SERPL-MCNC: 140 MG/DL (ref 65–100)
HCT VFR BLD AUTO: 33.4 % (ref 41.1–50.3)
HGB BLD-MCNC: 10.8 G/DL (ref 13.6–17.2)
LDH FLD L TO P-CCNC: 429 U/L
MAGNESIUM SERPL-MCNC: 2.4 MG/DL (ref 1.8–2.4)
MCH RBC QN AUTO: 28.6 PG (ref 26.1–32.9)
MCHC RBC AUTO-ENTMCNC: 32.3 G/DL (ref 31.4–35)
MCV RBC AUTO: 88.6 FL (ref 79.6–97.8)
PLATELET # BLD AUTO: 263 K/UL (ref 150–450)
PMV BLD AUTO: 9.8 FL (ref 10.8–14.1)
POTASSIUM SERPL-SCNC: 4.4 MMOL/L (ref 3.5–5.1)
PROT FLD-MCNC: 1.8 G/DL
RBC # BLD AUTO: 3.77 M/UL (ref 4.23–5.67)
SODIUM SERPL-SCNC: 137 MMOL/L (ref 136–145)
SPECIMEN SOURCE FLD: NORMAL
WBC # BLD AUTO: 9.3 K/UL (ref 4.3–11.1)

## 2017-03-08 PROCEDURE — 74011636637 HC RX REV CODE- 636/637: Performed by: THORACIC SURGERY (CARDIOTHORACIC VASCULAR SURGERY)

## 2017-03-08 PROCEDURE — 77030012891

## 2017-03-08 PROCEDURE — 74011250637 HC RX REV CODE- 250/637: Performed by: THORACIC SURGERY (CARDIOTHORACIC VASCULAR SURGERY)

## 2017-03-08 PROCEDURE — 74011250637 HC RX REV CODE- 250/637: Performed by: NURSE PRACTITIONER

## 2017-03-08 PROCEDURE — 65660000004 HC RM CVT STEPDOWN

## 2017-03-08 PROCEDURE — 74011250637 HC RX REV CODE- 250/637: Performed by: INTERNAL MEDICINE

## 2017-03-08 PROCEDURE — 97530 THERAPEUTIC ACTIVITIES: CPT

## 2017-03-08 PROCEDURE — 82962 GLUCOSE BLOOD TEST: CPT

## 2017-03-08 PROCEDURE — 99232 SBSQ HOSP IP/OBS MODERATE 35: CPT | Performed by: INTERNAL MEDICINE

## 2017-03-08 PROCEDURE — 97110 THERAPEUTIC EXERCISES: CPT

## 2017-03-08 PROCEDURE — 36592 COLLECT BLOOD FROM PICC: CPT

## 2017-03-08 PROCEDURE — 97166 OT EVAL MOD COMPLEX 45 MIN: CPT

## 2017-03-08 PROCEDURE — 94760 N-INVAS EAR/PLS OXIMETRY 1: CPT

## 2017-03-08 PROCEDURE — 80048 BASIC METABOLIC PNL TOTAL CA: CPT | Performed by: NURSE PRACTITIONER

## 2017-03-08 PROCEDURE — 83735 ASSAY OF MAGNESIUM: CPT | Performed by: NURSE PRACTITIONER

## 2017-03-08 PROCEDURE — 85027 COMPLETE CBC AUTOMATED: CPT | Performed by: NURSE PRACTITIONER

## 2017-03-08 RX ORDER — LISINOPRIL 5 MG/1
5 TABLET ORAL DAILY
Status: DISCONTINUED | OUTPATIENT
Start: 2017-03-08 | End: 2017-03-09

## 2017-03-08 RX ORDER — METOPROLOL SUCCINATE 50 MG/1
50 TABLET, EXTENDED RELEASE ORAL EVERY 12 HOURS
Status: DISCONTINUED | OUTPATIENT
Start: 2017-03-08 | End: 2017-03-13 | Stop reason: HOSPADM

## 2017-03-08 RX ORDER — FAMOTIDINE 20 MG/1
20 TABLET, FILM COATED ORAL EVERY 12 HOURS
Status: DISCONTINUED | OUTPATIENT
Start: 2017-03-08 | End: 2017-03-13 | Stop reason: HOSPADM

## 2017-03-08 RX ORDER — CEPHALEXIN 500 MG/1
500 CAPSULE ORAL 3 TIMES DAILY
Status: COMPLETED | OUTPATIENT
Start: 2017-03-08 | End: 2017-03-12

## 2017-03-08 RX ADMIN — ATORVASTATIN CALCIUM 80 MG: 40 TABLET, FILM COATED ORAL at 22:15

## 2017-03-08 RX ADMIN — CEPHALEXIN 500 MG: 500 CAPSULE ORAL at 22:15

## 2017-03-08 RX ADMIN — LISINOPRIL 5 MG: 5 TABLET ORAL at 09:02

## 2017-03-08 RX ADMIN — OXYCODONE HYDROCHLORIDE AND ACETAMINOPHEN 1 TABLET: 5; 325 TABLET ORAL at 20:51

## 2017-03-08 RX ADMIN — INSULIN HUMAN 5 UNITS: 100 INJECTION, SOLUTION PARENTERAL at 16:46

## 2017-03-08 RX ADMIN — AMIODARONE HYDROCHLORIDE 200 MG: 200 TABLET ORAL at 20:52

## 2017-03-08 RX ADMIN — OXYCODONE HYDROCHLORIDE AND ACETAMINOPHEN 1 TABLET: 10; 325 TABLET ORAL at 01:49

## 2017-03-08 RX ADMIN — METOPROLOL SUCCINATE 50 MG: 50 TABLET, EXTENDED RELEASE ORAL at 20:51

## 2017-03-08 RX ADMIN — INSULIN HUMAN 10 UNITS: 100 INJECTION, SOLUTION PARENTERAL at 12:32

## 2017-03-08 RX ADMIN — OXYCODONE HYDROCHLORIDE AND ACETAMINOPHEN 1 TABLET: 5; 325 TABLET ORAL at 09:01

## 2017-03-08 RX ADMIN — CEPHALEXIN 500 MG: 500 CAPSULE ORAL at 10:07

## 2017-03-08 RX ADMIN — DOCUSATE SODIUM 100 MG: 100 CAPSULE, LIQUID FILLED ORAL at 09:01

## 2017-03-08 RX ADMIN — Medication 10 ML: at 22:45

## 2017-03-08 RX ADMIN — Medication 10 ML: at 15:18

## 2017-03-08 RX ADMIN — Medication 10 ML: at 05:58

## 2017-03-08 RX ADMIN — OXYCODONE HYDROCHLORIDE AND ACETAMINOPHEN 1 TABLET: 5; 325 TABLET ORAL at 15:18

## 2017-03-08 RX ADMIN — FAMOTIDINE 20 MG: 20 TABLET ORAL at 22:51

## 2017-03-08 RX ADMIN — POTASSIUM CHLORIDE 20 MEQ: 20 TABLET, EXTENDED RELEASE ORAL at 09:02

## 2017-03-08 RX ADMIN — ASPIRIN 81 MG: 81 TABLET, COATED ORAL at 09:01

## 2017-03-08 RX ADMIN — CEPHALEXIN 500 MG: 500 CAPSULE ORAL at 16:47

## 2017-03-08 RX ADMIN — METOPROLOL SUCCINATE 50 MG: 50 TABLET, FILM COATED, EXTENDED RELEASE ORAL at 09:01

## 2017-03-08 RX ADMIN — AMIODARONE HYDROCHLORIDE 200 MG: 200 TABLET ORAL at 09:01

## 2017-03-08 RX ADMIN — FUROSEMIDE 40 MG: 40 TABLET ORAL at 09:01

## 2017-03-08 NOTE — PROGRESS NOTES
Problem: Mobility Impaired (Adult and Pediatric)  Goal: *Acute Goals and Plan of Care (Insert Text)  LTG: (reviewed and updated 3/7/17)  (1.)Mr. Yobany Avendaño will move from supine to sit and sit to supine , scoot up and down and roll side to side in bed with MODIFIED INDEPENDENCE within 7 day(s). (2.)Mr. Yobany Avendaño will transfer from bed to chair and chair to bed with MODIFIED INDEPENDENCE using the least restrictive device within 7 day(s). (3.)Mr. Yobany Avendaño will ambulate with MODIFIED INDEPENDENCE for 500+ feet with the least restrictive device within 7 day(s). (4.)Mr. Yobany Avendaño will perform standing static and dynamic balance activities x 8 minutes with SUPERVISION to improve safety within 7 day(s). (5.)Mr. Yobany Avendaño will ascend and descend 5 stairs using one hand rail(s) with SUPERVISION to improve functional mobility and safety within 7 day(s). (6.)Mr. Yobany Avendaño will tolerate 25 minutes of therapeutic activity/exercises within 7 day(s). (7.)Mr. Yobany Avendaño will perform transfers and ambulation without a decrease in SBP >20 and DBP >10 within 7 days. ________________________________________________________________________________________________     PHYSICAL THERAPY: Daily Note, Treatment Day: 1st and PM 3/8/2017  INPATIENT: Hospital Day: 10  Payor: BLUE CHOICE / [place] / Product Type: HMO /      NAME/AGE/GENDER: Sera Renteria is a 63 y.o. male      PRIMARY DIAGNOSIS: Atherosclerosis of native coronary artery of native heart with unstable angina pectoris (HCC) [I25.110]  Pleural effusion [J90] NSTEMI (non-ST elevated myocardial infarction) ([address] NSTEMI (non-ST elevated myocardial infarction) ([facility])  Procedure(s) (LRB):  ULTRASOUND (Bilateral)  THORACENTESIS (N/A)  1 Day Post-Op  ICD-10: Treatment Diagnosis:       · Generalized Muscle Weakness (M62.81)  · Difficulty in walking, Not elsewhere classified (R26.2)   Precaution/Allergies:  Review of patient's allergies indicates no known allergies. ASSESSMENT:      Mr. Manoj Her presents sitting up in bedside chair agreeable to treatment. He became fatigued with ambulation this treatment. He needed a short rest break prior to beginning to perform seated exercises. He is unsteady with gait and needs CGA for safety. Fair progress with mobility this treatment. This section established at most recent assessment   PROBLEM LIST (Impairments causing functional limitations):  1. Decreased Strength  2. Decreased Transfer Abilities  3. Decreased Ambulation Ability/Technique  4. Decreased Balance  5. Decreased Activity Tolerance  6. Increased Shortness of Breath  7. Decreased Knowledge of Precautions  8. Decreased [place] with Home Exercise Program    INTERVENTIONS PLANNED: (Benefits and precautions of physical therapy have been discussed with the patient.)  1. Balance Exercise  2. Bed Mobility  3. Family Education  4. Gait Training  5. Home Exercise Program (HEP)  6. Therapeutic Activites  7. Therapeutic Exercise/Strengthening  8. Transfer Training  9. Group Therapy      TREATMENT PLAN: Frequency/Duration: twice daily for duration of hospital stay  Rehabilitation Potential For Stated Goals: EXCELLENT      RECOMMENDED REHABILITATION/EQUIPMENT: (at time of discharge pending progress): Continue Skilled Therapy and Home Health: Physical Therapy. HISTORY:   History of Present Injury/Illness (Reason for Referral):  Patient is status post above  Past Medical History/Comorbidities:   Mr. Manoj Her  has a past medical history of Allergic rhinitis, cause unspecified (3/7/2014); CAD, multiple vessel (2/28/2017); Cancer ([facility] [address] (1979); Contact dermatitis and other eczema, due to unspecified cause (3/7/2014); Depressed left ventricular ejection fraction (2/27/2017); Esophageal reflux (3/7/2014); GERD (gastroesophageal reflux disease); Ischemic cardiomyopathy (2/28/2017); NSTEMI (non-ST elevated myocardial infarction) ([facility] [address] (2/27/2017);  Other and unspecified hyperlipidemia (3/7/2014); Other specified idiopathic peripheral neuropathy (3/7/2014); Retinopathy of both eyes; Type II or unspecified type diabetes mellitus with neurological manifestations, uncontrolled (3/7/2014); Undiagnosed cardiac murmurs (3/7/2014); Unspecified essential hypertension (3/7/2014); and Vitreous hemorrhage, unspecified eye ([facility] [address] (2/27/2017). Mr. Janes Kahn  has a past surgical history that includes urological (Left, 1979); urological (Left, 2010); and heent (2003). Social History/Living Environment:   Home Environment: Private residence  # Steps to Enter: 2  Rails to Enter: No  One/Two Story Residence: One story  Living Alone: No  Support Systems: Spouse/Significant Other/Partner  Patient Expects to be Discharged to[de-identified] Private residence  Current DME Used/Available at Home: None  Tub or Shower Type: Tub/Shower combination  Prior Level of Function/Work/Activity:  Patient lives with spouse in one story home with a few stairs through out the home. Patient was independent and active prior to admission. Personal Factors:          Patient had radiation for cancer   Number of Personal Factors/Comorbidities that affect the Plan of Care: 1-2: MODERATE COMPLEXITY   EXAMINATION:   Most Recent Physical Functioning:   Gross Assessment:                  Posture:  Posture (WDL): Exceptions to WDL  Posture Assessment:  Forward head, Rounded shoulders  Balance:  Sitting: Intact  Standing: Impaired  Standing - Static: Fair  Standing - Dynamic : Fair Bed Mobility:     Wheelchair Mobility:     Transfers:  Sit to Stand: Contact guard assistance  Stand to Sit: Contact guard assistance  Gait:     Base of Support: Widened  Step Length: Left shortened;Right shortened  Gait Abnormalities: Decreased step clearance;Trunk sway increased  Distance (ft): 115 Feet (ft)  Assistive Device: Walker, rolling  Ambulation - Level of Assistance: Contact guard assistance  Interventions: Safety awareness training;Verbal cues       Body Structures Involved:  1. Heart  2. Lungs  3. Thoracic Cage Body Functions Affected:  1. Cardio  2. Movement Related Activities and Participation Affected:  1. General Tasks and Demands  2. Mobility  3. Domestic Life   Number of elements that affect the Plan of Care: 4+: HIGH COMPLEXITY   CLINICAL PRESENTATION:   Presentation: Stable and uncomplicated: LOW COMPLEXITY   CLINICAL DECISION MAKIN[address] [place] [address] AM-PAC 6 Clicks   Basic Mobility Inpatient Short Form  How much difficulty does the patient currently have. .. Unable A Lot A Little None   1. Turning over in bed (including adjusting bedclothes, sheets and blankets)? [ ] 1   [ ] 2   [X] 3   [ ] 4   2. Sitting down on and standing up from a chair with arms ( e.g., wheelchair, bedside commode, etc.)   [ ] 1   [ ] 2   [X] 3   [ ] 4   3. Moving from lying on back to sitting on the side of the bed? [ ] 1   [ ] 2   [X] 3   [ ] 4   How much help from another person does the patient currently need. .. Total A Lot A Little None   4. Moving to and from a bed to a chair (including a wheelchair)? [ ] 1   [ ] 2   [X] 3   [ ] 4   5. Need to walk in hospital room? [ ] 1   [ ] 2   [X] 3   [ ] 4   6. Climbing 3-5 steps with a railing? [X] 1   [ ] 2   [ ] 3   [ ] 4   © , Trustees of [address] [place] [address], under license to [employer]. All rights reserved    Score:  Initial: 18 Most Recent:16 (Date: 3/7/17 )     Interpretation of Tool:  Represents activities that are increasingly more difficult (i.e. Bed mobility, Transfers, Gait).        Score 24 23 22-20 19-15 14-10 9-7 6       Modifier CH CI CJ CK CL CM CN         · Mobility - Walking and Moving Around:               - CURRENT STATUS:    CK - 40%-59% impaired, limited or restricted               - GOAL STATUS:           CJ - 20%-39% impaired, limited or restricted               - D/C STATUS:                       ---------------To be determined---------------  Payor[place] BLUE CHO[place] / Product Type: HMO /       Medical Necessity:     · Patient demonstrates excellent rehab potential due to higher previous functional level. · Skilled intervention continues to be required due to decline in overall functional mobility. Reason for Services/Other Comments:  · Patient continues to require skilled intervention due to medical complications and patient unable to attend/participate in therapy as expected. Use of outcome tool(s) and clinical judgement create a POC that gives a: Clear prediction of patient's progress: LOW COMPLEXITY                 TREATMENT:      Pre-treatment Symptoms/Complaints:  \"Waiting on the insurance company. \"  Pain: Initial:   Pain Intensity 1: 0  Post Session:  0/10        Most Recent Physical Functioning:   Gross Assessment:  AROM: Within functional limits (RUE (LUE not tested due to precautions))  Strength: Generally decreased, functional (RUE (LUE not testesd due to precautions))  Coordination: Within functional limits  Sensation: Intact  Posture:  Posture (WDL): Exceptions to WDL  Posture Assessment:  Forward head, Rounded shoulders  Balance:  Sitting: Intact  Standing: Impaired  Standing - Static: Fair  Standing - Dynamic : Fair  Bed Mobility:     Wheelchair Mobility:     Transfers:  Sit to Stand: Contact guard assistance  Stand to Sit: Contact guard assistance  Gait:  Base of Support: Widened  Step Length: Left shortened;Right shortened  Gait Abnormalities: Decreased step clearance;Trunk sway increased  Ambulation - Level of Assistance: Contact guard assistance  Distance (ft): 115 Feet (ft)  Assistive Device: Walker, rolling  Interventions: Safety awareness training;Verbal cues  Gait:     Base of Support: Widened  Step Length: Left shortened;Right shortened  Gait Abnormalities: Decreased step clearance;Trunk sway increased  Distance (ft): 115 Feet (ft)  Assistive Device: Walker, rolling  Ambulation - Level of Assistance: Contact guard assistance  Interventions: Safety awareness training;Verbal cues           Therapeutic Activity: (    12 Minutes): Therapeutic activities including Chair transfers and Ambulation on level ground to improve mobility, strength and balance. Required minimal Safety awareness training;Verbal cues to promote static and dynamic balance in standing. Therapeutic Exercise: (11 Minutes):  Exercises per grid below to improve mobility, strength and activity tolerance. Required minimal verbal cues to promote proper body alignment and promote proper body breathing techniques. Progressed repetitions and complexity of movement as indicated. Date:  3-8-17 Date:   Date:     ACTIVITY/EXERCISE AM PM AM PM AM PM   Ambulation:           Distance  Device  Duration         Seated Heel Raises x20 x20       Seated Toe Raises x20 x20       Seated Long Arc Quads x20 x20       Seated Marching x20 x20       Seated Hip Abduction x20 x20                B = bilateral; AA = active assistive; A = active; P = passive     Braces/Orthotics/Lines/Etc:   · None  Treatment/Session Assessment:    · Response to Treatment:  Patient tolerated treatment well  · Interdisciplinary Collaboration:  · Physical Therapy Assistant and Registered Nurse  · After treatment position/precautions:  · Up in chair, Call light within reach and RN notified  · Compliance with Program/Exercises: compliant all of the time. · Recommendations/Intent for next treatment session: \"Next visit will focus on advancements to more challenging activities and reduction in assistance provided\".   Total Treatment Duration:  PT Patient Time In/Time Out  Time In: 1350  Time Out: [address], PTA

## 2017-03-08 NOTE — PROGRESS NOTES
Spoke with Cony Anglin, awaiting for insurance approval. No word at this time. PT/OT were sent in for precert.

## 2017-03-08 NOTE — PROGRESS NOTES
Problem: Mobility Impaired (Adult and Pediatric)  Goal: *Acute Goals and Plan of Care (Insert Text)  LTG: (reviewed and updated 3/7/17)  (1.)Mr. Marianna Mullen will move from supine to sit and sit to supine , scoot up and down and roll side to side in bed with MODIFIED INDEPENDENCE within 7 day(s). (2.)Mr. Marianna Mullen will transfer from bed to chair and chair to bed with MODIFIED INDEPENDENCE using the least restrictive device within 7 day(s). (3.)Mr. Marianna Mullen will ambulate with MODIFIED INDEPENDENCE for 500+ feet with the least restrictive device within 7 day(s). (4.)Mr. Marianna Mullen will perform standing static and dynamic balance activities x 8 minutes with SUPERVISION to improve safety within 7 day(s). (5.)Mr. Marianna Mullen will ascend and descend 5 stairs using one hand rail(s) with SUPERVISION to improve functional mobility and safety within 7 day(s). (6.)Mr. Marianna Mullen will tolerate 25 minutes of therapeutic activity/exercises within 7 day(s). (7.)Mr. Marianna Mullen will perform transfers and ambulation without a decrease in SBP >20 and DBP >10 within 7 days. ________________________________________________________________________________________________      Physical Therapy Note:     Patient not seen this afternoon secondary to being unavailable. She was having an echocardiogram this afternoon. Will attempt to see patient later as time and schedule permit. Thank you.      Ova Morning, MYNOR

## 2017-03-08 NOTE — CONSULTS
MD Mandi   Medical Director  [address] [place] [address], [address] [place] [address]  Tel: [phone]     Physical Medicine & Rehabilitation Note-consult    Patient: Saima Lipgraciela MRN: [id number]  SSN: [id number]    YOB: 1955  Age: 63 y.o. Sex: male      Admit Date: 2/27/2017  Admitting Physician: Radha Pat MD    Medical Decision Making/Plan/Recommend:  Functional deficits. Recommend  acute rehab at [facility]. Discussed rehabilitation and further care program options with the patient. After careful consideration of all the needs of this patient, it is my professional opinion that the admission to inpatient rehab program is reasonable and necessary due to cardiac risk, ongoing medical conditions described below and present functional deficits. He will benefit from daily multi disciplinary  inpatient rehabilitation program and the availability of all the needed medical and nursing care. Continue PT, OT for balance, endurance, mobility, transfers, and gait training. Will follow progress. Chief Complaint : Gait dysfunction secondary to below. Admit Diagnosis: Atherosclerosis of native coronary artery of native heart w*  NSTEMI (non-ST elevated myocardial infarction) ([facility] (2/27/2017)  DM (diabetes mellitus) type II controlled, neurological manifestation ([facility] [address] (3/7/2014)  Essential hypertension, benign (3/7/2014)  Hypertriglyceridemia (2/27/2017)  Depressed left ventricular ejection fraction (2/27/2017)  Ischemic cardiomyopathy (2/28/2017)  CAD, multiple vessel (2/28/2017)  S/P CABG (coronary artery bypass graft) (2/28/2017)      Overview: 2/28/17 (Dr Douglas Mccarthy) Coronary artery bypass grafting x3. Grafts       consisting of       1. Left internal mammary artery to the left anterior descending. 2. Reversed LEFT saphenous vein graft to obtuse marginal.       3. Reversed LEFT saphenous vein graft to the left posterior descending       artery.    Postoperative anemia due to acute blood loss (3/1/2017)  Pleural effusion (3/2/2017)  Left thoracentesis with 600 ml removed   Right thoracentesis with 1350 ml removed   Hypoxia (3/2/2017)  Cardiogenic shock ([address] (4/6/6278)  Systolic CHF, acute on chronic ([address] (3/3/2017)  Cardiac arrest with ventricular fibrillation ([address] (3/5/2017)  ICD (implantable cardioverter-defibrillator) in place - [employer] MRI implantable cardioverter defibrillator - March 2188  Chronic systolic heart failure   Pain  DVT risk  Acute Rehab Dx:  Gait impairment  Debility    deconditioning  Mobility and ambulation deficits  Self Care/ADL deficits    Subjective:     Date of Evaluation:  March 8, 2017    HPI: Ed Shoulder is a 63 y.o. male patient at [address] [place] [address] who was admitted on 2/27/2017  by Amber Orellana MD with below mentioned medical history ,is being seen for Physical Medicine and Rehabilitation. Ed Shoulder  Presented with chest fullness, associated with shortness of breath, N/ vomited x 1 last pm. ECG  Showed incomplete LBBB and troponin levels were mildly elevated. Patient was admitted with diagnosis of Non-ST segment elevation myocardial infarction. Left heart cath  confirmed severe multi-vessel disease with markedly reduced left ventricular function, an echo showed an EF of 10% to 15%. He underwent a coronary artery bypass grafting x3 per Dr. Nicola Weeks. Jonny Blank MD on 2/28/2017 . The patient's post operative course has been complicated by patient coding on 3/4/2017, was in v. Fib. He required CPR, defibrillator, intubation, and vent support. He then spontaneously developed second VF arrest again the next day, requiring a code and resuscitation. He subsequently received  A ICD/ implantable cardioverter-defibrillator given recent events and severely reduced LV function. He also required bilateral thoracentesis for developing effusions. Patient continued to require cardiac monitoring, diuresis, and post op care.    We are consulted to assist with rehab needs and placement. Post op patient made WBAT BLE. Sternal precautions, cardiac precautions to be followed. He is starting to participate in rehab program initially to recover function. Patient starting to stand, taking steps, however shows significant functional deficits due to cardiac debility, weakness, poor activity tolerance presently. Eliu Escobedo is seen and examined today. Medical Records reviewed. He was active and independent prior to recent events. He denies any other prior major debilities    Medical  Dx:  Past Medical History:   Diagnosis Date    Allergic rhinitis, cause unspecified 3/7/2014    CAD, multiple vessel 2/28/2017 2/28/17 (Dr Lady Canales) Coronary artery bypass grafting x3. Grafts consisting of  1. Left internal mammary artery to the left anterior descending. 2. Reversed LEFT saphenous vein graft to obtuse marginal.  3. Reversed LEFT saphenous vein graft to the left posterior descending  artery.      Cancer [facility]) 1979    testicular- tx with radiation    Contact dermatitis and other eczema, due to unspecified cause 3/7/2014    Depressed left ventricular ejection fraction 2/27/2017 2/27/17 20%    Esophageal reflux 3/7/2014    GERD (gastroesophageal reflux disease)     OTC pepcid 1Xd controls it    Ischemic cardiomyopathy 2/28/2017    EF 10-15% echo 2/27/17    NSTEMI (non-ST elevated myocardial infarction) ([address] 2/27/2017    Other and unspecified hyperlipidemia 3/7/2014    Other specified idiopathic peripheral neuropathy 3/7/2014    Retinopathy of both eyes     Type II or unspecified type diabetes mellitus with neurological manifestations, uncontrolled 3/7/2014    avg fasting- 120-200; last A1C= 8.7 in Oct 2016- down from 11.4    Undiagnosed cardiac murmurs 8/3/1564    II/VI systolic murmur    Unspecified essential hypertension 3/7/2014    pt says he has never been on meds    Vitreous hemorrhage, unspecified eye ([address] 2/27/2017    2/21s/p vitrectomy (Dr Cyndee Cazares)        Current Level of Function: bed mobility - min A, transfers - CGA, decreased balance , ambulation 30' with CGA using a RW. Prior Level of Function/Work/Activity:  Patient lives with spouse in one story home with a few stairs through out the home. Patient was independent and active prior to admission. Family History   Problem Relation Age of Onset    Diabetes Mother      type 2 -insulin dep    Heart Attack Mother 76    Heart Disease Mother     Diabetes Father      type 2; insulin dep    Heart Disease Father      CAD-pacer-carotid endart    Stroke Father     Heart Attack Father 67    Diabetes Other      grandmother    Cancer Sister      breast    No Known Problems Brother     No Known Problems Sister       Social History   Substance Use Topics    Smoking status: Never Smoker    Smokeless tobacco: Never Used    Alcohol use No     Past Surgical History:   Procedure Laterality Date    HX HEENT  2003    SCC removal, nose    HX UROLOGICAL Left 1979    malignant tumor of testis/RAT    HX UROLOGICAL Left 2010    groin- cyst      Prior to Admission medications    Medication Sig Start Date End Date Taking? Authorizing Provider   sitaGLIPtin-metFORMIN (JANUMET) 50-1,000 mg per tablet Take 1 Tab by mouth two (2) times daily (with meals). 10/3/16  Yes Clarisa Hooker MD   prednisoLONE acetate (PRED FORTE) 1 % ophthalmic suspension Administer 1 Drop to right eye four (4) times daily. El Yu MD   exenatide microspheres (BYDUREON) 2 mg serr Subcutaneous injection once weekly  Patient taking differently: Subcutaneous injection once weekly  Pt has not started this yet- will start after upcoming surgery 1/16/17   Clarisa Hooker MD   dulaglutide (TRULICITY) 1.5 VZ/5.3 mL sub-q pen 0.5 mL by SubCUTAneous route every seven (7) days.  10/12/16   Clarisa Hooker MD     No Known Allergies     Review of Systems: Denies chest pain, shortness of breath, cough, headache, visual problems, abdominal pain, dysurea, calf pain. Pertinent positives are as noted in the medical records and unremarkable otherwise. Objective:     Vitals:  Blood pressure 117/65, pulse 75, temperature 97.2 °F (36.2 °C), resp. rate 20, height 5' 10\" (1.778 m), weight 204 lb (92.5 kg), SpO2 97 %. Temp (24hrs), Av.4 °F (36.3 °C), Min:97 °F (36.1 °C), Max:98.1 °F (36.7 °C)    BMI (calculated): 29.3 (17 0558)   Intake and Output:   1901 -  0700  In: 677.3 [P.O.:480; I.V.:197.3]  Out: 8633 [Urine:3049]    Physical Exam:   General: Alert and age appropriately oriented. No acute cardio respiratory distress. HEENT: Normocephalic,no scleral icterus  Oral mucosa moist without cyanosis, no JVD   Lungs: + few crackles. Respiration even and unlabored   Heart: Regular rate and rhythm, S1, S2   No  murmurs, clicks, rub or gallops   Abdomen: Soft, non-tender, nondistended. Bowel sounds present. No organomegaly. Genitourinary: Benign . Neuromuscular:      Grossly no focal motor deficits noted. Moves ankles. + mild generalized weakness. No sensory deficits distally BLE to soft touch. Skin/extremity: No rashes, no erythema.   + trace BLE edema. Wound appear benign.  Sternotomy, LE graft incisions appear C/D/I                                                                                        Labs/Studies:  Recent Results (from the past 72 hour(s))   GLUCOSE, POC    Collection Time: 17  1:43 PM   Result Value Ref Range    Glucose (POC) 146 (H) 65 - 100 mg/dL   GLUCOSE, POC    Collection Time: 17  4:43 PM   Result Value Ref Range    Glucose (POC) 146 (H) 65 - 100 mg/dL   GLUCOSE, POC    Collection Time: 17 10:56 PM   Result Value Ref Range    Glucose (POC) 189 (H) 65 - 100 mg/dL   CBC W/O DIFF    Collection Time: 17 11:00 PM   Result Value Ref Range    WBC 10.8 4.3 - 11.1 K/uL    RBC 3.42 (L) 4.23 - 5.67 M/uL    HGB 10.4 (L) 13.6 - 17.2 g/dL    HCT 31.2 (L) 41.1 - 50.3 % MCV 91.2 79.6 - 97.8 FL    MCH 30.4 26.1 - 32.9 PG    MCHC 33.3 31.4 - 35.0 g/dL    RDW 14.0 11.9 - 14.6 %    PLATELET 352 943 - 716 K/uL    MPV 10.4 (L) 10.8 - 61.5 FL   METABOLIC PANEL, BASIC    Collection Time: 03/05/17 11:00 PM   Result Value Ref Range    Sodium 138 136 - 145 mmol/L    Potassium 4.8 3.5 - 5.1 mmol/L    Chloride 102 98 - 107 mmol/L    CO2 27 21 - 32 mmol/L    Anion gap 9 7 - 16 mmol/L    Glucose 195 (H) 65 - 100 mg/dL    BUN 32 (H) 6 - 23 MG/DL    Creatinine 1.40 0.8 - 1.5 MG/DL    GFR est AA >60 >60 ml/min/1.73m2    GFR est non-AA 55 (L) >60 ml/min/1.73m2    Calcium 8.0 (L) 8.3 - 10.4 MG/DL   MAGNESIUM    Collection Time: 03/05/17 11:00 PM   Result Value Ref Range    Magnesium 2.6 (H) 1.8 - 2.4 mg/dL   BLOOD GAS & LYTES, ARTERIAL    Collection Time: 03/05/17 11:09 PM   Result Value Ref Range    pH 7.50 (H) 7.35 - 7.45      PCO2 32 (L) 35.0 - 45.0 mmHg    PO2 122 (H) 75.0 - 100.0 mmHg    BICARBONATE 25 22.0 - 26.0 mmol/L    BASE EXCESS 2.2 0 - 3 mmol/L    TOTAL HEMOGLOBIN 11.2 (L) 11.7 - 15.0 GM/DL    O2 SAT 99 (H) 92.0 - 98.5 %    ARTERIAL O2 HGB 96.7 94.0 - 97.0 %    CARBOXYHEMOGLOBIN 1.3 0.5 - 1.5 %    METHEMOGLOBIN 0.5 0.0 - 1.5 %    DEOXYHEMOGLOBIN 2 0.0 - 5.0 %    Sodium 134.2 (L) 135 - 148 MMOL/L    POTASSIUM 4.38 3.5 - 5.3 MMOL/L    CHLORIDE 101 98 - 106      Calcium, ionized 1.08 1.0 - 1.3 mmol/L    SITE RB     ALLENS TEST NA     MODE HFNC     O2 FLOW 10.00 L/min    Respiratory comment: dr Cristel Magaña at 3 5 2017 11 16 30 PM. Read back.     GLUCOSE, POC    Collection Time: 03/06/17  6:21 AM   Result Value Ref Range    Glucose (POC) 183 (H) 65 - 100 mg/dL   GLUCOSE, POC    Collection Time: 03/06/17 12:46 PM   Result Value Ref Range    Glucose (POC) 190 (H) 65 - 100 mg/dL   EKG, 12 LEAD, INITIAL    Collection Time: 03/06/17  4:52 PM   Result Value Ref Range    Systolic BP  mmHg    Diastolic BP  mmHg    Ventricular Rate 75 BPM    Atrial Rate 75 BPM    P-R Interval 140 ms    QRS Duration 130 ms Q-T Interval 434 ms    QTC Calculation (Bezet) 484 ms    Calculated P Axis 55 degrees    Calculated R Axis 49 degrees    Calculated T Axis -9 degrees    Diagnosis       Electronic atrial pacemaker  Non-specific intra-ventricular conduction block  T wave abnormality, consider lateral ischemia  Abnormal ECG  a pacing newe  Confirmed by HANS HUBER (), REMINGTON ZAPATA ([id number]) on 3/7/2017 7:57:38 AM     GLUCOSE, POC    Collection Time: 03/06/17  5:47 PM   Result Value Ref Range    Glucose (POC) 167 (H) 65 - 100 mg/dL   GLUCOSE, POC    Collection Time: 03/06/17  8:50 PM   Result Value Ref Range    Glucose (POC) 144 (H) 65 - 100 mg/dL   CBC W/O DIFF    Collection Time: 03/07/17  3:55 AM   Result Value Ref Range    WBC 10.0 4.3 - 11.1 K/uL    RBC 3.46 (L) 4.23 - 5.67 M/uL    HGB 10.0 (L) 13.6 - 17.2 g/dL    HCT 30.7 (L) 41.1 - 50.3 %    MCV 88.7 79.6 - 97.8 FL    MCH 28.9 26.1 - 32.9 PG    MCHC 32.6 31.4 - 35.0 g/dL    RDW 14.1 11.9 - 14.6 %    PLATELET 242 585 - 167 K/uL    MPV 10.1 (L) 10.8 - 63.4 FL   METABOLIC PANEL, BASIC    Collection Time: 03/07/17  3:55 AM   Result Value Ref Range    Sodium 139 136 - 145 mmol/L    Potassium 4.0 3.5 - 5.1 mmol/L    Chloride 100 98 - 107 mmol/L    CO2 28 21 - 32 mmol/L    Anion gap 11 7 - 16 mmol/L    Glucose 129 (H) 65 - 100 mg/dL    BUN 33 (H) 6 - 23 MG/DL    Creatinine 1.17 0.8 - 1.5 MG/DL    GFR est AA >60 >60 ml/min/1.73m2    GFR est non-AA >60 >60 ml/min/1.73m2    Calcium 7.6 (L) 8.3 - 10.4 MG/DL   MAGNESIUM    Collection Time: 03/07/17  3:55 AM   Result Value Ref Range    Magnesium 2.4 1.8 - 2.4 mg/dL   GLUCOSE, POC    Collection Time: 03/07/17  3:55 AM   Result Value Ref Range    Glucose (POC) 137 (H) 65 - 100 mg/dL   CELL COUNT, BODY FLUID    Collection Time: 03/07/17 11:30 AM   Result Value Ref Range    BODY FLUID TYPE RIGHT      FLUID COLOR RED      FLUID APPEARANCE CLOUDY      FLUID RBC COUNT 59547 /cu mm    FLD NUCLEATED CELLS 366 /cu mm    FLD SEGS 61 %    FLD LYMPHS 39 % FLUID COMMENT       OCCASIONAL UNIDENTIFIED LARGE MONONUCLEAR CELLS PRESENT   GLUCOSE, FLUID    Collection Time: 03/07/17 11:30 AM   Result Value Ref Range    Fluid Type: RIGHT      Glucose, body fld. 160 MG/DL   LDH, BODY FLUID    Collection Time: 03/07/17 11:30 AM   Result Value Ref Range    Fluid Type: RIGHT      LD, body fld. 429 U/L   PROTEIN TOTAL, FLUID    Collection Time: 03/07/17 11:30 AM   Result Value Ref Range    Fluid Type: RIGHT      Protein total, body fld.  1.8 g/dL   CULTURE, BODY FLUID W GRAM STAIN    Collection Time: 03/07/17 11:30 AM   Result Value Ref Range    Special Requests: LEFT 1     GRAM STAIN PENDING     Culture result: NO GROWTH 1 DAY     GLUCOSE, POC    Collection Time: 03/07/17 11:51 AM   Result Value Ref Range    Glucose (POC) 155 (H) 65 - 100 mg/dL   GLUCOSE, POC    Collection Time: 03/07/17  4:32 PM   Result Value Ref Range    Glucose (POC) 132 (H) 65 - 100 mg/dL   GLUCOSE, POC    Collection Time: 03/07/17  8:55 PM   Result Value Ref Range    Glucose (POC) 127 (H) 65 - 100 mg/dL   CBC W/O DIFF    Collection Time: 03/08/17  6:00 AM   Result Value Ref Range    WBC 9.3 4.3 - 11.1 K/uL    RBC 3.77 (L) 4.23 - 5.67 M/uL    HGB 10.8 (L) 13.6 - 17.2 g/dL    HCT 33.4 (L) 41.1 - 50.3 %    MCV 88.6 79.6 - 97.8 FL    MCH 28.6 26.1 - 32.9 PG    MCHC 32.3 31.4 - 35.0 g/dL    RDW 14.3 11.9 - 14.6 %    PLATELET 042 505 - 386 K/uL    MPV 9.8 (L) 10.8 - 09.0 FL   METABOLIC PANEL, BASIC    Collection Time: 03/08/17  6:00 AM   Result Value Ref Range    Sodium 137 136 - 145 mmol/L    Potassium 4.4 3.5 - 5.1 mmol/L    Chloride 99 98 - 107 mmol/L    CO2 29 21 - 32 mmol/L    Anion gap 9 7 - 16 mmol/L    Glucose 140 (H) 65 - 100 mg/dL    BUN 31 (H) 6 - 23 MG/DL    Creatinine 1.05 0.8 - 1.5 MG/DL    GFR est AA >60 >60 ml/min/1.73m2    GFR est non-AA >60 >60 ml/min/1.73m2    Calcium 8.2 (L) 8.3 - 10.4 MG/DL   MAGNESIUM    Collection Time: 03/08/17  6:00 AM   Result Value Ref Range    Magnesium 2.4 1.8 - 2.4 mg/dL   GLUCOSE, POC    Collection Time: 03/08/17  6:00 AM   Result Value Ref Range    Glucose (POC) 132 (H) 65 - 100 mg/dL       Functional Assessment:  Reviewed participation and progress in therapies  Gross Assessment  AROM: Generally decreased, functional (03/07/17 1400)  Strength: Generally decreased, functional (03/07/17 1400)  Coordination: Within functional limits (03/07/17 1400)  Sensation: Intact (03/07/17 1400)   Bed Mobility  Rolling:  (not tested, pt up in chair on arrival to room) (03/03/17 1400)   Balance  Sitting: Intact (03/08/17 0940)  Standing: Impaired (03/08/17 0940)  Standing - Static: Fair (03/08/17 0940)  Standing - Dynamic : Fair (03/08/17 0940)               Bed/Mat Mobility  Rolling:  (not tested, pt up in chair on arrival to room) (03/03/17 1400)  Sit to Stand: Contact guard assistance (03/08/17 0940)  Bed to Chair: Contact guard assistance (03/07/17 1400)     Ambulation:  Gait  Base of Support: Widened (03/08/17 0940)  Step Length: Left shortened;Right shortened (03/08/17 0940)  Gait Abnormalities: Decreased step clearance;Trunk sway increased (03/08/17 0940)  Ambulation - Level of Assistance: Contact guard assistance (03/08/17 0940)  Distance (ft): 100 Feet (ft) (03/08/17 0940)  Assistive Device:  (None) (03/08/17 0940)  Interventions: Safety awareness training;Verbal cues (03/08/17 0940)    Impression/Plan:     Principal Problem:    NSTEMI (non-ST elevated myocardial infarction) ([facility] (2/27/2017)    Active Problems:    DM (diabetes mellitus) type II controlled, neurological manifestation ([facility] [address] (3/7/2014)      Essential hypertension, benign (3/7/2014)      Hypertriglyceridemia (2/27/2017)      Depressed left ventricular ejection fraction (2/27/2017)      Overview: 2/27/17 20%      Ischemic cardiomyopathy (2/28/2017)      Overview: EF 10-15% echo 2/27/17      CAD, multiple vessel (2/28/2017)      Overview: 2/28/17 (Dr Lavern Bell) Coronary artery bypass grafting x3.  Grafts       consisting of       1. Left internal mammary artery to the left anterior descending. 2. Reversed LEFT saphenous vein graft to obtuse marginal.       3. Reversed LEFT saphenous vein graft to the left posterior descending       artery. S/P CABG (coronary artery bypass graft) (2/28/2017)      Postoperative anemia due to acute blood loss (3/1/2017)      Pleural effusion (3/2/2017)      Overview: 3/7/17      Left thoracentesis with 600 ml removed       Right thoracentesis with 1350 ml removed       Hypoxia (3/2/2017)      Cardiogenic shock (HCC) (5/9/3675)      Systolic CHF, acute on chronic ([facility] [address] (3/3/2017)      Cardiac arrest with ventricular fibrillation ([facility] [address] (3/5/2017)      Overview: 3/4/16      ICD (implantable cardioverter-defibrillator) in place (3/6/2017)      Overview: 1.  [employer] MRI implantable cardioverter defibrillator - March 2017        Current Facility-Administered Medications   Medication Dose Route Frequency Provider Last Rate Last Dose    cephALEXin (KEFLEX) capsule 500 mg  500 mg Oral TID Minda Reveles MD   500 mg at 03/08/17 1007    lisinopril (PRINIVIL, ZESTRIL) tablet 5 mg  5 mg Oral DAILY Minda Reveles MD   5 mg at 03/08/17 0902    famotidine (PEPCID) tablet 20 mg  20 mg Oral Q12H Lauri Killings, NP        furosemide (LASIX) tablet 40 mg  40 mg Oral DAILY Lauri Killings, NP   40 mg at 03/08/17 0901    tapentadol (NUCYNTA) tablet 100 mg  100 mg Oral Q6H PRN Bang Walker MD        potassium chloride (K-DUR, KLOR-CON) SR tablet 20 mEq  20 mEq Oral DAILY Bang Walker MD   20 mEq at 03/08/17 0902    sodium chloride (NS) flush 5-10 mL  5-10 mL IntraVENous Q8H Minda Reveles MD   10 mL at 03/08/17 0558    sodium chloride (NS) flush 5-10 mL  5-10 mL IntraVENous PRN Minda Reveles MD        metoprolol succinate (TOPROL-XL) XL tablet 50 mg  50 mg Oral BID Minda Reveles MD   50 mg at 03/08/17 0901    insulin regular (NOVOLIN R, HUMULIN R) injection SubCUTAneous AC&HS Gem Guallpa MD   Stopped at 03/07/17 1630    cyclobenzaprine (FLEXERIL) tablet 5 mg  5 mg Oral TID PRN Gem Guallpa MD   5 mg at 03/05/17 1820    insulin regular (Perfecto Moulder R, HUMULIN R) 100 Units in 0.9% sodium chloride 100 mL infusion  1 Units/hr IntraVENous TITRATE Gem Guallpa MD   Stopped at 03/05/17 7369    docusate sodium (COLACE) capsule 100 mg  100 mg Oral DAILY Neno Vallecillo MD   100 mg at 03/08/17 0901    alum-mag hydroxide-simeth (MYLANTA) oral suspension 30 mL  30 mL Oral Q4H PRN Neno Vallecillo MD        ondansetron [facility]) injection 4 mg  4 mg IntraVENous Q6H PRN Neno Vallecillo MD        acetaminophen (TYLENOL) tablet 650 mg  650 mg Oral Q4H PRN Neno Vallecillo MD        atorvastatin (LIPITOR) tablet 80 mg  80 mg Oral QHS Neno Vallecillo MD   80 mg at 03/07/17 2056    amiodarone (CORDARONE) tablet 200 mg  200 mg Oral Q12H Neno Vallecillo MD   200 mg at 03/08/17 0901    aspirin delayed-release tablet 81 mg  81 mg Oral DAILY Neno Vallecillo MD   81 mg at 03/08/17 0901    nitroglycerin (NITROLINGUAL) sublingual 0.4 mg/spray  1 Spray SubLINGual Q5MIN PRN Neno Vallecillo MD        baclofen (LIORESAL) tablet 10 mg  10 mg Oral TID PRN Roberth Cuello NP   10 mg at 03/04/17 0817    oxyCODONE-acetaminophen (PERCOCET) 5-325 mg per tablet 1 Tab  1 Tab Oral Q4H PRN Neno Vallecillo MD   1 Tab at 03/08/17 0901    oxyCODONE-acetaminophen (PERCOCET 10)  mg per tablet 1 Tab  1 Tab Oral Q4H PRN Neno Vallecillo MD   1 Tab at 03/08/17 0149      Recommendations: Recommendc acute rehab at [facility]. Continue Acute Rehab Program.  Coordination of rehab/medical care. Will follow along with you for PM&R issues and provide you follow up. Will follow with SW/ /Admissions Coordinators regarding insurance approvals and acceptance. Rehabilitation Management/ Medical Management: 1. Devices:Walkers, Type: Rolling Walker  2. Consult:Rehab team including PT, OT,  and . 3. Disposition Rehab-discussed with patient. 4. Plexipulse when in bed  5. Thigh-high or knee-high GILBERTO's when out of bed  6. DVT Prophylaxis per CT surgery  7. Incentive spirometer Q1H while awake  8. Post op hemorrhagic anemia-   Continue to monitor. 9. Activity: WBAT BLE. Sternal precautions. Cardiac precautions  Activity should be terminated if any of the following develops:   New onset cardiopulmonary symptoms;   HR decreases 20% of baseline,   HR increases 50% of baseline,   SBP increases to 240 mm Hg,   SBP decreases 30 mm Hg from baseline, or to <90mm Hg ,   DBP increases to 120 mm Hg. 10. Diet - cardiac, reg, reg. 11. Pain Control: stable, mild-to-moderate joint symptoms intermittently, reasonably well controlled by PRN meds   12. Wound Care: Keep wound clean and dry. Monitor for hematomas. Follow up with Dr Veena Lopez  2 weeks after discharge from rehab. Follow up with CT sx per instructions. Thank you for the opportunity to participate in the care of this patient.   Signed By: Ingrid Quevedo MD     March 8, 2017

## 2017-03-08 NOTE — PROGRESS NOTES
Problem: Self Care Deficits Care Plan (Adult)  Goal: *Acute Goals and Plan of Care (Insert Text)  1. Patient will verbalize and demonstrate understanding of pacemaker precautions with 100% accuracy during OT treatment session. 2. Patient will complete functional transfers with modified independence. 3. Patient will tolerate at least 20 minutes of OT treatment with 2 rest breaks to increase activity tolerance for ADLs. 4. Patient will complete full body bathing and dressing with modified independence and least restrictive adaptive equipment. 5. Patient will complete toileting with modified independence. 6. Patient will complete self feeding and grooming independently. Timeframe: 7 visits     Comments:       OCCUPATIONAL THERAPY: Initial Assessment and AM 3/8/2017  INPATIENT: Hospital Day: 10  Payor: [place]UE CHOICE / [place] / Product Type: HMO /      NAME/AGE/GENDER: Claudia Gaston is a 63 y.o. male      PRIMARY DIAGNOSIS:  Atherosclerosis of native coronary artery of native heart with unstable angina pectoris (HCC) [I25.110]  Pleural effusion [J90] NSTEMI (non-ST elevated myocardial infarction) ([facility] [address] NSTEMI (non-ST elevated myocardial infarction) ([facility])  Procedure(s) (LRB):  ULTRASOUND (Bilateral)  THORACENTESIS (N/A)  1 Day Post-Op  ICD-10: Treatment Diagnosis:        · Generalized Muscle Weakness (M62.81)  · Other lack of cordination (R27.8)   Precautions/Allergies:        Pacemaker; CABG Review of patient's allergies indicates no known allergies. ASSESSMENT:      Mr. Dia Mendoza presents to acute rehab s/p above procedure. Upon arrival of OT, patient was seated in reclining chair. He reports 5/10 pain in chest around incision area, RN is aware. No c/o SOB or dizziness throughout session. Pt reports that he was independent at baseline with ADLs and IADLs, including driving. He reports that he was working full time as an  at [employer].   Today, he complete transfers and functional mobility ~100' with contact guard assist for balance and steadying before reporting fatigue. LUE ROM AND strength was not test due to precautions in place. However, LUE strength in generally decreased. Due to decreased static/dynamic standing balance, decreased activity tolerance, decreased global strength, decreased ADL performance, decreased functional mobility, increased fatigue, Eliu Escobedo would benefit from continued skilled OT services to address functional deficits noted above in order to improve independence with ADLs and restore baseline level of functioning. This section established at most recent assessment   PROBLEM LIST (Impairments causing functional limitations):  1. Decreased Strength  2. Decreased ADL/Functional Activities  3. Decreased Transfer Abilities  4. Decreased Ambulation Ability/Technique  5. Decreased Balance  6. Increased Pain  7. Decreased Activity Tolerance  8. Decreased Work Simplification/Energy Conservation Techniques  9. Decreased Knowledge of Precautions    INTERVENTIONS PLANNED: (Benefits and precautions of occupational therapy have been discussed with the patient.)  1. Activities of daily living training  2. Adaptive equipment training  3. Clothing management  4. Donning&doffing training  5. Theraputic activity  6. Theraputic exercise      TREATMENT PLAN: Frequency/Duration: Follow patient 4x/week to address above goals. Rehabilitation Potential For Stated Goals: GOOD      RECOMMENDED REHABILITATION/EQUIPMENT: (at time of discharge pending progress): Continue Skilled Therapy.                       OCCUPATIONAL PROFILE AND HISTORY:   History of Present Injury/Illness (Reason for Referral):  Per H&P: Melita Nyhan Marlyne Chely is a 63 y.o. male with history of DM, who presented to [facility] early this am with one day history of chest fullness, associated with shortness of breath, N/ vomited x 1 last pm. Pain continued throughout night and was relieved by topical ntg in ER. ECG notes NSr with incomplete LBBB. Troponin levels are slightly elevated as well as d dimer. He denies prior cardiac hx, no prior episodes of CP, nonsmoker, no ETOH, , . Had testicular cancer in 1979 with orchiectomy and had mets to abdomen and left lung --both sites were treated with massive radiation by [employer]. Had vitreous hemmhorage with vitrectomy on 2/21--spoke with Opthamologist--DR. Anguiano--OK to heparinize. Strong family hx of CAD. \"  Past Medical History/Comorbidities:   Mr. Yobany Avendaño  has a past medical history of Allergic rhinitis, cause unspecified (3/7/2014); CAD, multiple vessel (2/28/2017); Cancer ([facility] [address] (1979); Contact dermatitis and other eczema, due to unspecified cause (3/7/2014); Depressed left ventricular ejection fraction (2/27/2017); Esophageal reflux (3/7/2014); GERD (gastroesophageal reflux disease); Ischemic cardiomyopathy (2/28/2017); NSTEMI (non-ST elevated myocardial infarction) ([facility] [address] (2/27/2017); Other and unspecified hyperlipidemia (3/7/2014); Other specified idiopathic peripheral neuropathy (3/7/2014); Retinopathy of both eyes; Type II or unspecified type diabetes mellitus with neurological manifestations, uncontrolled (3/7/2014); Undiagnosed cardiac murmurs (3/7/2014); Unspecified essential hypertension (3/7/2014); and Vitreous hemorrhage, unspecified eye ([facility] [address] (2/27/2017). Mr. Yobany Avendaño  has a past surgical history that includes urological (Left, 1979); urological (Left, 2010); and heent (2003). Social History/Living Environment:   Home Environment: Private residence  # Steps to Enter: 2  Rails to Enter: No  One/Two Story Residence: One story  Living Alone: No  Support Systems: Spouse/Significant Other/Partner  Patient Expects to be Discharged to[de-identified] Private residence  Current DME Used/Available at Home: None  Tub or Shower Type: Tub/Shower combination  Prior Level of Function/Work/Activity:  Lives in [place] [facility] [place] [facility] [facility] with wife.  Reports independence at baseline with ADLs and IADLs. Personal Factors:          Age:  63 y.o. Social Background:  Support from wife        Profession:  Full time ; eager to return to work   Number of Personal Factors/Comorbidities that affect the Plan of Care: Expanded review of therapy/medical records (1-2):  MODERATE COMPLEXITY   ASSESSMENT OF OCCUPATIONAL PERFORMANCE[de-identified]   Activities of Daily Living:           Basic ADLs (From Assessment) Complex ADLs (From Assessment)   Basic ADL  Feeding: Setup  Oral Facial Hygiene/Grooming: Setup  Bathing: Moderate assistance  Upper Body Dressing: Minimum assistance  Lower Body Dressing: Moderate assistance  Toileting: Minimum assistance Instrumental ADL  Meal Preparation: Maximum assistance  Homemaking: Maximum assistance   Grooming/Bathing/Dressing Activities of Daily Living     Cognitive Retraining  Safety/Judgement: Awareness of environment                       Bed/Mat Mobility  Sit to Stand: Contact guard assistance          Most Recent Physical Functioning:   Gross Assessment:  AROM: Within functional limits (RUE (LUE not tested due to precautions))  Strength: Generally decreased, functional (RUE (LUE not testesd due to precautions))  Sensation: Intact               Posture:  Posture (WDL): Exceptions to WDL  Posture Assessment:  Forward head, Rounded shoulders  Balance:  Sitting: Intact  Standing: Impaired  Standing - Static: Fair  Standing - Dynamic : Fair Bed Mobility:     Wheelchair Mobility:     Transfers:  Sit to Stand: Contact guard assistance  Stand to Sit: Contact guard assistance                    Patient Vitals for the past 6 hrs:       BP SpO2 Pulse   03/08/17 0716 117/65 97 % 75        Mental Status  Neurologic State: Alert  Orientation Level: Oriented X4  Cognition: Appropriate decision making, Appropriate for age attention/concentration, Follows commands  Perception: Appears intact  Perseveration: No perseveration noted  Safety/Judgement: Awareness of environment Physical Skills Involved:  1. Balance  2. Mobility  3. Strength  4. Endurance Cognitive Skills Affected (resulting in the inability to perform in a timely and safe manner):  1. None Psychosocial Skills Affected:  1. Habits  2. Routines and Behaviors   Number of elements that affect the Plan of Care: 3-5:  MODERATE COMPLEXITY   CLINICAL DECISION MAKIN[address] [place] [address] AM-PAC 6 Clicks   Basic Mobility Inpatient Short Form  How much help from another person does the patient currently need. .. Total A Lot A Little None   1. Putting on and taking off regular lower body clothing?   [ ] 1   [X] 2   [ ] 3   [ ] 4   2. Bathing (including washing, rinsing, drying)? [ ] 1   [X] 2   [ ] 3   [ ] 4   3. Toileting, which includes using toilet, bedpan or urinal?   [ ] 1   [ ] 2   [X] 3   [ ] 4   4. Putting on and taking off regular upper body clothing?   [ ] 1   [ ] 2   [X] 3   [ ] 4   5. Taking care of personal grooming such as brushing teeth? [ ] 1   [ ] 2   [ ] 3   [X] 4   6. Eating meals? [ ] 1   [ ] 2   [ ] 3   [X] 4   © , Trustees of [address] [place] [address], under license to [employer]. All rights reserved    Score:  Initial: 18 Most Recent: X (Date: -- )     Interpretation of Tool:  Represents activities that are increasingly more difficult (i.e. Bed mobility, Transfers, Gait). Score 24 23 22-20 19-15 14-10 9-7 6       Modifier CH CI CJ CK CL CM CN         · Self Care:               - CURRENT STATUS:    CK - 40%-59% impaired, limited or restricted               - GOAL STATUS:           CJ - 20%-39% impaired, limited or restricted               - D/C STATUS:                       ---------------To be determined---------------  Payor: BLUE CHO[place] / Product Type: HMO /       Medical Necessity:     · Patient demonstrates good rehab potential due to higher previous functional level.   Reason for Services/Other Comments:  · Patient continues to require present interventions due to patient's inability to complete ADLs at baseline level of function. .   Use of outcome tool(s) and clinical judgement create a POC that gives a: MODERATE COMPLEXITY             TREATMENT:   (In addition to Assessment/Re-Assessment sessions the following treatments were rendered)      Pre-treatment Symptoms/Complaints:    Pain: Initial:   Pain Intensity 1: 5  Pain Location 1: Chest  Pain Intervention(s) 1: Nurse notified, Repositioned  Post Session:  same      Assessment/Reassessment only, no treatment provided today     Braces/Orthotics/Lines/Etc:   · O2 Device: Room air  Treatment/Session Assessment:    · Response to Treatment:  Tolerated treatment well with no complications. · Interdisciplinary Collaboration:  · Physical Therapy Assistant  · Registered Nurse  · Certified Nursing Assistant/Patient Care Technician  · After treatment position/precautions:  · Up in chair  · Bed/Chair-wheels locked  · Call light within reach  · RN notified  · Compliance with Program/Exercises: compliant all of the time. · Recommendations/Intent for next treatment session: \"Next visit will focus on advancements to more challenging activities and reduction in assistance provided\".   Total Treatment Duration:  OT Patient Time In/Time Out  Time In: 1008  Time Out: Stef 32

## 2017-03-08 NOTE — PROGRESS NOTES
Problem: Mobility Impaired (Adult and Pediatric)  Goal: *Acute Goals and Plan of Care (Insert Text)  LTG: (reviewed and updated 3/7/17)  (1.)Mr. Allison Marc will move from supine to sit and sit to supine , scoot up and down and roll side to side in bed with MODIFIED INDEPENDENCE within 7 day(s). (2.)Mr. Allison Marc will transfer from bed to chair and chair to bed with MODIFIED INDEPENDENCE using the least restrictive device within 7 day(s). (3.)Mr. Allison Marc will ambulate with MODIFIED INDEPENDENCE for 500+ feet with the least restrictive device within 7 day(s). (4.)Mr. Allison Marc will perform standing static and dynamic balance activities x 8 minutes with SUPERVISION to improve safety within 7 day(s). (5.)Mr. Allison Marc will ascend and descend 5 stairs using one hand rail(s) with SUPERVISION to improve functional mobility and safety within 7 day(s). (6.)Mr. Allison Marc will tolerate 25 minutes of therapeutic activity/exercises within 7 day(s). (7.)Mr. Allison Marc will perform transfers and ambulation without a decrease in SBP >20 and DBP >10 within 7 days. ________________________________________________________________________________________________     PHYSICAL THERAPY: Daily Note, Treatment Day: 1st and AM 3/8/2017  INPATIENT: Hospital Day: 10  Payor[place]UE CHO[place] / Product Type: HMO /      NAME/AGE/GENDER: Saima Bragg is a 63 y.o. male      PRIMARY DIAGNOSIS: Atherosclerosis of native coronary artery of native heart with unstable angina pectoris (HCC) [I25.110]  Pleural effusion [J90] NSTEMI (non-ST elevated myocardial infarction) ([facility] [address] NSTEMI (non-ST elevated myocardial infarction) (HCC)  Procedure(s) (LRB):  ULTRASOUND (Bilateral)  THORACENTESIS (N/A)  1 Day Post-Op  ICD-10: Treatment Diagnosis:       · Generalized Muscle Weakness (M62.81)  · Difficulty in walking, Not elsewhere classified (R26.2)   Precaution/Allergies:  Review of patient's allergies indicates no known allergies. ASSESSMENT:      Mr. Antonia Tilley presents sitting up in bedside chair agreeable to treatment. He increased his ambulation with CGA. He ambulated on room air and his O2 sats were 95%. He returned to room and was instructed in seated exercises. He demonstrated good technique with all exercises. Good progress with mobility this treatment. This section established at most recent assessment   PROBLEM LIST (Impairments causing functional limitations):  1. Decreased Strength  2. Decreased Transfer Abilities  3. Decreased Ambulation Ability/Technique  4. Decreased Balance  5. Decreased Activity Tolerance  6. Increased Shortness of Breath  7. Decreased Knowledge of Precautions  8. Decreased [place] with Home Exercise Program    INTERVENTIONS PLANNED: (Benefits and precautions of physical therapy have been discussed with the patient.)  1. Balance Exercise  2. Bed Mobility  3. Family Education  4. Gait Training  5. Home Exercise Program (HEP)  6. Therapeutic Activites  7. Therapeutic Exercise/Strengthening  8. Transfer Training  9. Group Therapy      TREATMENT PLAN: Frequency/Duration: twice daily for duration of hospital stay  Rehabilitation Potential For Stated Goals: EXCELLENT      RECOMMENDED REHABILITATION/EQUIPMENT: (at time of discharge pending progress): Continue Skilled Therapy and Home Health: Physical Therapy. HISTORY:   History of Present Injury/Illness (Reason for Referral):  Patient is status post above  Past Medical History/Comorbidities:   Mr. Antonia Tilley  has a past medical history of Allergic rhinitis, cause unspecified (3/7/2014); CAD, multiple vessel (2/28/2017); Cancer ([facility] [address] (1979); Contact dermatitis and other eczema, due to unspecified cause (3/7/2014); Depressed left ventricular ejection fraction (2/27/2017); Esophageal reflux (3/7/2014); GERD (gastroesophageal reflux disease);  Ischemic cardiomyopathy (2/28/2017); NSTEMI (non-ST elevated myocardial infarction) ([address] (2/27/2017); Other and unspecified hyperlipidemia (3/7/2014); Other specified idiopathic peripheral neuropathy (3/7/2014); Retinopathy of both eyes; Type II or unspecified type diabetes mellitus with neurological manifestations, uncontrolled (3/7/2014); Undiagnosed cardiac murmurs (3/7/2014); Unspecified essential hypertension (3/7/2014); and Vitreous hemorrhage, unspecified eye ([facility] [address] (2/27/2017). Mr. Daisha Mo  has a past surgical history that includes urological (Left, 1979); urological (Left, 2010); and heent (2003). Social History/Living Environment:   Home Environment: Private residence  One/Two Story Residence: One story  Living Alone: No  Support Systems: Family member(s)  Patient Expects to be Discharged to[de-identified] Private residence  Current DME Used/Available at Home: None  Prior Level of Function/Work/Activity:  Patient lives with spouse in one story home with a few stairs through out the home. Patient was independent and active prior to admission. Personal Factors:          Patient had radiation for cancer   Number of Personal Factors/Comorbidities that affect the Plan of Care: 1-2: MODERATE COMPLEXITY   EXAMINATION:   Most Recent Physical Functioning:   Gross Assessment:                  Posture:  Posture (WDL): Exceptions to WDL  Posture Assessment: Forward head, Rounded shoulders  Balance:  Sitting: Intact  Standing: Impaired  Standing - Static: Fair  Standing - Dynamic : Fair Bed Mobility:     Wheelchair Mobility:     Transfers:  Sit to Stand: Contact guard assistance  Stand to Sit: Contact guard assistance  Gait:     Base of Support: Widened  Step Length: Left shortened;Right shortened  Gait Abnormalities: Decreased step clearance;Trunk sway increased  Distance (ft): 100 Feet (ft)  Assistive Device:  (None)  Ambulation - Level of Assistance: Contact guard assistance  Interventions: Safety awareness training;Verbal cues       Body Structures Involved:  1. Heart  2. Lungs  3.  Thoracic Cage Body Functions Affected:  1. Cardio  2. Movement Related Activities and Participation Affected:  1. General Tasks and Demands  2. Mobility  3. Domestic Life   Number of elements that affect the Plan of Care: 4+: HIGH COMPLEXITY   CLINICAL PRESENTATION:   Presentation: Stable and uncomplicated: LOW COMPLEXITY   CLINICAL DECISION MAKIN [facility] [address] [id number] AM-PAC 6 Clicks   Basic Mobility Inpatient Short Form  How much difficulty does the patient currently have. .. Unable A Lot A Little None   1. Turning over in bed (including adjusting bedclothes, sheets and blankets)? [ ] 1   [ ] 2   [X] 3   [ ] 4   2. Sitting down on and standing up from a chair with arms ( e.g., wheelchair, bedside commode, etc.)   [ ] 1   [ ] 2   [X] 3   [ ] 4   3. Moving from lying on back to sitting on the side of the bed? [ ] 1   [ ] 2   [X] 3   [ ] 4   How much help from another person does the patient currently need. .. Total A Lot A Little None   4. Moving to and from a bed to a chair (including a wheelchair)? [ ] 1   [ ] 2   [X] 3   [ ] 4   5. Need to walk in hospital room? [ ] 1   [ ] 2   [X] 3   [ ] 4   6. Climbing 3-5 steps with a railing? [X] 1   [ ] 2   [ ] 3   [ ] 4   © , Trustees of [address] [place] [address] [address] [id number], under license to [employer]. All rights reserved    Score:  Initial: 18 Most Recent:16 (Date: 3/7/17 )     Interpretation of Tool:  Represents activities that are increasingly more difficult (i.e. Bed mobility, Transfers, Gait).        Score 24 23 22-20 19-15 14-10 9-7 6       Modifier CH CI CJ CK CL CM CN         · Mobility - Walking and Moving Around:               - CURRENT STATUS:    CK - 40%-59% impaired, limited or restricted               - GOAL STATUS:           CJ - 20%-39% impaired, limited or restricted               - D/C STATUS:                       ---------------To be determined---------------  Payor: BLUE CHO[place] / Product Type: HMO /       Medical Necessity: · Patient demonstrates excellent rehab potential due to higher previous functional level. · Skilled intervention continues to be required due to decline in overall functional mobility. Reason for Services/Other Comments:  · Patient continues to require skilled intervention due to medical complications and patient unable to attend/participate in therapy as expected. Use of outcome tool(s) and clinical judgement create a POC that gives a: Clear prediction of patient's progress: LOW COMPLEXITY                 TREATMENT:      Pre-treatment Symptoms/Complaints: \"There was a fear factor also. \"  Pain: Initial:   Pain Intensity 1: 0  Post Session:  0/10        Most Recent Physical Functioning:   Gross Assessment:  AROM: Generally decreased, functional  Strength: Generally decreased, functional  Coordination: Within functional limits  Sensation: Intact  Posture:  Posture (WDL): Exceptions to WDL  Posture Assessment: Forward head, Rounded shoulders  Balance:  Sitting: Intact  Standing: Impaired  Standing - Static: Fair  Standing - Dynamic : Fair  Bed Mobility:     Wheelchair Mobility:     Transfers:  Sit to Stand: Contact guard assistance  Stand to Sit: Contact guard assistance  Gait:  Base of Support: Widened  Step Length: Left shortened;Right shortened  Gait Abnormalities: Decreased step clearance;Trunk sway increased  Ambulation - Level of Assistance: Contact guard assistance  Distance (ft): 100 Feet (ft)  Assistive Device:  (None)  Interventions: Safety awareness training;Verbal cues  Gait:     Base of Support: Widened  Step Length: Left shortened;Right shortened  Gait Abnormalities: Decreased step clearance;Trunk sway increased  Distance (ft): 100 Feet (ft)  Assistive Device:  (None)  Ambulation - Level of Assistance: Contact guard assistance  Interventions: Safety awareness training;Verbal cues           Therapeutic Activity: (    13 Minutes):   Therapeutic activities including Chair transfers and Ambulation on level ground to improve mobility, strength and balance. Required minimal Safety awareness training;Verbal cues to promote static and dynamic balance in standing. Therapeutic Exercise: (12 Minutes):  Exercises per grid below to improve mobility, strength and activity tolerance. Required minimal verbal cues to promote proper body alignment and promote proper body breathing techniques. Progressed repetitions and complexity of movement as indicated. Date:  3-8-17 Date:   Date:     ACTIVITY/EXERCISE AM PM AM PM AM PM   Ambulation:           Distance  Device  Duration         Seated Heel Raises x20        Seated Toe Raises x20        Seated Long Arc Quads x20        Seated Marching x20        Seated Hip Abduction x20                 B = bilateral; AA = active assistive; A = active; P = passive     Braces/Orthotics/Lines/Etc:   · None  Treatment/Session Assessment:    · Response to Treatment:  Patient tolerated treatment well  · Interdisciplinary Collaboration:  · Physical Therapy Assistant and Registered Nurse  · After treatment position/precautions:  · Up in chair, Call light within reach and RN notified  · Compliance with Program/Exercises: compliant all of the time. · Recommendations/Intent for next treatment session: \"Next visit will focus on advancements to more challenging activities and reduction in assistance provided\".   Total Treatment Duration:  PT Patient Time In/Time Out  Time In: 0940  Time Out: [address], PTA

## 2017-03-08 NOTE — PROGRESS NOTES
CV Progress Note    Subjective:   Admit Date: 2017    Surgery Date:  8 Days Post-Op      Procedure:  Procedure(s):  ULTRASOUND  THORACENTESIS    Incisional pain:  minimal  Appetite:  Regular diet     Recurrent vfib arrest last over weekend, ICD in place  L>R thora yesterday  Ambulated 30' yesterday      Objective:     Vitals:  Blood pressure 117/65, pulse 75, temperature 97.2 °F (36.2 °C), resp. rate 20, height 5' 10\" (1.778 m), weight 204 lb (92.5 kg), SpO2 97 %. Temp (24hrs), Av.4 °F (36.3 °C), Min:97 °F (36.1 °C), Max:98.1 °F (36.7 °C)  Neuro in tact  RRR  Breath sounds dimished at bases  Belly soft  Sternum stable incision clean dry and in tact, ICD under occlusive dressing  +Leg edema      Plan/Recommendations/Medical Decision Making:     Principal Problem:    NSTEMI (non-ST elevated myocardial infarction) ([facility] (2017)    Active Problems:    DM (diabetes mellitus) type II controlled, neurological manifestation ([address] (3/7/2014)      Essential hypertension, benign (3/7/2014)      Hypertriglyceridemia (2017)      Depressed left ventricular ejection fraction (2017)      Overview: 17 20%      Ischemic cardiomyopathy (2017)      Overview: EF 10-15% echo 17      CAD, multiple vessel (2017)      Overview: 17 (Dr Lavern Bell) Coronary artery bypass grafting x3. Grafts       consisting of       1. Left internal mammary artery to the left anterior descending. 2. Reversed LEFT saphenous vein graft to obtuse marginal.       3. Reversed LEFT saphenous vein graft to the left posterior descending       artery.        S/P CABG (coronary artery bypass graft) (2017)      Postoperative anemia due to acute blood loss (3/1/2017)      Pleural effusion (3/2/2017)      Overview: 3/7/17      Left thoracentesis with 600 ml removed       Right thoracentesis with 1350 ml removed       Hypoxia (3/2/2017)      Cardiogenic shock (HCC) (2/3/1462)      Systolic CHF, acute on chronic ([address] (3/3/2017)      Cardiac arrest with ventricular fibrillation ([address] (3/5/2017)      Overview: 3/4/16      ICD (implantable cardioverter-defibrillator) in place (3/6/2017)      Overview: 1. [employer] [facility] implantable cardioverter defibrillator - March 2017      ICD in place.  Post R/L thora yesterday  Monitor sternum  Slow progress would benefit from [facility]- will request consult

## 2017-03-08 NOTE — PROGRESS NOTES
Cleo Marin  Admission Date: 2/27/2017             Daily Progress Note: 3/8/2017    The patient's chart is reviewed and the patient is discussed with the staff. 62 yo WM with severe ischemic CMP (LVEF 10%) s/p CABG. Had VF arrest X 2 on 4th and 5th   ICD 3/6/17  3/7/17 thoracentesis R with 1350cc removed.  Left with 600cc removed     Subjective:     Up to chair on room air  O2 sats 97%, denies sob or trouble breathing  Cough with light green sputum    Current Facility-Administered Medications   Medication Dose Route Frequency    cephALEXin (KEFLEX) capsule 500 mg  500 mg Oral TID    lisinopril (PRINIVIL, ZESTRIL) tablet 5 mg  5 mg Oral DAILY    furosemide (LASIX) tablet 40 mg  40 mg Oral DAILY    tapentadol (NUCYNTA) tablet 100 mg  100 mg Oral Q6H PRN    potassium chloride (K-DUR, KLOR-CON) SR tablet 20 mEq  20 mEq Oral DAILY    sodium chloride (NS) flush 5-10 mL  5-10 mL IntraVENous Q8H    sodium chloride (NS) flush 5-10 mL  5-10 mL IntraVENous PRN    metoprolol succinate (TOPROL-XL) XL tablet 50 mg  50 mg Oral BID    insulin regular (NOVOLIN R, HUMULIN R) injection   SubCUTAneous AC&HS    cyclobenzaprine (FLEXERIL) tablet 5 mg  5 mg Oral TID PRN    famotidine (PF) (PEPCID) injection 20 mg  20 mg IntraVENous Q12H    insulin regular (NOVOLIN R, HUMULIN R) 100 Units in 0.9% sodium chloride 100 mL infusion  1 Units/hr IntraVENous TITRATE    docusate sodium (COLACE) capsule 100 mg  100 mg Oral DAILY    alum-mag hydroxide-simeth (MYLANTA) oral suspension 30 mL  30 mL Oral Q4H PRN    ondansetron (ZOFRAN) injection 4 mg  4 mg IntraVENous Q6H PRN    acetaminophen (TYLENOL) tablet 650 mg  650 mg Oral Q4H PRN    atorvastatin (LIPITOR) tablet 80 mg  80 mg Oral QHS    amiodarone (CORDARONE) tablet 200 mg  200 mg Oral Q12H    aspirin delayed-release tablet 81 mg  81 mg Oral DAILY    nitroglycerin (NITROLINGUAL) sublingual 0.4 mg/spray  1 Spray SubLINGual Q5MIN PRN    baclofen (LIORESAL) tablet 10 mg  10 mg Oral TID PRN    oxyCODONE-acetaminophen (PERCOCET) 5-325 mg per tablet 1 Tab  1 Tab Oral Q4H PRN    oxyCODONE-acetaminophen (PERCOCET 10)  mg per tablet 1 Tab  1 Tab Oral Q4H PRN       Review of Systems  Constitutional: negative for fever, chills, sweats  Cardiovascular: negative for chest pain, palpitations, syncope, edema  Gastrointestinal:  negative for dysphagia, reflux, vomiting, diarrhea, abdominal pain, or melena  Neurologic:  negative for focal weakness, numbness, headache    Objective:     Vitals:    03/08/17 0248 03/08/17 0340 03/08/17 0558 03/08/17 0716   BP: 107/67   117/65   Pulse: 69   75   Resp: 18   20   Temp: 98.1 °F (36.7 °C)   97.2 °F (36.2 °C)   SpO2: 94% 96%  97%   Weight:   92.5 kg (204 lb)    Height:         Intake and Output:   03/06 1901 - 03/08 0700  In: 677.3 [P.O.:480; I.V.:197.3]  Out: 4999 [Urine:3049]       Physical Exam:   Constitution:  the patient is well developed and in no acute distress, on room air   EENMT:  Sclera clear, pupils equal, oral mucosa moist  Respiratory: few crackles L base, clear otherwise   Cardiovascular:  RRR without M,G,R  Gastrointestinal: soft and non-tender; with positive bowel sounds. Musculoskeletal: warm without cyanosis. There is no lower leg edema.   Skin:  no jaundice or rashes, no open wounds   Neurologic: no gross neuro deficits     Psychiatric:  alert and oriented x 3    CHEST XRAY 3/7/17:       LAB  Recent Labs      03/08/17   0600  03/07/17   2055  03/07/17   1632  03/07/17   1151  03/07/17   0355   GLUCPOC  132*  127*  132*  155*  137*      Recent Labs      03/08/17   0600  03/07/17   0355  03/05/17   2300   WBC  9.3  10.0  10.8   HGB  10.8*  10.0*  10.4*   HCT  33.4*  30.7*  31.2*   PLT  263  211  187     Recent Labs      03/08/17   0600  03/07/17   0355  03/05/17   2300   NA  137  139  138   K  4.4  4.0  4.8   CL  99  100  102   CO2  29  28  27   GLU  140*  129*  195*   BUN  31*  33*  32*   CREA  1.05 1. 17  1.40   MG  2.4  2.4  2.6*   CA  8.2*  7.6*  8.0*     Recent Labs      03/05/17   2309   PH  7.50*   PCO2  32*   PO2  122*   HCO3  25     No results for input(s): LCAD, LAC in the last 72 hours. Assessment:  (Medical Decision Making)     Hospital Problems  Date Reviewed: 3/8/2017          Codes Class Noted POA    ICD (implantable cardioverter-defibrillator) in place ICD-10-CM: Z95.810  ICD-9-CM: V45.02  3/6/2017 Unknown     1. [employer] MRI implantable cardioverter defibrillator - March 2017             Cardiac arrest with ventricular fibrillation [facility]) ICD-10-CM: I46.9, I49.01  ICD-9-CM: 427.5, 427.41  3/5/2017 Unknown     3/4/16 and again 3/5              Systolic CHF, acute on chronic (HCC) ICD-10-CM: I50.23  ICD-9-CM: 428.23, 428.0  3/3/2017 Unknown    Continue daily lasix     Pleural effusion ICD-10-CM: J90  ICD-9-CM: 511.9  3/2/2017 Yes     3/7/17  Left thoracentesis with 600 ml removed   Right thoracentesis with 1350 ml removed              Hypoxia ICD-10-CM: R09.02  ICD-9-CM: 799.02  3/2/2017 Unknown    Resolved. On room air     Cardiogenic shock ([address] ICD-10-CM: R57.0  ICD-9-CM: 785.51  3/2/2017 Unknown        Postoperative anemia due to acute blood loss ICD-10-CM: D62  ICD-9-CM: 285.1  3/1/2017 No        Ischemic cardiomyopathy (Chronic) ICD-10-CM: I25.5  ICD-9-CM: 414.8  2/28/2017 Yes     EF 10-15% echo 2/27/17             CAD, multiple vessel (Chronic) ICD-10-CM: I25.10  ICD-9-CM: 414.00  2/28/2017 Yes    Overview Signed 2/28/2017  1:28 PM by Scarlet Benitez, MINE     2/28/17 (Dr Lavern Bell) Coronary artery bypass grafting x3. Grafts consisting of   1. Left internal mammary artery to the left anterior descending. 2. Reversed LEFT saphenous vein graft to obtuse marginal.   3. Reversed LEFT saphenous vein graft to the left posterior descending   artery.               S/P CABG (coronary artery bypass graft) ICD-10-CM: Z95.1  ICD-9-CM: V45.81  2/28/2017 Yes    OOB, IS, oxygen weaned to room air Hypertriglyceridemia (Chronic) ICD-10-CM: E78.1  ICD-9-CM: 272.1  2/27/2017 Yes        * (Principal)NSTEMI (non-ST elevated myocardial infarction) [facility]) ICD-10-CM: I21.4  ICD-9-CM: 410.70  2/27/2017 Yes        Depressed left ventricular ejection fraction (Chronic) ICD-10-CM: R93.1  ICD-9-CM: 794.39  2/27/2017 Yes     2/27/17 20%             DM (diabetes mellitus) type II controlled, neurological manifestation (HCC) (Chronic) ICD-10-CM: E11.49  ICD-9-CM: 250.60  3/7/2014 Yes        Essential hypertension, benign (Chronic) ICD-10-CM: I10  ICD-9-CM: 401.1  3/7/2014 Yes              Plan:  (Medical Decision Making)     --Continue daily lasix  --On room air   --OOB        More than 50% of the time documented was spent in face-to-face contact with the patient and in the care of the patient on the floor/unit where the patient is located. Katy Campos NP          Lungs:  clear  Heart:  RRR with no Murmur/Rubs/Gallops    Additional Comments:  Stable on Ra, may go to Inpatient rehab     I have spoken with and examined the patient. I agree with the above assessment and plan as documented.     Je Solis MD

## 2017-03-08 NOTE — PROGRESS NOTES
[facility] CARDIOLOGY PROGRESS NOTE           3/8/2017 8:01 AM    Admit Date: 2/27/2017    Admit Diagnosis: Atherosclerosis of native coronary artery of native heart w*      Subjective:   Patient continues to show signs of great improvement. He feels better. No Further V. Tach events. His renal function has improved      Objective:     Vitals:    03/08/17 0248 03/08/17 0340 03/08/17 0558 03/08/17 0716   BP: 107/67   117/65   Pulse: 69   75   Resp: 18   20   Temp: 98.1 °F (36.7 °C)   97.2 °F (36.2 °C)   SpO2: 94% 96%  97%   Weight:   204 lb (92.5 kg)    Height:           Physical Exam:  General-Well Developed, Well Nourished, No Acute Distress, Alert & Oriented x 3, appropriate mood. Neck- supple, no JVD  CV- regular rate and rhythm 1/6 murmur  Lung- clear bilaterally  Abd- soft, nontender, nondistended  Ext- no edema bilaterally.   Skin- warm and dry    Current Facility-Administered Medications   Medication Dose Route Frequency    furosemide (LASIX) tablet 40 mg  40 mg Oral DAILY    tapentadol (NUCYNTA) tablet 100 mg  100 mg Oral Q6H PRN    potassium chloride (K-DUR, KLOR-CON) SR tablet 20 mEq  20 mEq Oral DAILY    sodium chloride (NS) flush 5-10 mL  5-10 mL IntraVENous Q8H    sodium chloride (NS) flush 5-10 mL  5-10 mL IntraVENous PRN    metoprolol succinate (TOPROL-XL) XL tablet 50 mg  50 mg Oral BID    insulin regular (NOVOLIN R, HUMULIN R) injection   SubCUTAneous AC&HS    cyclobenzaprine (FLEXERIL) tablet 5 mg  5 mg Oral TID PRN    famotidine (PF) (PEPCID) injection 20 mg  20 mg IntraVENous Q12H    insulin regular (NOVOLIN R, HUMULIN R) 100 Units in 0.9% sodium chloride 100 mL infusion  1 Units/hr IntraVENous TITRATE    docusate sodium (COLACE) capsule 100 mg  100 mg Oral DAILY    alum-mag hydroxide-simeth (MYLANTA) oral suspension 30 mL  30 mL Oral Q4H PRN    ondansetron (ZOFRAN) injection 4 mg  4 mg IntraVENous Q6H PRN    acetaminophen (TYLENOL) tablet 650 mg  650 mg Oral Q4H PRN  atorvastatin (LIPITOR) tablet 80 mg  80 mg Oral QHS    amiodarone (CORDARONE) tablet 200 mg  200 mg Oral Q12H    aspirin delayed-release tablet 81 mg  81 mg Oral DAILY    nitroglycerin (NITROLINGUAL) sublingual 0.4 mg/spray  1 Spray SubLINGual Q5MIN PRN    baclofen (LIORESAL) tablet 10 mg  10 mg Oral TID PRN    oxyCODONE-acetaminophen (PERCOCET) 5-325 mg per tablet 1 Tab  1 Tab Oral Q4H PRN    oxyCODONE-acetaminophen (PERCOCET 10)  mg per tablet 1 Tab  1 Tab Oral Q4H PRN     Data Review:   Recent Results (from the past 24 hour(s))   CELL COUNT, BODY FLUID    Collection Time: 03/07/17 11:30 AM   Result Value Ref Range    BODY FLUID TYPE RIGHT      FLUID COLOR RED      FLUID APPEARANCE CLOUDY      FLUID RBC COUNT 37045 /cu mm    FLD NUCLEATED CELLS 366 /cu mm    FLD SEGS 61 %    FLD LYMPHS 39 %    FLUID COMMENT       OCCASIONAL UNIDENTIFIED LARGE MONONUCLEAR CELLS PRESENT   GLUCOSE, FLUID    Collection Time: 03/07/17 11:30 AM   Result Value Ref Range    Fluid Type: RIGHT      Glucose, body fld. 160 MG/DL   LDH, BODY FLUID    Collection Time: 03/07/17 11:30 AM   Result Value Ref Range    Fluid Type: RIGHT      LD, body fld. 429 U/L   PROTEIN TOTAL, FLUID    Collection Time: 03/07/17 11:30 AM   Result Value Ref Range    Fluid Type: RIGHT      Protein total, body fld.  1.8 g/dL   GLUCOSE, POC    Collection Time: 03/07/17 11:51 AM   Result Value Ref Range    Glucose (POC) 155 (H) 65 - 100 mg/dL   GLUCOSE, POC    Collection Time: 03/07/17  4:32 PM   Result Value Ref Range    Glucose (POC) 132 (H) 65 - 100 mg/dL   GLUCOSE, POC    Collection Time: 03/07/17  8:55 PM   Result Value Ref Range    Glucose (POC) 127 (H) 65 - 100 mg/dL   CBC W/O DIFF    Collection Time: 03/08/17  6:00 AM   Result Value Ref Range    WBC 9.3 4.3 - 11.1 K/uL    RBC 3.77 (L) 4.23 - 5.67 M/uL    HGB 10.8 (L) 13.6 - 17.2 g/dL    HCT 33.4 (L) 41.1 - 50.3 %    MCV 88.6 79.6 - 97.8 FL    MCH 28.6 26.1 - 32.9 PG    MCHC 32.3 31.4 - 35.0 g/dL RDW 14.3 11.9 - 14.6 %    PLATELET 943 060 - 325 K/uL    MPV 9.8 (L) 10.8 - 17.2 FL   METABOLIC PANEL, BASIC    Collection Time: 03/08/17  6:00 AM   Result Value Ref Range    Sodium 137 136 - 145 mmol/L    Potassium 4.4 3.5 - 5.1 mmol/L    Chloride 99 98 - 107 mmol/L    CO2 29 21 - 32 mmol/L    Anion gap 9 7 - 16 mmol/L    Glucose 140 (H) 65 - 100 mg/dL    BUN 31 (H) 6 - 23 MG/DL    Creatinine 1.05 0.8 - 1.5 MG/DL    GFR est AA >60 >60 ml/min/1.73m2    GFR est non-AA >60 >60 ml/min/1.73m2    Calcium 8.2 (L) 8.3 - 10.4 MG/DL   MAGNESIUM    Collection Time: 03/08/17  6:00 AM   Result Value Ref Range    Magnesium 2.4 1.8 - 2.4 mg/dL   GLUCOSE, POC    Collection Time: 03/08/17  6:00 AM   Result Value Ref Range    Glucose (POC) 132 (H) 65 - 100 mg/dL     Assessment:     Principal Problem:    NSTEMI (non-ST elevated myocardial infarction) [facility] (2/27/2017)    Active Problems:    DM (diabetes mellitus) type II controlled, neurological manifestation ([facility] (3/7/2014)      Essential hypertension, benign (3/7/2014)      Hypertriglyceridemia (2/27/2017)      Depressed left ventricular ejection fraction (2/27/2017)      Overview: 2/27/17 20%      Ischemic cardiomyopathy (2/28/2017)      Overview: EF 10-15% echo 2/27/17      CAD, multiple vessel (2/28/2017)      Overview: 2/28/17 (Dr Yesica Lopez) Coronary artery bypass grafting x3. Grafts       consisting of       1. Left internal mammary artery to the left anterior descending. 2. Reversed LEFT saphenous vein graft to obtuse marginal.       3. Reversed LEFT saphenous vein graft to the left posterior descending       artery.        S/P CABG (coronary artery bypass graft) (2/28/2017)      Postoperative anemia due to acute blood loss (3/1/2017)      Pleural effusion (3/2/2017)      Overview: 3/7/17      Left thoracentesis with 600 ml removed       Right thoracentesis with 1350 ml removed       Hypoxia (3/2/2017)      Cardiogenic shock (HCC) (0/4/3604)      Systolic CHF, acute on chronic ([facility] [address] (3/3/2017)      Cardiac arrest with ventricular fibrillation ([facility] [address] (3/5/2017)      Overview: 3/4/16      ICD (implantable cardioverter-defibrillator) in place (3/6/2017)      Overview: 1. [employer] MRI implantable cardioverter defibrillator - March 2017      Plan:   1. V. Fib arrest -Patient is >5 days post op with recurrent V. Fib arrest. Did not respond to po Amio. Changed to iv that stabilized V. Fib. Now back on PO. No evidence of active ischemia. Post device implant we changed coreg to Toprol XL. 2. Chronic systolic heart failure - EF pre-op was 10-15%, Post-op EF was 20-25%. Add Lisinopril 5mg po qam today. On Toprol XL  3. CAD - Post CABG - Lipitor 80mg, Toprol XL and Lisinopril  4. Discharge planning - Patient from the Ep/Cardiology standpoint should be stable for rehab on Thursday or Friday. Christopher Gregory. Chava Quiroz M.D., F.A.C.C, F.H.R.S.   Cardiology/Electrophysiology

## 2017-03-09 LAB
ANION GAP BLD CALC-SCNC: 11 MMOL/L (ref 7–16)
BACTERIA SPEC CULT: NORMAL
BUN SERPL-MCNC: 27 MG/DL (ref 6–23)
CALCIUM SERPL-MCNC: 7.6 MG/DL (ref 8.3–10.4)
CHLORIDE SERPL-SCNC: 98 MMOL/L (ref 98–107)
CO2 SERPL-SCNC: 28 MMOL/L (ref 21–32)
CREAT SERPL-MCNC: 0.89 MG/DL (ref 0.8–1.5)
ERYTHROCYTE [DISTWIDTH] IN BLOOD BY AUTOMATED COUNT: 14.3 % (ref 11.9–14.6)
GLUCOSE BLD STRIP.AUTO-MCNC: 123 MG/DL (ref 65–100)
GLUCOSE BLD STRIP.AUTO-MCNC: 131 MG/DL (ref 65–100)
GLUCOSE BLD STRIP.AUTO-MCNC: 168 MG/DL (ref 65–100)
GLUCOSE BLD STRIP.AUTO-MCNC: 192 MG/DL (ref 65–100)
GLUCOSE SERPL-MCNC: 125 MG/DL (ref 65–100)
GRAM STN SPEC: NORMAL
GRAM STN SPEC: NORMAL
HCT VFR BLD AUTO: 30.6 % (ref 41.1–50.3)
HGB BLD-MCNC: 10 G/DL (ref 13.6–17.2)
MAGNESIUM SERPL-MCNC: 2.3 MG/DL (ref 1.8–2.4)
MCH RBC QN AUTO: 29.3 PG (ref 26.1–32.9)
MCHC RBC AUTO-ENTMCNC: 32.7 G/DL (ref 31.4–35)
MCV RBC AUTO: 89.7 FL (ref 79.6–97.8)
PLATELET # BLD AUTO: 237 K/UL (ref 150–450)
PMV BLD AUTO: 10 FL (ref 10.8–14.1)
POTASSIUM SERPL-SCNC: 4 MMOL/L (ref 3.5–5.1)
RBC # BLD AUTO: 3.41 M/UL (ref 4.23–5.67)
SERVICE CMNT-IMP: NORMAL
SODIUM SERPL-SCNC: 137 MMOL/L (ref 136–145)
WBC # BLD AUTO: 8.1 K/UL (ref 4.3–11.1)

## 2017-03-09 PROCEDURE — 74011250637 HC RX REV CODE- 250/637: Performed by: NURSE PRACTITIONER

## 2017-03-09 PROCEDURE — 85027 COMPLETE CBC AUTOMATED: CPT | Performed by: NURSE PRACTITIONER

## 2017-03-09 PROCEDURE — 97530 THERAPEUTIC ACTIVITIES: CPT

## 2017-03-09 PROCEDURE — 74011250637 HC RX REV CODE- 250/637: Performed by: THORACIC SURGERY (CARDIOTHORACIC VASCULAR SURGERY)

## 2017-03-09 PROCEDURE — 80048 BASIC METABOLIC PNL TOTAL CA: CPT | Performed by: NURSE PRACTITIONER

## 2017-03-09 PROCEDURE — 83036 HEMOGLOBIN GLYCOSYLATED A1C: CPT | Performed by: THORACIC SURGERY (CARDIOTHORACIC VASCULAR SURGERY)

## 2017-03-09 PROCEDURE — 82962 GLUCOSE BLOOD TEST: CPT

## 2017-03-09 PROCEDURE — 74011636637 HC RX REV CODE- 636/637: Performed by: NURSE PRACTITIONER

## 2017-03-09 PROCEDURE — 65660000004 HC RM CVT STEPDOWN

## 2017-03-09 PROCEDURE — 97110 THERAPEUTIC EXERCISES: CPT

## 2017-03-09 PROCEDURE — 74011250637 HC RX REV CODE- 250/637: Performed by: INTERNAL MEDICINE

## 2017-03-09 PROCEDURE — 83735 ASSAY OF MAGNESIUM: CPT | Performed by: NURSE PRACTITIONER

## 2017-03-09 RX ORDER — LISINOPRIL 5 MG/1
2.5 TABLET ORAL DAILY
Status: DISCONTINUED | OUTPATIENT
Start: 2017-03-10 | End: 2017-03-13 | Stop reason: HOSPADM

## 2017-03-09 RX ORDER — SODIUM CHLORIDE 0.9 % (FLUSH) 0.9 %
5-10 SYRINGE (ML) INJECTION EVERY 8 HOURS
Status: CANCELLED | OUTPATIENT
Start: 2017-03-09

## 2017-03-09 RX ORDER — MAG HYDROX/ALUMINUM HYD/SIMETH 200-200-20
30 SUSPENSION, ORAL (FINAL DOSE FORM) ORAL
Status: CANCELLED | OUTPATIENT
Start: 2017-03-09

## 2017-03-09 RX ORDER — FAMOTIDINE 20 MG/1
20 TABLET, FILM COATED ORAL EVERY 12 HOURS
Status: CANCELLED | OUTPATIENT
Start: 2017-03-09

## 2017-03-09 RX ORDER — BACLOFEN 10 MG/1
10 TABLET ORAL
Status: CANCELLED | OUTPATIENT
Start: 2017-03-09

## 2017-03-09 RX ORDER — METOPROLOL SUCCINATE 50 MG/1
50 TABLET, EXTENDED RELEASE ORAL EVERY 12 HOURS
Status: CANCELLED | OUTPATIENT
Start: 2017-03-09

## 2017-03-09 RX ORDER — CYCLOBENZAPRINE HCL 10 MG
5 TABLET ORAL
Status: CANCELLED | OUTPATIENT
Start: 2017-03-09

## 2017-03-09 RX ORDER — ACETAMINOPHEN 325 MG/1
650 TABLET ORAL
Status: CANCELLED | OUTPATIENT
Start: 2017-03-09

## 2017-03-09 RX ORDER — CEPHALEXIN 500 MG/1
500 CAPSULE ORAL 3 TIMES DAILY
Status: CANCELLED | OUTPATIENT
Start: 2017-03-09 | End: 2017-03-13

## 2017-03-09 RX ORDER — POTASSIUM CHLORIDE 20 MEQ/1
20 TABLET, EXTENDED RELEASE ORAL DAILY
Status: CANCELLED | OUTPATIENT
Start: 2017-03-10

## 2017-03-09 RX ORDER — AMIODARONE HYDROCHLORIDE 200 MG/1
200 TABLET ORAL EVERY 12 HOURS
Status: CANCELLED | OUTPATIENT
Start: 2017-03-09

## 2017-03-09 RX ORDER — INSULIN LISPRO 100 [IU]/ML
INJECTION, SOLUTION INTRAVENOUS; SUBCUTANEOUS
Status: DISCONTINUED | OUTPATIENT
Start: 2017-03-09 | End: 2017-03-13 | Stop reason: HOSPADM

## 2017-03-09 RX ORDER — ASPIRIN 81 MG/1
81 TABLET ORAL DAILY
Status: CANCELLED | OUTPATIENT
Start: 2017-03-10

## 2017-03-09 RX ORDER — INSULIN LISPRO 100 [IU]/ML
INJECTION, SOLUTION INTRAVENOUS; SUBCUTANEOUS
Status: DISCONTINUED | OUTPATIENT
Start: 2017-03-09 | End: 2017-03-09

## 2017-03-09 RX ORDER — OXYCODONE AND ACETAMINOPHEN 5; 325 MG/1; MG/1
1 TABLET ORAL
Status: CANCELLED | OUTPATIENT
Start: 2017-03-09

## 2017-03-09 RX ORDER — DOCUSATE SODIUM 100 MG/1
100 CAPSULE, LIQUID FILLED ORAL DAILY
Status: CANCELLED | OUTPATIENT
Start: 2017-03-10

## 2017-03-09 RX ORDER — ONDANSETRON 2 MG/ML
4 INJECTION INTRAMUSCULAR; INTRAVENOUS
Status: CANCELLED | OUTPATIENT
Start: 2017-03-09

## 2017-03-09 RX ORDER — FUROSEMIDE 40 MG/1
40 TABLET ORAL DAILY
Status: CANCELLED | OUTPATIENT
Start: 2017-03-10

## 2017-03-09 RX ORDER — LISINOPRIL 5 MG/1
5 TABLET ORAL DAILY
Status: CANCELLED | OUTPATIENT
Start: 2017-03-10

## 2017-03-09 RX ORDER — OXYCODONE AND ACETAMINOPHEN 10; 325 MG/1; MG/1
1 TABLET ORAL
Status: CANCELLED | OUTPATIENT
Start: 2017-03-09

## 2017-03-09 RX ORDER — NITROGLYCERIN 400 UG/1
1 SPRAY ORAL
Status: CANCELLED | OUTPATIENT
Start: 2017-03-09

## 2017-03-09 RX ORDER — SODIUM CHLORIDE 0.9 % (FLUSH) 0.9 %
5-10 SYRINGE (ML) INJECTION AS NEEDED
Status: CANCELLED | OUTPATIENT
Start: 2017-03-09

## 2017-03-09 RX ORDER — ATORVASTATIN CALCIUM 40 MG/1
80 TABLET, FILM COATED ORAL
Status: CANCELLED | OUTPATIENT
Start: 2017-03-09

## 2017-03-09 RX ADMIN — CEPHALEXIN 500 MG: 500 CAPSULE ORAL at 08:41

## 2017-03-09 RX ADMIN — FAMOTIDINE 20 MG: 20 TABLET ORAL at 19:52

## 2017-03-09 RX ADMIN — FUROSEMIDE 40 MG: 40 TABLET ORAL at 08:40

## 2017-03-09 RX ADMIN — ASPIRIN 81 MG: 81 TABLET, COATED ORAL at 08:41

## 2017-03-09 RX ADMIN — CYCLOBENZAPRINE HYDROCHLORIDE 5 MG: 10 TABLET, FILM COATED ORAL at 19:43

## 2017-03-09 RX ADMIN — OXYCODONE HYDROCHLORIDE AND ACETAMINOPHEN 1 TABLET: 5; 325 TABLET ORAL at 05:29

## 2017-03-09 RX ADMIN — Medication 10 ML: at 05:25

## 2017-03-09 RX ADMIN — CEPHALEXIN 500 MG: 500 CAPSULE ORAL at 16:40

## 2017-03-09 RX ADMIN — POTASSIUM CHLORIDE 20 MEQ: 20 TABLET, EXTENDED RELEASE ORAL at 08:40

## 2017-03-09 RX ADMIN — OXYCODONE HYDROCHLORIDE AND ACETAMINOPHEN 1 TABLET: 10; 325 TABLET ORAL at 19:40

## 2017-03-09 RX ADMIN — LISINOPRIL 5 MG: 5 TABLET ORAL at 08:40

## 2017-03-09 RX ADMIN — Medication 10 ML: at 19:48

## 2017-03-09 RX ADMIN — CEPHALEXIN 500 MG: 500 CAPSULE ORAL at 19:49

## 2017-03-09 RX ADMIN — Medication 10 ML: at 14:21

## 2017-03-09 RX ADMIN — ATORVASTATIN CALCIUM 80 MG: 40 TABLET, FILM COATED ORAL at 19:52

## 2017-03-09 RX ADMIN — INSULIN LISPRO 2 UNITS: 100 INJECTION, SOLUTION INTRAVENOUS; SUBCUTANEOUS at 11:50

## 2017-03-09 RX ADMIN — ACETAMINOPHEN 650 MG: 325 TABLET ORAL at 23:50

## 2017-03-09 RX ADMIN — FAMOTIDINE 20 MG: 20 TABLET ORAL at 08:40

## 2017-03-09 RX ADMIN — AMIODARONE HYDROCHLORIDE 200 MG: 200 TABLET ORAL at 20:03

## 2017-03-09 RX ADMIN — METOPROLOL SUCCINATE 50 MG: 50 TABLET, EXTENDED RELEASE ORAL at 20:03

## 2017-03-09 RX ADMIN — METOPROLOL SUCCINATE 50 MG: 50 TABLET, EXTENDED RELEASE ORAL at 08:40

## 2017-03-09 RX ADMIN — OXYCODONE HYDROCHLORIDE AND ACETAMINOPHEN 1 TABLET: 10; 325 TABLET ORAL at 23:50

## 2017-03-09 RX ADMIN — BACLOFEN 10 MG: 10 TABLET ORAL at 19:52

## 2017-03-09 RX ADMIN — INSULIN LISPRO 2 UNITS: 100 INJECTION, SOLUTION INTRAVENOUS; SUBCUTANEOUS at 20:16

## 2017-03-09 RX ADMIN — DOCUSATE SODIUM 100 MG: 100 CAPSULE, LIQUID FILLED ORAL at 08:40

## 2017-03-09 RX ADMIN — AMIODARONE HYDROCHLORIDE 200 MG: 200 TABLET ORAL at 08:40

## 2017-03-09 NOTE — PROGRESS NOTES
Problem: Mobility Impaired (Adult and Pediatric)  Goal: *Acute Goals and Plan of Care (Insert Text)  LTG: (reviewed and updated 3/7/17)  (1.)Mr. Beryle Primmer will move from supine to sit and sit to supine , scoot up and down and roll side to side in bed with MODIFIED INDEPENDENCE within 7 day(s). (2.)Mr. Beryle Primmer will transfer from bed to chair and chair to bed with MODIFIED INDEPENDENCE using the least restrictive device within 7 day(s). (3.)Mr. Beryle Primmer will ambulate with MODIFIED INDEPENDENCE for 500+ feet with the least restrictive device within 7 day(s). (4.)Mr. Beryle Primmer will perform standing static and dynamic balance activities x 8 minutes with SUPERVISION to improve safety within 7 day(s). (5.)Mr. Beryle Primmer will ascend and descend 5 stairs using one hand rail(s) with SUPERVISION to improve functional mobility and safety within 7 day(s). (6.)Mr. Beryle Primmer will tolerate 25 minutes of therapeutic activity/exercises within 7 day(s). (7.)Mr. Beryle Primmer will perform transfers and ambulation without a decrease in SBP >20 and DBP >10 within 7 days. ________________________________________________________________________________________________     PHYSICAL THERAPY: Daily Note, Treatment Day: 2nd and PM 3/9/2017  INPATIENT: Hospital Day: 11  Payor: BLUE CHOICE / [place] / Product Type: HMO /      NAME/AGE/GENDER: Diana Mejias is a 63 y.o. male      PRIMARY DIAGNOSIS: Atherosclerosis of native coronary artery of native heart with unstable angina pectoris (HCC) [I25.110]  Pleural effusion [J90] NSTEMI (non-ST elevated myocardial infarction) ([facility] [address] NSTEMI (non-ST elevated myocardial infarction) ([facility])  Procedure(s) (LRB):  ULTRASOUND (Bilateral)  THORACENTESIS (N/A)  2 Days Post-Op  ICD-10: Treatment Diagnosis:       · Generalized Muscle Weakness (M62.81)  · Difficulty in walking, Not elsewhere classified (R26.2)   Precaution/Allergies:  Review of patient's allergies indicates no known allergies. ASSESSMENT:      Mr. Brett Morin presents lying supine and was agreeable to treatment. He increase his ambulation but needed a standing rest break. He returned to room and performed standing exercises. Good progress with mobility and activity tolerance this treatment. This section established at most recent assessment   PROBLEM LIST (Impairments causing functional limitations):  1. Decreased Strength  2. Decreased Transfer Abilities  3. Decreased Ambulation Ability/Technique  4. Decreased Balance  5. Decreased Activity Tolerance  6. Increased Shortness of Breath  7. Decreased Knowledge of Precautions  8. Decreased [place] with Home Exercise Program    INTERVENTIONS PLANNED: (Benefits and precautions of physical therapy have been discussed with the patient.)  1. Balance Exercise  2. Bed Mobility  3. Family Education  4. Gait Training  5. Home Exercise Program (HEP)  6. Therapeutic Activites  7. Therapeutic Exercise/Strengthening  8. Transfer Training  9. Group Therapy      TREATMENT PLAN: Frequency/Duration: twice daily for duration of hospital stay  Rehabilitation Potential For Stated Goals: EXCELLENT      RECOMMENDED REHABILITATION/EQUIPMENT: (at time of discharge pending progress): Continue Skilled Therapy and Home Health: Physical Therapy. HISTORY:   History of Present Injury/Illness (Reason for Referral):  Patient is status post above  Past Medical History/Comorbidities:   Mr. Brett Morin  has a past medical history of Allergic rhinitis, cause unspecified (3/7/2014); CAD, multiple vessel (2/28/2017); Cancer ([facility] [address] (1979); Contact dermatitis and other eczema, due to unspecified cause (3/7/2014); Depressed left ventricular ejection fraction (2/27/2017); Esophageal reflux (3/7/2014); GERD (gastroesophageal reflux disease); Ischemic cardiomyopathy (2/28/2017); NSTEMI (non-ST elevated myocardial infarction) ([facility] [address] (2/27/2017); Other and unspecified hyperlipidemia (3/7/2014);  Other specified idiopathic peripheral neuropathy (3/7/2014); Retinopathy of both eyes; Type II or unspecified type diabetes mellitus with neurological manifestations, uncontrolled (3/7/2014); Undiagnosed cardiac murmurs (3/7/2014); Unspecified essential hypertension (3/7/2014); and Vitreous hemorrhage, unspecified eye ([facility] (2/27/2017). Mr. David Beaulieu  has a past surgical history that includes urological (Left, 1979); urological (Left, 2010); and heent (2003). Social History/Living Environment:   Home Environment: Private residence  # Steps to Enter: 2  Rails to Enter: No  One/Two Story Residence: One story  Living Alone: No  Support Systems: Spouse/Significant Other/Partner  Patient Expects to be Discharged to[de-identified] Private residence  Current DME Used/Available at Home: None  Tub or Shower Type: Tub/Shower combination  Prior Level of Function/Work/Activity:  Patient lives with spouse in one story home with a few stairs through out the home. Patient was independent and active prior to admission. Personal Factors:          Patient had radiation for cancer   Number of Personal Factors/Comorbidities that affect the Plan of Care: 1-2: MODERATE COMPLEXITY   EXAMINATION:   Most Recent Physical Functioning:   Gross Assessment:                  Posture:  Posture (WDL): Exceptions to WDL  Posture Assessment:  Forward head, Rounded shoulders  Balance:  Sitting: Intact  Standing: Impaired  Standing - Static: Good  Standing - Dynamic : Fair (+) Bed Mobility:  Supine to Sit: Stand-by asssistance (with head of bed elevated)  Wheelchair Mobility:     Transfers:  Sit to Stand: Stand-by asssistance  Stand to Sit: Stand-by asssistance  Gait:     Base of Support: Widened  Step Length: Left shortened;Right shortened  Gait Abnormalities: Decreased step clearance  Distance (ft): 150 Feet (ft) (rest break; 100 feet)  Assistive Device:  (none)  Ambulation - Level of Assistance: Contact guard assistance  Interventions: Safety awareness training;Verbal cues Body Structures Involved:  1. Heart  2. Lungs  3. Thoracic Cage Body Functions Affected:  1. Cardio  2. Movement Related Activities and Participation Affected:  1. General Tasks and Demands  2. Mobility  3. Domestic Life   Number of elements that affect the Plan of Care: 4+: HIGH COMPLEXITY   CLINICAL PRESENTATION:   Presentation: Stable and uncomplicated: LOW COMPLEXITY   CLINICAL DECISION MAKIN[address] [place] [address] AM-PAC 6 Clicks   Basic Mobility Inpatient Short Form  How much difficulty does the patient currently have. .. Unable A Lot A Little None   1. Turning over in bed (including adjusting bedclothes, sheets and blankets)? [ ] 1   [ ] 2   [X] 3   [ ] 4   2. Sitting down on and standing up from a chair with arms ( e.g., wheelchair, bedside commode, etc.)   [ ] 1   [ ] 2   [X] 3   [ ] 4   3. Moving from lying on back to sitting on the side of the bed? [ ] 1   [ ] 2   [X] 3   [ ] 4   How much help from another person does the patient currently need. .. Total A Lot A Little None   4. Moving to and from a bed to a chair (including a wheelchair)? [ ] 1   [ ] 2   [X] 3   [ ] 4   5. Need to walk in hospital room? [ ] 1   [ ] 2   [X] 3   [ ] 4   6. Climbing 3-5 steps with a railing? [X] 1   [ ] 2   [ ] 3   [ ] 4   © , Trustees of [address] [place] [address], under license to [employer]. All rights reserved    Score:  Initial: 18 Most Recent:16 (Date: 3/7/17 )     Interpretation of Tool:  Represents activities that are increasingly more difficult (i.e. Bed mobility, Transfers, Gait).        Score 24 23 22-20 19-15 14-10 9-7 6       Modifier CH CI CJ CK CL CM CN         · Mobility - Walking and Moving Around:               - CURRENT STATUS:    CK - 40%-59% impaired, limited or restricted               - GOAL STATUS:           CJ - 20%-39% impaired, limited or restricted               - D/C STATUS:                       ---------------To be determined---------------  Payor: BLUE CHOICE [place] / Product Type: HMO /       Medical Necessity:     · Patient demonstrates excellent rehab potential due to higher previous functional level. · Skilled intervention continues to be required due to decline in overall functional mobility. Reason for Services/Other Comments:  · Patient continues to require skilled intervention due to medical complications and patient unable to attend/participate in therapy as expected. Use of outcome tool(s) and clinical judgement create a POC that gives a: Clear prediction of patient's progress: LOW COMPLEXITY                 TREATMENT:      Pre-treatment Symptoms/Complaints:  \"I want to go home. \"  Pain: Initial:   Pain Intensity 1: 0  Post Session:  0/10        Most Recent Physical Functioning:   Gross Assessment:  AROM: Within functional limits (RUE (LUE not tested due to precautions))  Strength: Generally decreased, functional (RUE (LUE not testesd due to precautions))  Coordination: Within functional limits  Sensation: Intact  Posture:  Posture (WDL): Exceptions to WDL  Posture Assessment:  Forward head, Rounded shoulders  Balance:  Sitting: Intact  Standing: Impaired  Standing - Static: Good  Standing - Dynamic : Fair (+)  Bed Mobility:  Supine to Sit: Stand-by asssistance (with head of bed elevated)  Wheelchair Mobility:     Transfers:  Sit to Stand: Stand-by asssistance  Stand to Sit: Stand-by asssistance  Gait:  Base of Support: Widened  Step Length: Left shortened;Right shortened  Gait Abnormalities: Decreased step clearance  Ambulation - Level of Assistance: Contact guard assistance  Distance (ft): 150 Feet (ft) (rest break; 100 feet)  Assistive Device:  (none)  Interventions: Safety awareness training;Verbal cues  Gait:     Base of Support: Widened  Step Length: Left shortened;Right shortened  Gait Abnormalities: Decreased step clearance  Distance (ft): 150 Feet (ft) (rest break; 100 feet)  Assistive Device:  (none)  Ambulation - Level of Assistance: Contact guard assistance  Interventions: Safety awareness training;Verbal cues           Therapeutic Activity: (    10 Minutes): Therapeutic activities including Chair transfers and Ambulation on level ground to improve mobility, strength and balance. Required minimal Safety awareness training;Verbal cues to promote static and dynamic balance in standing. Therapeutic Exercise: (15 Minutes):  Exercises per grid below to improve mobility, strength and activity tolerance. Required minimal verbal cues to promote proper body alignment and promote proper body breathing techniques. Progressed repetitions and complexity of movement as indicated. Date:  3-8-17 Date:  3-9-17 Date:     ACTIVITY/EXERCISE AM PM AM PM AM PM   Ambulation:           Distance  Device  Duration         Seated Heel Raises x20 x20 x25      Seated Toe Raises x20 x20 x25      Seated Long Arc Quads x20 x20 x25 x25     Seated Marching x20 x20 x25      Seated Hip Abduction x20 x20 x25      Standing heel/toe raises   x15 x15     Standing marching    x10     Standing hip extension    x10     Standing hip abduction    x10                                                  B = bilateral; AA = active assistive; A = active; P = passive     Braces/Orthotics/Lines/Etc:   · None  Treatment/Session Assessment:    · Response to Treatment:  Patient tolerated treatment well  · Interdisciplinary Collaboration:  · Physical Therapy Assistant and Registered Nurse  · After treatment position/precautions:  · Supine in bed, Bed/Chair-wheels locked, Bed in low position, Call light within reach and RN notified  · Compliance with Program/Exercises: compliant all of the time. · Recommendations/Intent for next treatment session: \"Next visit will focus on advancements to more challenging activities and reduction in assistance provided\".   Total Treatment Duration:  PT Patient Time In/Time Out  Time In: 1330  Time Out: [facility], \[facility] [place] [facility]\""

## 2017-03-09 NOTE — PROGRESS NOTES
Report received from [facility], RN; patient and chart reviewed, followed by reconciliation of lines, tubes, drains, verifying gtt rates and dual skin assessment.       Lines-HARLEEN junior        Dual skin assessment-diffuse ecchymosis at groin

## 2017-03-09 NOTE — PROGRESS NOTES
Problem: Mobility Impaired (Adult and Pediatric)  Goal: *Acute Goals and Plan of Care (Insert Text)  LTG: (reviewed and updated 3/7/17)  (1.)Mr. Praful Zamarripa will move from supine to sit and sit to supine , scoot up and down and roll side to side in bed with MODIFIED INDEPENDENCE within 7 day(s). (2.)Mr. Praful Zamarripa will transfer from bed to chair and chair to bed with MODIFIED INDEPENDENCE using the least restrictive device within 7 day(s). (3.)Mr. Praful Zamarripa will ambulate with MODIFIED INDEPENDENCE for 500+ feet with the least restrictive device within 7 day(s). (4.)Mr. Praful Zamarripa will perform standing static and dynamic balance activities x 8 minutes with SUPERVISION to improve safety within 7 day(s). (5.)Mr. Praful Zamarripa will ascend and descend 5 stairs using one hand rail(s) with SUPERVISION to improve functional mobility and safety within 7 day(s). (6.)Mr. Praful Zamarripa will tolerate 25 minutes of therapeutic activity/exercises within 7 day(s). (7.)Mr. Praful Zamarripa will perform transfers and ambulation without a decrease in SBP >20 and DBP >10 within 7 days. ________________________________________________________________________________________________     PHYSICAL THERAPY: Daily Note, Treatment Day: 2nd and AM 3/9/2017  INPATIENT: Hospital Day: 11  Payor: BLUE CHOICE [place] / Product Type: HMO /      NAME/AGE/GENDER: Pasquale Baez is a 63 y.o. male      PRIMARY DIAGNOSIS: Atherosclerosis of native coronary artery of native heart with unstable angina pectoris (HCC) [I25.110]  Pleural effusion [J90] NSTEMI (non-ST elevated myocardial infarction) ([facility] [address] NSTEMI (non-ST elevated myocardial infarction) (HCC)  Procedure(s) (LRB):  ULTRASOUND (Bilateral)  THORACENTESIS (N/A)  2 Days Post-Op  ICD-10: Treatment Diagnosis:       · Generalized Muscle Weakness (M62.81)  · Difficulty in walking, Not elsewhere classified (R26.2)   Precaution/Allergies:  Review of patient's allergies indicates no known allergies. ASSESSMENT:      Mr. Praful Zamarripa presents sitting up in bedside chair agreeable to treatment with family present. He increased him ambulation this treatment. His trunk sway was improved this treatment. He returned to room and performed seated exercises with good technique. Gave patient a home exercise program consisting of both standing and seated exercises. Good progress noted with treatment. This section established at most recent assessment   PROBLEM LIST (Impairments causing functional limitations):  1. Decreased Strength  2. Decreased Transfer Abilities  3. Decreased Ambulation Ability/Technique  4. Decreased Balance  5. Decreased Activity Tolerance  6. Increased Shortness of Breath  7. Decreased Knowledge of Precautions  8. Decreased [place] with Home Exercise Program    INTERVENTIONS PLANNED: (Benefits and precautions of physical therapy have been discussed with the patient.)  1. Balance Exercise  2. Bed Mobility  3. Family Education  4. Gait Training  5. Home Exercise Program (HEP)  6. Therapeutic Activites  7. Therapeutic Exercise/Strengthening  8. Transfer Training  9. Group Therapy      TREATMENT PLAN: Frequency/Duration: twice daily for duration of hospital stay  Rehabilitation Potential For Stated Goals: EXCELLENT      RECOMMENDED REHABILITATION/EQUIPMENT: (at time of discharge pending progress): Continue Skilled Therapy and Home Health: Physical Therapy. HISTORY:   History of Present Injury/Illness (Reason for Referral):  Patient is status post above  Past Medical History/Comorbidities:   Mr. Praful Zamarripa  has a past medical history of Allergic rhinitis, cause unspecified (3/7/2014); CAD, multiple vessel (2/28/2017); Cancer ([facility] [address] (1979); Contact dermatitis and other eczema, due to unspecified cause (3/7/2014); Depressed left ventricular ejection fraction (2/27/2017); Esophageal reflux (3/7/2014); GERD (gastroesophageal reflux disease);  Ischemic cardiomyopathy (2/28/2017); NSTEMI (non-ST elevated myocardial infarction) ([facility] (2/27/2017); Other and unspecified hyperlipidemia (3/7/2014); Other specified idiopathic peripheral neuropathy (3/7/2014); Retinopathy of both eyes; Type II or unspecified type diabetes mellitus with neurological manifestations, uncontrolled (3/7/2014); Undiagnosed cardiac murmurs (3/7/2014); Unspecified essential hypertension (3/7/2014); and Vitreous hemorrhage, unspecified eye ([facility] (2/27/2017). Mr. Nidia Appiah  has a past surgical history that includes urological (Left, 1979); urological (Left, 2010); and heent (2003). Social History/Living Environment:   Home Environment: Private residence  # Steps to Enter: 2  Rails to Enter: No  One/Two Story Residence: One story  Living Alone: No  Support Systems: Spouse/Significant Other/Partner  Patient Expects to be Discharged to[de-identified] Private residence  Current DME Used/Available at Home: None  Tub or Shower Type: Tub/Shower combination  Prior Level of Function/Work/Activity:  Patient lives with spouse in one story home with a few stairs through out the home. Patient was independent and active prior to admission. Personal Factors:          Patient had radiation for cancer   Number of Personal Factors/Comorbidities that affect the Plan of Care: 1-2: MODERATE COMPLEXITY   EXAMINATION:   Most Recent Physical Functioning:   Gross Assessment:                  Posture:  Posture (WDL): Exceptions to WDL  Posture Assessment:  Forward head, Rounded shoulders  Balance:  Sitting: Intact  Standing: Impaired  Standing - Static: Good  Standing - Dynamic : Fair Bed Mobility:     Wheelchair Mobility:     Transfers:  Sit to Stand: Stand-by asssistance  Stand to Sit: Stand-by asssistance  Gait:     Base of Support: Widened  Step Length: Left shortened;Right shortened  Gait Abnormalities: Decreased step clearance  Distance (ft): 150 Feet (ft)  Assistive Device:  (None)  Ambulation - Level of Assistance: Contact guard assistance  Interventions: Safety awareness training;Verbal cues       Body Structures Involved:  1. Heart  2. Lungs  3. Thoracic Cage Body Functions Affected:  1. Cardio  2. Movement Related Activities and Participation Affected:  1. General Tasks and Demands  2. Mobility  3. Domestic Life   Number of elements that affect the Plan of Care: 4+: HIGH COMPLEXITY   CLINICAL PRESENTATION:   Presentation: Stable and uncomplicated: LOW COMPLEXITY   CLINICAL DECISION MAKIN[address] [place] [address] AM-PAC 6 Clicks   Basic Mobility Inpatient Short Form  How much difficulty does the patient currently have. .. Unable A Lot A Little None   1. Turning over in bed (including adjusting bedclothes, sheets and blankets)? [ ] 1   [ ] 2   [X] 3   [ ] 4   2. Sitting down on and standing up from a chair with arms ( e.g., wheelchair, bedside commode, etc.)   [ ] 1   [ ] 2   [X] 3   [ ] 4   3. Moving from lying on back to sitting on the side of the bed? [ ] 1   [ ] 2   [X] 3   [ ] 4   How much help from another person does the patient currently need. .. Total A Lot A Little None   4. Moving to and from a bed to a chair (including a wheelchair)? [ ] 1   [ ] 2   [X] 3   [ ] 4   5. Need to walk in hospital room? [ ] 1   [ ] 2   [X] 3   [ ] 4   6. Climbing 3-5 steps with a railing? [X] 1   [ ] 2   [ ] 3   [ ] 4   © , Trustees of [address] [place] [address], under license to [employer]. All rights reserved    Score:  Initial: 18 Most Recent:16 (Date: 3/7/17 )     Interpretation of Tool:  Represents activities that are increasingly more difficult (i.e. Bed mobility, Transfers, Gait).        Score 24 23 22-20 19-15 14-10 9-7 6       Modifier CH CI CJ CK CL CM CN         · Mobility - Walking and Moving Around:               - CURRENT STATUS:    CK - 40%-59% impaired, limited or restricted               - GOAL STATUS:           CJ - 20%-39% impaired, limited or restricted               - D/C STATUS:                       ---------------To be determined---------------  Payor: BLUE CHOICE [place] / Product Type: HMO /       Medical Necessity:     · Patient demonstrates excellent rehab potential due to higher previous functional level. · Skilled intervention continues to be required due to decline in overall functional mobility. Reason for Services/Other Comments:  · Patient continues to require skilled intervention due to medical complications and patient unable to attend/participate in therapy as expected. Use of outcome tool(s) and clinical judgement create a POC that gives a: Clear prediction of patient's progress: LOW COMPLEXITY                 TREATMENT:      Pre-treatment Symptoms/Complaints:  \"I do feel better today. \"  Pain: Initial:   Pain Intensity 1: 0  Post Session:  0/10        Most Recent Physical Functioning:   Gross Assessment:  AROM: Within functional limits (RUE (LUE not tested due to precautions))  Strength: Generally decreased, functional (RUE (LUE not testesd due to precautions))  Coordination: Within functional limits  Sensation: Intact  Posture:  Posture (WDL): Exceptions to WDL  Posture Assessment:  Forward head, Rounded shoulders  Balance:  Sitting: Intact  Standing: Impaired  Standing - Static: Good  Standing - Dynamic : Fair  Bed Mobility:     Wheelchair Mobility:     Transfers:  Sit to Stand: Stand-by asssistance  Stand to Sit: Stand-by asssistance  Gait:  Base of Support: Widened  Step Length: Left shortened;Right shortened  Gait Abnormalities: Decreased step clearance  Ambulation - Level of Assistance: Contact guard assistance  Distance (ft): 150 Feet (ft)  Assistive Device:  (None)  Interventions: Safety awareness training;Verbal cues  Gait:     Base of Support: Widened  Step Length: Left shortened;Right shortened  Gait Abnormalities: Decreased step clearance  Distance (ft): 150 Feet (ft)  Assistive Device:  (None)  Ambulation - Level of Assistance: Contact guard assistance  Interventions: Safety awareness training;Verbal cues           Therapeutic Activity: (    12 Minutes): Therapeutic activities including Chair transfers and Ambulation on level ground to improve mobility, strength and balance. Required minimal Safety awareness training;Verbal cues to promote static and dynamic balance in standing. Therapeutic Exercise: (13 Minutes):  Exercises per grid below to improve mobility, strength and activity tolerance. Required minimal verbal cues to promote proper body alignment and promote proper body breathing techniques. Progressed repetitions and complexity of movement as indicated. Date:  3-8-17 Date:  3-9-17 Date:     ACTIVITY/EXERCISE AM PM AM PM AM PM   Ambulation:           Distance  Device  Duration         Seated Heel Raises x20 x20 x25      Seated Toe Raises x20 x20 x25      Seated Long Arc Quads x20 x20 x25      Seated Marching x20 x20 x25      Seated Hip Abduction x20 x20 x25      Standing heel/toe raises   x15      B = bilateral; AA = active assistive; A = active; P = passive     Braces/Orthotics/Lines/Etc:   · None  Treatment/Session Assessment:    · Response to Treatment:  Patient tolerated treatment well  · Interdisciplinary Collaboration:  · Physical Therapy Assistant and Registered Nurse  · After treatment position/precautions:  · Up in chair, Call light within reach and RN notified  · Compliance with Program/Exercises: compliant all of the time. · Recommendations/Intent for next treatment session: \"Next visit will focus on advancements to more challenging activities and reduction in assistance provided\".   Total Treatment Duration:  PT Patient Time In/Time Out  Time In: 0957  Time Out: [address] Earnest Elaine PTA

## 2017-03-09 NOTE — PROGRESS NOTES
Problem: Nutrition Deficit  Goal: *Optimize nutritional status  Nutrition  Reason for assessment: LOS day 10  Assessment:   Diet order(s): [facility]  Food/Nutrition Patient History:  Patient s/p CABG 2/28, extubated 2/28 and re-intubated 3/4. Patient has since been extubated 3/5, had an ICD placed 3/6 and diagnostic thoracentesis 3/7. Patient's overall intake has been sporadic with being intubated twice and numerous procedures. He reports that his overall intake has been similar to what he would consume at home. He states that he actually may be eating more here than he does at home as he has been eating two solid meals per day and a light snack at night. He reports only drinking water or crystal light at home, no sugar sweetened beverages. Patient has a h/o DM with last A1C of 9 (10/10/2016). Patient reports that he prefers to have other items over desserts with meals. Anthropometrics:Height: 5' 10\" (177.8 cm),  Weight: 92.5 kg (203 lb 14.4 oz), Weight Source: Standing scale (comment), Body mass index is 29.26 kg/(m^2). BMI class of overweight. WT / BMI 3/9/2017 2/27/2017 2/21/2017   WEIGHT 203 lb 14.4 oz 199 lb 199 lb 8 oz       WT / BMI 2/14/2017 10/3/2016 4/4/2016   WEIGHT 204 lb 206 lb 201 lb   Patient's weight has been relatively stable over the past year. Macronutrient needs:  EER:  0409-3272 kcal /day (20-25 kcal/kg listed BW)  EPR:   grams protein/day (1.2-1.4 grams/kg IBW)  Intake/Comparative Standards:  Average intake for past 10 day(s)/7 recorded meal(s): 40%. This potentially meets ~43% of kcal and ~39% of protein needs     Nutrition Diagnosis: Inadequate oral intake r/t decreased ability to consume sufficient oral intake as evidenced by patient intubated twice, numerous procedures inhibiting oral intake, meeting above noted needs. Intervention:  Meals and snacks: Continue current diet. Add cardiac diet restriction to current diet. RD collected food preferences.   RD provided patient with a menu.      Nicole Linn, Gerardo Ricky [address], [address] [place] [address], [address] [place] [address], [phone]

## 2017-03-09 NOTE — PROGRESS NOTES
Problem: Self Care Deficits Care Plan (Adult)  Goal: *Acute Goals and Plan of Care (Insert Text)  1. Patient will verbalize and demonstrate understanding of pacemaker precautions with 100% accuracy during OT treatment session. (Goal Met)  2. Patient will complete functional transfers with modified independence. (Goal Met)- pt does not require AD for transfers  3. Patient will tolerate at least 20 minutes of OT treatment with 2 rest breaks to increase activity tolerance for ADLs. 4. Patient will complete full body bathing and dressing with modified independence and least restrictive adaptive equipment. 5. Patient will complete toileting with modified independence. 6. Patient will complete self feeding and grooming independently. (Nursing staff reports pt is independent with Goals 4-6)  Timeframe: 7 visits     Comments:       OCCUPATIONAL THERAPY: Daily Note, Treatment Day: 1st and PM    3/9/2017  INPATIENT: Hospital Day: 11  Payor: BLUE CHOICE / [place] / Product Type: HMO /      NAME/AGE/GENDER: Manav Sanders is a 63 y.o. male      PRIMARY DIAGNOSIS:  Atherosclerosis of native coronary artery of native heart with unstable angina pectoris (HCC) [I25.110]  Pleural effusion [J90] NSTEMI (non-ST elevated myocardial infarction) ([facility] NSTEMI (non-ST elevated myocardial infarction) ([facility])  Procedure(s) (LRB):  ULTRASOUND (Bilateral)  THORACENTESIS (N/A)  2 Days Post-Op  ICD-10: Treatment Diagnosis:        · Generalized Muscle Weakness (M62.81)  · Other lack of cordination (R27.8)   Precautions/Allergies:        Pacemaker; CABG Review of patient's allergies indicates no known allergies. ASSESSMENT:      Mr. Pedro Mckay presents to acute rehab s/p above procedure. 3/9/2017 Pt presents in supine upon arrival. Pt transferred to sitting independently. Pt completed sit to stand with SBA and donned robe with SBA. Pt verbalized understanding of his pace maker precautions.  Pt completed functional mobility in his room and in the hallway with no AD with mild trunk sway. Pt returned to room and doffed robe independently and returned to supine independently. Therapist discussed self care activities with pt. Pt states he is already independent with above and nursing staff concurs. Pt may be have met all goals according to pt and staff. Will follow. This section established at most recent assessment   PROBLEM LIST (Impairments causing functional limitations):  1. Decreased Strength  2. Decreased ADL/Functional Activities  3. Decreased Transfer Abilities  4. Decreased Ambulation Ability/Technique  5. Decreased Balance  6. Increased Pain  7. Decreased Activity Tolerance  8. Decreased Work Simplification/Energy Conservation Techniques  9. Decreased Knowledge of Precautions    INTERVENTIONS PLANNED: (Benefits and precautions of occupational therapy have been discussed with the patient.)  1. Activities of daily living training  2. Adaptive equipment training  3. Clothing management  4. Donning&doffing training  5. Theraputic activity  6. Theraputic exercise      TREATMENT PLAN: Frequency/Duration: Follow patient 4x/week to address above goals. Rehabilitation Potential For Stated Goals: GOOD      RECOMMENDED REHABILITATION/EQUIPMENT: (at time of discharge pending progress): Continue Skilled Therapy. OCCUPATIONAL PROFILE AND HISTORY:   History of Present Injury/Illness (Reason for Referral):  Per H&P: Angelica Dave is a 63 y.o. male with history of DM, who [facility] with one day history of chest fullness, associated with shortness of breath, N/ vomited x 1 last pm. Pain continued throughout night and was relieved by topical ntg in ER. ECG notes NSr with incomplete LBBB. Troponin levels are slightly elevated as well as d dimer. He denies prior cardiac hx, no prior episodes of CP, nonsmoker, no ETOH, , .  Had testicular cancer in 1979 with orchiectomy and had mets to abdomen and left lung --both sites were treated with massive radiation by the [employer]. Had vitreous hemmhorage with vitrectomy on 2/21--spoke with Opthamologist--DR. Anguiano--OK to heparinize. Strong family hx of CAD. \"  Past Medical History/Comorbidities:   Mr. Ham Conrad  has a past medical history of Allergic rhinitis, cause unspecified (3/7/2014); CAD, multiple vessel (2/28/2017); Cancer ([facility] (1979); Contact dermatitis and other eczema, due to unspecified cause (3/7/2014); Depressed left ventricular ejection fraction (2/27/2017); Esophageal reflux (3/7/2014); GERD (gastroesophageal reflux disease); Ischemic cardiomyopathy (2/28/2017); NSTEMI (non-ST elevated myocardial infarction) ([facility] (2/27/2017); Other and unspecified hyperlipidemia (3/7/2014); Other specified idiopathic peripheral neuropathy (3/7/2014); Retinopathy of both eyes; Type II or unspecified type diabetes mellitus with neurological manifestations, uncontrolled (3/7/2014); Undiagnosed cardiac murmurs (3/7/2014); Unspecified essential hypertension (3/7/2014); and Vitreous hemorrhage, unspecified eye ([facility] (2/27/2017). Mr. Ham Conrad  has a past surgical history that includes urological (Left, 1979); urological (Left, 2010); and heent (2003). Social History/Living Environment:   Home Environment: Private residence  # Steps to Enter: 2  Rails to Enter: No  One/Two Story Residence: One story  Living Alone: No  Support Systems: Spouse/Significant Other/Partner  Patient Expects to be Discharged to[de-identified] Private residence  Current DME Used/Available at Home: None  Tub or Shower Type: Tub/Shower combination  Prior Level of Function/Work/Activity:  Lives in [facility] with wife. Reports independence at baseline with ADLs and IADLs. Personal Factors:          Age:  63 y.o.         Social Background:  Support from wife        Profession:  Full time ; eager to return to work   Number of Personal Factors/Comorbidities that affect the Plan of Care: Expanded review of therapy/medical records (1-2):  MODERATE COMPLEXITY   ASSESSMENT OF OCCUPATIONAL PERFORMANCE[de-identified]   Activities of Daily Living:           Basic ADLs (From Assessment) Complex ADLs (From Assessment)   Basic ADL  Feeding: Setup  Oral Facial Hygiene/Grooming: Setup  Bathing: Moderate assistance  Upper Body Dressing: Minimum assistance  Lower Body Dressing: Moderate assistance  Toileting: Minimum assistance Instrumental ADL  Meal Preparation: Maximum assistance  Homemaking: Maximum assistance   Grooming/Bathing/Dressing Activities of Daily Living                             Bed/Mat Mobility  Sit to Stand: Stand-by asssistance          Most Recent Physical Functioning:   Gross Assessment:                  Posture:  Posture (WDL): Exceptions to WDL  Posture Assessment: Forward head, Rounded shoulders  Balance:  Sitting: Intact  Standing: Impaired  Standing - Static: Good  Standing - Dynamic : Fair (+) Bed Mobility:     Wheelchair Mobility:     Transfers:  Sit to Stand: Stand-by asssistance  Stand to Sit: Stand-by asssistance                    No data found. Mental Status  Neurologic State: Alert  Orientation Level: Oriented X4  Cognition: Follows commands  Perception: Appears intact  Perseveration: No perseveration noted  Safety/Judgement: Awareness of environment                               Physical Skills Involved:  1. Balance  2. Mobility  3. Strength  4. Endurance Cognitive Skills Affected (resulting in the inability to perform in a timely and safe manner):  1. None Psychosocial Skills Affected:  1. Habits  2. Routines and Behaviors   Number of elements that affect the Plan of Care: 3-5:  MODERATE COMPLEXITY   CLINICAL DECISION MAKIN [facility] [address] [id number] AM-PAC 6 Clicks   Basic Mobility Inpatient Short Form  How much help from another person does the patient currently need. .. Total A Lot A Little None   1.   Putting on and taking off regular lower body clothing?   [ ] 1   [X] 2   [ ] 3   [ ] 4   2. Bathing (including washing, rinsing, drying)? [ ] 1   [X] 2   [ ] 3   [ ] 4   3. Toileting, which includes using toilet, bedpan or urinal?   [ ] 1   [ ] 2   [X] 3   [ ] 4   4. Putting on and taking off regular upper body clothing?   [ ] 1   [ ] 2   [X] 3   [ ] 4   5. Taking care of personal grooming such as brushing teeth? [ ] 1   [ ] 2   [ ] 3   [X] 4   6. Eating meals? [ ] 1   [ ] 2   [ ] 3   [X] 4   © 2007, Trustees of [address] [place] [address] [address] [id number], under license to [employer]. All rights reserved    Score:  Initial: 18 Most Recent: X (Date: -- )     Interpretation of Tool:  Represents activities that are increasingly more difficult (i.e. Bed mobility, Transfers, Gait). Score 24 23 22-20 19-15 14-10 9-7 6       Modifier CH CI CJ CK CL CM CN         · Self Care:               - CURRENT STATUS:    CK - 40%-59% impaired, limited or restricted               - GOAL STATUS:           CJ - 20%-39% impaired, limited or restricted               - D/C STATUS:                       ---------------To be determined---------------  Payor: BLUE CHOICE [place] / Product Type: HMO /       Medical Necessity:     · Patient demonstrates good rehab potential due to higher previous functional level. Reason for Services/Other Comments:  · Patient continues to require present interventions due to patient's inability to complete ADLs at baseline level of function. .   Use of outcome tool(s) and clinical judgement create a POC that gives a: MODERATE COMPLEXITY             TREATMENT:   (In addition to Assessment/Re-Assessment sessions the following treatments were rendered)      Pre-treatment Symptoms/Complaints:    Pain: Initial:   Pain Intensity 1: 0  Post Session:  same      Therapeutic Activity: (10 minutes): Therapeutic activities including Bed transfers and static/dynamic standing to improve mobility, strength, balance and activity tolerance.   Required no Safety awareness training;Verbal cues to promote static and dynamic balance in standing. Braces/Orthotics/Lines/Etc:   · O2 Device: Room air  Treatment/Session Assessment:    · Response to Treatment:  Tolerated treatment well with no complications. · Interdisciplinary Collaboration:  · Certified Occupational Therapy Assistant and Registered Nurse  · After treatment position/precautions:  · Supine in bed, Bed/Chair-wheels locked, Bed in low position, Call light within reach, RN notified and Side rails x 2  · Compliance with Program/Exercises: compliant all of the time. · Recommendations/Intent for next treatment session: \"Next visit will focus on advancements to more challenging activities and reduction in assistance provided\".   Total Treatment Duration:  OT Patient Time In/Time Out  Time In: 1535  Time Out: 1300 [facility] Angela Falk

## 2017-03-09 NOTE — PROGRESS NOTES
[facility] CARDIOLOGY PROGRESS NOTE           3/9/2017 3:17 PM    Admit Date: 2/27/2017    Admit Diagnosis: Atherosclerosis of native coronary artery of native heart w*      Subjective:   No complaints today, no chest pain or shortness of breath    Interval History: (History of pertinent interval events obtained from nursing staff)    ROS:  GEN:  No fever or chills  Cardiovascular:  As noted above  Pulmonary:  As noted above  Neuro:  No new focal motor or sensory loss      Objective:     Vitals:    03/08/17 1918 03/08/17 2300 03/09/17 0600 03/09/17 1100   BP: 119/70 105/60     Pulse: 75 69     Resp: 18 18     Temp: 98.3 °F (36.8 °C) 97.3 °F (36.3 °C)  97.6 °F (36.4 °C)   SpO2: 99% 95%     Weight:   92.5 kg (203 lb 14.4 oz)    Height:   5' 10\" (1.778 m)        Physical Exam:  Estle Marquis, Well Nourished, No Acute Distress, Alert & Oriented x 3, appropriate mood. Neck- supple, no JVD  CV- regular rate and rhythm, +SM, central sternotomy scar well healing, ICD pocket well healing  Lung- clear bilaterally  Abd- soft, nontender, nondistended  Ext- no edema bilaterally.   Skin- warm and dry    Current Facility-Administered Medications   Medication Dose Route Frequency    [START ON 3/10/2017] lisinopril (PRINIVIL, ZESTRIL) tablet 2.5 mg  2.5 mg Oral DAILY    insulin lispro (HUMALOG) injection   SubCUTAneous AC&HS    cephALEXin (KEFLEX) capsule 500 mg  500 mg Oral TID    famotidine (PEPCID) tablet 20 mg  20 mg Oral Q12H    metoprolol succinate (TOPROL-XL) XL tablet 50 mg  50 mg Oral Q12H    furosemide (LASIX) tablet 40 mg  40 mg Oral DAILY    tapentadol (NUCYNTA) tablet 100 mg  100 mg Oral Q6H PRN    potassium chloride (K-DUR, KLOR-CON) SR tablet 20 mEq  20 mEq Oral DAILY    sodium chloride (NS) flush 5-10 mL  5-10 mL IntraVENous Q8H    sodium chloride (NS) flush 5-10 mL  5-10 mL IntraVENous PRN    cyclobenzaprine (FLEXERIL) tablet 5 mg  5 mg Oral TID PRN    docusate sodium (COLACE) capsule 100 mg  100 mg Oral DAILY    alum-mag hydroxide-simeth (MYLANTA) oral suspension 30 mL  30 mL Oral Q4H PRN    ondansetron (ZOFRAN) injection 4 mg  4 mg IntraVENous Q6H PRN    acetaminophen (TYLENOL) tablet 650 mg  650 mg Oral Q4H PRN    atorvastatin (LIPITOR) tablet 80 mg  80 mg Oral QHS    amiodarone (CORDARONE) tablet 200 mg  200 mg Oral Q12H    aspirin delayed-release tablet 81 mg  81 mg Oral DAILY    nitroglycerin (NITROLINGUAL) sublingual 0.4 mg/spray  1 Spray SubLINGual Q5MIN PRN    baclofen (LIORESAL) tablet 10 mg  10 mg Oral TID PRN    oxyCODONE-acetaminophen (PERCOCET) 5-325 mg per tablet 1 Tab  1 Tab Oral Q4H PRN    oxyCODONE-acetaminophen (PERCOCET 10)  mg per tablet 1 Tab  1 Tab Oral Q4H PRN     Data Review:   Recent Results (from the past 24 hour(s))   GLUCOSE, POC    Collection Time: 03/08/17  4:08 PM   Result Value Ref Range    Glucose (POC) 161 (H) 65 - 100 mg/dL   GLUCOSE, POC    Collection Time: 03/08/17  8:51 PM   Result Value Ref Range    Glucose (POC) 124 (H) 65 - 100 mg/dL   CBC W/O DIFF    Collection Time: 03/09/17  5:15 AM   Result Value Ref Range    WBC 8.1 4.3 - 11.1 K/uL    RBC 3.41 (L) 4.23 - 5.67 M/uL    HGB 10.0 (L) 13.6 - 17.2 g/dL    HCT 30.6 (L) 41.1 - 50.3 %    MCV 89.7 79.6 - 97.8 FL    MCH 29.3 26.1 - 32.9 PG    MCHC 32.7 31.4 - 35.0 g/dL    RDW 14.3 11.9 - 14.6 %    PLATELET 687 087 - 216 K/uL    MPV 10.0 (L) 10.8 - 23.2 FL   METABOLIC PANEL, BASIC    Collection Time: 03/09/17  5:15 AM   Result Value Ref Range    Sodium 137 136 - 145 mmol/L    Potassium 4.0 3.5 - 5.1 mmol/L    Chloride 98 98 - 107 mmol/L    CO2 28 21 - 32 mmol/L    Anion gap 11 7 - 16 mmol/L    Glucose 125 (H) 65 - 100 mg/dL    BUN 27 (H) 6 - 23 MG/DL    Creatinine 0.89 0.8 - 1.5 MG/DL    GFR est AA >60 >60 ml/min/1.73m2    GFR est non-AA >60 >60 ml/min/1.73m2    Calcium 7.6 (L) 8.3 - 10.4 MG/DL   MAGNESIUM    Collection Time: 03/09/17  5:15 AM   Result Value Ref Range Magnesium 2.3 1.8 - 2.4 mg/dL   GLUCOSE, POC    Collection Time: 03/09/17  6:22 AM   Result Value Ref Range    Glucose (POC) 123 (H) 65 - 100 mg/dL   GLUCOSE, POC    Collection Time: 03/09/17 11:21 AM   Result Value Ref Range    Glucose (POC) 168 (H) 65 - 100 mg/dL       EKG:  (EKG has been independently visualized by me with interpretation below)  Assessment:     Principal Problem:    NSTEMI (non-ST elevated myocardial infarction) ([address] (2/27/2017)    Active Problems:    DM (diabetes mellitus) type II controlled, neurological manifestation ([address] (3/7/2014)      Essential hypertension, benign (3/7/2014)      Hypertriglyceridemia (2/27/2017)      Depressed left ventricular ejection fraction (2/27/2017)      Overview: 2/27/17 20%      Ischemic cardiomyopathy (2/28/2017)      Overview: EF 10-15% echo 2/27/17      CAD, multiple vessel (2/28/2017)      Overview: 2/28/17 (Dr Rody Reagan) Coronary artery bypass grafting x3. Grafts       consisting of       1. Left internal mammary artery to the left anterior descending. 2. Reversed LEFT saphenous vein graft to obtuse marginal.       3. Reversed LEFT saphenous vein graft to the left posterior descending       artery. S/P CABG (coronary artery bypass graft) (2/28/2017)      Postoperative anemia due to acute blood loss (3/1/2017)      Pleural effusion (3/2/2017)      Overview: 3/7/17      Left thoracentesis with 600 ml removed       Right thoracentesis with 1350 ml removed       Hypoxia (3/2/2017)      Cardiogenic shock (HCC) (6/2/8356)      Systolic CHF, acute on chronic ([address] (3/3/2017)      Cardiac arrest with ventricular fibrillation ([address] (3/5/2017)      Overview: 3/4/16      ICD (implantable cardioverter-defibrillator) in place (3/6/2017)      Overview: 1. [employer] implantable cardioverter defibrillator - March 2017      Plan:   1. V. Fib arrest -Patient is >5 days post op with recurrent V. Fib arrest. Did not respond to po Amio.  Changed to iv that stabilized V. Fib. Now back on PO. Doing well s/p ICD implant. 2. Chronic systolic heart failure - EF pre-op was 10-15%, Post-op EF was 20-25%. Add Lisinopril 5mg po qam yesterday. On Toprol XL  3. CAD - Post CABG - Lipitor 80mg, Toprol XL and Lisinopril  4. Discharge planning - Patient from the Ep/Cardiology standpoint should be stable for rehab. Dm Galindo MD  Cardiology/Electrophysiology

## 2017-03-09 NOTE — PROGRESS NOTES
CV Progress Note    Subjective:   Admit Date: 2017    Surgery Date:  9 Days Post-Op      Procedure:  Procedure(s):  ULTRASOUND  THORACENTESIS    Incisional pain:  minimal  Appetite:  Regular diet     Recurrent vfib arrest last this past weekend, ICD in place  Stable on RA  BM+  Fatigue noted in PT notes. Assist with ambulation      Objective:     Vitals:  Blood pressure 105/60, pulse 69, temperature 97.3 °F (36.3 °C), resp. rate 18, height 5' 10\" (1.778 m), weight 203 lb 14.4 oz (92.5 kg), SpO2 95 %. Temp (24hrs), Av.8 °F (36.6 °C), Min:97 °F (36.1 °C), Max:98.6 °F (37 °C)  Neuro in tact  RRR  Breath sounds dimished at bases  Belly soft  Sternum stable incision clean dry and in tact, ICD under occlusive dressing  +Leg edema      Plan/Recommendations/Medical Decision Making:     Principal Problem:    NSTEMI (non-ST elevated myocardial infarction) ([address] (2017)    Active Problems:    DM (diabetes mellitus) type II controlled, neurological manifestation ([address] (3/7/2014)      Essential hypertension, benign (3/7/2014)      Hypertriglyceridemia (2017)      Depressed left ventricular ejection fraction (2017)      Overview: 17 20%      Ischemic cardiomyopathy (2017)      Overview: EF 10-15% echo 17      CAD, multiple vessel (2017)      Overview: 17 (Dr Mackenzie Sutherland) Coronary artery bypass grafting x3. Grafts       consisting of       1. Left internal mammary artery to the left anterior descending. 2. Reversed LEFT saphenous vein graft to obtuse marginal.       3. Reversed LEFT saphenous vein graft to the left posterior descending       artery.        S/P CABG (coronary artery bypass graft) (2017)      Postoperative anemia due to acute blood loss (3/1/2017)      Pleural effusion (3/2/2017)      Overview: 3/7/17      Left thoracentesis with 600 ml removed       Right thoracentesis with 1350 ml removed       Hypoxia (3/2/2017)      Cardiogenic shock ([address] (3/2/2017) Systolic CHF, acute on chronic (HCC) (3/3/2017)      Cardiac arrest with ventricular fibrillation ([facility] (3/5/2017)      Overview: 3/4/16      ICD (implantable cardioverter-defibrillator) in place (3/6/2017)      Overview: 1. EGT [facility] implantable cardioverter defibrillator - March 2017      ICD in place  Off oxygen  Monitor sternum (CPR compression)  Slow progress would benefit from [facility]- insurance approval pending- patient would like to go home.   Wife works but can be in and out during day

## 2017-03-10 ENCOUNTER — HOME HEALTH ADMISSION (OUTPATIENT)
Dept: HOME HEALTH SERVICES | Facility: HOME HEALTH | Age: 62
End: 2017-03-10
Payer: COMMERCIAL

## 2017-03-10 LAB
EST. AVERAGE GLUCOSE BLD GHB EST-MCNC: 166 MG/DL
GLUCOSE BLD STRIP.AUTO-MCNC: 131 MG/DL (ref 65–100)
GLUCOSE BLD STRIP.AUTO-MCNC: 167 MG/DL (ref 65–100)
GLUCOSE BLD STRIP.AUTO-MCNC: 190 MG/DL (ref 65–100)
GLUCOSE BLD STRIP.AUTO-MCNC: 226 MG/DL (ref 65–100)
HBA1C MFR BLD: 7.4 % (ref 4.8–6)

## 2017-03-10 PROCEDURE — 97110 THERAPEUTIC EXERCISES: CPT

## 2017-03-10 PROCEDURE — 97530 THERAPEUTIC ACTIVITIES: CPT

## 2017-03-10 PROCEDURE — 74011250637 HC RX REV CODE- 250/637: Performed by: NURSE PRACTITIONER

## 2017-03-10 PROCEDURE — 74011250636 HC RX REV CODE- 250/636: Performed by: NURSE PRACTITIONER

## 2017-03-10 PROCEDURE — 65660000004 HC RM CVT STEPDOWN

## 2017-03-10 PROCEDURE — 82962 GLUCOSE BLOOD TEST: CPT

## 2017-03-10 PROCEDURE — 74011250637 HC RX REV CODE- 250/637: Performed by: THORACIC SURGERY (CARDIOTHORACIC VASCULAR SURGERY)

## 2017-03-10 PROCEDURE — 74011250637 HC RX REV CODE- 250/637: Performed by: INTERNAL MEDICINE

## 2017-03-10 RX ORDER — BISACODYL 5 MG
10 TABLET, DELAYED RELEASE (ENTERIC COATED) ORAL DAILY PRN
Status: DISCONTINUED | OUTPATIENT
Start: 2017-03-10 | End: 2017-03-13 | Stop reason: HOSPADM

## 2017-03-10 RX ORDER — ADHESIVE BANDAGE
30 BANDAGE TOPICAL DAILY PRN
Status: DISCONTINUED | OUTPATIENT
Start: 2017-03-10 | End: 2017-03-13 | Stop reason: HOSPADM

## 2017-03-10 RX ADMIN — OXYCODONE HYDROCHLORIDE AND ACETAMINOPHEN 1 TABLET: 10; 325 TABLET ORAL at 20:05

## 2017-03-10 RX ADMIN — Medication 10 ML: at 16:11

## 2017-03-10 RX ADMIN — CEPHALEXIN 500 MG: 500 CAPSULE ORAL at 08:26

## 2017-03-10 RX ADMIN — OXYCODONE HYDROCHLORIDE AND ACETAMINOPHEN 1 TABLET: 10; 325 TABLET ORAL at 04:59

## 2017-03-10 RX ADMIN — Medication 10 ML: at 04:58

## 2017-03-10 RX ADMIN — OXYCODONE HYDROCHLORIDE AND ACETAMINOPHEN 1 TABLET: 10; 325 TABLET ORAL at 23:46

## 2017-03-10 RX ADMIN — POTASSIUM CHLORIDE 20 MEQ: 20 TABLET, EXTENDED RELEASE ORAL at 08:27

## 2017-03-10 RX ADMIN — FUROSEMIDE 40 MG: 40 TABLET ORAL at 08:27

## 2017-03-10 RX ADMIN — BISACODYL 10 MG: 5 TABLET, COATED ORAL at 20:49

## 2017-03-10 RX ADMIN — ASPIRIN 81 MG: 81 TABLET, COATED ORAL at 08:27

## 2017-03-10 RX ADMIN — INSULIN LISPRO 4 UNITS: 100 INJECTION, SOLUTION INTRAVENOUS; SUBCUTANEOUS at 20:56

## 2017-03-10 RX ADMIN — METOPROLOL SUCCINATE 50 MG: 50 TABLET, EXTENDED RELEASE ORAL at 20:36

## 2017-03-10 RX ADMIN — DOCUSATE SODIUM 100 MG: 100 CAPSULE, LIQUID FILLED ORAL at 08:26

## 2017-03-10 RX ADMIN — INSULIN LISPRO 2 UNITS: 100 INJECTION, SOLUTION INTRAVENOUS; SUBCUTANEOUS at 10:42

## 2017-03-10 RX ADMIN — MAGNESIUM HYDROXIDE 30 ML: 400 SUSPENSION ORAL at 08:57

## 2017-03-10 RX ADMIN — FAMOTIDINE 20 MG: 20 TABLET ORAL at 20:23

## 2017-03-10 RX ADMIN — CEPHALEXIN 500 MG: 500 CAPSULE ORAL at 17:27

## 2017-03-10 RX ADMIN — INSULIN LISPRO 2 UNITS: 100 INJECTION, SOLUTION INTRAVENOUS; SUBCUTANEOUS at 16:10

## 2017-03-10 RX ADMIN — Medication 10 ML: at 20:35

## 2017-03-10 RX ADMIN — ATORVASTATIN CALCIUM 80 MG: 40 TABLET, FILM COATED ORAL at 20:23

## 2017-03-10 RX ADMIN — METOPROLOL SUCCINATE 50 MG: 50 TABLET, EXTENDED RELEASE ORAL at 08:27

## 2017-03-10 RX ADMIN — CYCLOBENZAPRINE HYDROCHLORIDE 5 MG: 10 TABLET, FILM COATED ORAL at 20:49

## 2017-03-10 RX ADMIN — CEPHALEXIN 500 MG: 500 CAPSULE ORAL at 20:36

## 2017-03-10 RX ADMIN — LISINOPRIL 2.5 MG: 5 TABLET ORAL at 08:27

## 2017-03-10 RX ADMIN — AMIODARONE HYDROCHLORIDE 200 MG: 200 TABLET ORAL at 08:27

## 2017-03-10 RX ADMIN — FAMOTIDINE 20 MG: 20 TABLET ORAL at 08:27

## 2017-03-10 RX ADMIN — AMIODARONE HYDROCHLORIDE 200 MG: 200 TABLET ORAL at 20:36

## 2017-03-10 NOTE — PROGRESS NOTES
Pt's left and right groin puncture sites slightly hardened approx. quarter size around insertion site. Bruising noted to bilateral groins, legs, left flank, and left and right upper arms. Pt educated on checking groin sites and verbalized understanding.

## 2017-03-10 NOTE — PROGRESS NOTES
[address] [facility]  Face to Face Encounter    Patients Name: Dereck Leung    YOB: 1955    Primary Diagnosis: s/p CABG  Ordering Physician: Dr. Love Evans    Date of Face to Face:   3/10/2017                                  Face to Face Encounter findings are related to primary reason for home care:   yes. 1. I certify that the patient needs intermittent care as follows: skilled nursing care:  skilled observation/assessment, patient education, wound care and rehabilitative nursing  physical therapy: strengthening, gait/stair training and balance training    2. I certify that this patient is homebound, that is: 1) patient requires the use of a none device, special transportation, or assistance of another to leave the home; or 2) patient's condition makes leaving the home medically contraindicated; and 3) patient has a normal inability to leave the home and leaving the home requires considerable and taxing effort. Patient may leave the home for infrequent and short duration for medical reasons, and occasional absences for non-medical reasons. Homebound status is due to the following functional limitations: Patient currently under activity restrictions secondary to recent surgical procedure, this hinders their ability to safely leave the home. Patient with increased shortness of breath and elevated heart rate with ambulation greater than 20 feet limiting patient's ability to ambulate safely within the community. Patient with strength deficits limiting the performance of all ADL's without caregiver assistance or the use of an assistive device. Patient with poor safety awareness and is at risk for falls without assistance of another person and the use of an assistive device. Patient with poor ambulation endurance limiting their safe ability to ascend/descend the required number of steps to leave the home.   Patient with increased shortness of breath with all exertional activity limiting ambulation outside the home. Patient with strength deficits limiting the ability to carry portable O2 for distances outside the home without the assistance of a caregiver. 3. I certify that this patient is under my care and that I, or a nurse practitioner or  [id number], or clinical nurse specialist, or certified nurse midwife, working with me, had a Face-to-Face Encounter that meets the physician Face-to-Face Encounter requirements. The following are the clinical findings from the [address] [place] [address] encounter that support the need for skilled services and is a summary of the encounter:     See hospital chart. Mary Ellen Mancuso RN  3/10/2017      THE FOLLOWING TO BE COMPLETED BY THE COMMUNITY PHYSICIAN:    I concur with the findings described above from the F encounter that this patient is homebound and in need of a skilled service.     Certifying Physician: _____________________________________      Printed Certifying Physician Name: _____________________________________    Date: _________________

## 2017-03-10 NOTE — PROGRESS NOTES
Verbal & Bedside shift change report given to Ritesh Fabian (oncoming nurse) by Santiago Slaughter (offgoing nurse). Report given with SBAR, Kardex, Intake/Output, MAR and Recent Results.

## 2017-03-10 NOTE — PROGRESS NOTES
2 sutures were removed by Nurse without difficulty. Wound edges were well approximated. Incision care completed with Hibiclens. No drainage noted. Patient did tolerate well.

## 2017-03-10 NOTE — PROGRESS NOTES
Call to [facility], admission with [facility], continue to await insurance approval.

## 2017-03-10 NOTE — PROGRESS NOTES
Patient has viewed discharge video and has received the recovery diary. Pt. Given a chance to ask questions and verbalized understanding.

## 2017-03-10 NOTE — PROGRESS NOTES
Problem: Mobility Impaired (Adult and Pediatric)  Goal: *Acute Goals and Plan of Care (Insert Text)  LTG: (reviewed and updated 3/7/17)  (1.)Mr. Yesenia Dave will move from supine to sit and sit to supine , scoot up and down and roll side to side in bed with MODIFIED INDEPENDENCE within 7 day(s). (2.)Mr. Yesenia Dave will transfer from bed to chair and chair to bed with MODIFIED INDEPENDENCE using the least restrictive device within 7 day(s). (3.)Mr. Yesenia Dave will ambulate with MODIFIED INDEPENDENCE for 500+ feet with the least restrictive device within 7 day(s). (4.)Mr. Yesenia Dave will perform standing static and dynamic balance activities x 8 minutes with SUPERVISION to improve safety within 7 day(s). (5.)Mr. Yesenia Dave will ascend and descend 5 stairs using one hand rail(s) with SUPERVISION to improve functional mobility and safety within 7 day(s). (6.)Mr. Yesenia Dave will tolerate 25 minutes of therapeutic activity/exercises within 7 day(s). (7.)Mr. Yesenia Dave will perform transfers and ambulation without a decrease in SBP >20 and DBP >10 within 7 days. ________________________________________________________________________________________________     PHYSICAL THERAPY: Daily Note, Treatment Day: 3rd and AM 3/10/2017  INPATIENT: Hospital Day: 12  Payor: BLUE CHOICE / [place] / Product Type: HMO /      NAME/AGE/GENDER: Larry Black is a 63 y.o. male      PRIMARY DIAGNOSIS: Atherosclerosis of native coronary artery of native heart with unstable angina pectoris (HCC) [I25.110]  Pleural effusion [J90] NSTEMI (non-ST elevated myocardial infarction) ([facility] [address] NSTEMI (non-ST elevated myocardial infarction) ([facility])  Procedure(s) (LRB):  ULTRASOUND (Bilateral)  THORACENTESIS (N/A)  3 Days Post-Op  ICD-10: Treatment Diagnosis:       · Generalized Muscle Weakness (M62.81)  · Difficulty in walking, Not elsewhere classified (R26.2)   Precaution/Allergies:  Review of patient's allergies indicates no known allergies. ASSESSMENT:      Mr. Ahmet Munguia presents sitting up in bedside chair agreeable to treatment today. He is doing well with ambulation and with exercises. Will continue to advance plan of care as he can tolerate it. This section established at most recent assessment   PROBLEM LIST (Impairments causing functional limitations):  1. Decreased Strength  2. Decreased Transfer Abilities  3. Decreased Ambulation Ability/Technique  4. Decreased Balance  5. Decreased Activity Tolerance  6. Increased Shortness of Breath  7. Decreased Knowledge of Precautions  8. Decreased [place] with Home Exercise Program    INTERVENTIONS PLANNED: (Benefits and precautions of physical therapy have been discussed with the patient.)  1. Balance Exercise  2. Bed Mobility  3. Family Education  4. Gait Training  5. Home Exercise Program (HEP)  6. Therapeutic Activites  7. Therapeutic Exercise/Strengthening  8. Transfer Training  9. Group Therapy      TREATMENT PLAN: Frequency/Duration: twice daily for duration of hospital stay  Rehabilitation Potential For Stated Goals: EXCELLENT      RECOMMENDED REHABILITATION/EQUIPMENT: (at time of discharge pending progress): Continue Skilled Therapy and Home Health: Physical Therapy. HISTORY:   History of Present Injury/Illness (Reason for Referral):  Patient is status post above  Past Medical History/Comorbidities:   Mr. Ahmet Munguia  has a past medical history of Allergic rhinitis, cause unspecified (3/7/2014); CAD, multiple vessel (2/28/2017); Cancer ([facility] [address] (1979); Contact dermatitis and other eczema, due to unspecified cause (3/7/2014); Depressed left ventricular ejection fraction (2/27/2017); Esophageal reflux (3/7/2014); GERD (gastroesophageal reflux disease); Ischemic cardiomyopathy (2/28/2017); NSTEMI (non-ST elevated myocardial infarction) ([facility] [address] (2/27/2017); Other and unspecified hyperlipidemia (3/7/2014); Other specified idiopathic peripheral neuropathy (3/7/2014);  Retinopathy of both eyes; Type II or unspecified type diabetes mellitus with neurological manifestations, uncontrolled (3/7/2014); Undiagnosed cardiac murmurs (3/7/2014); Unspecified essential hypertension (3/7/2014); and Vitre[facility] hemorrhage, [facility] (2/27/2017). Mr. Duglas Overton  has a past surgical history that includes urological (Left, 1979); urological (Left, 2010); and heent (2003). Social History/Living Environment:   Home Environment: Private residence  # Steps to Enter: 2  Rails to Enter: No  One/Two Story Residence: One story  Living Alone: No  Support Systems: Spouse/Significant Other/Partner  Patient Expects to be Discharged to[de-identified] Private residence  Current DME Used/Available at Home: None  Tub or Shower Type: Tub/Shower combination  Prior Level of Function/Work/Activity:  Patient lives with spouse in one story home with a few stairs through out the home. Patient was independent and active prior to admission. Personal Factors:          Patient had radiation for cancer   Number of Personal Factors/Comorbidities that affect the Plan of Care: 1-2: MODERATE COMPLEXITY   EXAMINATION:   Most Recent Physical Functioning:   Gross Assessment:                  Posture:  Posture (WDL): Exceptions to WDL  Posture Assessment: Forward head, Rounded shoulders  Balance:  Sitting: Intact  Standing: Impaired  Standing - Static: Good  Standing - Dynamic : Fair (+) Bed Mobility:     Wheelchair Mobility:     Transfers:  Sit to Stand: Supervision  Stand to Sit: Supervision  Gait:     Base of Support: Widened  Step Length: Left shortened;Right shortened  Gait Abnormalities: Decreased step clearance  Distance (ft): 125 Feet (ft) (x 4 with multiple rest breaks)  Assistive Device:  (none)  Ambulation - Level of Assistance: Contact guard assistance  Interventions: Safety awareness training;Verbal cues       Body Structures Involved:  1. Heart  2. Lungs  3. Thoracic Cage Body Functions Affected:  1. Cardio  2.  Movement Related Activities and Participation Affected:  1. General Tasks and Demands  2. Mobility  3. Domestic Life   Number of elements that affect the Plan of Care: 4+: HIGH COMPLEXITY   CLINICAL PRESENTATION:   Presentation: Stable and uncomplicated: LOW COMPLEXITY   CLINICAL DECISION MAKIN [facility] [address] [id number] AM-PAC 6 Clicks   Basic Mobility Inpatient Short Form  How much difficulty does the patient currently have. .. Unable A Lot A Little None   1. Turning over in bed (including adjusting bedclothes, sheets and blankets)? [ ] 1   [ ] 2   [X] 3   [ ] 4   2. Sitting down on and standing up from a chair with arms ( e.g., wheelchair, bedside commode, etc.)   [ ] 1   [ ] 2   [X] 3   [ ] 4   3. Moving from lying on back to sitting on the side of the bed? [ ] 1   [ ] 2   [X] 3   [ ] 4   How much help from another person does the patient currently need. .. Total A Lot A Little None   4. Moving to and from a bed to a chair (including a wheelchair)? [ ] 1   [ ] 2   [X] 3   [ ] 4   5. Need to walk in hospital room? [ ] 1   [ ] 2   [X] 3   [ ] 4   6. Climbing 3-5 steps with a railing? [X] 1   [ ] 2   [ ] 3   [ ] 4   © , Trustees of [address] [facility] [address] [address], under license to [email or web address]. All rights reserved    Score:  Initial: 18 Most Recent:16 (Date: 3/7/17 )     Interpretation of Tool:  Represents activities that are increasingly more difficult (i.e. Bed mobility, Transfers, Gait).        Score 24 23 22-20 19-15 14-10 9-7 6       Modifier CH CI CJ CK CL CM CN         · Mobility - Walking and Moving Around:               - CURRENT STATUS:    CK - 40%-59% impaired, limited or restricted               - GOAL STATUS:           CJ - 20%-39% impaired, limited or restricted               - D/C STATUS:                       ---------------To be determined---------------  Payor: BLUE CHOICE [place] / Product Type: HMO /       Medical Necessity:     · Patient demonstrates excellent rehab potential due to higher previous functional level. · Skilled intervention continues to be required due to decline in overall functional mobility. Reason for Services/Other Comments:  · Patient continues to require skilled intervention due to medical complications and patient unable to attend/participate in therapy as expected. Use of outcome tool(s) and clinical judgement create a POC that gives a: Clear prediction of patient's progress: LOW COMPLEXITY                 TREATMENT:      Pre-treatment Symptoms/Complaints:  \"I am ready to go home. \"  Pain: Initial:   Pain Intensity 1: 0  Post Session:  0/10        Most Recent Physical Functioning:   Gross Assessment:  AROM: Within functional limits (RUE (LUE not tested due to precautions))  Strength: Generally decreased, functional (RUE (LUE not testesd due to precautions))  Coordination: Within functional limits  Sensation: Intact  Posture:  Posture (WDL): Exceptions to WDL  Posture Assessment: Forward head, Rounded shoulders  Balance:  Sitting: Intact  Standing: Impaired  Standing - Static: Good  Standing - Dynamic : Fair (+)  Bed Mobility:     Wheelchair Mobility:     Transfers:  Sit to Stand: Supervision  Stand to Sit: Supervision  Gait:  Base of Support: Widened  Step Length: Left shortened;Right shortened  Gait Abnormalities: Decreased step clearance  Ambulation - Level of Assistance: Contact guard assistance  Distance (ft): 125 Feet (ft) (x 4 with multiple rest breaks)  Assistive Device:  (none)  Interventions: Safety awareness training;Verbal cues  Gait:     Base of Support: Widened  Step Length: Left shortened;Right shortened  Gait Abnormalities: Decreased step clearance  Distance (ft): 125 Feet (ft) (x 4 with multiple rest breaks)  Assistive Device:  (none)  Ambulation - Level of Assistance: Contact guard assistance  Interventions: Safety awareness training;Verbal cues           Therapeutic Activity: (    12 Minutes):   Therapeutic activities including Chair transfers and Ambulation on level ground to improve mobility, strength and balance. Required minimal Safety awareness training;Verbal cues to promote static and dynamic balance in standing. Therapeutic Exercise: (13 Minutes):  Exercises per grid below to improve mobility, strength and activity tolerance. Required minimal verbal cues to promote proper body alignment and promote proper body breathing techniques. Progressed repetitions and complexity of movement as indicated. Date:  3-8-17 Date:  3-9-17 Date:  3-10-17   ACTIVITY/EXERCISE AM PM AM PM AM PM   Ambulation:           Distance  Device  Duration         Seated Heel Raises x20 x20 x25      Seated Toe Raises x20 x20 x25      Seated Long Arc Quads x20 x20 x25 x25     Seated Marching x20 x20 x25      Seated Hip Abduction x20 x20 x25      Standing heel/toe raises   x15 x15 x10    Standing marching    x10     Standing hip extension    x10 x10    Standing hip abduction    x10     Standing mini squats     x10    Standing hamstring curls     x10                               B = bilateral; AA = active assistive; A = active; P = passive     Braces/Orthotics/Lines/Etc:   · None  Treatment/Session Assessment:    · Response to Treatment:  Patient tolerated treatment well  · Interdisciplinary Collaboration:  · Physical Therapy Assistant and Registered Nurse  · After treatment position/precautions:  · Up in chair, Bed/Chair-wheels locked, Call light within reach and RN notified  · Compliance with Program/Exercises: compliant all of the time. · Recommendations/Intent for next treatment session: \"Next visit will focus on advancements to more challenging activities and reduction in assistance provided\".   Total Treatment Duration:  PT Patient Time In/Time Out  Time In: 0930  Time Out: 2000 [facility]

## 2017-03-10 NOTE — ADT AUTH CERT NOTES
Utilization Review           Myocardial Infarction - Care Day 12 (3/10/2017) by Kimberly Miller RN        Review Entered Review Status       3/10/2017 Completed       Details              Care Day: 12 Care Date: 3/10/2017 Level of Care: Step Down       Guideline Day 3        Clinical Status       (X) * Hemodynamic stability       (X) * Chest pain, dyspnea, or anginal equivalent absent       (X) * No evidence of bleeding or recurrent myocardial ischemia       (X) * Dangerous arrhythmia absent       (X) * Vascular access site without evidence of infection, aneurysm, or growing hematoma       (X) * Renal function at baseline or acceptable for next level of care       ( ) * Discharge plans and education understood              Activity       (X) * Ambulatory              Routes       (X) * Oral hydration, medications, and diet       (X) Heart-healthy diet              Medications       (X) * Anticoagulation absent       (X) Aspirin       (X) Beta-blocker       (X) Probable ACE inhibitor or angiotensin receptor blocker       (X) Statin                                   * Milestone              Additional Notes       CONT STAY REVIEW FOR 3/10/17              CV Progress Note               Subjective:       Admit Date: 2/27/2017               Surgery Date:  10 Days Post-Op                 Procedure: Procedure(s):       ULTRASOUND       THORACENTESIS               Incisional pain: minimal       Appetite: Regular diet               Recurrent vfib arrest last this past weekend, ICD in place       Stable on RA       BM+       Fatigue noted in PT notes.  Assist with ambulation       Sternum stable incision clean dry and in tact, ICD under occlusive dressing       +Leg edema                       Plan/Recommendations/Medical Decision Making:               Principal Problem:       NSTEMI (non-ST elevated myocardial infarction) ([address] (2/27/2017)               Active Problems:       DM (diabetes mellitus) type II controlled, neurological manifestation ([address] (3/7/2014)               Essential hypertension, benign (3/7/2014)               Hypertriglyceridemia (2/27/2017)               Depressed left ventricular ejection fraction (2/27/2017)       Overview: 2/27/17 20%               Ischemic cardiomyopathy (2/28/2017)       Overview: EF 10-15% echo 2/27/17               CAD, multiple vessel (2/28/2017)       Overview: 2/28/17 (Dr Mackenzie Sutherland) Coronary artery bypass grafting x3. Grafts        consisting of        1. Left internal mammary artery to the left anterior descending.        2. Reversed LEFT saphenous vein graft to obtuse marginal.        3. Reversed LEFT saphenous vein graft to the left posterior descending        artery.                S/P CABG (coronary artery bypass graft) (2/28/2017)               Postoperative anemia due to acute blood loss (3/1/2017)               Pleural effusion (3/2/2017)       Overview: 3/7/17       Left thoracentesis with 600 ml removed        Right thoracentesis with 1350 ml removed                Hypoxia (3/2/2017)               Cardiogenic shock ([address] (3/2/2017)               Systolic CHF, acute on chronic ([address] (3/3/2017)               Cardiac arrest with ventricular fibrillation ([address] (3/5/2017)       Overview: 3/4/16               ICD (implantable cardioverter-defibrillator) in place (3/6/2017)       Overview: 1.  [employer] MRI implantable cardioverter defibrillator - March 2017                ICD in place       Off oxygen       Monitor sternum (CPR compression)       Slow progress would benefit from [facility]- insurance approval pending       GLUCOSE,FAST - POC: 190 (H)          TELE, NSR, VSS, PAIN 7/10,  DM DIET, GLUC QID, WT QD, SCDS, TEDS,  CORDARONE PO, ASA, LIPITOR, KEFLEX PO TID, LASIX PO, SSI, PRINIVIL, TOPROL, PERCOCET, K DUR, PEPCID PO           Myocardial Infarction - Care Day 11 (3/9/2017) by Winnie oRman RN        Review Entered Review Status       3/9/2017 Completed       Details            Care Day: 11 Care Date: 3/9/2017 Level of Care: Step Down       Guideline Day 3        Clinical Status       (X) * Hemodynamic stability       (X) * Chest pain, dyspnea, or anginal equivalent absent       (X) * No evidence of bleeding or recurrent myocardial ischemia       (X) * Dangerous arrhythmia absent       (X) * Vascular access site without evidence of infection, aneurysm, or growing hematoma       (X) * Renal function at baseline or acceptable for next level of care       ( ) * Discharge plans and education understood              Activity       (X) * Ambulatory              Routes       (X) * Oral hydration, medications, and diet       (X) Heart-healthy diet              Medications       (X) * Anticoagulation absent       (X) Aspirin       (X) Beta-blocker       (X) Probable ACE inhibitor or angiotensin receptor blocker       (X) Statin                                   * Milestone              Additional Notes       CONT STAY REVIEW FOR 3/9/17              [facility] CARDIOLOGY PROGRESS NOTE       Admit Diagnosis: Atherosclerosis of native coronary artery of native heart        Subjective:       No complaints today, no chest pain or shortness of breath       CV- regular rate and rhythm, +SM, central sternotomy scar well healing, ICD pocket well healing       Lung- clear bilaterally       Assessment:               Principal Problem:       NSTEMI (non-ST elevated myocardial infarction) ([address] (2/27/2017)               Active Problems:       DM (diabetes mellitus) type II controlled, neurological manifestation (HCC) (3/7/2014)               Essential hypertension, benign (3/7/2014)               Hypertriglyceridemia (2/27/2017)               Depressed left ventricular ejection fraction (2/27/2017)       Overview: 2/27/17 20%               Ischemic cardiomyopathy (2/28/2017)       Overview: EF 10-15% echo 2/27/17               CAD, multiple vessel (2/28/2017)       Overview: 2/28/17 (Dr Raeann Fajardo) Coronary artery bypass grafting x3. Grafts        consisting of        1. Left internal mammary artery to the left anterior descending.        2. Reversed LEFT saphenous vein graft to obtuse marginal.        3. Reversed LEFT saphenous vein graft to the left posterior descending        artery.                S/P CABG (coronary artery bypass graft) (2/28/2017)               Postoperative anemia due to acute blood loss (3/1/2017)               Pleural effusion (3/2/2017)       Overview: 3/7/17       Left thoracentesis with 600 ml removed        Right thoracentesis with 1350 ml removed                Hypoxia (3/2/2017)               Cardiogenic shock ([address] (3/2/2017)               Systolic CHF, acute on chronic ([facility] [address] (3/3/2017)               Cardiac arrest with ventricular fibrillation ([facility] [address] (3/5/2017)       Overview: 3/4/16               ICD (implantable cardioverter-defibrillator) in place (3/6/2017)       Overview: 1. [employer] MRI implantable cardioverter defibrillator - March 2017        Plan:       1. V. Fib arrest -Patient is >5 days post op with recurrent V. Fib arrest. Did not respond to po Amio. Changed to iv that stabilized V. Fib. Now back on PO. Doing well s/p ICD implant.        2. Chronic systolic heart failure - EF pre-op was 10-15%, Post-op EF was 20-25%. Add Lisinopril 5mg po qam yesterday. On Toprol XL       3. CAD - Post CABG - Lipitor 80mg, Toprol XL and Lisinopril       4.  Discharge planning - Patient from the Ep/Cardiology standpoint should be stable for rehab.       WBC: 8.1       RBC: 3.41 (L)       HGB: 10.0 (L)       HCT: 30.6 (L)       TELE, NSR/BBB, DM DIET, GLUC QID, WT QD, SCDS, TEDS,  CORDARONE PO, ASA, LIPITOR, KEFLEX PO TID, LASIX PO, SSI, PRINIVIL, TOPROL, PERCOCET, K DUR, PEPCID PO, PRINIVIL           Myocardial Infarction - Care Day 10 (3/8/2017) by Douglas Meneses RN        Review Entered Review Status       3/8/2017 Completed       Details              Care Day: 10 Care Date: 3/8/2017 Level of Care: Step Down       Guideline Day 2        Level Of Care       (X) Transfer to intermediate care or telemetry              Clinical Status       (X) * Hemodynamic stability       (X) * Chest pain, dyspnea, or anginal equivalent absent              Activity       (X) Activity as tolerated       (X) Possibly begin ambulation              Routes       (X) * Oral hydration, medications       (X) Parenteral medications       (X) Heart-healthy diet as tolerated              Interventions       (X) * Oxygen absent              Medications       (X) * IV nitroglycerin absent       (X) Aspirin       (X) Beta-blocker       (X) Probable ACE inhibitor or angiotensin receptor blocker       (X) Statin medication                                   * Milestone              Additional Notes       CONT STAY REVIEW FOR 3/8/17              CV Progress Note               Subjective:       Admit Date: 2/27/2017               Surgery Date:  8 Days Post-Op                 Procedure: Procedure(s):       ULTRASOUND       THORACENTESIS               Incisional pain: minimal       Appetite: Regular diet               Recurrent vfib arrest last over weekend, ICD in place       L>R thora yesterday       Ambulated 30' yesterday        Sternum stable incision clean dry and in tact, ICD under occlusive dressing       +Leg edema                       Plan/Recommendations/Medical Decision Making:               Principal Problem:       NSTEMI (non-ST elevated myocardial infarction) ([facility] [address] (2/27/2017)               Active Problems:       DM (diabetes mellitus) type II controlled, neurological manifestation (HCC) (3/7/2014)               Essential hypertension, benign (3/7/2014)               Hypertriglyceridemia (2/27/2017)               Depressed left ventricular ejection fraction (2/27/2017)       Overview: 2/27/17 20%               Ischemic cardiomyopathy (2/28/2017)       Overview: EF 10-15% echo 2/27/17               CAD, multiple vessel (2/28/2017)       Overview: 2/28/17 (Dr Irma Ghosh) Coronary artery bypass grafting x3. Grafts        consisting of        1. Left internal mammary artery to the left anterior descending.        2. Reversed LEFT saphenous vein graft to obtuse marginal.        3. Reversed LEFT saphenous vein graft to the left posterior descending        artery.                S/P CABG (coronary artery bypass graft) (2/28/2017)               Postoperative anemia due to acute blood loss (3/1/2017)               Pleural effusion (3/2/2017)       Overview: 3/7/17       Left thoracentesis with 600 ml removed        Right thoracentesis with 1350 ml removed                Hypoxia (3/2/2017)               Cardiogenic shock ([address] (3/2/2017)               Systolic CHF, acute on chronic ([address] (3/3/2017)               Cardiac arrest with ventricular fibrillation ([facility] [address] (3/5/2017)       Overview: 3/4/16               ICD (implantable cardioverter-defibrillator) in place (3/6/2017)       Overview: 1. [employer] MRI implantable cardioverter defibrillator - March 2017                       ICD in place. Post R/L thora yesterday       Monitor sternum       Slow progress would benefit from [facility]- will request consult       WBC: 9.3       RBC: 3.77 (L)       HGB: 10.8 (L)       HCT: 33.4 (L)        Glucose: 140 (H)       BUN: 31 (H)       VSS, PAIN 5/10, MONITOR-NSR-PACED,  DM DIET, OT AND REHAB CONSULTS, GLUC QID, WT QD, SCDS,        CORDARONE PO, ASA.  LIPITOR, KEFLEX PO TID, LASIX PO, SSI, PRINIVIL, TOPROL, PERCOCET,       K DUR, PEPCID PO

## 2017-03-10 NOTE — PROGRESS NOTES
CV Progress Note    Subjective:   Admit Date: 2017    Surgery Date:  10 Days Post-Op      Procedure:  Procedure(s):  ULTRASOUND  THORACENTESIS    Incisional pain:  minimal  Appetite:  Regular diet     Recurrent vfib arrest last this past weekend, ICD in place  Stable on RA  BM+  Fatigue noted in PT notes. Assist with ambulation      Objective:     Vitals:  Blood pressure 140/68, pulse 75, temperature 97.7 °F (36.5 °C), resp. rate 16, height 5' 10\" (1.778 m), weight 201 lb (91.2 kg), SpO2 96 %. Temp (24hrs), Av.2 °F (36.8 °C), Min:97.5 °F (36.4 °C), Max:99.1 °F (37.3 °C)  Neuro in tact  RRR  Breath sounds dimished at bases  Belly soft  Sternum stable incision clean dry and in tact, ICD under occlusive dressing  +Leg edema      Plan/Recommendations/Medical Decision Making:     Principal Problem:    NSTEMI (non-ST elevated myocardial infarction) ([address] (2017)    Active Problems:    DM (diabetes mellitus) type II controlled, neurological manifestation ([address] (3/7/2014)      Essential hypertension, benign (3/7/2014)      Hypertriglyceridemia (2017)      Depressed left ventricular ejection fraction (2017)      Overview: 17 20%      Ischemic cardiomyopathy (2017)      Overview: EF 10-15% echo 17      CAD, multiple vessel (2017)      Overview: 17 (Dr Lady Canales) Coronary artery bypass grafting x3. Grafts       consisting of       1. Left internal mammary artery to the left anterior descending. 2. Reversed LEFT saphenous vein graft to obtuse marginal.       3. Reversed LEFT saphenous vein graft to the left posterior descending       artery.        S/P CABG (coronary artery bypass graft) (2017)      Postoperative anemia due to acute blood loss (3/1/2017)      Pleural effusion (3/2/2017)      Overview: 3/7/17      Left thoracentesis with 600 ml removed       Right thoracentesis with 1350 ml removed       Hypoxia (3/2/2017)      Cardiogenic shock ([address] (3/2/2017) Systolic CHF, acute on chronic (HCC) (3/3/2017)      Cardiac arrest with ventricular fibrillation ([facility] [address] (3/5/2017)      Overview: 3/4/16      ICD (implantable cardioverter-defibrillator) in place (3/6/2017)      Overview: 1. [employer]ronik MRI implantable cardioverter defibrillator - March 2017      ICD in place  Off oxygen  Monitor sternum (CPR compression)  Slow progress would benefit from [facility]- insurance approval pending              3/11/2017     1:35 PM    I have personally seen and examined Carolina Chaudhry with  MILLIE Perez. I agree and confirm findings in history, physical exam, and assessment/plan as outlined above, except as noted below.     Law Bragg MD

## 2017-03-10 NOTE — PROGRESS NOTES
Problem: Mobility Impaired (Adult and Pediatric)  Goal: *Acute Goals and Plan of Care (Insert Text)  LTG: (reviewed and updated 3/7/17)  (1.)Mr. Manoj Her will move from supine to sit and sit to supine , scoot up and down and roll side to side in bed with MODIFIED INDEPENDENCE within 7 day(s). (2.)Mr. Manoj Her will transfer from bed to chair and chair to bed with MODIFIED INDEPENDENCE using the least restrictive device within 7 day(s). (3.)Mr. Manoj Her will ambulate with MODIFIED INDEPENDENCE for 500+ feet with the least restrictive device within 7 day(s). (4.)Mr. Manoj Her will perform standing static and dynamic balance activities x 8 minutes with SUPERVISION to improve safety within 7 day(s). (5.)Mr. Manoj Her will ascend and descend 5 stairs using one hand rail(s) with SUPERVISION to improve functional mobility and safety within 7 day(s). (6.)Mr. Manoj Her will tolerate 25 minutes of therapeutic activity/exercises within 7 day(s). (7.)Mr. Manoj Her will perform transfers and ambulation without a decrease in SBP >20 and DBP >10 within 7 days. ________________________________________________________________________________________________     PHYSICAL THERAPY: Daily Note, Treatment Day: 3rd and PM 3/10/2017  INPATIENT: Hospital Day: 12  Payor: BLUE CHOICE / [place] / Product Type: HMO /      NAME/AGE/GENDER: Dennis Alex is a 63 y.o. male      PRIMARY DIAGNOSIS: Atherosclerosis of native coronary artery of native heart with unstable angina pectoris (HCC) [I25.110]  Pleural effusion [J90] NSTEMI (non-ST elevated myocardial infarction) ([address] NSTEMI (non-ST elevated myocardial infarction) ([facility])  Procedure(s) (LRB):  ULTRASOUND (Bilateral)  THORACENTESIS (N/A)  3 Days Post-Op  ICD-10: Treatment Diagnosis:       · Generalized Muscle Weakness (M62.81)  · Difficulty in walking, Not elsewhere classified (R26.2)   Precaution/Allergies:  Review of patient's allergies indicates no known allergies. ASSESSMENT:      Mr. Leo Moreau presents in bed and agreeable to treatment this PM.  He ambulated 500' and up and down stairs. When he returned to his room he performed bilateral LE ex in standing with one seated rest break. Returned to chair in the room. Will continue to advance plan of care as he can tolerate it. This section established at most recent assessment   PROBLEM LIST (Impairments causing functional limitations):  1. Decreased Strength  2. Decreased Transfer Abilities  3. Decreased Ambulation Ability/Technique  4. Decreased Balance  5. Decreased Activity Tolerance  6. Increased Shortness of Breath  7. Decreased Knowledge of Precautions  8. Decreased [place] with Home Exercise Program    INTERVENTIONS PLANNED: (Benefits and precautions of physical therapy have been discussed with the patient.)  1. Balance Exercise  2. Bed Mobility  3. Family Education  4. Gait Training  5. Home Exercise Program (HEP)  6. Therapeutic Activites  7. Therapeutic Exercise/Strengthening  8. Transfer Training  9. Group Therapy      TREATMENT PLAN: Frequency/Duration: twice daily for duration of hospital stay  Rehabilitation Potential For Stated Goals: EXCELLENT      RECOMMENDED REHABILITATION/EQUIPMENT: (at time of discharge pending progress): Continue Skilled Therapy and Home Health: Physical Therapy. HISTORY:   History of Present Injury/Illness (Reason for Referral):  Patient is status post above  Past Medical History/Comorbidities:   Mr. Leo Moreau  has a past medical history of Allergic rhinitis, cause unspecified (3/7/2014); CAD, multiple vessel (2/28/2017); Cancer ([facility] [address] (1979); Contact dermatitis and other eczema, due to unspecified cause (3/7/2014); Depressed left ventricular ejection fraction (2/27/2017); Esophageal reflux (3/7/2014); GERD (gastroesophageal reflux disease); Ischemic cardiomyopathy (2/28/2017); NSTEMI (non-ST elevated myocardial infarction) ([facility] [address] (2/27/2017);  Other and unspecified hyperlipidemia (3/7/2014); Other specified idiopathic peripheral neuropathy (3/7/2014); Retinopathy of both eyes; Type II or unspecified type diabetes mellitus with neurological manifestations, uncontrolled (3/7/2014); Undiagnosed cardiac murmurs (3/7/2014); Unspecified essential hypertension (3/7/2014); and Vitreous hemorrhage, unspecified eye ([facility] [address] (2/27/2017). Mr. David Beaulieu  has a past surgical history that includes urological (Left, 1979); urological (Left, 2010); and heent (2003). Social History/Living Environment:   Home Environment: Private residence  # Steps to Enter: 2  Rails to Enter: No  One/Two Story Residence: One story  Living Alone: No  Support Systems: Spouse/Significant Other/Partner  Patient Expects to be Discharged to[de-identified] Private residence  Current DME Used/Available at Home: None  Tub or Shower Type: Tub/Shower combination  Prior Level of Function/Work/Activity:  Patient lives with spouse in one story home with a few stairs through out the home. Patient was independent and active prior to admission. Personal Factors:          Patient had radiation for cancer   Number of Personal Factors/Comorbidities that affect the Plan of Care: 1-2: MODERATE COMPLEXITY   EXAMINATION:   Most Recent Physical Functioning:   Gross Assessment:                  Posture:  Posture (WDL): Exceptions to WDL  Posture Assessment:  Forward head, Rounded shoulders  Balance:  Sitting: Intact  Standing: Impaired  Standing - Static: Good  Standing - Dynamic : Fair Bed Mobility:  Supine to Sit: Supervision  Wheelchair Mobility:     Transfers:  Sit to Stand: Supervision  Stand to Sit: Supervision  Gait:     Base of Support: Widened  Step Length: Left shortened;Right shortened  Gait Abnormalities: Decreased step clearance;Trunk sway increased  Distance (ft): 500 Feet (ft)  Ambulation - Level of Assistance: Stand-by asssistance  Interventions: Safety awareness training;Verbal cues       Body Structures Involved:  1. Heart  2. Lungs  3. Thoracic Cage Body Functions Affected:  1. Cardio  2. Movement Related Activities and Participation Affected:  1. General Tasks and Demands  2. Mobility  3. Domestic Life   Number of elements that affect the Plan of Care: 4+: HIGH COMPLEXITY   CLINICAL PRESENTATION:   Presentation: Stable and uncomplicated: LOW COMPLEXITY   CLINICAL DECISION MAKING:   [facility] AM-PAC 6 Clicks   Basic Mobility Inpatient Short Form  How much difficulty does the patient currently have. .. Unable A Lot A Little None   1. Turning over in bed (including adjusting bedclothes, sheets and blankets)? [ ] 1   [ ] 2   [X] 3   [ ] 4   2. Sitting down on and standing up from a chair with arms ( e.g., wheelchair, bedside commode, etc.)   [ ] 1   [ ] 2   [X] 3   [ ] 4   3. Moving from lying on back to sitting on the side of the bed? [ ] 1   [ ] 2   [X] 3   [ ] 4   How much help from another person does the patient currently need. .. Total A Lot A Little None   4. Moving to and from a bed to a chair (including a wheelchair)? [ ] 1   [ ] 2   [X] 3   [ ] 4   5. Need to walk in hospital room? [ ] 1   [ ] 2   [X] 3   [ ] 4   6. Climbing 3-5 steps with a railing? [X] 1   [ ] 2   [ ] 3   [ ] 4   © 2007, Trustees of [facility], under license to [employer]. All rights reserved    Score:  Initial: 18 Most Recent:16 (Date: 3/7/17 )     Interpretation of Tool:  Represents activities that are increasingly more difficult (i.e. Bed mobility, Transfers, Gait).        Score 24 23 22-20 19-15 14-10 9-7 6       Modifier CH CI CJ CK CL CM CN         · Mobility - Walking and Moving Around:               - CURRENT STATUS:    CK - 40%-59% impaired, limited or restricted               - GOAL STATUS:           CJ - 20%-39% impaired, limited or restricted               - D/C STATUS:                       ---------------To be determined---------------  Payor: BLUE CHOICE [place] / Product Type: HMO /       Medical Necessity:     · Patient demonstrates excellent rehab potential due to higher previous functional level. · Skilled intervention continues to be required due to decline in overall functional mobility. Reason for Services/Other Comments:  · Patient continues to require skilled intervention due to medical complications and patient unable to attend/participate in therapy as expected. Use of outcome tool(s) and clinical judgement create a POC that gives a: Clear prediction of patient's progress: LOW COMPLEXITY                 TREATMENT:      Pre-treatment Symptoms/Complaints:  \"I'm okay. \"  Pain: Initial:      Post Session:  0/10        Most Recent Physical Functioning:           Therapeutic Activity: (    10 Minutes): Therapeutic activities including Chair transfers, up and down steps, and Ambulation on level ground to improve mobility, strength and balance. Required minimal Safety awareness training;Verbal cues to promote static and dynamic balance in standing. Therapeutic Exercise: (13 Minutes):  Exercises per grid below to improve mobility, strength and activity tolerance. Required minimal verbal cues to promote proper body alignment and promote proper body breathing techniques. Progressed repetitions and complexity of movement as indicated.        Date:  3-8-17 Date:  3-9-17 Date:  3-10-17   ACTIVITY/EXERCISE AM PM AM PM AM PM   Ambulation:           Distance  Device  Duration         Seated Heel Raises x20 x20 x25      Seated Toe Raises x20 x20 x25      Seated Long Arc Quads x20 x20 x25 x25     Seated Marching x20 x20 x25      Seated Hip Abduction x20 x20 x25      Standing heel/toe raises   x15 x15 x10 X 10   Standing marching    x10  X 10   Standing hip extension    x10 x10 X 10   Standing hip abduction    x10  X 10   Standing mini squats     x10 X 10   Standing hamstring curls     x10                               B = bilateral; AA = active assistive; A = active; P = passive     Braces/Orthotics/Lines/Etc:   · None  Treatment/Session Assessment:    · Response to Treatment:  Patient tolerated treatment well  · Interdisciplinary Collaboration:  · Physical Therapy Assistant and Registered Nurse  · After treatment position/precautions:  · Up in chair, Bed/Chair-wheels locked, Call light within reach and RN notified  · Compliance with Program/Exercises: compliant all of the time. · Recommendations/Intent for next treatment session: \"Next visit will focus on advancements to more challenging activities and reduction in assistance provided\".   Total Treatment Duration:  PT Patient Time In/Time Out  Time In: 1329  Time Out: 1000 [facility]

## 2017-03-10 NOTE — PROGRESS NOTES
Pt temp 99.1. Pt instructed to use incentive spirometry, however wife at bedside states \"I took that home a while ago\". Pt given new IS and pulling 750 mL. Pt and wife educated on using IS everyday and verbalized understanding.

## 2017-03-10 NOTE — PROGRESS NOTES
[facility] CARDIOLOGY PROGRESS NOTE           3/10/2017 4:22 PM    Admit Date: 2/27/2017    Admit Diagnosis: Atherosclerosis of native coronary artery of native heart w*      Subjective:   Patient remains cardiac stable. No V. Fib events. Objective:     Vitals:    03/10/17 0506 03/10/17 0715 03/10/17 1145 03/10/17 1500   BP:  140/68 116/63 108/62   Pulse:  75 75 75   Resp:  16 16 16   Temp:  97.7 °F (36.5 °C) 98.4 °F (36.9 °C) 98.4 °F (36.9 °C)   SpO2:  96% 96% 94%   Weight: 201 lb (91.2 kg)      Height:           Physical Exam:  General-Well Developed, Well Nourished, No Acute Distress, Alert & Oriented x 3, appropriate mood. Neck- supple, no JVD  CV- regular rate and rhythm no MRG  Lung- clear bilaterally  Abd- soft, nontender, nondistended  Ext- no edema bilaterally.   Skin- warm and dry    Current Facility-Administered Medications   Medication Dose Route Frequency    magnesium hydroxide (MILK OF MAGNESIA) 400 mg/5 mL oral suspension 30 mL  30 mL Oral DAILY PRN    lisinopril (PRINIVIL, ZESTRIL) tablet 2.5 mg  2.5 mg Oral DAILY    insulin lispro (HUMALOG) injection   SubCUTAneous AC&HS    cephALEXin (KEFLEX) capsule 500 mg  500 mg Oral TID    famotidine (PEPCID) tablet 20 mg  20 mg Oral Q12H    metoprolol succinate (TOPROL-XL) XL tablet 50 mg  50 mg Oral Q12H    furosemide (LASIX) tablet 40 mg  40 mg Oral DAILY    tapentadol (NUCYNTA) tablet 100 mg  100 mg Oral Q6H PRN    potassium chloride (K-DUR, KLOR-CON) SR tablet 20 mEq  20 mEq Oral DAILY    sodium chloride (NS) flush 5-10 mL  5-10 mL IntraVENous Q8H    sodium chloride (NS) flush 5-10 mL  5-10 mL IntraVENous PRN    cyclobenzaprine (FLEXERIL) tablet 5 mg  5 mg Oral TID PRN    docusate sodium (COLACE) capsule 100 mg  100 mg Oral DAILY    alum-mag hydroxide-simeth (MYLANTA) oral suspension 30 mL  30 mL Oral Q4H PRN    ondansetron (ZOFRAN) injection 4 mg  4 mg IntraVENous Q6H PRN    acetaminophen (TYLENOL) tablet 650 mg  650 mg Oral Q4H PRN    atorvastatin (LIPITOR) tablet 80 mg  80 mg Oral QHS    amiodarone (CORDARONE) tablet 200 mg  200 mg Oral Q12H    aspirin delayed-release tablet 81 mg  81 mg Oral DAILY    nitroglycerin (NITROLINGUAL) sublingual 0.4 mg/spray  1 Spray SubLINGual Q5MIN PRN    baclofen (LIORESAL) tablet 10 mg  10 mg Oral TID PRN    oxyCODONE-acetaminophen (PERCOCET) 5-325 mg per tablet 1 Tab  1 Tab Oral Q4H PRN    oxyCODONE-acetaminophen (PERCOCET 10)  mg per tablet 1 Tab  1 Tab Oral Q4H PRN     Data Review:   Recent Results (from the past 24 hour(s))   GLUCOSE, POC    Collection Time: 03/09/17  4:45 PM   Result Value Ref Range    Glucose (POC) 131 (H) 65 - 100 mg/dL   GLUCOSE, POC    Collection Time: 03/09/17  8:08 PM   Result Value Ref Range    Glucose (POC) 192 (H) 65 - 100 mg/dL   GLUCOSE, POC    Collection Time: 03/10/17  6:38 AM   Result Value Ref Range    Glucose (POC) 131 (H) 65 - 100 mg/dL   GLUCOSE, POC    Collection Time: 03/10/17 10:28 AM   Result Value Ref Range    Glucose (POC) 190 (H) 65 - 100 mg/dL     Assessment:     Principal Problem:    NSTEMI (non-ST elevated myocardial infarction) ([facility] (2/27/2017)    Active Problems:    DM (diabetes mellitus) type II controlled, neurological manifestation ([facility] (3/7/2014)      Essential hypertension, benign (3/7/2014)      Hypertriglyceridemia (2/27/2017)      Depressed left ventricular ejection fraction (2/27/2017)      Overview: 2/27/17 20%      Ischemic cardiomyopathy (2/28/2017)      Overview: EF 10-15% echo 2/27/17      CAD, multiple vessel (2/28/2017)      Overview: 2/28/17 (Dr Surya Rebollar) Coronary artery bypass grafting x3. Grafts       consisting of       1. Left internal mammary artery to the left anterior descending. 2. Reversed LEFT saphenous vein graft to obtuse marginal.       3. Reversed LEFT saphenous vein graft to the left posterior descending       artery.        S/P CABG (coronary artery bypass graft) (2/28/2017)      Postoperative anemia due to acute blood loss (3/1/2017)      Pleural effusion (3/2/2017)      Overview: 3/7/17      Left thoracentesis with 600 ml removed       Right thoracentesis with 1350 ml removed       Hypoxia (3/2/2017)      Cardiogenic shock (HCC) (3/4/2493)      Systolic CHF, acute on chronic ([facility] [address] (3/3/2017)      Cardiac arrest with ventricular fibrillation ([facility] [address] (3/5/2017)      Overview: 3/4/16      ICD (implantable cardioverter-defibrillator) in place (3/6/2017)      Overview: 1. [employer] MRI implantable cardioverter defibrillator - March 2017      Plan:   1. V. Fib arrest -Patient was >5 days post op with recurrent V. Fib arrest. Did not respond to po Amio. Changed to iv that stabilized V. Fib. Now back on PO. No evidence of active ischemia. Post device implant we changed coreg to Toprol XL. 2. Chronic systolic heart failure - EF pre-op was 10-15%, Post-op EF was 20-25%. Added Lisinopril 5mg po qam today. On Toprol XL  3. CAD - Post CABG - Lipitor 80mg, Toprol XL and Lisinopril    Santi Ro. Elisabeth Gustafson M.D., F.A.C.C, F.H.R.S.   Cardiology/Electrophysiology

## 2017-03-11 LAB
GLUCOSE BLD STRIP.AUTO-MCNC: 136 MG/DL (ref 65–100)
GLUCOSE BLD STRIP.AUTO-MCNC: 138 MG/DL (ref 65–100)
GLUCOSE BLD STRIP.AUTO-MCNC: 150 MG/DL (ref 65–100)
GLUCOSE BLD STRIP.AUTO-MCNC: 252 MG/DL (ref 65–100)

## 2017-03-11 PROCEDURE — 74011250636 HC RX REV CODE- 250/636: Performed by: NURSE PRACTITIONER

## 2017-03-11 PROCEDURE — 74011250637 HC RX REV CODE- 250/637: Performed by: NURSE PRACTITIONER

## 2017-03-11 PROCEDURE — 97110 THERAPEUTIC EXERCISES: CPT

## 2017-03-11 PROCEDURE — 74011250637 HC RX REV CODE- 250/637: Performed by: THORACIC SURGERY (CARDIOTHORACIC VASCULAR SURGERY)

## 2017-03-11 PROCEDURE — 74011250637 HC RX REV CODE- 250/637: Performed by: INTERNAL MEDICINE

## 2017-03-11 PROCEDURE — 65660000004 HC RM CVT STEPDOWN

## 2017-03-11 PROCEDURE — 82962 GLUCOSE BLOOD TEST: CPT

## 2017-03-11 RX ADMIN — FAMOTIDINE 20 MG: 20 TABLET ORAL at 09:12

## 2017-03-11 RX ADMIN — CEPHALEXIN 500 MG: 500 CAPSULE ORAL at 09:12

## 2017-03-11 RX ADMIN — LISINOPRIL 2.5 MG: 5 TABLET ORAL at 09:12

## 2017-03-11 RX ADMIN — AMIODARONE HYDROCHLORIDE 200 MG: 200 TABLET ORAL at 20:25

## 2017-03-11 RX ADMIN — CEPHALEXIN 500 MG: 500 CAPSULE ORAL at 20:26

## 2017-03-11 RX ADMIN — POTASSIUM CHLORIDE 20 MEQ: 20 TABLET, EXTENDED RELEASE ORAL at 09:12

## 2017-03-11 RX ADMIN — ATORVASTATIN CALCIUM 80 MG: 40 TABLET, FILM COATED ORAL at 20:26

## 2017-03-11 RX ADMIN — Medication 10 ML: at 13:06

## 2017-03-11 RX ADMIN — OXYCODONE HYDROCHLORIDE AND ACETAMINOPHEN 1 TABLET: 10; 325 TABLET ORAL at 20:10

## 2017-03-11 RX ADMIN — METOPROLOL SUCCINATE 50 MG: 50 TABLET, EXTENDED RELEASE ORAL at 09:11

## 2017-03-11 RX ADMIN — FAMOTIDINE 20 MG: 20 TABLET ORAL at 20:25

## 2017-03-11 RX ADMIN — Medication 10 ML: at 05:41

## 2017-03-11 RX ADMIN — CEPHALEXIN 500 MG: 500 CAPSULE ORAL at 17:21

## 2017-03-11 RX ADMIN — Medication 10 ML: at 20:27

## 2017-03-11 RX ADMIN — CYCLOBENZAPRINE HYDROCHLORIDE 5 MG: 10 TABLET, FILM COATED ORAL at 20:25

## 2017-03-11 RX ADMIN — INSULIN LISPRO 6 UNITS: 100 INJECTION, SOLUTION INTRAVENOUS; SUBCUTANEOUS at 13:03

## 2017-03-11 RX ADMIN — METOPROLOL SUCCINATE 50 MG: 50 TABLET, EXTENDED RELEASE ORAL at 20:25

## 2017-03-11 RX ADMIN — ASPIRIN 81 MG: 81 TABLET, COATED ORAL at 09:12

## 2017-03-11 RX ADMIN — AMIODARONE HYDROCHLORIDE 200 MG: 200 TABLET ORAL at 09:12

## 2017-03-11 RX ADMIN — FUROSEMIDE 40 MG: 40 TABLET ORAL at 09:12

## 2017-03-11 NOTE — PROGRESS NOTES
CV Progress Note    Subjective:   Admit Date: 2017    Surgery Date:  11 Days Post-Op      Procedure:  Procedure(s):  ULTRASOUND  THORACENTESIS    Incisional pain:  minimal  Appetite:  Regular diet     Recurrent vfib arrest last this past weekend, ICD in place  Stable on RA  BM+  Fatigue noted in PT notes. Independent with Ambulation      Objective:     Vitals:  Blood pressure 134/73, pulse 75, temperature 98.4 °F (36.9 °C), resp. rate 16, height 5' 10\" (1.778 m), weight 200 lb (90.7 kg), SpO2 98 %. Temp (24hrs), Av.2 °F (36.8 °C), Min:97.8 °F (36.6 °C), Max:98.4 °F (36.9 °C)  Neuro in tact  RRR  Breath sounds dimished at bases  Belly soft  Sternum stable incision clean dry and in tact, ICD under occlusive dressing  +Leg edema, bilateral femoral hematomas, ecchymosis of upper thighs resolving, hard hematomas bilaterally in femoral region stable. Plan/Recommendations/Medical Decision Making:     Principal Problem:    NSTEMI (non-ST elevated myocardial infarction) ([address] (2017)    Active Problems:    DM (diabetes mellitus) type II controlled, neurological manifestation ([address] (3/7/2014)      Essential hypertension, benign (3/7/2014)      Hypertriglyceridemia (2017)      Depressed left ventricular ejection fraction (2017)      Overview: 17 20%      Ischemic cardiomyopathy (2017)      Overview: EF 10-15% echo 17      CAD, multiple vessel (2017)      Overview: 17 (Dr Irma Ghosh) Coronary artery bypass grafting x3. Grafts       consisting of       1. Left internal mammary artery to the left anterior descending. 2. Reversed LEFT saphenous vein graft to obtuse marginal.       3. Reversed LEFT saphenous vein graft to the left posterior descending       artery.        S/P CABG (coronary artery bypass graft) (2017)      Postoperative anemia due to acute blood loss (3/1/2017)      Pleural effusion (3/2/2017)      Overview: 3/7/17      Left thoracentesis with 600 ml removed       Right thoracentesis with 1350 ml removed       Hypoxia (3/2/2017)      Cardiogenic shock (HCC) (0/5/9943)      Systolic CHF, acute on chronic ([facility] [address] (3/3/2017)      Cardiac arrest with ventricular fibrillation ([facility] [address] (3/5/2017)      Overview: 3/4/16      ICD (implantable cardioverter-defibrillator) in place (3/6/2017)      Overview: 1. [employer] MRI implantable cardioverter defibrillator - March 2017      ICD in place  Off oxygen, VSS, afebrile  Monitor sternum (CPR compression)  Slow progress would benefit from [facility]- insurance approval but patient declined, prefers home. DC Monday              3/11/2017     1:34 PM    I have personally seen and examined Diana Mejias with  MILLIE Weeks. I agree and confirm findings in history, physical exam, and assessment/plan as outlined above, except as noted below.     Gualberto Macias MD

## 2017-03-11 NOTE — PROGRESS NOTES
3/11/2017 8:40 AM    Admit Date: 2/27/2017        Subjective:     Alice Mora reports feeling better Up walking He plans on going home Monday.             Objective:      Visit Vitals    /73 (BP 1 Location: Left arm, BP Patient Position: At rest)    Pulse 75    Temp 98.4 °F (36.9 °C)    Resp 16    Ht 5' 10\" (1.778 m)    Wt 90.7 kg (200 lb)    SpO2 98%    BMI 28.7 kg/m2       Physical Exam:  Heart: regular rate and rhythm  Lungs: clear to auscultation bilaterally  Extremities: extremities normal, atraumatic, no cyanosis or edema    Data Review:   Labs:    Recent Results (from the past 24 hour(s))   GLUCOSE, POC    Collection Time: 03/10/17 10:28 AM   Result Value Ref Range    Glucose (POC) 190 (H) 65 - 100 mg/dL   GLUCOSE, POC    Collection Time: 03/10/17  4:07 PM   Result Value Ref Range    Glucose (POC) 167 (H) 65 - 100 mg/dL   GLUCOSE, POC    Collection Time: 03/10/17  8:54 PM   Result Value Ref Range    Glucose (POC) 226 (H) 65 - 100 mg/dL   GLUCOSE, POC    Collection Time: 03/11/17  6:04 AM   Result Value Ref Range    Glucose (POC) 150 (H) 65 - 100 mg/dL       Telemetry: normal sinus rhythm      Assessment:     Patient Active Problem List    Diagnosis Date Noted    ICD (implantable cardioverter-defibrillator) in place stable 03/06/2017    Cardiac arrest with ventricular fibrillation (HCC) 91/62/9214    Systolic CHF, acute on chronic (HCC) 03/03/2017    Pleural effusion 03/02/2017    Hypoxia 03/02/2017    Cardiogenic shock ([facility]) 86/74/5354    Systolic CHF, chronic ([facility]) 03/02/2017    Postoperative anemia due to acute blood loss 03/01/2017    Ischemic cardiomyopathy 02/28/2017    CAD, multiple vessel 02/28/2017    S/P CABG (coronary artery bypass graft) 02/28/2017    Hypertriglyceridemia 02/27/2017    NSTEMI (non-ST elevated myocardial infarction) ([facility] [address] [facility] 02/27/2017    Depressed left ventricular ejection fraction 02/27/2017    Pure hypercholesterolemia 04/04/2016    Retinopathy of both eyes     Undiagnosed cardiac murmurs 03/07/2014    DM (diabetes mellitus) type II controlled, neurological manifestation ([address] 03/07/2014    Allergic rhinitis, cause unspecified 03/07/2014    Contact dermatitis and other eczema, due to unspecified cause 03/07/2014    Esophageal reflux 03/07/2014    Other specified idiopathic peripheral neuropathy 03/07/2014    Essential hypertension, benign 03/07/2014    History of testicular cancer 01/01/1979       Plan:   Overall doing better ICD is stable

## 2017-03-11 NOTE — PROGRESS NOTES
Problem: Mobility Impaired (Adult and Pediatric)  Goal: *Acute Goals and Plan of Care (Insert Text)  LTG: (reviewed and updated 3/7/17)  (1.)Mr. Mee White will move from supine to sit and sit to supine , scoot up and down and roll side to side in bed with MODIFIED INDEPENDENCE within 7 day(s). (2.)Mr. Mee White will transfer from bed to chair and chair to bed with MODIFIED INDEPENDENCE using the least restrictive device within 7 day(s). (3.)Mr. Mee White will ambulate with MODIFIED INDEPENDENCE for 500+ feet with the least restrictive device within 7 day(s). (4.)Mr. Mee White will perform standing static and dynamic balance activities x 8 minutes with SUPERVISION to improve safety within 7 day(s). (5.)Mr. Mee White will ascend and descend 5 stairs using one hand rail(s) with SUPERVISION to improve functional mobility and safety within 7 day(s). (6.)Mr. Mee White will tolerate 25 minutes of therapeutic activity/exercises within 7 day(s). (7.)Mr. Mee White will perform transfers and ambulation without a decrease in SBP >20 and DBP >10 within 7 days.    ________________________________________________________________________________________________     PHYSICAL THERAPY: Daily Note, Treatment Day: 4th and AM 3/11/2017  INPATIENT: Hospital Day: 13  Payor: BLUE CHOICE / [place] / Product Type: HMO /      NAME/AGE/GENDER: Cleo Marin is a 63 y.o. male      PRIMARY DIAGNOSIS: Atherosclerosis of native coronary artery of native heart with unstable angina pectoris (HCC) [I25.110]  Pleural effusion [J90] NSTEMI (non-ST elevated myocardial infarction) ([facility] [address] NSTEMI (non-ST elevated myocardial infarction) ([address]  Procedure(s) (LRB):  ULTRASOUND (Bilateral)  THORACENTESIS (N/A)  4 Days Post-Op  ICD-10: Treatment Diagnosis:       · Generalized Muscle Weakness (M62.81)  · Difficulty in walking, Not elsewhere classified (R26.2)   Precaution/Allergies:  Tomato       ASSESSMENT:      Mr. Mee White presents in bed and agreeable to treatment reporting he is doing great. He is independent in bed mobility with it flat. He walked 3 laps without incident and went up and over stair unit 6 times. After walking he performed standing exercises below all with excellent ability. He has met all goals set and will continue to exercises while here independent. This section established at most recent assessment   PROBLEM LIST (Impairments causing functional limitations):  1. Decreased Strength  2. Decreased Transfer Abilities  3. Decreased Ambulation Ability/Technique  4. Decreased Balance  5. Decreased Activity Tolerance  6. Increased Shortness of Breath  7. Decreased Knowledge of Precautions  8. Decreased [place] with Home Exercise Program    INTERVENTIONS PLANNED: (Benefits and precautions of physical therapy have been discussed with the patient.)  1. Balance Exercise  2. Bed Mobility  3. Family Education  4. Gait Training  5. Home Exercise Program (HEP)  6. Therapeutic Activites  7. Therapeutic Exercise/Strengthening  8. Transfer Training  9. Group Therapy      TREATMENT PLAN: Frequency/Duration: twice daily for duration of hospital stay  Rehabilitation Potential For Stated Goals: EXCELLENT      RECOMMENDED REHABILITATION/EQUIPMENT: (at time of discharge pending progress): Continue Skilled Therapy and Home Health: Physical Therapy. HISTORY:   History of Present Injury/Illness (Reason for Referral):  Patient is status post above  Past Medical History/Comorbidities:   Mr. Nidia Appiah  has a past medical history of Allergic rhinitis, cause unspecified (3/7/2014); CAD, multiple vessel (2/28/2017); Cancer ([facility] [address] (1979); Contact dermatitis and other eczema, due to unspecified cause (3/7/2014); Depressed left ventricular ejection fraction (2/27/2017); Esophageal reflux (3/7/2014); GERD (gastroesophageal reflux disease);  Ischemic cardiomyopathy (2/28/2017); NSTEMI (non-ST elevated myocardial infarction) ([facility] [address] (2/27/2017); Other and unspecified hyperlipidemia (3/7/2014); Other specified idiopathic peripheral neuropathy (3/7/2014); Retinopathy of both eyes; Type II or unspecified type diabetes mellitus with neurological manifestations, uncontrolled (3/7/2014); Undiagnosed cardiac murmurs (3/7/2014); Unspecified essential hypertension (3/7/2014); and Vitreous hemorrhage, unspecified eye ([facility] [address] (2/27/2017). Mr. Ahmet Munguia  has a past surgical history that includes urological (Left, 1979); urological (Left, 2010); and heent (2003). Social History/Living Environment:   Home Environment: Private residence  # Steps to Enter: 2  Rails to Enter: No  One/Two Story Residence: One story  Living Alone: No  Support Systems: Spouse/Significant Other/Partner  Patient Expects to be Discharged to[de-identified] Private residence  Current DME Used/Available at Home: None  Tub or Shower Type: Tub/Shower combination  Prior Level of Function/Work/Activity:  Patient lives with spouse in one story home with a few stairs through out the home. Patient was independent and active prior to admission. Personal Factors:          Patient had radiation for cancer   Number of Personal Factors/Comorbidities that affect the Plan of Care: 1-2: MODERATE COMPLEXITY   EXAMINATION:   Most Recent Physical Functioning:   Gross Assessment:                  Posture:     Balance:    Bed Mobility:  Supine to Sit: Independent  Sit to Supine: Independent  Wheelchair Mobility:     Transfers:  Sit to Stand: Independent  Stand to Sit: Independent  Gait:     Distance (ft): 750 Feet (ft)  Assistive Device:  (none)  Number of Stairs Trained: 3 (6x)  Stairs - Level of Assistance: Independent  Rail Use: Both       Body Structures Involved:  1. Heart  2. Lungs  3. Thoracic Cage Body Functions Affected:  1. Cardio  2. Movement Related Activities and Participation Affected:  1. General Tasks and Demands  2. Mobility  3.  Domestic Life   Number of elements that affect the Plan of Care: 4+: HIGH COMPLEXITY CLINICAL PRESENTATION:   Presentation: Stable and uncomplicated: LOW COMPLEXITY   CLINICAL DECISION MAKIN[address] [place] [id number] AM-PAC 6 Clicks   Basic Mobility Inpatient Short Form  How much difficulty does the patient currently have. .. Unable A Lot A Little None   1. Turning over in bed (including adjusting bedclothes, sheets and blankets)? [ ] 1   [ ] 2   [X] 3   [ ] 4   2. Sitting down on and standing up from a chair with arms ( e.g., wheelchair, bedside commode, etc.)   [ ] 1   [ ] 2   [X] 3   [ ] 4   3. Moving from lying on back to sitting on the side of the bed? [ ] 1   [ ] 2   [X] 3   [ ] 4   How much help from another person does the patient currently need. .. Total A Lot A Little None   4. Moving to and from a bed to a chair (including a wheelchair)? [ ] 1   [ ] 2   [X] 3   [ ] 4   5. Need to walk in hospital room? [ ] 1   [ ] 2   [X] 3   [ ] 4   6. Climbing 3-5 steps with a railing? [X] 1   [ ] 2   [ ] 3   [ ] 4   © , Trustees of [address] [place] [address] [address] [address], under license to [employer]. All rights reserved    Score:  Initial: 18 Most Recent:16 (Date: 3/7/17 )     Interpretation of Tool:  Represents activities that are increasingly more difficult (i.e. Bed mobility, Transfers, Gait). Score 24 23 22-20 19-15 14-10 9-7 6       Modifier CH CI CJ CK CL CM CN         · Mobility - Walking and Moving Around:               - CURRENT STATUS:    CK - 40%-59% impaired, limited or restricted               - GOAL STATUS:           CJ - 20%-39% impaired, limited or restricted               - D/C STATUS:                       ---------------To be determined---------------  Payor[place] BLUE CHOICE [place] / Product Type: HMO /       Medical Necessity:     · Patient demonstrates excellent rehab potential due to higher previous functional level. · Skilled intervention continues to be required due to decline in overall functional mobility.   Reason for Services/Other Comments:  · Patient continues to require skilled intervention due to medical complications and patient unable to attend/participate in therapy as expected. Use of outcome tool(s) and clinical judgement create a POC that gives a: Clear prediction of patient's progress: LOW COMPLEXITY                 TREATMENT:      Pre-treatment Symptoms/Complaints:  \"I'm doing great\"  Pain: Initial:   Pain Intensity 1: 0  Post Session:  0/10        Most Recent Physical Functioning:           Therapeutic Exercise: (25 Minutes):  Exercises per grid below to improve mobility, strength and activity tolerance. Required minimal verbal cues to promote proper body alignment and promote proper body breathing techniques. Progressed repetitions and complexity of movement as indicated.        Date:  3-8-17 Date:  3-9-17 Date:  3-10-17   ACTIVITY/EXERCISE AM PM AM PM AM PM   Ambulation:           Distance  Device  Duration         Seated Heel Raises x20 x20 x25      Seated Toe Raises x20 x20 x25      Seated Long Arc Quads x20 x20 x25 x25     Seated Marching x20 x20 x25      Seated Hip Abduction x20 x20 x25      Standing heel/toe raises   x15 x15 x10 X 10   Standing marching    x10  X 10   Standing hip extension    x10 x10 X 10   Standing hip abduction    x10  X 10   Standing mini squats     x10 X 10   Standing hamstring curls     x10                               B = bilateral; AA = active assistive; A = active; P = passive      Date:  3/11/16 Date:   Date:     Activity/Exercise Parameters Parameters Parameters   ambulation 750 ft     Calf raises 15x B     Standing hip abd 15x B     Standing marching 15x B     Sit back squats 10x     Stair unit 6x               Braces/Orthotics/Lines/Etc:   · None  Treatment/Session Assessment:    · Response to Treatment:  Patient tolerated treatment well  · Interdisciplinary Collaboration:  · Physical Therapy Assistant and Registered Nurse  · After treatment position/precautions:  · Supine in bed, Bed/Chair-wheels locked, Call light within reach and RN notified  · Compliance with Program/Exercises: NA  · Recommendations/Intent for next treatment session:  NA  Total Treatment Duration:  PT Patient Time In/Time Out  Time In: 0920  Time Out: 0945     Priyank Soliman, PTA

## 2017-03-11 NOTE — PROGRESS NOTES
Verbal & Bedside shift change report given to Michelle November (oncoming nurse) by [employer] (offgoing nurse). Report given with SBAR, Kardex, Intake/Output, MAR and Recent Results.

## 2017-03-11 NOTE — PROGRESS NOTES
OT note: This therapist worked with Mr Jelena Villa a couple of days ago who stated he was independent with all mobility and ADL's . Pt has pacer precautions and is very aware of them. This therapist checked with pt today with his wife present. Both state pt is independent with all ADL's and mobility. Nursing staff concurred. Therefore, pt does not demonstrate a further need for acute care OT.   Thank you, for this referral  Myra Hollis

## 2017-03-12 LAB
GLUCOSE BLD STRIP.AUTO-MCNC: 152 MG/DL (ref 65–100)
GLUCOSE BLD STRIP.AUTO-MCNC: 240 MG/DL (ref 65–100)
GLUCOSE BLD STRIP.AUTO-MCNC: 264 MG/DL (ref 65–100)

## 2017-03-12 PROCEDURE — 74011250636 HC RX REV CODE- 250/636: Performed by: NURSE PRACTITIONER

## 2017-03-12 PROCEDURE — 74011250637 HC RX REV CODE- 250/637: Performed by: THORACIC SURGERY (CARDIOTHORACIC VASCULAR SURGERY)

## 2017-03-12 PROCEDURE — 74011250637 HC RX REV CODE- 250/637: Performed by: INTERNAL MEDICINE

## 2017-03-12 PROCEDURE — 82962 GLUCOSE BLOOD TEST: CPT

## 2017-03-12 PROCEDURE — 74011250637 HC RX REV CODE- 250/637: Performed by: NURSE PRACTITIONER

## 2017-03-12 PROCEDURE — 65660000004 HC RM CVT STEPDOWN

## 2017-03-12 RX ADMIN — CYCLOBENZAPRINE HYDROCHLORIDE 5 MG: 10 TABLET, FILM COATED ORAL at 00:22

## 2017-03-12 RX ADMIN — Medication 10 ML: at 16:34

## 2017-03-12 RX ADMIN — LISINOPRIL 2.5 MG: 5 TABLET ORAL at 08:15

## 2017-03-12 RX ADMIN — AMIODARONE HYDROCHLORIDE 200 MG: 200 TABLET ORAL at 08:16

## 2017-03-12 RX ADMIN — FAMOTIDINE 20 MG: 20 TABLET ORAL at 08:15

## 2017-03-12 RX ADMIN — AMIODARONE HYDROCHLORIDE 200 MG: 200 TABLET ORAL at 21:56

## 2017-03-12 RX ADMIN — Medication 10 ML: at 21:58

## 2017-03-12 RX ADMIN — CEPHALEXIN 500 MG: 500 CAPSULE ORAL at 16:33

## 2017-03-12 RX ADMIN — INSULIN LISPRO 6 UNITS: 100 INJECTION, SOLUTION INTRAVENOUS; SUBCUTANEOUS at 16:32

## 2017-03-12 RX ADMIN — ATORVASTATIN CALCIUM 80 MG: 40 TABLET, FILM COATED ORAL at 21:57

## 2017-03-12 RX ADMIN — METOPROLOL SUCCINATE 50 MG: 50 TABLET, EXTENDED RELEASE ORAL at 21:57

## 2017-03-12 RX ADMIN — FUROSEMIDE 40 MG: 40 TABLET ORAL at 08:15

## 2017-03-12 RX ADMIN — CEPHALEXIN 500 MG: 500 CAPSULE ORAL at 08:15

## 2017-03-12 RX ADMIN — INSULIN LISPRO 4 UNITS: 100 INJECTION, SOLUTION INTRAVENOUS; SUBCUTANEOUS at 11:27

## 2017-03-12 RX ADMIN — BISACODYL 10 MG: 5 TABLET, COATED ORAL at 21:57

## 2017-03-12 RX ADMIN — POTASSIUM CHLORIDE 20 MEQ: 20 TABLET, EXTENDED RELEASE ORAL at 08:14

## 2017-03-12 RX ADMIN — OXYCODONE HYDROCHLORIDE AND ACETAMINOPHEN 1 TABLET: 10; 325 TABLET ORAL at 00:23

## 2017-03-12 RX ADMIN — ASPIRIN 81 MG: 81 TABLET, COATED ORAL at 08:16

## 2017-03-12 RX ADMIN — Medication 10 ML: at 04:12

## 2017-03-12 RX ADMIN — CEPHALEXIN 500 MG: 500 CAPSULE ORAL at 21:56

## 2017-03-12 RX ADMIN — FAMOTIDINE 20 MG: 20 TABLET ORAL at 21:57

## 2017-03-12 RX ADMIN — METOPROLOL SUCCINATE 50 MG: 50 TABLET, EXTENDED RELEASE ORAL at 08:15

## 2017-03-12 NOTE — PROGRESS NOTES
CV Progress Note    Subjective:   Admit Date: 2017    Surgery Date:  11 Days Post-Op      Procedure:  Procedure(s):  ULTRASOUND  THORACENTESIS    Incisional pain:  minimal  Appetite:  Regular diet     Recurrent vfib arrest last this past weekend, ICD in place  Stable on RA  Tightness with breathing last night      Objective:     Vitals:  Blood pressure 126/71, pulse 75, temperature 97.4 °F (36.3 °C), resp. rate 16, height 5' 10\" (1.778 m), weight 199 lb 14.4 oz (90.7 kg), SpO2 97 %. Temp (24hrs), Av.9 °F (36.6 °C), Min:97.4 °F (36.3 °C), Max:98.6 °F (37 °C)  Neuro in tact  RRR  Breath sounds dimished at bases  Belly soft  Sternum stable incision clean dry and in tact, ICD under occlusive dressing  +Leg edema, bilateral femoral hematomas, ecchymosis of upper thighs resolving, hard hematomas bilaterally in femoral region stable. Plan/Recommendations/Medical Decision Making:     Principal Problem:    NSTEMI (non-ST elevated myocardial infarction) ([address] (2017)    Active Problems:    DM (diabetes mellitus) type II controlled, neurological manifestation ([address] (3/7/2014)      Essential hypertension, benign (3/7/2014)      Hypertriglyceridemia (2017)      Depressed left ventricular ejection fraction (2017)      Overview: 17 20%      Ischemic cardiomyopathy (2017)      Overview: EF 10-15% echo 17      CAD, multiple vessel (2017)      Overview: 17 (Dr Verlin Koyanagi) Coronary artery bypass grafting x3. Grafts       consisting of       1. Left internal mammary artery to the left anterior descending. 2. Reversed LEFT saphenous vein graft to obtuse marginal.       3. Reversed LEFT saphenous vein graft to the left posterior descending       artery.        S/P CABG (coronary artery bypass graft) (2017)      Postoperative anemia due to acute blood loss (3/1/2017)      Pleural effusion (3/2/2017)      Overview: 3/7/17      Left thoracentesis with 600 ml removed       Right thoracentesis with 1350 ml removed       Hypoxia (3/2/2017)      Cardiogenic shock ([facility] [address] (3/9/7809)      Systolic CHF, acute on chronic ([address] (3/3/2017)      Cardiac arrest with ventricular fibrillation ([facility] [address] (3/5/2017)      Overview: 3/4/16      ICD (implantable cardioverter-defibrillator) in place (3/6/2017)      Overview: 1. [employer] MRI implantable cardioverter defibrillator - March 2017      ICD in place  Continues Off oxygen, VSS, afebrile  Monitor sternum (CPR compression)  Slow progress would benefit from [facility]- insurance approval but patient declined, prefers home. DC in AM                    3/16/2017     12:37 PM    I have personally seen and examined Maria Teresa Anthony with  Jaymie Son, FNP. I agree and confirm findings in history, physical exam, and assessment/plan as outlined above, except as noted below.     Paulette Kimbrough MD

## 2017-03-12 NOTE — PROGRESS NOTES
3/12/2017 9:10 AM    Admit Date: 2/27/2017        Subjective:     Dereck Leung reports overall doing better he walked alot yesterday had brief SOB last night but OK this AM In NSR.             Objective:      Visit Vitals    /71 (BP 1 Location: Left arm, BP Patient Position: At rest)    Pulse 75    Temp 97.4 °F (36.3 °C)    Resp 16    Ht 5' 10\" (1.778 m)    Wt 90.7 kg (199 lb 14.4 oz)    SpO2 97%    BMI 28.68 kg/m2       Physical Exam:  Lungs: clear to auscultation bilaterally  Cor RRR  Data Review:   Labs:    Recent Results (from the past 24 hour(s))   GLUCOSE, POC    Collection Time: 03/11/17 10:45 AM   Result Value Ref Range    Glucose (POC) 252 (H) 65 - 100 mg/dL   GLUCOSE, POC    Collection Time: 03/11/17  4:13 PM   Result Value Ref Range    Glucose (POC) 136 (H) 65 - 100 mg/dL   GLUCOSE, POC    Collection Time: 03/11/17  8:40 PM   Result Value Ref Range    Glucose (POC) 138 (H) 65 - 100 mg/dL       Telemetry: normal sinus rhythm      Assessment:     Patient Active Problem List    Diagnosis Date Noted    ICD (implantable cardioverter-defibrillator) in place stable 03/06/2017    Cardiac arrest with ventricular fibrillation ([address] 56/54/5340    Systolic CHF, acute on chronic (HCC) better 03/03/2017    Pleural effusion 03/02/2017    Hypoxia 03/02/2017    Cardiogenic shock ([address] 41/60/0676    Systolic CHF, chronic (HCC) 03/02/2017    Postoperative anemia due to acute blood loss 03/01/2017    Ischemic cardiomyopathy 02/28/2017    CAD, multiple vessel 02/28/2017    S/P CABG (coronary artery bypass graft) 02/28/2017    Hypertriglyceridemia 02/27/2017    NSTEMI (non-ST elevated myocardial infarction) ([address] 02/27/2017    Depressed left ventricular ejection fraction 02/27/2017    Pure hypercholesterolemia 04/04/2016    Retinopathy of both eyes     Undiagnosed cardiac murmurs 03/07/2014    DM (diabetes mellitus) type II controlled, neurological manifestation ([address] 03/07/2014    Allergic rhinitis, cause unspecified 03/07/2014    Contact dermatitis and other eczema, due to unspecified cause 03/07/2014    Esophageal reflux 03/07/2014    Other specified idiopathic peripheral neuropathy 03/07/2014    Essential hypertension, benign 03/07/2014    History of testicular cancer 01/01/1979       Plan:   Overall improved Home Monday

## 2017-03-12 NOTE — PROGRESS NOTES
Verbal & Bedside shift change report given to Deniz Fuentes (oncoming nurse) by Mi Coronado (offgoing nurse). Report given with SBAR, Kardex, Intake/Output, MAR and Recent Results.

## 2017-03-13 VITALS
TEMPERATURE: 97 F | HEART RATE: 75 BPM | HEIGHT: 70 IN | WEIGHT: 200.3 LBS | RESPIRATION RATE: 20 BRPM | SYSTOLIC BLOOD PRESSURE: 116 MMHG | OXYGEN SATURATION: 99 % | DIASTOLIC BLOOD PRESSURE: 68 MMHG | BODY MASS INDEX: 28.67 KG/M2

## 2017-03-13 PROBLEM — R09.02 HYPOXIA: Status: RESOLVED | Noted: 2017-03-02 | Resolved: 2017-03-13

## 2017-03-13 PROBLEM — R57.0 CARDIOGENIC SHOCK (HCC): Status: RESOLVED | Noted: 2017-03-02 | Resolved: 2017-03-13

## 2017-03-13 LAB
GLUCOSE BLD STRIP.AUTO-MCNC: 145 MG/DL (ref 65–100)
GLUCOSE BLD STRIP.AUTO-MCNC: 161 MG/DL (ref 65–100)

## 2017-03-13 PROCEDURE — 74011250637 HC RX REV CODE- 250/637: Performed by: NURSE PRACTITIONER

## 2017-03-13 PROCEDURE — 74011250637 HC RX REV CODE- 250/637: Performed by: THORACIC SURGERY (CARDIOTHORACIC VASCULAR SURGERY)

## 2017-03-13 PROCEDURE — 82962 GLUCOSE BLOOD TEST: CPT

## 2017-03-13 PROCEDURE — 74011250637 HC RX REV CODE- 250/637: Performed by: INTERNAL MEDICINE

## 2017-03-13 PROCEDURE — 74011250636 HC RX REV CODE- 250/636: Performed by: NURSE PRACTITIONER

## 2017-03-13 RX ORDER — ACETAMINOPHEN 325 MG/1
TABLET ORAL
Qty: 1 TAB | Refills: 0 | Status: SHIPPED
Start: 2017-03-13 | End: 2017-08-17 | Stop reason: ALTCHOICE

## 2017-03-13 RX ORDER — POTASSIUM CHLORIDE 20 MEQ/1
TABLET, EXTENDED RELEASE ORAL
Qty: 14 TAB | Refills: 0 | Status: SHIPPED | OUTPATIENT
Start: 2017-03-13 | End: 2017-04-11

## 2017-03-13 RX ORDER — FUROSEMIDE 40 MG/1
40 TABLET ORAL DAILY
Qty: 14 TAB | Refills: 0 | Status: SHIPPED | OUTPATIENT
Start: 2017-03-13 | End: 2017-03-27

## 2017-03-13 RX ORDER — ASPIRIN 81 MG/1
81 TABLET ORAL DAILY
Qty: 30 TAB | Refills: 12 | Status: SHIPPED
Start: 2017-03-13 | End: 2017-08-17 | Stop reason: SDUPTHER

## 2017-03-13 RX ORDER — METOPROLOL SUCCINATE 50 MG/1
50 TABLET, EXTENDED RELEASE ORAL EVERY 12 HOURS
Qty: 30 TAB | Refills: 5 | Status: SHIPPED | OUTPATIENT
Start: 2017-03-13 | End: 2017-03-21 | Stop reason: SDUPTHER

## 2017-03-13 RX ORDER — LISINOPRIL 2.5 MG/1
2.5 TABLET ORAL DAILY
Qty: 30 TAB | Refills: 5 | Status: SHIPPED | OUTPATIENT
Start: 2017-03-13 | End: 2017-03-21 | Stop reason: SDUPTHER

## 2017-03-13 RX ORDER — TRAMADOL HYDROCHLORIDE 50 MG/1
50 TABLET ORAL
Qty: 60 TAB | Refills: 1 | Status: SHIPPED | OUTPATIENT
Start: 2017-03-13 | End: 2017-04-11

## 2017-03-13 RX ORDER — ATORVASTATIN CALCIUM 40 MG/1
40 TABLET, FILM COATED ORAL
Qty: 30 TAB | Refills: 5 | Status: SHIPPED | OUTPATIENT
Start: 2017-03-13 | End: 2017-03-21 | Stop reason: SDUPTHER

## 2017-03-13 RX ADMIN — AMIODARONE HYDROCHLORIDE 200 MG: 200 TABLET ORAL at 08:43

## 2017-03-13 RX ADMIN — FAMOTIDINE 20 MG: 20 TABLET ORAL at 08:43

## 2017-03-13 RX ADMIN — LISINOPRIL 2.5 MG: 5 TABLET ORAL at 08:40

## 2017-03-13 RX ADMIN — DOCUSATE SODIUM 100 MG: 100 CAPSULE, LIQUID FILLED ORAL at 08:41

## 2017-03-13 RX ADMIN — METOPROLOL SUCCINATE 50 MG: 50 TABLET, EXTENDED RELEASE ORAL at 08:42

## 2017-03-13 RX ADMIN — CYCLOBENZAPRINE HYDROCHLORIDE 5 MG: 10 TABLET, FILM COATED ORAL at 01:39

## 2017-03-13 RX ADMIN — INSULIN LISPRO 2 UNITS: 100 INJECTION, SOLUTION INTRAVENOUS; SUBCUTANEOUS at 08:42

## 2017-03-13 RX ADMIN — Medication 10 ML: at 06:00

## 2017-03-13 RX ADMIN — POTASSIUM CHLORIDE 20 MEQ: 20 TABLET, EXTENDED RELEASE ORAL at 08:42

## 2017-03-13 RX ADMIN — ASPIRIN 81 MG: 81 TABLET, COATED ORAL at 08:43

## 2017-03-13 RX ADMIN — FUROSEMIDE 40 MG: 40 TABLET ORAL at 08:43

## 2017-03-13 NOTE — DISCHARGE SUMMARY
Physician Discharge Summary     Patient: Dangelo Walker MRN: [id number]  SSN: [id number]    YOB: 1955  Age: 63 y.o. Sex: male       Admit Date: 2/27/2017    Discharge Date: 3/13/2017      Admitting Physician: Khloe Espinal MD     Discharge Physician: Becki Jones NP    Admission Diagnoses: Atherosclerosis of native coronary artery of native heart with unstable angina pectoris ([facility] [address] [I25.110]  Pleural effusion [J90]    Discharge Diagnoses:   Problem List as of 3/13/2017  Date Reviewed: 3/8/2017          Codes Class Noted - Resolved    ICD (implantable cardioverter-defibrillator) in place ICD-10-CM: Z95.810  ICD-9-CM: V45.02  3/6/2017 - Present    Overview Signed 3/6/2017  4:19 PM by Rossi Ibrahim MD     1.  [employer] MRI implantable cardioverter defibrillator - March 2017             Cardiac arrest with ventricular fibrillation [facility]) ICD-10-CM: I46.9, I49.01  ICD-9-CM: 427.5, 427.41  3/5/2017 - Present    Overview Signed 3/5/2017 10:28 AM by Nakia Alves MD     3/4/16             Systolic CHF, acute on chronic [facility]) ICD-10-CM: I50.23  ICD-9-CM: 428.23, 428.0  3/3/2017 - Present        Pleural effusion ICD-10-CM: J90  ICD-9-CM: 511.9  3/2/2017 - Present    Overview Signed 3/7/2017  4:14 PM by Ivory Smith NP     3/7/17  Left thoracentesis with 600 ml removed   Right thoracentesis with 1350 ml removed              Systolic CHF, chronic (HCC) (Chronic) ICD-10-CM: J36.67  ICD-9-CM: 428.22, 428.0  3/2/2017 - Present        Postoperative anemia due to acute blood loss ICD-10-CM: D62  ICD-9-CM: 285.1  3/1/2017 - Present        Ischemic cardiomyopathy (Chronic) ICD-10-CM: I25.5  ICD-9-CM: 414.8  2/28/2017 - Present    Overview Addendum 3/13/2017 10:23 AM by Becki Jones NP     EF 20 % to 25 % 3/4/17  EF 10-15% echo 2/27/17             CAD, multiple vessel (Chronic) ICD-10-CM: I25.10  ICD-9-CM: 414.00  2/28/2017 - Present    Overview Signed 2/28/2017  1:28 PM by Tiarra Donohue Nathaly Miranda NP     2/28/17 (Dr Lady Canales) Coronary artery bypass grafting x3. Grafts consisting of   1. Left internal mammary artery to the left anterior descending. 2. Reversed LEFT saphenous vein graft to obtuse marginal.   3. Reversed LEFT saphenous vein graft to the left posterior descending   artery.               S/P CABG (coronary artery bypass graft) ICD-10-CM: Z95.1  ICD-9-CM: V45.81  2/28/2017 - Present        Hypertriglyceridemia (Chronic) ICD-10-CM: E78.1  ICD-9-CM: 272.1  2/27/2017 - Present        * (Principal)NSTEMI (non-ST elevated myocardial infarction) ([facility] [address] ICD-10-CM: I21.4  ICD-9-CM: 410.70  2/27/2017 - Present        Depressed left ventricular ejection fraction (Chronic) ICD-10-CM: R93.1  ICD-9-CM: 794.39  2/27/2017 - Present    Overview Signed 2/27/2017  4:07 PM by Roberth Cuello NP     2/27/17 20%             Pure hypercholesterolemia ICD-10-CM: E78.00  ICD-9-CM: 272.0  4/4/2016 - Present        Retinopathy of both eyes ICD-10-CM: H35.00  ICD-9-CM: 362.10  Unknown - Present    Overview Signed 2/27/2017 12:58 PM by Manuel Figueredo NP     Recent surgery 2/22/17             Undiagnosed cardiac murmurs ICD-10-CM: R01.1  ICD-9-CM: 785.2  3/7/2014 - Present        DM (diabetes mellitus) type II controlled, neurological manifestation (HCC) (Chronic) ICD-10-CM: E11.49  ICD-9-CM: 250.60  3/7/2014 - Present        Allergic rhinitis, cause unspecified ICD-10-CM: J30.9  ICD-9-CM: 477.9  3/7/2014 - Present        Contact dermatitis and other eczema, due to unspecified cause ICD-10-CM: L25.9  ICD-9-CM: 692.9  3/7/2014 - Present        Esophageal reflux ICD-10-CM: K21.9  ICD-9-CM: 530.81  3/7/2014 - Present        Other specified idiopathic peripheral neuropathy ICD-10-CM: G60.8  ICD-9-CM: 356.8  3/7/2014 - Present        Essential hypertension, benign (Chronic) ICD-10-CM: I10  ICD-9-CM: 401.1  3/7/2014 - Present        History of testicular cancer (Chronic) ICD-10-CM: Z85.47  ICD-9-CM: V10.47  1/1/1979 - Present    Overview Addendum 2/27/2017  3:30 PM by Corine Alonso NP     1979 testicular- tx with radiation             RESOLVED: Hypoxia ICD-10-CM: R09.02  ICD-9-CM: 799.02  3/2/2017 - 3/13/2017        RESOLVED: Cardiogenic shock ([facility] ICD-10-CM: R57.0  ICD-9-CM: 785.51  3/2/2017 - 3/13/2017        RESOLVED: LVAD (left ventricular assist device) present [facility]) ICD-10-CM: Z95.811  ICD-9-CM: V43.21  3/1/2017 - 3/4/2017        RESOLVED: Encounter for weaning from ventilator ([facility] ICD-10-CM: Z99.11  ICD-9-CM: V46.13  2/28/2017 - 3/4/2017        RESOLVED: Chest pain ICD-10-CM: R07.9  ICD-9-CM: 786.50  2/27/2017 - 3/2/2017        RESOLVED: Elevated troponin ICD-10-CM: R79.89  ICD-9-CM: 790.6  2/27/2017 - 3/2/2017        RESOLVED: Unstable angina ([facility] [address] ICD-10-CM: I20.0  ICD-9-CM: 411.1  2/27/2017 - 3/2/2017        RESOLVED: Vitreous hemorrhage, unspecified eye ([facility] [address] ICD-10-CM: H43.10  ICD-9-CM: 379.23  2/27/2017 - 3/2/2017    Overview Addendum 2/27/2017  3:33 PM by Corine Alonso NP     2/21s/p vitrectomy RIGHT EYE (Dr Martinez Duron)             RESOLVED: Hyperlipidemia ICD-10-CM: E78.5  ICD-9-CM: 272.4  3/7/2014 - 4/4/2016               Procedures for this admission: Procedure(s):  ULTRASOUND  THORACENTESIS    Discharged Condition: stable    Hospital Course:   Mr. Mee White is a very pleasant 82-year-old gentleman   who presented to the hospital yesterday with complaints of chest   fullness and very significant exertional dyspnea. He underwent   left heart cath which confirmed severe multi-vessel disease with   markedly reduced left ventricular function. An echo obtained   after the cath showed an EF of 10% to 15%. 2/28/17 he underwent successfulCoronary artery bypass grafting x3. Grafts consisting of   1. Left internal mammary artery to the left anterior descending. 2. Reversed saphenous vein graft to obtuse marginal.   3. Reversed saphenous vein graft to the left posterior descending   artery.      The patient tolerated the surgery well and transitioned to stepdown POD 3. He was extubated the day after surgery but remained in CVICU for dobutamine and  Impella support until both successfully weaned. POD 4 he was noedt to be in sinus bradycardia in the 40s, asymptomatic. Then several seconds later had coarse vfib on monitor, no pulse, CPR started  approximately 5minutes and patient received 1 shock at 200J -> sinus rhythm and ROSC. He was transferred to CVICU where he remained neurologically intact and off pressors but then then had recurrent V. Fib arrest. Not responding to Amiodarone. Having periods of bradycardia. No evidence of active ischemia- He was then treated with Insertion of an implantable cardioverter defibrillator 3/6/17. Thoracentesis, bilaterally performed 3/7/17 and he transferred to [facility]. He successfully weaned off oxygen. His recovery since transfer back to [facility] has been uneventful with him remaining in NSR, pacemaker sensing and monitoring, afebrile, and hemodynamically stable. He is tolerating Metoprolol, Statin, ACEI and ASA for NSTEMI protocol. All incisions are healing, including the PM pocket that has been kept under occlusive dressing. He has done well participating in PT and IS. He was denied placement on IRC therefore we elected to keep him for additional days to monitor cardiac status, and gain strength for home discharge. Pain has improved and is well controlled. He is stable and is doing well with independence and following plan of care for discharge today.      Consults: Cardiac Rehab, Cardiology, Cardiothoracic Surgery, Diabetic Education, Physical Medicine and Rehabilitation and Pulmonary/Critical Care    Significant Diagnostic Studies: labs  HgbA1C: 7.4  microbiology  radiology  cardiac graphics: Telemetry, ECG and Echocardiogram  angiography    Treatments:   Cardiac Meds: lisinopril (Zestril), metoprolol and Toprol XL, furosemide and amiodarone  Anticoagulation: ASA  Statins: atorvastatin  IV Hydration  Antibiotics: perioperative  Analgesia  Insulin: Regular - Sliding Scale  Respiratory Therapy: O2  Therapies: PT, OT  Procedures: As above  Surgery: As above  Discharge Exam: Blood pressure 123/71, pulse 75, temperature 97.8 °F (36.6 °C), resp. rate 16, height 5' 10\" (1.778 m), weight 200 lb 4.8 oz (90.9 kg), SpO2 97 %. Temp (24hrs), Av.8 °F (36.6 °C), Min:97.1 °F (36.2 °C), Max:98.6 °F (37 °C)  Neuro in tact  RRR  Breath sounds dimished at bases  Belly soft  Sternum stable incision clean dry and in tact, ICD under occlusive dressing  +Leg edema, bilateral femoral hematomas, ecchymosis of upper thighs resolving, hard hematomas bilaterally in femoral region stable. Disposition: Home Health Care [facility]     Patient Instructions:   Current Discharge Medication List      START taking these medications    Details   acetaminophen (TYLENOL) 325 mg tablet Over the counter medicine - take as needed for pain as directed on label  Qty: 1 Tab, Refills: 0      aspirin delayed-release 81 mg tablet Take 1 Tab by mouth daily. Qty: 30 Tab, Refills: 12      atorvastatin (LIPITOR) 40 mg tablet Take 1 Tab by mouth nightly. Qty: 30 Tab, Refills: 5      lisinopril (PRINIVIL, ZESTRIL) 2.5 mg tablet Take 1 Tab by mouth daily. Qty: 30 Tab, Refills: 5    Associated Diagnoses: NSTEMI (non-ST elevated myocardial infarction) (HCC)      metoprolol succinate (TOPROL-XL) 50 mg XL tablet Take 1 Tab by mouth every twelve (12) hours. Qty: 30 Tab, Refills: 5    Associated Diagnoses: Ventricular fibrillation (HCC)      traMADol (ULTRAM) 50 mg tablet Take 1 Tab by mouth every four (4) hours as needed for Pain. Max Daily Amount: 300 mg. Qty: 60 Tab, Refills: 1      furosemide (LASIX) 40 mg tablet Take 1 Tab by mouth daily for 14 days.  Please discuss this medication at your FIRST visit with [facility]  Qty: 14 Tab, Refills: 0      potassium chloride (K-DUR, KLOR-CON) 20 mEq tablet Please discuss this medication at your FIRST office visit with [facility]  Qty: 14 Tab, Refills: 0         CONTINUE these medications which have NOT CHANGED    Details   sitaGLIPtin-metFORMIN (JANUMET) 50-1,000 mg per tablet Take 1 Tab by mouth two (2) times daily (with meals). Qty: 60 Tab, Refills: 5    Associated Diagnoses: Controlled type 2 diabetes mellitus with diabetic neuropathy, unspecified long term insulin use status (HCC)      prednisoLONE acetate (PRED FORTE) 1 % ophthalmic suspension Administer 1 Drop to right eye four (4) times daily. exenatide microspheres (BYDUREON) 2 mg serr Subcutaneous injection once weekly  Qty: 4 Each, Refills: 3      dulaglutide (TRULICITY) 1.5 VZ/6.9 mL sub-q pen 0.5 mL by SubCUTAneous route every seven (7) days. Qty: 4 Pen, Refills: 5         STOP taking these medications       moxifloxacin (VIGAMOX) 0.5 % ophthalmic solution Comments:   Reason for Stopping:               Reference discharge instructions as provided by nursing for diet, labs, medications, activity, wound care and any outpatient referrals. Follow-up Appointments   Procedures    FOLLOW UP VISIT Appointment in: [facility]- Post op CABG, ICD, EF 20-25% ICD check     [facility]- Post op CABG, ICD, EF 20-25%  ICD check     Standing Status:   Standing     Number of Occurrences:   1     Order Specific Question:   Appointment in     Answer: One Week    FOLLOW UP VISIT Appointment in: Other (Specify) Dr Leonora Mullins     Standing Status:   Standing     Number of Occurrences:   1     Standing Expiration Date:   3/14/2017     Order Specific Question:   Appointment in     Answer:    Other (Specify)        Signed:  Aiden Ramos NP  3/13/2017  10:55 AM

## 2017-03-13 NOTE — PROGRESS NOTES
Bedside and Verbal shift change report given to Naomi Bautista (oncoming nurse) by Miguel Ángel Bennett (offgoing nurse). Report included the following information SBAR, Kardex, Procedure Summary, Intake/Output, MAR and Recent Results.

## 2017-03-13 NOTE — DISCHARGE INSTRUCTIONS
DISCHARGE SUMMARY from Nurse    The following personal items are in your possession at time of discharge:    Dental Appliances: None  Visual Aid: Glasses, With patient     Home Medications: None  Jewelry: None  Clothing: None                PATIENT INSTRUCTIONS:    After general anesthesia or intravenous sedation, for 24 hours or while taking prescription Narcotics:  · Limit your activities  · Do not drive and operate hazardous machinery  · Do not make important personal or business decisions  · Do  not drink alcoholic beverages  · If you have not urinated within 8 hours after discharge, please contact your surgeon on call. Report the following to your surgeon:  · Excessive pain, swelling, redness or odor of or around the surgical area  · Temperature over 100.5  · Nausea and vomiting lasting longer than 4 hours or if unable to take medications  · Any signs of decreased circulation or nerve impairment to extremity: change in color, persistent  numbness, tingling, coldness or increase pain  · Any questions              *  Please give a list of your current medications to your Primary Care Provider. *  Please update this list whenever your medications are discontinued, doses are      changed, or new medications (including over-the-counter products) are added. *  Please carry medication information at all times in case of emergency situations. These are general instructions for a healthy lifestyle:    No smoking/ No tobacco products/ Avoid exposure to second hand smoke    Surgeon General's Warning:  Quitting smoking now greatly reduces serious risk to your health.     Obesity, smoking, and sedentary lifestyle greatly increases your risk for illness    A healthy diet, regular physical exercise & weight monitoring are important for maintaining a healthy lifestyle    You may be retaining fluid if you have a history of heart failure or if you experience any of the following symptoms:  Weight gain of 3 pounds or more overnight or 5 pounds in a week, increased swelling in our hands or feet or shortness of breath while lying flat in bed. Please call your doctor as soon as you notice any of these symptoms; do not wait until your next office visit. Recognize signs and symptoms of STROKE:    F-face looks uneven    A-arms unable to move or move unevenly    S-speech slurred or non-existent    T-time-call 911 as soon as signs and symptoms begin-DO NOT go       Back to bed or wait to see if you get better-TIME IS BRAIN. Warning Signs of HEART ATTACK     Call 911 if you have these symptoms:   Chest discomfort. Most heart attacks involve discomfort in the center of the chest that lasts more than a few minutes, or that goes away and comes back. It can feel like uncomfortable pressure, squeezing, fullness, or pain.  Discomfort in other areas of the upper body. Symptoms can include pain or discomfort in one or both arms, the back, neck, jaw, or stomach.  Shortness of breath with or without chest discomfort.  Other signs may include breaking out in a cold sweat, nausea, or lightheadedness. Don't wait more than five minutes to call 911 - MINUTES MATTER! Fast action can save your life. Calling 911 is almost always the fastest way to get lifesaving treatment. Emergency Medical Services staff can begin treatment when they arrive -- up to an hour sooner than if someone gets to the hospital by car. The discharge information has been reviewed with the patient and spouse. The patient and spouse verbalized understanding. Discharge medications reviewed with the patient and spouse and appropriate educational materials and side effects teaching were provided.

## 2017-03-13 NOTE — PROGRESS NOTES
CV Progress Note    Subjective:   Admit Date: 2017    Surgery Date:  12 Days Post-Op      Procedure:  Procedure(s):  ULTRASOUND  THORACENTESIS    Incisional pain:  minimal  Appetite:  Regular diet     Recurrent vfib arrest last this past weekend, ICD in place  Stable on RA  Tightness with breathing last night      Objective:     Vitals:  Blood pressure 123/71, pulse 75, temperature 97.8 °F (36.6 °C), resp. rate 16, height 5' 10\" (1.778 m), weight 200 lb 4.8 oz (90.9 kg), SpO2 97 %. Temp (24hrs), Av.8 °F (36.6 °C), Min:97.1 °F (36.2 °C), Max:98.6 °F (37 °C)  Neuro in tact  RRR  Breath sounds dimished at bases  Belly soft  Sternum stable incision clean dry and in tact, ICD under occlusive dressing  +Leg edema, bilateral femoral hematomas, ecchymosis of upper thighs resolving, hard hematomas bilaterally in femoral region stable. Plan/Recommendations/Medical Decision Making:     Principal Problem:    NSTEMI (non-ST elevated myocardial infarction) ([address] (2017)    Active Problems:    DM (diabetes mellitus) type II controlled, neurological manifestation ([address] (3/7/2014)      Essential hypertension, benign (3/7/2014)      Hypertriglyceridemia (2017)      Depressed left ventricular ejection fraction (2017)      Overview: 17 20%      Ischemic cardiomyopathy (2017)      Overview: EF 20 % to 25 % 3/4/17      EF 10-15% echo 17      CAD, multiple vessel (2017)      Overview: 17 (Dr Ember Ornelas) Coronary artery bypass grafting x3. Grafts       consisting of       1. Left internal mammary artery to the left anterior descending. 2. Reversed LEFT saphenous vein graft to obtuse marginal.       3. Reversed LEFT saphenous vein graft to the left posterior descending       artery.        S/P CABG (coronary artery bypass graft) (2017)      Postoperative anemia due to acute blood loss (3/1/2017)      Pleural effusion (3/2/2017)      Overview: 3/7/17      Left thoracentesis with 600 ml removed       Right thoracentesis with 1350 ml removed       Hypoxia (3/2/2017)      Cardiogenic shock (HCC) (4/1/1650)      Systolic CHF, acute on chronic ([facility] [address] (3/3/2017)      Cardiac arrest with ventricular fibrillation ([facility] [address] (3/5/2017)      Overview: 3/4/16      ICD (implantable cardioverter-defibrillator) in place (3/6/2017)      Overview: 1. [employer] [facility] implantable cardioverter defibrillator - March 2017      ICD in place EF 20 % to 25 %  Monitor sternum (CPR compression)- stable  Slow progress would benefit from [facility]- insurance approval but patient declined, prefers home.   DC today

## 2017-03-13 NOTE — PROGRESS NOTES
Bedside and Verbal shift change report given to Ken Peters (oncoming nurse) by Desi Andersen (offgoing nurse). Report included the following information SBAR, Kardex, Intake/Output, MAR, Recent Results and Med Rec Status.

## 2017-03-13 NOTE — PROGRESS NOTES
No changes in pt condition at this point. Pt denies needs. Watching tv. Assessment completed. Will call for needs.  Pt has bilateral femoral hematomas, ecchymosis of upper thighs, hard hematomas bilaterally in femoral region stable.

## 2017-03-13 NOTE — PROGRESS NOTES
Discharge instructions, activity restrictions, medications, and follow up appointments reviewed with patient and wife. Time allowed for questions. Verbalize understanding. Patient transported to discharge area via wheelchair where he was driven home by his wife. Patient stable at discharge.

## 2017-03-13 NOTE — PROGRESS NOTES
Central line d/c with tip intact. Hemostasis after 5 min, pressure dressing applied, site benign, asked pt to stay still for 10 min, then get dressed. No pressure to neck with bending, etc and v/u. To leave dressing on till tomorrow am. V/u to hold pressure to neck if starts to bleed and call RN. No sting barrier wipes applied to electrode sites for slight irritation.

## 2017-03-13 NOTE — PROGRESS NOTES
Selena Crump, liaison with [facility] notified of d/c today home.  Pt agrees for [facility] per Uriah Lal NP.

## 2017-03-15 NOTE — ADT AUTH CERT NOTES
Myocardial Infarction - Care Day 13 (3/11/2017) by Arabella Gutierrez RN        Review Entered Review Status       3/14/2017 Completed       Details              Care Day: 13 Care Date: 3/11/2017 Level of Care: Step Down       Guideline Day 3        Clinical Status       (X) * Hemodynamic stability       (X) * Chest pain, dyspnea, or anginal equivalent absent       (X) * No evidence of bleeding or recurrent myocardial ischemia       (X) * Dangerous arrhythmia absent       (X) * Vascular access site without evidence of infection, aneurysm, or growing hematoma       (X) * Renal function at baseline or acceptable for next level of care       ( ) * Discharge plans and education understood              Activity       (X) * Ambulatory              Routes       (X) * Oral hydration, medications, and diet       (X) Heart-healthy diet              Medications       (X) * Anticoagulation absent       (X) Aspirin       (X) Beta-blocker       (X) Probable ACE inhibitor or angiotensin receptor blocker       (X) Statin                                   * Milestone              Additional Notes       CONT STAY REVIEW FOR 3/11/17       CV Progress Note               Subjective:       Admit Date: 2/27/2017               Surgery Date:  11 Days Post-Op                 Procedure: Procedure(s):       ULTRASOUND       THORACENTESIS               Incisional pain: minimal       Appetite: Regular diet               Recurrent vfib arrest last this past weekend, ICD in place       Stable on RA       BM+       Fatigue noted in PT notes.  Independent with Ambulation       Sternum stable incision clean dry and in tact, ICD under occlusive dressing       +Leg edema, bilateral femoral hematomas, ecchymosis of upper thighs resolving, hard hematomas bilaterally in femoral region stable.                        Plan/Recommendations/Medical Decision Making:               Principal Problem:       NSTEMI (non-ST elevated myocardial infarction) ([facility] [address] (2/27/2017)               Active Problems:       DM (diabetes mellitus) type II controlled, neurological manifestation ([address] (3/7/2014)               Essential hypertension, benign (3/7/2014)               Hypertriglyceridemia (2/27/2017)               Depressed left ventricular ejection fraction (2/27/2017)       Overview: 2/27/17 20%               Ischemic cardiomyopathy (2/28/2017)       Overview: EF 10-15% echo 2/27/17               CAD, multiple vessel (2/28/2017)       Overview: 2/28/17 (Dr Jonny Blank) Coronary artery bypass grafting x3. Grafts        consisting of        1. Left internal mammary artery to the left anterior descending.        2. Reversed LEFT saphenous vein graft to obtuse marginal.        3. Reversed LEFT saphenous vein graft to the left posterior descending        artery.                S/P CABG (coronary artery bypass graft) (2/28/2017)               Postoperative anemia due to acute blood loss (3/1/2017)               Pleural effusion (3/2/2017)       Overview: 3/7/17       Left thoracentesis with 600 ml removed        Right thoracentesis with 1350 ml removed                Hypoxia (3/2/2017)               Cardiogenic shock ([address] (3/2/2017)               Systolic CHF, acute on chronic ([address] (3/3/2017)               Cardiac arrest with ventricular fibrillation ([address] (3/5/2017)       Overview: 3/4/16               ICD (implantable cardioverter-defibrillator) in place (3/6/2017)       Overview: 1. [employer] MRI implantable cardioverter defibrillator - March 2017                       ICD in place       Off oxygen, VSS, afebrile       Monitor sternum (CPR compression)       Slow progress would benefit from [facility]- insurance approval but patient declined, prefers home.  DC Monday       KEFLEX PO, CORDARONE PO, ASA, LIPITOR, PEPCID PO, LASIX PO, SSI,        PRINIVIL,  TOPROL, PERCOCET, K DUR

## 2017-03-17 ENCOUNTER — HOME CARE VISIT (OUTPATIENT)
Dept: SCHEDULING | Facility: HOME HEALTH | Age: 62
End: 2017-03-17
Payer: COMMERCIAL

## 2017-03-17 VITALS
DIASTOLIC BLOOD PRESSURE: 74 MMHG | RESPIRATION RATE: 20 BRPM | TEMPERATURE: 97.5 F | HEART RATE: 95 BPM | SYSTOLIC BLOOD PRESSURE: 120 MMHG | OXYGEN SATURATION: 98 %

## 2017-03-17 PROCEDURE — G0299 HHS/HOSPICE OF RN EA 15 MIN: HCPCS

## 2017-03-17 PROCEDURE — 400013 HH SOC

## 2017-03-21 ENCOUNTER — TELEPHONE (OUTPATIENT)
Dept: HOME HEALTH SERVICES | Facility: HOME HEALTH | Age: 62
End: 2017-03-21

## 2017-03-21 NOTE — TELEPHONE ENCOUNTER
[address] [facility] Pharmacist consult. I have called Mr. Denis Maloney and only gotten voice mail. I have left my name and cell phone number. I asked him to call me any time he had a medication question or issue.  3/21/17  [id number]

## 2017-03-22 ENCOUNTER — HOME CARE VISIT (OUTPATIENT)
Dept: SCHEDULING | Facility: HOME HEALTH | Age: 62
End: 2017-03-22
Payer: COMMERCIAL

## 2017-03-22 PROCEDURE — G0299 HHS/HOSPICE OF RN EA 15 MIN: HCPCS

## 2017-03-23 ENCOUNTER — HOME CARE VISIT (OUTPATIENT)
Dept: SCHEDULING | Facility: HOME HEALTH | Age: 62
End: 2017-03-23
Payer: COMMERCIAL

## 2017-03-23 VITALS
TEMPERATURE: 96 F | RESPIRATION RATE: 19 BRPM | HEART RATE: 70 BPM | DIASTOLIC BLOOD PRESSURE: 82 MMHG | SYSTOLIC BLOOD PRESSURE: 120 MMHG

## 2017-03-23 VITALS
HEART RATE: 70 BPM | DIASTOLIC BLOOD PRESSURE: 70 MMHG | SYSTOLIC BLOOD PRESSURE: 132 MMHG | OXYGEN SATURATION: 99 % | TEMPERATURE: 96 F | RESPIRATION RATE: 16 BRPM

## 2017-03-23 PROCEDURE — G0151 HHCP-SERV OF PT,EA 15 MIN: HCPCS

## 2017-03-27 ENCOUNTER — TELEPHONE (OUTPATIENT)
Dept: HOME HEALTH SERVICES | Facility: HOME HEALTH | Age: 62
End: 2017-03-27

## 2017-03-27 NOTE — TELEPHONE ENCOUNTER
Mr. Juancarlos Thompson said he is doing good. He had good visit with cardiologist last week. No new medication and no questions at this time. I asked him to call with any medication issues.

## 2017-03-28 ENCOUNTER — HOSPITAL ENCOUNTER (OUTPATIENT)
Dept: LAB | Age: 62
Discharge: HOME | End: 2017-03-28
Attending: INTERNAL MEDICINE
Payer: SELF-PAY

## 2017-03-28 DIAGNOSIS — R53.83 FATIGUE, UNSPECIFIED TYPE: ICD-10-CM

## 2017-03-28 LAB
T4 FREE SERPL-MCNC: 1.5 NG/DL (ref 0.78–1.46)
TSH SERPL DL<=0.005 MIU/L-ACNC: 2.8 UIU/ML (ref 0.36–3.74)

## 2017-03-28 PROCEDURE — 36415 COLL VENOUS BLD VENIPUNCTURE: CPT | Performed by: INTERNAL MEDICINE

## 2017-03-28 PROCEDURE — 84482 T3 REVERSE: CPT | Performed by: INTERNAL MEDICINE

## 2017-03-28 PROCEDURE — 84439 ASSAY OF FREE THYROXINE: CPT | Performed by: INTERNAL MEDICINE

## 2017-03-28 PROCEDURE — 84443 ASSAY THYROID STIM HORMONE: CPT | Performed by: INTERNAL MEDICINE

## 2017-03-30 ENCOUNTER — HOME CARE VISIT (OUTPATIENT)
Dept: SCHEDULING | Facility: HOME HEALTH | Age: 62
End: 2017-03-30
Payer: COMMERCIAL

## 2017-03-30 VITALS
DIASTOLIC BLOOD PRESSURE: 72 MMHG | SYSTOLIC BLOOD PRESSURE: 130 MMHG | RESPIRATION RATE: 16 BRPM | TEMPERATURE: 96 F | HEART RATE: 76 BPM | OXYGEN SATURATION: 98 %

## 2017-03-30 PROCEDURE — G0299 HHS/HOSPICE OF RN EA 15 MIN: HCPCS

## 2017-04-05 LAB
FUNGUS CULTURE, RFCO2T: NEGATIVE
FUNGUS SMEAR, RFCO1T: NORMAL
FUNGUS SPEC CULT: NORMAL
FUNGUS STAIN, 188244: NORMAL
REFLEX TO ID, RFCO3T: NORMAL
SPECIMEN SOURCE: NORMAL
SPECIMEN SOURCE: NORMAL
T3REVERSE SERPL-MCNC: 58.3 NG/DL (ref 9.2–24.1)

## 2017-04-11 ENCOUNTER — HOSPITAL ENCOUNTER (OUTPATIENT)
Dept: CARDIAC REHAB | Age: 62
Discharge: HOME | End: 2017-04-11

## 2017-04-11 NOTE — CARDIO/PULMONARY
Dear Dr. Harkins Friends:    Thank you for referring your patient, Christiano Henry (1955), to the [facility] Program at [facility]. Mr. Marta Sarmiento is a good candidate for the Rehab Program and should see improvements with regular participation. We will be addressing appropriate interventions for modifiable risk factors with your patient during the next 12 weeks. We will contact you with any issues or concerns that may arise, or you can follow your patients progress through [address] [place] [address] at any time. A final summary will be sent to you when the program is completed. Again, thank you for your referral. If we can be of further assistance, please feel free to contact the Cardiopulmonary Rehab staff at [phone].

## 2017-04-18 ENCOUNTER — HOSPITAL ENCOUNTER (OUTPATIENT)
Dept: CARDIAC REHAB | Age: 62
Discharge: HOME | End: 2017-04-18
Payer: COMMERCIAL

## 2017-04-18 VITALS — WEIGHT: 191.6 LBS | HEIGHT: 70 IN | BODY MASS INDEX: 27.43 KG/M2

## 2017-04-18 PROCEDURE — 93798 PHYS/QHP OP CAR RHAB W/ECG: CPT

## 2017-04-19 ENCOUNTER — HOSPITAL ENCOUNTER (OUTPATIENT)
Dept: CARDIAC REHAB | Age: 62
Discharge: HOME | End: 2017-04-19
Payer: COMMERCIAL

## 2017-04-19 LAB
ACID FAST STN SPEC: NEGATIVE
MYCOBACTERIUM SPEC QL CULT: NEGATIVE
SPECIMEN PREPARATION: NORMAL
SPECIMEN SOURCE: NORMAL

## 2017-04-19 PROCEDURE — 93798 PHYS/QHP OP CAR RHAB W/ECG: CPT

## 2017-04-21 ENCOUNTER — HOSPITAL ENCOUNTER (OUTPATIENT)
Dept: CARDIAC REHAB | Age: 62
Discharge: HOME | End: 2017-04-21
Payer: COMMERCIAL

## 2017-04-21 PROCEDURE — 93798 PHYS/QHP OP CAR RHAB W/ECG: CPT

## 2017-04-24 ENCOUNTER — HOSPITAL ENCOUNTER (OUTPATIENT)
Dept: CARDIAC REHAB | Age: 62
Discharge: HOME | End: 2017-04-24
Payer: COMMERCIAL

## 2017-04-24 PROCEDURE — 93798 PHYS/QHP OP CAR RHAB W/ECG: CPT

## 2017-04-26 ENCOUNTER — APPOINTMENT (OUTPATIENT)
Dept: CARDIAC REHAB | Age: 62
End: 2017-04-26
Payer: COMMERCIAL

## 2017-04-28 ENCOUNTER — HOSPITAL ENCOUNTER (OUTPATIENT)
Dept: CARDIAC REHAB | Age: 62
Discharge: HOME | End: 2017-04-28
Payer: COMMERCIAL

## 2017-04-28 PROCEDURE — 93798 PHYS/QHP OP CAR RHAB W/ECG: CPT

## 2017-05-01 ENCOUNTER — HOSPITAL ENCOUNTER (OUTPATIENT)
Dept: CARDIAC REHAB | Age: 62
Discharge: HOME | End: 2017-05-01
Payer: COMMERCIAL

## 2017-05-01 PROCEDURE — 93798 PHYS/QHP OP CAR RHAB W/ECG: CPT

## 2017-05-03 ENCOUNTER — HOSPITAL ENCOUNTER (OUTPATIENT)
Dept: CARDIAC REHAB | Age: 62
Discharge: HOME | End: 2017-05-03
Payer: COMMERCIAL

## 2017-05-03 VITALS — WEIGHT: 191.8 LBS | BODY MASS INDEX: 27.52 KG/M2

## 2017-05-03 PROCEDURE — 93798 PHYS/QHP OP CAR RHAB W/ECG: CPT

## 2017-05-05 ENCOUNTER — HOSPITAL ENCOUNTER (OUTPATIENT)
Dept: CARDIAC REHAB | Age: 62
Discharge: HOME | End: 2017-05-05
Payer: COMMERCIAL

## 2017-05-05 PROCEDURE — 93798 PHYS/QHP OP CAR RHAB W/ECG: CPT

## 2017-05-08 ENCOUNTER — HOSPITAL ENCOUNTER (OUTPATIENT)
Dept: CARDIAC REHAB | Age: 62
Discharge: HOME | End: 2017-05-08
Payer: COMMERCIAL

## 2017-05-08 PROCEDURE — 93798 PHYS/QHP OP CAR RHAB W/ECG: CPT

## 2017-05-10 ENCOUNTER — HOSPITAL ENCOUNTER (OUTPATIENT)
Dept: CARDIAC REHAB | Age: 62
Discharge: HOME | End: 2017-05-10
Payer: COMMERCIAL

## 2017-05-10 PROCEDURE — 93798 PHYS/QHP OP CAR RHAB W/ECG: CPT

## 2017-05-12 ENCOUNTER — HOSPITAL ENCOUNTER (OUTPATIENT)
Dept: CARDIAC REHAB | Age: 62
Discharge: HOME | End: 2017-05-12
Payer: COMMERCIAL

## 2017-05-12 PROCEDURE — 93798 PHYS/QHP OP CAR RHAB W/ECG: CPT

## 2017-05-15 ENCOUNTER — HOSPITAL ENCOUNTER (OUTPATIENT)
Dept: CARDIAC REHAB | Age: 62
Discharge: HOME | End: 2017-05-15
Payer: COMMERCIAL

## 2017-05-15 PROCEDURE — 93798 PHYS/QHP OP CAR RHAB W/ECG: CPT

## 2017-05-15 NOTE — PROGRESS NOTES
Client History:  Current Medical Diagnosis: see ITP  Pertinent Medications: see review PTA meds      Have you gained or lost any weight in the past 3 months: lost from 208 to 183 lbs since surgery and gained back to 192 lbs. What do you attribute it to: hospitalization and no appetite  What is your weight goal: see cardiac ITP    Have you made any changes in your eating habits since your heart problems began: less salt use and lowered portion sizes. Describe your appetite: increasing    Supplements/Vitamins/Herbs: MVI, fish oil    Food allergies: fresh tomatoes    Problems with digestion, N/V/C/D: no    Alcohol use: see cardiac ITP      Dietary recall:  Gets up at 1-2 am. Eats breakfast at 2:30am: high fiber cereal and fat free milk or an egg  Works 4am-4pm.   Eats evening meal at 5pm: [place] take out of chicken and vegetables and steamed rice or salad made at home or vegetables (can be fresh, frozen or low salt canned). Drinks 30-40 oz water/day. Anthropometric Data: see cardiac ITP  Ht:   Wt:   Age:  BMI:  Waist measurement:     Estimated Nutritional Needs:  Calories: 20 kcal/kg FBG=9718 kcals  Protein: 1.2gm/kg IBW=90gm   CHO: 40%=173gm   Fluids:1ml/kcal    Nutrition Diagnosis:  Food and nutrition knowledge deficit related to therapeutic diet as evidenced by skipping meals, having meal without protein and low in fiber and protein and fluids. Nutrition Intervention:  Reviewed lab/body comp results/goals. Reviewed rate your plate and heart healthy choices. Pt declined the need for diabetes and diet education but encouraged having a protein with meals and snacks and using high fiber carbohydrates and not going over 5 hrs during the day without eating. Pt stated he feels he eats healthy and thinks his heart problems are from stress. Affirmed that pt is choosing heart healthy choices and affirmed that pt lowered his salt use.  Discussed that glycemic variability can lead to blood vessel damage and encouraged pt to check some pre-meal and 2 hr pp blood sugars and goal is < 50 points. Pt verbalized he could do this. Discussed some high protein cereals available. Discussed sodium and fiber guidelines and label reading and better choices when eating out. Pt declined the need for recipes or recipe modification info. Handouts: lab/body comp, heart healthy, grocery guide, herbs and spices, fiber, eating out    Goals: See cardiac ITP         Monitoring/Evaluation: Follow-up appointment: RD to follow up with participant during cardiac rehab sessions for questions and assessment of progression toward goals.     Sosa Sosa, THEODORE, LD, CDE  Phone: [phone]

## 2017-05-17 ENCOUNTER — HOSPITAL ENCOUNTER (OUTPATIENT)
Dept: CARDIAC REHAB | Age: 62
Discharge: HOME | End: 2017-05-17
Payer: COMMERCIAL

## 2017-05-17 VITALS — BODY MASS INDEX: 28.09 KG/M2 | WEIGHT: 195.8 LBS

## 2017-05-17 PROCEDURE — 93798 PHYS/QHP OP CAR RHAB W/ECG: CPT

## 2017-05-19 ENCOUNTER — HOSPITAL ENCOUNTER (OUTPATIENT)
Dept: CARDIAC REHAB | Age: 62
Discharge: HOME | End: 2017-05-19
Payer: COMMERCIAL

## 2017-05-19 PROCEDURE — 93798 PHYS/QHP OP CAR RHAB W/ECG: CPT

## 2017-05-22 ENCOUNTER — HOSPITAL ENCOUNTER (OUTPATIENT)
Dept: CARDIAC REHAB | Age: 62
Discharge: HOME | End: 2017-05-22
Payer: COMMERCIAL

## 2017-05-22 PROCEDURE — 93798 PHYS/QHP OP CAR RHAB W/ECG: CPT

## 2017-05-24 ENCOUNTER — HOSPITAL ENCOUNTER (OUTPATIENT)
Dept: CARDIAC REHAB | Age: 62
Discharge: HOME | End: 2017-05-24
Payer: COMMERCIAL

## 2017-05-24 VITALS — BODY MASS INDEX: 28.09 KG/M2 | WEIGHT: 195.8 LBS

## 2017-05-24 PROCEDURE — 93798 PHYS/QHP OP CAR RHAB W/ECG: CPT

## 2017-05-26 ENCOUNTER — HOSPITAL ENCOUNTER (OUTPATIENT)
Dept: CARDIAC REHAB | Age: 62
Discharge: HOME | End: 2017-05-26
Payer: COMMERCIAL

## 2017-05-26 PROCEDURE — 93798 PHYS/QHP OP CAR RHAB W/ECG: CPT

## 2017-05-31 ENCOUNTER — HOSPITAL ENCOUNTER (OUTPATIENT)
Dept: CARDIAC REHAB | Age: 62
Discharge: HOME | End: 2017-05-31
Payer: COMMERCIAL

## 2017-05-31 VITALS — BODY MASS INDEX: 27.78 KG/M2 | WEIGHT: 193.6 LBS

## 2017-05-31 PROCEDURE — 93798 PHYS/QHP OP CAR RHAB W/ECG: CPT

## 2017-06-02 ENCOUNTER — HOSPITAL ENCOUNTER (OUTPATIENT)
Dept: CARDIAC REHAB | Age: 62
Discharge: HOME | End: 2017-06-02
Payer: COMMERCIAL

## 2017-06-02 PROCEDURE — 93798 PHYS/QHP OP CAR RHAB W/ECG: CPT

## 2017-06-05 ENCOUNTER — HOSPITAL ENCOUNTER (OUTPATIENT)
Dept: CARDIAC REHAB | Age: 62
Discharge: HOME | End: 2017-06-05
Payer: COMMERCIAL

## 2017-06-05 PROCEDURE — 93798 PHYS/QHP OP CAR RHAB W/ECG: CPT

## 2017-06-07 ENCOUNTER — HOSPITAL ENCOUNTER (OUTPATIENT)
Dept: CARDIAC REHAB | Age: 62
Discharge: HOME | End: 2017-06-07
Payer: COMMERCIAL

## 2017-06-07 VITALS — BODY MASS INDEX: 28.01 KG/M2 | WEIGHT: 195.2 LBS

## 2017-06-07 PROCEDURE — 93798 PHYS/QHP OP CAR RHAB W/ECG: CPT

## 2017-06-09 ENCOUNTER — HOSPITAL ENCOUNTER (OUTPATIENT)
Dept: CARDIAC REHAB | Age: 62
Discharge: HOME | End: 2017-06-09
Payer: COMMERCIAL

## 2017-06-09 PROCEDURE — 93798 PHYS/QHP OP CAR RHAB W/ECG: CPT

## 2017-06-12 ENCOUNTER — HOSPITAL ENCOUNTER (OUTPATIENT)
Dept: CARDIAC REHAB | Age: 62
Discharge: HOME | End: 2017-06-12
Payer: COMMERCIAL

## 2017-06-12 PROCEDURE — 93798 PHYS/QHP OP CAR RHAB W/ECG: CPT

## 2017-06-14 ENCOUNTER — HOSPITAL ENCOUNTER (OUTPATIENT)
Dept: CARDIAC REHAB | Age: 62
Discharge: HOME | End: 2017-06-14
Payer: COMMERCIAL

## 2017-06-14 PROCEDURE — 93798 PHYS/QHP OP CAR RHAB W/ECG: CPT

## 2017-06-16 ENCOUNTER — HOSPITAL ENCOUNTER (OUTPATIENT)
Dept: CARDIAC REHAB | Age: 62
Discharge: HOME | End: 2017-06-16
Payer: COMMERCIAL

## 2017-06-16 PROCEDURE — 93798 PHYS/QHP OP CAR RHAB W/ECG: CPT

## 2017-06-19 ENCOUNTER — HOSPITAL ENCOUNTER (OUTPATIENT)
Dept: CARDIAC REHAB | Age: 62
Discharge: HOME | End: 2017-06-19
Payer: COMMERCIAL

## 2017-06-19 PROCEDURE — 93798 PHYS/QHP OP CAR RHAB W/ECG: CPT

## 2017-06-21 ENCOUNTER — HOSPITAL ENCOUNTER (OUTPATIENT)
Dept: CARDIAC REHAB | Age: 62
Discharge: HOME | End: 2017-06-21
Payer: COMMERCIAL

## 2017-06-21 VITALS — BODY MASS INDEX: 28.07 KG/M2 | WEIGHT: 195.6 LBS

## 2017-06-21 PROCEDURE — 93798 PHYS/QHP OP CAR RHAB W/ECG: CPT

## 2017-06-23 ENCOUNTER — HOSPITAL ENCOUNTER (OUTPATIENT)
Dept: CARDIAC REHAB | Age: 62
Discharge: HOME | End: 2017-06-23
Payer: COMMERCIAL

## 2017-06-23 PROCEDURE — 93798 PHYS/QHP OP CAR RHAB W/ECG: CPT

## 2017-06-26 ENCOUNTER — HOSPITAL ENCOUNTER (OUTPATIENT)
Dept: CARDIAC REHAB | Age: 62
Discharge: HOME | End: 2017-06-26
Payer: COMMERCIAL

## 2017-06-26 PROCEDURE — 93798 PHYS/QHP OP CAR RHAB W/ECG: CPT

## 2017-06-28 ENCOUNTER — HOSPITAL ENCOUNTER (OUTPATIENT)
Dept: CARDIAC REHAB | Age: 62
Discharge: HOME | End: 2017-06-28
Payer: COMMERCIAL

## 2017-06-28 VITALS — BODY MASS INDEX: 27.29 KG/M2 | WEIGHT: 190.2 LBS

## 2017-06-28 PROCEDURE — 93798 PHYS/QHP OP CAR RHAB W/ECG: CPT

## 2017-06-30 ENCOUNTER — HOSPITAL ENCOUNTER (OUTPATIENT)
Dept: CARDIAC REHAB | Age: 62
Discharge: HOME | End: 2017-06-30
Payer: COMMERCIAL

## 2017-06-30 VITALS — BODY MASS INDEX: 26.69 KG/M2 | WEIGHT: 186 LBS

## 2017-06-30 PROCEDURE — 93798 PHYS/QHP OP CAR RHAB W/ECG: CPT

## 2017-07-03 ENCOUNTER — HOSPITAL ENCOUNTER (OUTPATIENT)
Dept: CARDIAC REHAB | Age: 62
Discharge: HOME | End: 2017-07-03
Payer: COMMERCIAL

## 2017-07-03 PROCEDURE — 93798 PHYS/QHP OP CAR RHAB W/ECG: CPT

## 2017-07-05 ENCOUNTER — HOSPITAL ENCOUNTER (OUTPATIENT)
Dept: CARDIAC REHAB | Age: 62
Discharge: HOME | End: 2017-07-05
Payer: COMMERCIAL

## 2017-07-05 VITALS — WEIGHT: 187 LBS | BODY MASS INDEX: 26.83 KG/M2

## 2017-07-05 PROCEDURE — 93798 PHYS/QHP OP CAR RHAB W/ECG: CPT

## 2017-07-07 ENCOUNTER — HOSPITAL ENCOUNTER (OUTPATIENT)
Dept: CARDIAC REHAB | Age: 62
Discharge: HOME | End: 2017-07-07
Payer: COMMERCIAL

## 2017-07-07 PROCEDURE — 93798 PHYS/QHP OP CAR RHAB W/ECG: CPT

## 2017-07-10 ENCOUNTER — HOSPITAL ENCOUNTER (OUTPATIENT)
Dept: CARDIAC REHAB | Age: 62
Discharge: HOME | End: 2017-07-10
Payer: COMMERCIAL

## 2017-07-10 PROCEDURE — 93798 PHYS/QHP OP CAR RHAB W/ECG: CPT

## 2017-07-12 ENCOUNTER — HOSPITAL ENCOUNTER (OUTPATIENT)
Dept: CARDIAC REHAB | Age: 62
Discharge: HOME | End: 2017-07-12
Payer: COMMERCIAL

## 2017-07-12 VITALS — WEIGHT: 189 LBS | BODY MASS INDEX: 27.12 KG/M2

## 2017-07-12 PROCEDURE — 93798 PHYS/QHP OP CAR RHAB W/ECG: CPT

## 2017-07-14 ENCOUNTER — APPOINTMENT (OUTPATIENT)
Dept: CARDIAC REHAB | Age: 62
End: 2017-07-14
Payer: COMMERCIAL

## 2017-07-17 ENCOUNTER — APPOINTMENT (OUTPATIENT)
Dept: CARDIAC REHAB | Age: 62
End: 2017-07-17
Payer: COMMERCIAL

## 2017-07-19 ENCOUNTER — APPOINTMENT (OUTPATIENT)
Dept: CARDIAC REHAB | Age: 62
End: 2017-07-19
Payer: COMMERCIAL

## 2017-08-17 PROBLEM — J90 PLEURAL EFFUSION: Status: RESOLVED | Noted: 2017-03-02 | Resolved: 2017-08-17

## 2017-08-17 PROBLEM — I46.9 CARDIAC ARREST WITH VENTRICULAR FIBRILLATION (HCC): Status: RESOLVED | Noted: 2017-03-05 | Resolved: 2017-08-17

## 2017-08-17 PROBLEM — I50.23 SYSTOLIC CHF, ACUTE ON CHRONIC (HCC): Status: RESOLVED | Noted: 2017-03-03 | Resolved: 2017-08-17

## 2017-08-17 PROBLEM — I25.2 HISTORY OF NON-ST ELEVATION MYOCARDIAL INFARCTION (NSTEMI): Status: ACTIVE | Noted: 2017-02-27

## 2017-08-17 PROBLEM — I49.01 CARDIAC ARREST WITH VENTRICULAR FIBRILLATION (HCC): Status: RESOLVED | Noted: 2017-03-05 | Resolved: 2017-08-17

## 2017-08-17 PROBLEM — Z95.0 POSTSURGICAL CARDIAC PACEMAKER IN SITU: Status: ACTIVE | Noted: 2017-08-17

## 2017-08-17 PROBLEM — D62 POSTOPERATIVE ANEMIA DUE TO ACUTE BLOOD LOSS: Status: RESOLVED | Noted: 2017-03-01 | Resolved: 2017-08-17

## 2017-08-23 PROBLEM — E11.3592: Status: ACTIVE | Noted: 2017-08-23

## 2017-08-23 PROBLEM — E11.3511: Status: ACTIVE | Noted: 2017-08-23

## 2017-08-23 PROBLEM — E11.39 CONTROLLED TYPE 2 DIABETES MELLITUS WITH OPHTHALMIC COMPLICATION, WITHOUT LONG-TERM CURRENT USE OF INSULIN (HCC): Status: ACTIVE | Noted: 2017-08-23

## 2017-08-23 PROBLEM — H43.13: Status: ACTIVE | Noted: 2017-02-27

## 2019-03-10 PROBLEM — Z12.5 ENCOUNTER FOR SPECIAL SCREENING EXAMINATION FOR NEOPLASM OF PROSTATE: Status: ACTIVE | Noted: 2019-03-10

## 2019-06-24 PROBLEM — E11.21 TYPE 2 DIABETES WITH NEPHROPATHY (HCC): Status: ACTIVE | Noted: 2019-06-24

## 2019-06-24 PROBLEM — I50.9 HEART FAILURE (HCC): Status: ACTIVE | Noted: 2017-02-28

## 2019-08-09 PROBLEM — I35.1 MODERATE AORTIC REGURGITATION: Status: ACTIVE | Noted: 2019-08-09

## 2021-06-02 NOTE — PROGRESS NOTES
Patient pre-assessment complete for CARLOS scheduled for 0800, arrival time 0700. Patient verified using . Patient instructed to bring all home medications in labeled bottles on the day of procedure. NPO status reinforced. Patient instructed to HOLD DM medications, 1/2 dose insulin tonight , lisinopril,. Instructed they can take all other medications excluding vitamins & supplements. Patient verbalizes understanding of all instructions & denies any questions at this time.

## 2021-06-03 ENCOUNTER — HOSPITAL ENCOUNTER (OUTPATIENT)
Dept: CARDIAC CATH/INVASIVE PROCEDURES | Age: 66
Discharge: HOME | End: 2021-06-03
Attending: INTERNAL MEDICINE | Admitting: INTERNAL MEDICINE
Payer: COMMERCIAL

## 2021-06-03 VITALS
SYSTOLIC BLOOD PRESSURE: 122 MMHG | HEIGHT: 70 IN | TEMPERATURE: 98.1 F | BODY MASS INDEX: 28.06 KG/M2 | OXYGEN SATURATION: 97 % | WEIGHT: 196 LBS | RESPIRATION RATE: 16 BRPM | DIASTOLIC BLOOD PRESSURE: 63 MMHG | HEART RATE: 76 BPM

## 2021-06-03 DIAGNOSIS — I35.0 NONRHEUMATIC AORTIC VALVE STENOSIS: ICD-10-CM

## 2021-06-03 LAB
ALBUMIN SERPL-MCNC: 4.1 G/DL (ref 3.2–4.6)
ALBUMIN/GLOB SERPL: 1.2 {RATIO} (ref 1.2–3.5)
ALP SERPL-CCNC: 75 U/L (ref 50–136)
ALT SERPL-CCNC: 34 U/L (ref 12–65)
ANION GAP SERPL CALC-SCNC: 4 MMOL/L (ref 7–16)
AST SERPL-CCNC: 15 U/L (ref 15–37)
ATRIAL RATE: 75 BPM
BILIRUB SERPL-MCNC: 1 MG/DL (ref 0.2–1.1)
BUN SERPL-MCNC: 24 MG/DL (ref 8–23)
CALCIUM SERPL-MCNC: 9.3 MG/DL (ref 8.3–10.4)
CALCULATED P AXIS, ECG09: 57 DEGREES
CALCULATED R AXIS, ECG10: 19 DEGREES
CALCULATED T AXIS, ECG11: 167 DEGREES
CHLORIDE SERPL-SCNC: 106 MMOL/L (ref 98–107)
CO2 SERPL-SCNC: 28 MMOL/L (ref 21–32)
CREAT SERPL-MCNC: 1.17 MG/DL (ref 0.8–1.5)
DIAGNOSIS, 93000: NORMAL
ERYTHROCYTE [DISTWIDTH] IN BLOOD BY AUTOMATED COUNT: 12.8 % (ref 11.9–14.6)
GLOBULIN SER CALC-MCNC: 3.3 G/DL (ref 2.3–3.5)
GLUCOSE SERPL-MCNC: 219 MG/DL (ref 65–100)
HCT VFR BLD AUTO: 43.2 % (ref 41.1–50.3)
HGB BLD-MCNC: 14.2 G/DL (ref 13.6–17.2)
INR PPP: 1.1
MAGNESIUM SERPL-MCNC: 2.3 MG/DL (ref 1.8–2.4)
MCH RBC QN AUTO: 29.8 PG (ref 26.1–32.9)
MCHC RBC AUTO-ENTMCNC: 32.9 G/DL (ref 31.4–35)
MCV RBC AUTO: 90.8 FL (ref 79.6–97.8)
NRBC # BLD: 0 K/UL (ref 0–0.2)
P-R INTERVAL, ECG05: 208 MS
PLATELET # BLD AUTO: 157 K/UL (ref 150–450)
PMV BLD AUTO: 9.2 FL (ref 9.4–12.3)
POTASSIUM SERPL-SCNC: 4.6 MMOL/L (ref 3.5–5.1)
PROT SERPL-MCNC: 7.4 G/DL (ref 6.3–8.2)
PROTHROMBIN TIME: 14.9 SEC (ref 12.5–14.7)
Q-T INTERVAL, ECG07: 402 MS
QRS DURATION, ECG06: 106 MS
QTC CALCULATION (BEZET), ECG08: 448 MS
RBC # BLD AUTO: 4.76 M/UL (ref 4.23–5.6)
SODIUM SERPL-SCNC: 138 MMOL/L (ref 138–145)
VENTRICULAR RATE, ECG03: 75 BPM
WBC # BLD AUTO: 4.6 K/UL (ref 4.3–11.1)

## 2021-06-03 PROCEDURE — 80053 COMPREHEN METABOLIC PANEL: CPT

## 2021-06-03 PROCEDURE — 85027 COMPLETE CBC AUTOMATED: CPT

## 2021-06-03 PROCEDURE — 93005 ELECTROCARDIOGRAM TRACING: CPT | Performed by: INTERNAL MEDICINE

## 2021-06-03 PROCEDURE — 74011000250 HC RX REV CODE- 250: Performed by: INTERNAL MEDICINE

## 2021-06-03 PROCEDURE — 83735 ASSAY OF MAGNESIUM: CPT

## 2021-06-03 PROCEDURE — 99152 MOD SED SAME PHYS/QHP 5/>YRS: CPT

## 2021-06-03 PROCEDURE — 93312 ECHO TRANSESOPHAGEAL: CPT

## 2021-06-03 PROCEDURE — 74011250636 HC RX REV CODE- 250/636: Performed by: INTERNAL MEDICINE

## 2021-06-03 PROCEDURE — 99153 MOD SED SAME PHYS/QHP EA: CPT

## 2021-06-03 PROCEDURE — 85610 PROTHROMBIN TIME: CPT

## 2021-06-03 RX ORDER — MIDAZOLAM HYDROCHLORIDE 1 MG/ML
1-10 INJECTION, SOLUTION INTRAMUSCULAR; INTRAVENOUS AS NEEDED
Status: DISCONTINUED | OUTPATIENT
Start: 2021-06-03 | End: 2021-06-03 | Stop reason: HOSPADM

## 2021-06-03 RX ORDER — SODIUM CHLORIDE 9 MG/ML
75 INJECTION, SOLUTION INTRAVENOUS CONTINUOUS
Status: DISCONTINUED | OUTPATIENT
Start: 2021-06-03 | End: 2021-06-03 | Stop reason: HOSPADM

## 2021-06-03 RX ORDER — LIDOCAINE HYDROCHLORIDE 20 MG/ML
15 SOLUTION OROPHARYNGEAL AS NEEDED
Status: DISCONTINUED | OUTPATIENT
Start: 2021-06-03 | End: 2021-06-03 | Stop reason: HOSPADM

## 2021-06-03 RX ORDER — FENTANYL CITRATE 50 UG/ML
25-200 INJECTION, SOLUTION INTRAMUSCULAR; INTRAVENOUS AS NEEDED
Status: DISCONTINUED | OUTPATIENT
Start: 2021-06-03 | End: 2021-06-03 | Stop reason: HOSPADM

## 2021-06-03 RX ADMIN — LIDOCAINE HYDROCHLORIDE 15 ML: 20 SOLUTION ORAL; TOPICAL at 09:33

## 2021-06-03 RX ADMIN — MIDAZOLAM 1 MG: 1 INJECTION INTRAMUSCULAR; INTRAVENOUS at 10:18

## 2021-06-03 RX ADMIN — SODIUM CHLORIDE 75 ML/HR: 900 INJECTION, SOLUTION INTRAVENOUS at 09:56

## 2021-06-03 RX ADMIN — FENTANYL CITRATE 50 MCG: 50 INJECTION INTRAMUSCULAR; INTRAVENOUS at 10:25

## 2021-06-03 RX ADMIN — MIDAZOLAM 1 MG: 1 INJECTION INTRAMUSCULAR; INTRAVENOUS at 10:16

## 2021-06-03 RX ADMIN — FENTANYL CITRATE 50 MCG: 50 INJECTION INTRAMUSCULAR; INTRAVENOUS at 10:02

## 2021-06-03 RX ADMIN — MIDAZOLAM 2 MG: 1 INJECTION INTRAMUSCULAR; INTRAVENOUS at 10:02

## 2021-06-03 NOTE — PROGRESS NOTES
Pt arrived, ambulated to room with no visible problems, planned CARLOS for Dr Lossie Nageotte. Consent signed, Procedure discussed with pt all questions answered voiced understanding. Medications and history discussed with pt. Pt prepped per ordersThe patient has a fraility score of 3-MANAGING WELL, based on ability to complete ADLs without assistance.

## 2021-06-03 NOTE — PROGRESS NOTES
TRANSFER - IN REPORT:    Verbal report received from Bari Adrian RN on [facility] being received from [address] [place] [address] for routine progression of care      Report consisted of patients Situation, Background, Assessment and Recommendations(SBAR). Information from the following report(s) Procedure Summary was reviewed with the receiving nurse. Opportunity for questions and clarification was provided. Assessment completed upon patients arrival to unit and care assumed.

## 2021-06-03 NOTE — DISCHARGE INSTRUCTIONS
AFTER YOU TRANSESOPHAGEAL ECHOCARDIOGRAM    Be sure someone else drives you home. You may feel drowsy for several hours. Do not eat or drink for at least two hours after your procedure. Your throat will be numb and there is a risk you might have difficulty swallowing for a while. Be careful when you do eat or drink for the first time especially with hot fluids since you could easily burn your throat. Call your doctor if:    · You are bleeding from your throat or mouth. · You have trouble breathing all of a sudden. · You have chest pain or any pain that spreads to your neck, jaw, or arms. · You have questions or concerns. · You have a fever greater than 101°F.    Doctor: Amie Amaya    Special Instructions:    No driving for 24 hours.   Nothing to eat/drink until 1130am.

## 2021-06-04 NOTE — H&P
[facility] Card[facility] History & Physical      Date of  Admission: 6/3/2021  6:59 AM     CC: CARLOS to evaluate aortic valve/mitral valve    HPI:  Tea Gustafson is a 63 y.o. male     History of coronary disease with prior acute coronary syndrome in 2017 with coronary artery bypass grafting with LIMA to LAD, SVG to OM, SVG to PDA, EF 10-15% with VF arrest.  The patient underwent defibrillator placement.       Noted echocardiogram from 3/2021 with stated severe aortic stenosis (DI 0.28)/consideration for severe mitral stenosis and CARLOS recommended. Discussed with patient's primary cardiologist who reviewed imaging and felt aortic valve severity needed to be assessed with discrepancy in echocardiogram and also mitral valve felt to be mostly secondary to mitral annular calcification; also recommended per structural heart team for TAVR evaluation.     Cardiac History:   1. Multivessel coronary disease on cardiac catheterization 2017.  2. 2017 carotids <50% stenosis    3. 2017 Coronary artery bypass grafting with LIMA to LAD, SVG to OM, SVG to PDA. 4. Carotids 2017 <50%   5. EF 10-15% by echocardiogram.  Aortic valve appeared calcified, but gradients could not be obtained due to poor acoustic windows and aortic stenosis severity could not be graded based on his low cardiac output at that time. 6. Device interrogation 07/2018 atrial flutter. Initiated on Eliquis. 7. 8/2019 EF low normal Moderate AS mean gradient 19 mm Hg. 8. 2019 patient stopped eliquis due to dizziness. 9. Echo 7/2020 EF 50% Moderate to severe AS mean gradient 25 mm Hg DI 0.25   10. 3/2021 echocardiogram EF 60 to 65% mean aortic valve gradient 33 mmHg dimensionless index 0.28 elevated mitral valve gradients with severe mitral annular calcification        Past Medical History:   Diagnosis Date    Allergic rhinitis, cause unspecified 3/7/2014    CAD, multiple vessel 2/28/2017 2/28/17 (Dr Toshia Hawley) Coronary artery bypass grafting x3.  Grafts consisting of  1. Left internal mammary artery to the left anterior descending. 2. Reversed LEFT saphenous vein graft to obtuse marginal.  3. Reversed LEFT saphenous vein graft to the left posterior descending  artery.  Depressed left ventricular ejection fraction 2/27/2017 2/27/17 20%    Esophageal reflux 3/7/2014    History of testicular cancer 1979    testicular- tx with radiation and lung cancer    Retinopathy of both eyes       Past Surgical History:   Procedure Laterality Date    HX CATARACT REMOVAL Right 04/21/2019    HX CORONARY ARTERY BYPASS GRAFT      CABG    HX HEENT  2003    SCC removal, nose    HX PACEMAKER      HX UROLOGICAL Left 1979    malignant tumor of testis/RAT    HX UROLOGICAL Left 2010    groin- cyst       Allergies   Allergen Reactions    Tomato Swelling     NO FRESH TOMATO, CAN EAT KETCHUP AND ANY CANNED/COOKED TOMATO PRODUCTS    Caffeine Other (comments)     Dizziness and may pass out      Social History     Socioeconomic History    Marital status:      Spouse name: Minus Anatoly Number of children: 1    Years of education: Not on file    Highest education level: Not on file   Occupational History    Occupation:      Comment: [address]   Tobacco Use    Smoking status: Never Smoker    Smokeless tobacco: Never Used   Substance and Sexual Activity    Alcohol use: No     Alcohol/week: 0.0 standard drinks    Drug use: No    Sexual activity: Not on file   Other Topics Concern    Not on file   Social History Narrative    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 [employer]     Social Determinants of Health     Financial Resource Strain:     Difficulty of Paying Living Expenses:    Food Insecurity:     Worried About Running Out of Food in the Last Year:     [address] in the Last Year:    Transportation Needs:     Lack of Transportation (Medical):      Lack of Transportation (Non-Medical):    Physical Activity:     Days of Exercise per Week:     Minutes of Exercise per Session:    Stress:     Feeling of Stress :    Social Connections:     Frequency of Communication with Friends and Family:     Frequency of Social Gatherings with Friends and Family:     Attends Druze Services:     Active Member of Clubs or Organizations:     Attends Club or Organization Meetings:     Marital Status:    Intimate Partner Violence:     Fear of Current or Ex-Partner:     Emotionally Abused:     Physically Abused:     Sexually Abused:      Family History   Problem Relation Age of Onset    Diabetes Mother         type 2 -insulin dep    Heart Attack Mother 76    Heart Disease Mother     Diabetes Father         type 2; insulin dep    Heart Disease Father         CAD-pacer-carotid endart    Stroke Father     Heart Attack Father 67    Diabetes Other         grandmother    Cancer Sister         breast    No Known Problems Brother     No Known Problems Sister         No current facility-administered medications for this encounter. Current Outpatient Medications   Medication Sig    metFORMIN ER (GLUCOPHAGE XR) 500 mg tablet Take 1 Tab by mouth two (2) times a day.  insulin degludec Glorianne Jubilee FlexTouch U-100) 100 unit/mL (3 mL) inpn Admin10 units SQ every day and increase by 2u every Mon and Thurs until your FBS are 120. Max daily dose 50 Units. Indications: type 2 diabetes mellitus (Patient taking differently: 14 Units daily. Admin10 units SQ every day and increase by 2u every Mon and Thurs until your FBS are 120. Max daily dose 50 Units. Indications: type 2 diabetes mellitus)    dapagliflozin (Farxiga) 10 mg tab tablet Take 1 Tab by mouth daily.  atorvastatin (LIPITOR) 40 mg tablet Take 1 Tab by mouth nightly.  Indications: hardening of the arteries due to plaque buildup, treatment to prevent a heart attack    metoprolol succinate (TOPROL-XL) 50 mg XL tablet Take 1 Tab by mouth every twelve (12) hours. Indications: high blood pressure, myocardial reinfarction prevention (Patient taking differently: Take 50 mg by mouth daily. Indications: high blood pressure, myocardial reinfarction prevention)    lisinopriL (PRINIVIL, ZESTRIL) 2.5 mg tablet Take 1 Tab by mouth daily.  rivaroxaban (XARELTO) 20 mg tab tablet Take 1 Tab by mouth daily (with breakfast).  famotidine (PEPCID) 20 mg tablet Take 1 Tab by mouth three (3) times daily.  Insulin Needles, Disposable, (Bindu Pen Needle) 32 gauge x 5/32\" ndle Use to administer basal insulin once daily    aspirin delayed-release 81 mg tablet Take 1 Tab by mouth daily. Review of Systems    ROS  Review of Systems   Constitution: Negative for decreased appetite and fever. HENT: Negative for congestion and nosebleeds. Eyes: Negative for blurred vision. Respiratory: Negative for cough and hemoptysis. Endocrine: Negative for polydipsia. Hematologic/Lymphatic: Negative for adenopathy and bleeding problem. Skin: Negative for flushing. Musculoskeletal: Negative for falls. Gastrointestinal: Negative for abdominal pain. Genitourinary: Negative for frequency. Neurological: Positive for weakness. Negative for seizures. Psychiatric/Behavioral: Negative for suicidal ideas. Allergic/Immunologic: Negative for hives. Subjective:     Visit Vitals  /63   Pulse 76   Temp 98.1 °F (36.7 °C)   Resp 16   Ht 5' 10\" (1.778 m)   Wt 196 lb (88.9 kg)   SpO2 97%   BMI 28.12 kg/m²       No intake/output data recorded. No intake/output data recorded. Physical Exam:  Constitutional: He is oriented to person, place, and time. He appears well-developed. HENT:   Head: Normocephalic. Eyes: Pupils are equal, round, and reactive to light. Conjunctivae are normal.   Neck: Normal range of motion. No JVD present. Cardiovascular:   Murmur; 3/6 JEREMY at RUSB; dim S2  Pulmonary/Chest: Effort normal. No respiratory distress. He has no wheezes.    Abdominal: Soft. He exhibits no distension. Musculoskeletal:         General: No edema. Neurological: He is alert and oriented to person, place, and time. Skin: Skin is warm and dry.        Labs:   Recent Results (from the past 24 hour(s))   EKG, 12 LEAD, INITIAL    Collection Time: 06/03/21  7:21 AM   Result Value Ref Range    Ventricular Rate 75 BPM    Atrial Rate 75 BPM    P-R Interval 208 ms    QRS Duration 106 ms    Q-T Interval 402 ms    QTC Calculation (Bezet) 448 ms    Calculated P Axis 57 degrees    Calculated R Axis 19 degrees    Calculated T Axis 167 degrees    Diagnosis       Atrial-paced rhythm  T wave abnormality, consider inferior ischemia  Non-specific ST-t wave changes  When compared with ECG of 06-MAR-2017 16:52,  QRS duration has decreased  Confirmed by Ashli Pierce MD (), WILLIAN OAKES ([id number]) on 6/3/2021 5:00:24 PM     CBC W/O DIFF    Collection Time: 06/03/21  7:22 AM   Result Value Ref Range    WBC 4.6 4.3 - 11.1 K/uL    RBC 4.76 4.23 - 5.6 M/uL    HGB 14.2 13.6 - 17.2 g/dL    HCT 43.2 41.1 - 50.3 %    MCV 90.8 79.6 - 97.8 FL    MCH 29.8 26.1 - 32.9 PG    MCHC 32.9 31.4 - 35.0 g/dL    RDW 12.8 11.9 - 14.6 %    PLATELET 445 593 - 916 K/uL    MPV 9.2 (L) 9.4 - 12.3 FL    ABSOLUTE NRBC 0.00 0.0 - 0.2 K/uL   METABOLIC PANEL, COMPREHENSIVE    Collection Time: 06/03/21  7:22 AM   Result Value Ref Range    Sodium 138 138 - 145 mmol/L    Potassium 4.6 3.5 - 5.1 mmol/L    Chloride 106 98 - 107 mmol/L    CO2 28 21 - 32 mmol/L    Anion gap 4 (L) 7 - 16 mmol/L    Glucose 219 (H) 65 - 100 mg/dL    BUN 24 (H) 8 - 23 MG/DL    Creatinine 1.17 0.8 - 1.5 MG/DL    GFR est AA >60 >60 ml/min/1.73m2    GFR est non-AA >60 >60 ml/min/1.73m2    Calcium 9.3 8.3 - 10.4 MG/DL    Bilirubin, total 1.0 0.2 - 1.1 MG/DL    ALT (SGPT) 34 12 - 65 U/L    AST (SGOT) 15 15 - 37 U/L    Alk.  phosphatase 75 50 - 136 U/L    Protein, total 7.4 6.3 - 8.2 g/dL    Albumin 4.1 3.2 - 4.6 g/dL    Globulin 3.3 2.3 - 3.5 g/dL    A-G Ratio 1.2 1.2 - 3.5 PROTHROMBIN TIME + INR    Collection Time: 06/03/21  7:22 AM   Result Value Ref Range    Prothrombin time 14.9 (H) 12.5 - 14.7 sec    INR 1.1     MAGNESIUM    Collection Time: 06/03/21  7:22 AM   Result Value Ref Range    Magnesium 2.3 1.8 - 2.4 mg/dL        Assessment/Plan:         1. Cardiomyopathy,   · EF improved   · New York Heart Association class 1 symptoms, on appropriate therapies. · Defibrillator in place. Defibrillator in situ, normal function, atrial flutter detected. Key CAD CHF Meds                 atorvastatin (LIPITOR) 40 mg tablet (Taking) Take 1 Tab by mouth nightly. Indications: hardening of the arteries due to plaque buildup, treatment to prevent a heart attack     metoprolol succinate (TOPROL-XL) 50 mg XL tablet (Taking) Take 1 Tab by mouth every twelve (12) hours. Indications: high blood pressure, myocardial reinfarction prevention     lisinopriL (PRINIVIL, ZESTRIL) 2.5 mg tablet (Taking) Take 1 Tab by mouth daily.     rivaroxaban (XARELTO) 20 mg tab tablet (Taking) Take 1 Tab by mouth daily (with breakfast).     aspirin delayed-release 81 mg tablet (Taking) Take 1 Tab by mouth daily.          Farxiga   2. Atrial flutter. · Risks and benefits of anticoagulation therapy were discussed. 3. Aortic stenosis-assess further with CARLOS; possibly moderate to severe based on DI. Assess MV as well. 4. Hyperlipidemia. · Continue lipid lowering therapies, controlled. 5. Diabetes. · Uncontrolled last a1c >10   · On SLG- DDP-4 therapy Insulin. Key Antihyperglycemic Medications                 dapagliflozin (Farxiga) 10 mg tab tablet (Taking) Take 1 Tab by mouth daily.     metFORMIN ER (GLUCOPHAGE XR) 500 mg tablet (Taking) Take 1 Tab by mouth daily (with dinner).     insulin degludec Mónica Clunes FlexTouch U-100) 100 unit/mL (3 mL) inpn (Taking) Admin10 units SQ every day and increase by 2u every Mon and Thurs until your FBS are 120. Max daily dose 50 Units.   Indications: type 2 diabetes mellitus           6. Protein gap elevated 9/2018 normal 8/2019   · CKD last creatine elevated micro albumin creatine ratio < 5 in 2018 resumde ACE due to CKD and DM. 7. Long term use of anticoagulation   · dizziness on eliquis   ·  continue xarelto   8. Device interrogation 2/2020 reviewed 1:1 AT vs flutter   9.  Carotid disease 2017 less than 50% stenosis        Oneyda Cruz MD  6/3/2021 9:17 AM

## 2021-06-17 PROCEDURE — 93325 DOPPLER ECHO COLOR FLOW MAPG: CPT | Performed by: INTERNAL MEDICINE

## 2021-06-17 PROCEDURE — 93312 ECHO TRANSESOPHAGEAL: CPT | Performed by: INTERNAL MEDICINE

## 2021-06-17 PROCEDURE — 93320 DOPPLER ECHO COMPLETE: CPT | Performed by: INTERNAL MEDICINE

## 2021-06-17 PROCEDURE — 99152 MOD SED SAME PHYS/QHP 5/>YRS: CPT | Performed by: INTERNAL MEDICINE

## 2021-08-23 PROBLEM — I35.0 NONRHEUMATIC AORTIC VALVE STENOSIS: Chronic | Status: ACTIVE | Noted: 2021-08-23

## 2021-08-23 PROBLEM — E78.5 DYSLIPIDEMIA: Chronic | Status: ACTIVE | Noted: 2021-08-23

## 2021-08-23 PROBLEM — I48.92 ATRIAL FLUTTER (HCC): Chronic | Status: ACTIVE | Noted: 2021-08-23

## 2021-08-24 ENCOUNTER — TELEPHONE (OUTPATIENT)
Dept: CASE MANAGEMENT | Age: 66
End: 2021-08-24

## 2021-08-24 DIAGNOSIS — I35.0 NONRHEUMATIC AORTIC VALVE STENOSIS: Primary | ICD-10-CM

## 2021-08-24 NOTE — TELEPHONE ENCOUNTER
Spoke with pt regarding plans for upcoming appts scheduled for 9/3 with arrival time @1015. Instructed pt to enter hospital on the outpatient side ([address] go to 2nd floor, and check in at radiology. No food 4 hr prior and nothing to drink 2 hr prior to the CT scan except sips of water with medications. Hold metformin on 9/3. TAVR protocol CT: 10:45 am  Carotid ultrasound: 11:30 am      Pt verbalized understanding and contact information ([phone]) provided for any further questions.     Gris Schwartz, Structural Heart Navigator

## 2021-09-03 ENCOUNTER — HOSPITAL ENCOUNTER (OUTPATIENT)
Dept: ULTRASOUND IMAGING | Age: 66
Discharge: HOME | End: 2021-09-03
Attending: INTERNAL MEDICINE
Payer: COMMERCIAL

## 2021-09-03 ENCOUNTER — TELEPHONE (OUTPATIENT)
Dept: CASE MANAGEMENT | Age: 66
End: 2021-09-03

## 2021-09-03 ENCOUNTER — HOSPITAL ENCOUNTER (OUTPATIENT)
Dept: CT IMAGING | Age: 66
Discharge: HOME | End: 2021-09-03
Attending: INTERNAL MEDICINE
Payer: COMMERCIAL

## 2021-09-03 DIAGNOSIS — I35.0 NONRHEUMATIC AORTIC VALVE STENOSIS: ICD-10-CM

## 2021-09-03 LAB — CREAT BLD-MCNC: 1.28 MG/DL (ref 0.8–1.5)

## 2021-09-03 PROCEDURE — 82565 ASSAY OF CREATININE: CPT

## 2021-09-03 PROCEDURE — 74011000636 HC RX REV CODE- 636: Performed by: INTERNAL MEDICINE

## 2021-09-03 PROCEDURE — 74011000258 HC RX REV CODE- 258: Performed by: INTERNAL MEDICINE

## 2021-09-03 PROCEDURE — 93880 EXTRACRANIAL BILAT STUDY: CPT

## 2021-09-03 PROCEDURE — 75574 CT ANGIO HRT W/3D IMAGE: CPT

## 2021-09-03 PROCEDURE — 71275 CT ANGIOGRAPHY CHEST: CPT

## 2021-09-03 RX ORDER — SODIUM CHLORIDE 0.9 % (FLUSH) 0.9 %
10 SYRINGE (ML) INJECTION
Status: COMPLETED | OUTPATIENT
Start: 2021-09-03 | End: 2021-09-03

## 2021-09-03 RX ADMIN — IOPAMIDOL 100 ML: 755 INJECTION, SOLUTION INTRAVENOUS at 11:00

## 2021-09-03 RX ADMIN — Medication 10 ML: at 11:00

## 2021-09-03 RX ADMIN — SODIUM CHLORIDE 100 ML: 900 INJECTION, SOLUTION INTRAVENOUS at 11:00

## 2021-09-03 NOTE — TELEPHONE ENCOUNTER
Pt wants to move cath procedure out further on a Wednesday instead of next week. Patient instructions given for cardiac catheterization with possible intervention with Dr Lucila Nino. Scheduled for 10/20 at 11:00, arrival time 2 hr prior. Patient instructed to bring all home medications in labeled bottles on the day of procedure or a list with the dosages. NPO after midnight the night prior to the procedure, except for medications. Patient informed to take Aspirin 81 mg x 4 on the day of procedure. Hold Lisinopril, Metformin, and insulin on am of the procedure  Hold Xarelto 24-48 hr prior to the procedure- last dose on 10/18   Hold Metformin 10/19 pm  Take 1/2 of insulin dose the night prior to the procedure and hold insulin am of procedure   Instructed they can take all other medications excluding vitamins & supplements. Patient verbalizes understanding of all instructions & denies any questions at this time. Informed that Sherry Chance, or someone from cath lab, may contact them with final details prior to cardiac catheterization. Informed also for potential overnight stay if an intervention is completed. Contact info provided.      Katie Lamb, Structural Heart Navigator

## 2021-09-07 PROCEDURE — 75574 CT ANGIO HRT W/3D IMAGE: CPT | Performed by: INTERNAL MEDICINE

## 2021-09-07 PROCEDURE — 75571 CT HRT W/O DYE W/CA TEST: CPT | Performed by: INTERNAL MEDICINE

## 2021-10-19 NOTE — PROGRESS NOTES
Patient pre-assessment complete for [facility] poss with Dr Heidi Zambrano scheduled for 10/20/21 at 11am, arrival time 9am. Patient verified using . Patient instructed to bring all home medications in labeled bottles on the day of procedure. NPO status reinforced. Patient informed to take a full dose aspirin 325mg  or 81 mg x 4 on the day of procedure. Patient instructed to HOLD xareltio & metformin (last dose 10/18/21) & ONLY take 1/2 insulin tonight. Instructed they can take all other medications excluding vitamins & supplements. Patient verbalizes understanding of all instructions & denies any questions at this time.

## 2021-10-20 ENCOUNTER — HOSPITAL ENCOUNTER (OUTPATIENT)
Age: 66
Setting detail: OUTPATIENT SURGERY
Discharge: HOME | End: 2021-10-20
Attending: INTERNAL MEDICINE | Admitting: INTERNAL MEDICINE
Payer: COMMERCIAL

## 2021-10-20 VITALS
DIASTOLIC BLOOD PRESSURE: 81 MMHG | BODY MASS INDEX: 25.05 KG/M2 | HEART RATE: 82 BPM | RESPIRATION RATE: 16 BRPM | TEMPERATURE: 98.1 F | OXYGEN SATURATION: 97 % | SYSTOLIC BLOOD PRESSURE: 127 MMHG | WEIGHT: 175 LBS | HEIGHT: 70 IN

## 2021-10-20 DIAGNOSIS — I10 ESSENTIAL HYPERTENSION, BENIGN: Chronic | ICD-10-CM

## 2021-10-20 DIAGNOSIS — Z95.1 S/P CABG (CORONARY ARTERY BYPASS GRAFT): ICD-10-CM

## 2021-10-20 DIAGNOSIS — I50.22 SYSTOLIC CHF, CHRONIC (HCC): Chronic | ICD-10-CM

## 2021-10-20 DIAGNOSIS — I35.0 NONRHEUMATIC AORTIC VALVE STENOSIS: Chronic | ICD-10-CM

## 2021-10-20 DIAGNOSIS — E78.5 DYSLIPIDEMIA: Chronic | ICD-10-CM

## 2021-10-20 DIAGNOSIS — I25.10 CORONARY ARTERY DISEASE INVOLVING NATIVE HEART, UNSPECIFIED VESSEL OR LESION TYPE, UNSPECIFIED WHETHER ANGINA PRESENT: ICD-10-CM

## 2021-10-20 LAB
ANION GAP SERPL CALC-SCNC: 2 MMOL/L (ref 7–16)
ATRIAL RATE: 84 BPM
BUN SERPL-MCNC: 15 MG/DL (ref 8–23)
CALCIUM SERPL-MCNC: 9.9 MG/DL (ref 8.3–10.4)
CALCULATED P AXIS, ECG09: 76 DEGREES
CALCULATED R AXIS, ECG10: 33 DEGREES
CALCULATED T AXIS, ECG11: -5 DEGREES
CHLORIDE SERPL-SCNC: 106 MMOL/L (ref 98–107)
CO2 SERPL-SCNC: 30 MMOL/L (ref 21–32)
CREAT SERPL-MCNC: 1.25 MG/DL (ref 0.8–1.5)
DIAGNOSIS, 93000: NORMAL
ERYTHROCYTE [DISTWIDTH] IN BLOOD BY AUTOMATED COUNT: 13.1 % (ref 11.9–14.6)
GLUCOSE SERPL-MCNC: 167 MG/DL (ref 65–100)
HCT VFR BLD AUTO: 44.4 % (ref 41.1–50.3)
HGB BLD-MCNC: 14.3 G/DL (ref 13.6–17.2)
MCH RBC QN AUTO: 28.8 PG (ref 26.1–32.9)
MCHC RBC AUTO-ENTMCNC: 32.2 G/DL (ref 31.4–35)
MCV RBC AUTO: 89.5 FL (ref 79.6–97.8)
NRBC # BLD: 0 K/UL (ref 0–0.2)
P-R INTERVAL, ECG05: 170 MS
PLATELET # BLD AUTO: 158 K/UL (ref 150–450)
PMV BLD AUTO: 10 FL (ref 9.4–12.3)
POTASSIUM SERPL-SCNC: 4.3 MMOL/L (ref 3.5–5.1)
Q-T INTERVAL, ECG07: 380 MS
QRS DURATION, ECG06: 102 MS
QTC CALCULATION (BEZET), ECG08: 449 MS
RBC # BLD AUTO: 4.96 M/UL (ref 4.23–5.6)
SODIUM SERPL-SCNC: 138 MMOL/L (ref 138–145)
VENTRICULAR RATE, ECG03: 84 BPM
WBC # BLD AUTO: 5.7 K/UL (ref 4.3–11.1)

## 2021-10-20 PROCEDURE — 75630 X-RAY AORTA LEG ARTERIES: CPT | Performed by: INTERNAL MEDICINE

## 2021-10-20 PROCEDURE — 75716 ARTERY X-RAYS ARMS/LEGS: CPT | Performed by: INTERNAL MEDICINE

## 2021-10-20 PROCEDURE — 99152 MOD SED SAME PHYS/QHP 5/>YRS: CPT | Performed by: INTERNAL MEDICINE

## 2021-10-20 PROCEDURE — 77030042317 HC BND COMPR HEMSTAT -B: Performed by: INTERNAL MEDICINE

## 2021-10-20 PROCEDURE — C1894 INTRO/SHEATH, NON-LASER: HCPCS | Performed by: INTERNAL MEDICINE

## 2021-10-20 PROCEDURE — 74011250636 HC RX REV CODE- 250/636: Performed by: INTERNAL MEDICINE

## 2021-10-20 PROCEDURE — 74011000250 HC RX REV CODE- 250: Performed by: INTERNAL MEDICINE

## 2021-10-20 PROCEDURE — 93005 ELECTROCARDIOGRAM TRACING: CPT | Performed by: INTERNAL MEDICINE

## 2021-10-20 PROCEDURE — 93455 CORONARY ART/GRFT ANGIO S&I: CPT | Performed by: INTERNAL MEDICINE

## 2021-10-20 PROCEDURE — 80048 BASIC METABOLIC PNL TOTAL CA: CPT

## 2021-10-20 PROCEDURE — 77030004558 HC CATH ANGI DX SUPR TORQ CARD -A: Performed by: INTERNAL MEDICINE

## 2021-10-20 PROCEDURE — 99153 MOD SED SAME PHYS/QHP EA: CPT | Performed by: INTERNAL MEDICINE

## 2021-10-20 PROCEDURE — 74011000636 HC RX REV CODE- 636: Performed by: INTERNAL MEDICINE

## 2021-10-20 PROCEDURE — C1769 GUIDE WIRE: HCPCS | Performed by: INTERNAL MEDICINE

## 2021-10-20 PROCEDURE — 93459 L HRT ART/GRFT ANGIO: CPT | Performed by: INTERNAL MEDICINE

## 2021-10-20 PROCEDURE — 77030016699 HC CATH ANGI DX INFN1 CARD -A: Performed by: INTERNAL MEDICINE

## 2021-10-20 PROCEDURE — 85027 COMPLETE CBC AUTOMATED: CPT

## 2021-10-20 RX ORDER — SODIUM CHLORIDE 0.9 % (FLUSH) 0.9 %
5-40 SYRINGE (ML) INJECTION EVERY 8 HOURS
Status: DISCONTINUED | OUTPATIENT
Start: 2021-10-20 | End: 2021-10-20 | Stop reason: HOSPADM

## 2021-10-20 RX ORDER — HEPARIN SODIUM 200 [USP'U]/100ML
INJECTION, SOLUTION INTRAVENOUS
Status: COMPLETED | OUTPATIENT
Start: 2021-10-20 | End: 2021-10-20

## 2021-10-20 RX ORDER — SODIUM CHLORIDE, SODIUM LACTATE, POTASSIUM CHLORIDE, CALCIUM CHLORIDE 600; 310; 30; 20 MG/100ML; MG/100ML; MG/100ML; MG/100ML
75 INJECTION, SOLUTION INTRAVENOUS CONTINUOUS
Status: DISCONTINUED | OUTPATIENT
Start: 2021-10-20 | End: 2021-10-20

## 2021-10-20 RX ORDER — FENTANYL CITRATE 50 UG/ML
INJECTION, SOLUTION INTRAMUSCULAR; INTRAVENOUS AS NEEDED
Status: DISCONTINUED | OUTPATIENT
Start: 2021-10-20 | End: 2021-10-20 | Stop reason: HOSPADM

## 2021-10-20 RX ORDER — GUAIFENESIN 100 MG/5ML
81 LIQUID (ML) ORAL ONCE
Status: DISCONTINUED | OUTPATIENT
Start: 2021-10-20 | End: 2021-10-20 | Stop reason: HOSPADM

## 2021-10-20 RX ORDER — LIDOCAINE HYDROCHLORIDE 10 MG/ML
INJECTION INFILTRATION; PERINEURAL AS NEEDED
Status: DISCONTINUED | OUTPATIENT
Start: 2021-10-20 | End: 2021-10-20 | Stop reason: HOSPADM

## 2021-10-20 RX ORDER — SODIUM CHLORIDE 9 MG/ML
250 INJECTION, SOLUTION INTRAVENOUS CONTINUOUS
Status: ACTIVE | OUTPATIENT
Start: 2021-10-20 | End: 2021-10-20

## 2021-10-20 RX ORDER — SODIUM CHLORIDE 0.9 % (FLUSH) 0.9 %
5-40 SYRINGE (ML) INJECTION AS NEEDED
Status: DISCONTINUED | OUTPATIENT
Start: 2021-10-20 | End: 2021-10-20 | Stop reason: HOSPADM

## 2021-10-20 RX ORDER — SODIUM CHLORIDE 9 MG/ML
75 INJECTION, SOLUTION INTRAVENOUS CONTINUOUS
Status: DISCONTINUED | OUTPATIENT
Start: 2021-10-20 | End: 2021-10-20 | Stop reason: HOSPADM

## 2021-10-20 RX ORDER — MIDAZOLAM HYDROCHLORIDE 1 MG/ML
INJECTION, SOLUTION INTRAMUSCULAR; INTRAVENOUS AS NEEDED
Status: DISCONTINUED | OUTPATIENT
Start: 2021-10-20 | End: 2021-10-20 | Stop reason: HOSPADM

## 2021-10-20 NOTE — PROGRESS NOTES
Report received from [address] [place] [address]. Procedural findings communicated. Intra procedural  medication administration reviewed. Progression of care discussed.      Patient received into [id number] [facility] 3 post sheath removal.     Access site without bleeding or swelling yes    Dressing dry and intact yes    Patient instructed to limit movement to left upper extremity    Routine post procedural vital signs and site assessment initiated yes

## 2021-10-20 NOTE — DISCHARGE INSTRUCTIONS
HEART CATHETERIZATION/ANGIOGRAPHY DISCHARGE INSTRUCTIONS    1. Check puncture site frequently for swelling or bleeding. If there is any bleeding, lie down and apply pressure over the area with a clean towel or washcloth and call 911. Notify your doctor for any redness, swelling, drainage, or oozing from the puncture site. Notify your doctor for any fever or chills. 2. If the extremity becomes cold, numb, or painful call Dr. Fany Gan at [phone].  3. Activity should be limited for the next 48 hours. Avoid pushing, pulling and bending of affected wrist for 48 hours. No heavy lifting (anything over 5 pounds) for 3 days. No driving for 48 hours. 4. You may resume your usual diet. Drink more fluids than usual.  5. Have a responsible person drive you home and stay with you for at least 24 hours after your heart catheterization/angiography. 6. You may remove bandage from your left arm  in 24 hours. You may shower in 24 hours. No tub baths, hot tubs, or swimming for 1 week. Do not place any lotions, creams, powders, or ointments over puncture site for 1 week. You may place a clean band-aid over the puncture site each day for 5 days. Change daily. I have read the above instructions and have had the opportunity to ask questions.

## 2021-10-20 NOTE — PROGRESS NOTES
TRANSFER - OUT REPORT:    Verbal report given to RN(name) on Kameron Settler  being transferred to CPRU(unit) for routine progression of care       Report consisted of patients Situation, Background, Assessment and   Recommendations(SBAR). Information from the following report(s) SBAR was reviewed with the receiving nurse. SABINE tomlinson/ Ariel  No interventions, TAVR work-up, consult to CV surgery   2 mg Versed  50 mcg Fentanyl  L radial - TR band 11 MLs  5000 units heparin in radial cocktail

## 2021-10-20 NOTE — Clinical Note
Multiple views of the saphenous vein graft to the posterior descending obtained using power injection.

## 2021-10-20 NOTE — PROGRESS NOTES
Terumo band completely deflated. 1350 Terumo band removed from right wrist using sterile technique. Sterile dressing applied. No signs and symptoms of bleeding, oozing or hematoma.

## 2021-10-20 NOTE — ROUTINE PROCESS
Patient received to [address] [place] [address] room # 11  Ambulatory from [facility]. Patient scheduled for [facility] today with Dr Daquan Pham. Procedure reviewed & questions answered, voiced good understanding consent obtained & placed on chart. All medications and medical history reviewed. Will prep patient per orders. Patient & family updated on plan of care. The patient has a fraility score of 3-MANAGING WELL, based on reports no recent falls.

## 2021-10-20 NOTE — PROGRESS NOTES
Patient up to bedside, vital signs and site stable. Patient ambulated to bathroom without difficulty. Patient voided without difficulty. Vascular site stable. Discharge instructions and home medications reviewed with patient. Time allowed for questions and answers. Site stable after ambulation. Peripheral IV sites dc'd without difficulty with tips intact. 1410 Patient discharged to home with family.

## 2021-10-20 NOTE — Clinical Note
TRANSFER - OUT REPORT:     Verbal report given to: CPRU. Report consisted of patient's Situation, Background, Assessment and   Recommendations(SBAR). Opportunity for questions and clarification was provided. Patient transported with a Registered Nurse.

## 2021-10-20 NOTE — H&P
[facility] CARDIOLOGY  [address], [address]                NAME:  Etta Young  : 1955  MRN: [id number]        SUBJECTIVE:   Etta Young is a 63 y.o. male seen for a follow up visit regarding the following:          Chief Complaint   Patient presents with    Referral / Consult    Hypertension         HPI:   Patient with history of coronary artery disease requiring coronary bypass grafting in 2017. This was associated with severe systolic heart failure status post ICD. With appropriate medical therapy patient's ejection fraction has now normalized. Over the last several years she has developed progressively worsening aortic stenosis. Recent echocardiogram was transesophageal echocardiogram 2021 which demonstrates severely calcified aortic valve that is trileaflet in nature. Peak velocities were 4.3 m/s. Peak gradient was 74 mmHg mean gradient was 43 mmHg. Dimensionless index was 0.19. Patient remains physically active with only mild dyspnea on exertion.              Past Medical History, Past Surgical History, Family history, Social History, and Medications were all reviewed with the patient today and updated as necessary.             Current Outpatient Medications   Medication Sig Dispense Refill    semaglutide (Ozempic) 0.25 mg or 0.5 mg/dose (2 mg/1.5 ml) subq pen 0.5 mg by SubCUTAneous route every seven (7) days.        metFORMIN ER (GLUCOPHAGE XR) 500 mg tablet Take 1 Tablet by mouth daily (with dinner). 90 Tablet 3    insulin degludec Olman Barahona FlexTouch U-100) 100 unit/mL (3 mL) inpn Admin10 units SQ every day and increase by 2u every Mon and Thurs until your FBS are 120. Max daily dose 50 Units. Indications: type 2 diabetes mellitus (Patient taking differently: 14 Units daily. Admin10 units SQ every day and increase by 2u every Mon and Thurs until your FBS are 120. Max daily dose 50 Units.   Indications: type 2 diabetes mellitus) 5 Pen 5    dapagliflozin (Farxiga) 10 mg tab tablet Take 1 Tab by mouth daily. 30 Tab 5    atorvastatin (LIPITOR) 40 mg tablet Take 1 Tab by mouth nightly. Indications: hardening of the arteries due to plaque buildup, treatment to prevent a heart attack 90 Tab 3    metoprolol succinate (TOPROL-XL) 50 mg XL tablet Take 1 Tab by mouth every twelve (12) hours. Indications: high blood pressure, myocardial reinfarction prevention (Patient taking differently: Take 50 mg by mouth daily. Indications: high blood pressure, myocardial reinfarction prevention) 180 Tab 3    lisinopriL (PRINIVIL, ZESTRIL) 2.5 mg tablet Take 1 Tab by mouth daily. 30 Tab 11    rivaroxaban (XARELTO) 20 mg tab tablet Take 1 Tab by mouth daily (with breakfast). 30 Tab 11    famotidine (PEPCID) 20 mg tablet Take 1 Tab by mouth three (3) times daily. 270 Tab 3    aspirin delayed-release 81 mg tablet Take 1 Tab by mouth daily.  30 Tab 0    Insulin Needles, Disposable, (Bindu Pen Needle) 32 gauge x 5/32\" ndle Use to administer basal insulin once daily 100 Pen Needle 3           Patient Active Problem List     Diagnosis    Vitreous hemorrhage, both eyes (HCC)--Dr. Kim       2/21s/p vitrectomy RIGHT EYE (Dr Mio Waite)       Hypertriglyceridemia    History of non-ST elevation myocardial infarction (NSTEMI): March 2017    Depressed left ventricular ejection fraction       2/27/17 20%       Retinopathy of both eyes w/ macular puckering--Dr. Kim       Recent surgery 2/22/17  Last eye exam 7/11/17       Allergic rhinitis, cause unspecified    Esophageal reflux    Essential hypertension, benign    History of testicular cancer       1979 testicular- tx with radiation       Nonrheumatic aortic valve stenosis    Dyslipidemia    Atrial flutter (HCC)    Moderate aortic regurgitation       Echo done 8/6/19       Uncontrolled type 2 diabetes with neuropathy ([address]    Uncontrolled type 2 diabetes mellitus with right eye affected by proliferative retinopathy and macular edema, with long-term current use of insulin ([address]    Uncontrolled type 2 diabetes mellitus with left eye affected by proliferative retinopathy without macular edema, with long-term current use of insulin ([facility])    Postsurgical cardiac pacemaker in situ       Placed in March 2017       ICD (implantable cardioverter-defibrillator) in place       1. [employer] MRI implantable cardioverter defibrillator - March 5944       Systolic CHF, chronic [facility])    CAD, multiple vessel       2/28/17 (Dr Rosalina Christine) Coronary artery bypass grafting x3. Grafts consisting of   1. Left internal mammary artery to the left anterior descending. 2. Reversed LEFT saphenous vein graft to obtuse marginal.   3. Reversed LEFT saphenous vein graft to the left posterior descending   artery.        S/P CABG (coronary artery bypass graft)            Family History   Problem Relation Age of Onset    Diabetes Mother           type 2 -insulin dep    Heart Attack Mother 76    Heart Disease Mother      Diabetes Father           type 2; insulin dep    Heart Disease Father           CAD-pacer-carotid endart    Stroke Father      Heart Attack Father 67    Diabetes Other           grandmother    Cancer Sister           breast    No Known Problems Brother      No Known Problems Sister        Social History            Tobacco Use    Smoking status: Never Smoker    Smokeless tobacco: Never Used   Substance Use Topics    Alcohol use: No       Alcohol/week: 0.0 standard drinks         Review Of Symptoms     Review of Systems   Constitutional: Negative for chills and fever. Eyes: Negative for visual disturbance. Cardiovascular: Positive for dyspnea on exertion. Negative for chest pain, palpitations and syncope. Respiratory: Negative for cough and shortness of breath. Skin: Negative for color change and rash. Musculoskeletal: Negative for joint pain and muscle cramps.    Gastrointestinal: Negative for abdominal pain, diarrhea and nausea. Genitourinary: Negative for dysuria. Neurological: Negative for dizziness and headaches. Psychiatric/Behavioral: Negative for depression.         Physical Exam  Blood pressure 118/80, pulse 96, weight 183 lb (83 kg). Physical Exam  Constitutional:       Appearance: He is well-developed. HENT:      Head: Normocephalic and atraumatic. Cardiovascular:      Rate and Rhythm: Normal rate and regular rhythm. Heart sounds: Murmur heard. Harsh midsystolic murmur is present at the upper right sternal border radiating to the neck. Pulmonary:      Breath sounds: Normal breath sounds. No wheezing or rales. Abdominal:      General: Bowel sounds are normal.      Palpations: Abdomen is soft. Musculoskeletal:         General: Normal range of motion. Skin:     General: Skin is warm and dry.    Neurological:      Mental Status: He is alert and oriented to person, place, and time.             Medical problems, medical history and test results were reviewed with the patient today.           CBC        Recent Labs     06/03/21  0722 12/17/20  0855   WBC 4.6 5.6   HGB 14.2 14.8   HCT 43.2 43.0    176   MCV 90.8 88         CMP          Recent Labs     06/21/21  0845 06/03/21  0722 03/29/21  0846 12/17/20  0855 12/17/20  0855    138 138   < > 135   K 5.0 4.6 4.8   < > 4.8   MG  --  2.3  --   --   --     106 101   < > 96   CO2 23 28 22   < > 23   AGAP  --  4*  --   --   --    * 219* 249*   < > 240*   BUN 21 24* 21   < > 19   CREA 1.11 1.17 1.39*   < > 1.30*   BUCR 19  --  15  --  15   GFRAA 81 >60 62   < > 67   GFRNA 70 >60 54*   < > 58*   CA 9.7 9.3 10.0   < > 10.1   TBILI 0.8 1.0  --   --  1.4*   ALT 21 34  --   --  21   AP 78 75  --   --  77   TP 7.1 7.4  --   --  6.9   ALB 4.8 4.1  --   --  4.5   GLOB  --  3.3  --   --   --    AGRAT 2.1 1.2  --   --  1.9    < > = values in this interval not displayed.         INR      Recent Labs     06/03/21  0722   INR 1.1   PTP 14.9*    THYROID  No results for input(s): TSH, TSH2, TSH3, TSHP, TSHELE, TSHEXT, TT3, T3U, T3UP, FRT3, FT3, FT4, FT4P, T4, T4P, FT4T, TT7 in the last 46977 hours.     Invalid input(s): T3RIA, 1164, TMCAB     LIPIDS        Recent Labs     06/21/21  0845 12/17/20  0855 12/16/19  1003   CHOL 106 99* 101   HDL 41 42 40   LDLC 46 37 43   TRIGL 98 107 90             ASSESSMENT:     Diagnoses and all orders for this visit:     1. Essential hypertension, benign -pressure currently well controlled. Continue metoprolol succinate and lisinopril.  -     AMB POC EKG ROUTINE W/ 12 LEADS, INTER & REP     2. Nonrheumatic aortic valve stenosis -progressively worsening aortic stenosis. Patient now with severe aortic stenosis confirmed by CARLOS. Patient wishes to proceed with TAVR work-up. Given fairly recent CABG, I believe patient would likely be moderate to high risk for redo sternotomy.     3. Systolic CHF, chronic (HCC) -ejection fraction has normalized. Medical therapy appropriate. Volume status stable. Patient has normally functioning ICD.     4. CAD, multiple vessel -patient with coronary bypass grafting. Patient will need ischemic work-up with cardiac catheterization prior to TAVR.     5. Dyslipidemia -continue atorvastatin 40 mg daily.     6. Uncontrolled type 2 diabetes with neuropathy ([facility] -continue insulin. Management per primary care physician.     7. Typical atrial flutter (HCC) -we will monitor on device.   Patient stable on Xarelto.                   Problem List Items Addressed This Visit                 Cardiology Problems      Essential hypertension, benign - Primary (Chronic)      Relevant Orders      AMB POC EKG ROUTINE W/ 12 LEADS, INTER & REP (Completed)      CAD, multiple vessel (Chronic)      Systolic CHF, chronic (HCC) (Chronic)      Nonrheumatic aortic valve stenosis (Chronic)      Dyslipidemia (Chronic)      Atrial flutter (HCC) (Chronic)            Other      Uncontrolled type 2 diabetes with neuropathy (HCC)      Relevant Medications      semaglutide (Ozempic) 0.25 mg or 0.5 mg/dose (2 mg/1.5 ml) subq pen              There are no discontinued medications.                       Patient has been instructed and agrees to call our office with any issues or other concerns related to their cardiac condition(s) and/or complaint(s).       Follow-up and Dispositions  ·   Return for Follow-up Per Nataliya Clancy MD

## 2021-10-20 NOTE — Clinical Note
TRANSFER - IN REPORT:     Verbal report received from: CPRU. Report consisted of patient's Situation, Background, Assessment and   Recommendations(SBAR). Opportunity for questions and clarification was provided. Assessment completed upon patient's arrival to unit and care assumed. Patient transported with a Cardiac Cath Tech / Patient Care Tech.

## 2021-11-14 ENCOUNTER — ANESTHESIA EVENT (OUTPATIENT)
Dept: SURGERY | Age: 66
DRG: 220 | End: 2021-11-14
Payer: COMMERCIAL

## 2021-11-16 ENCOUNTER — HOSPITAL ENCOUNTER (OUTPATIENT)
Dept: SURGERY | Age: 66
Discharge: HOME | DRG: 220 | End: 2021-11-16
Attending: THORACIC SURGERY (CARDIOTHORACIC VASCULAR SURGERY)
Payer: COMMERCIAL

## 2021-11-16 ENCOUNTER — HOSPITAL ENCOUNTER (OUTPATIENT)
Dept: GENERAL RADIOLOGY | Age: 66
Discharge: HOME | End: 2021-11-16
Attending: PHYSICIAN ASSISTANT

## 2021-11-16 VITALS
SYSTOLIC BLOOD PRESSURE: 126 MMHG | BODY MASS INDEX: 26 KG/M2 | DIASTOLIC BLOOD PRESSURE: 69 MMHG | HEART RATE: 88 BPM | WEIGHT: 181.6 LBS | TEMPERATURE: 98.7 F | OXYGEN SATURATION: 98 % | HEIGHT: 70 IN | RESPIRATION RATE: 16 BRPM

## 2021-11-16 LAB
ALBUMIN SERPL-MCNC: 3.9 G/DL (ref 3.2–4.6)
ALBUMIN/GLOB SERPL: 1.2 {RATIO} (ref 1.2–3.5)
ALP SERPL-CCNC: 70 U/L (ref 50–136)
ALT SERPL-CCNC: 22 U/L (ref 12–65)
ANION GAP SERPL CALC-SCNC: 2 MMOL/L (ref 7–16)
APPEARANCE UR: CLEAR
APTT PPP: 28.9 SEC (ref 24.1–35.1)
AST SERPL-CCNC: 5 U/L (ref 15–37)
BACTERIA SPEC CULT: NORMAL
BILIRUB SERPL-MCNC: 0.8 MG/DL (ref 0.2–1.1)
BILIRUB UR QL: NEGATIVE
BUN SERPL-MCNC: 16 MG/DL (ref 8–23)
CALCIUM SERPL-MCNC: 9.8 MG/DL (ref 8.3–10.4)
CHLORIDE SERPL-SCNC: 107 MMOL/L (ref 98–107)
CO2 SERPL-SCNC: 30 MMOL/L (ref 21–32)
COLOR UR: YELLOW
CREAT SERPL-MCNC: 1.2 MG/DL (ref 0.8–1.5)
ERYTHROCYTE [DISTWIDTH] IN BLOOD BY AUTOMATED COUNT: 12.8 % (ref 11.9–14.6)
EST. AVERAGE GLUCOSE BLD GHB EST-MCNC: 186 MG/DL
GLOBULIN SER CALC-MCNC: 3.3 G/DL (ref 2.3–3.5)
GLUCOSE SERPL-MCNC: 149 MG/DL (ref 65–100)
GLUCOSE UR STRIP.AUTO-MCNC: >1000 MG/DL
HBA1C MFR BLD: 8.1 % (ref 4.2–6.3)
HCT VFR BLD AUTO: 41.4 % (ref 41.1–50.3)
HGB BLD-MCNC: 13.7 G/DL (ref 13.6–17.2)
HGB UR QL STRIP: NEGATIVE
HISTORY CHECKED?,CKHIST: NORMAL
INR PPP: 1
KETONES UR QL STRIP.AUTO: NEGATIVE MG/DL
LEUKOCYTE ESTERASE UR QL STRIP.AUTO: NEGATIVE
MAGNESIUM SERPL-MCNC: 2 MG/DL (ref 1.8–2.4)
MCH RBC QN AUTO: 30 PG (ref 26.1–32.9)
MCHC RBC AUTO-ENTMCNC: 33.1 G/DL (ref 31.4–35)
MCV RBC AUTO: 90.6 FL (ref 79.6–97.8)
NITRITE UR QL STRIP.AUTO: NEGATIVE
NRBC # BLD: 0 K/UL (ref 0–0.2)
PH UR STRIP: 6.5 [PH] (ref 5–9)
PLATELET # BLD AUTO: 157 K/UL (ref 150–450)
PMV BLD AUTO: 9.5 FL (ref 9.4–12.3)
POTASSIUM SERPL-SCNC: 4.3 MMOL/L (ref 3.5–5.1)
PROT SERPL-MCNC: 7.2 G/DL (ref 6.3–8.2)
PROT UR STRIP-MCNC: NEGATIVE MG/DL
PROTHROMBIN TIME: 13.7 SEC (ref 12.6–14.5)
RBC # BLD AUTO: 4.57 M/UL (ref 4.23–5.6)
SARS-COV-2, COV2: NOT DETECTED
SERVICE CMNT-IMP: NORMAL
SODIUM SERPL-SCNC: 139 MMOL/L (ref 136–145)
SP GR UR REFRACTOMETRY: 1.01 (ref 1–1.02)
SPECIMEN SOURCE, FCOV2M: NORMAL
UROBILINOGEN UR QL STRIP.AUTO: 0.2 EU/DL (ref 0.2–1)
WBC # BLD AUTO: 5.3 K/UL (ref 4.3–11.1)

## 2021-11-16 PROCEDURE — 36415 COLL VENOUS BLD VENIPUNCTURE: CPT

## 2021-11-16 PROCEDURE — 77030027138 HC INCENT SPIROMETER -A

## 2021-11-16 PROCEDURE — 85730 THROMBOPLASTIN TIME PARTIAL: CPT

## 2021-11-16 PROCEDURE — 85027 COMPLETE CBC AUTOMATED: CPT

## 2021-11-16 PROCEDURE — 87641 MR-STAPH DNA AMP PROBE: CPT

## 2021-11-16 PROCEDURE — U0003 INFECTIOUS AGENT DETECTION BY NUCLEIC ACID (DNA OR RNA); SEVERE ACUTE RESPIRATORY SYNDROME CORONAVIRUS 2 (SARS-COV-2) (CORONAVIRUS DISEASE [COVID-19]), AMPLIFIED PROBE TECHNIQUE, MAKING USE OF HIGH THROUGHPUT TECHNOLOGIES AS DESCRIBED BY CMS-2020-01-R: HCPCS

## 2021-11-16 PROCEDURE — 86923 COMPATIBILITY TEST ELECTRIC: CPT

## 2021-11-16 PROCEDURE — 86901 BLOOD TYPING SEROLOGIC RH(D): CPT

## 2021-11-16 PROCEDURE — 81003 URINALYSIS AUTO W/O SCOPE: CPT

## 2021-11-16 PROCEDURE — 85610 PROTHROMBIN TIME: CPT

## 2021-11-16 PROCEDURE — 87086 URINE CULTURE/COLONY COUNT: CPT

## 2021-11-16 PROCEDURE — 83036 HEMOGLOBIN GLYCOSYLATED A1C: CPT

## 2021-11-16 PROCEDURE — 80053 COMPREHEN METABOLIC PANEL: CPT

## 2021-11-16 PROCEDURE — 71046 X-RAY EXAM CHEST 2 VIEWS: CPT

## 2021-11-16 PROCEDURE — 83735 ASSAY OF MAGNESIUM: CPT

## 2021-11-16 RX ORDER — ATORVASTATIN CALCIUM 40 MG/1
40 TABLET, FILM COATED ORAL DAILY
Status: ON HOLD | COMMUNITY
End: 2021-11-23 | Stop reason: SDUPTHER

## 2021-11-16 RX ORDER — METFORMIN HYDROCHLORIDE 500 MG/1
500 TABLET ORAL
COMMUNITY
End: 2022-01-11 | Stop reason: ALTCHOICE

## 2021-11-16 RX ORDER — AMIODARONE HYDROCHLORIDE 200 MG/1
600 TABLET ORAL ONCE
COMMUNITY
Start: 2021-11-17 | End: 2021-11-23

## 2021-11-16 RX ORDER — ASPIRIN 81 MG/1
81 TABLET ORAL DAILY
COMMUNITY

## 2021-11-16 NOTE — PERIOP NOTES
Patient verified name and . Patient provided medical/health information and PTA medications to the best of their ability. TYPE  CASE: 3  Order for consent  found in EHR and matches case posting. Labs per surgeon: CBC,Hgb A1c, UA,UA Cx, CMP, PT, MG, CXR, MRSA/MSSA, PTT, T/C; results pending. Labs per anesthesia protocol: Glucose; results 149  EKG:  EKG 10.20.21, Pacer Interr. 9.20.21, ECHO 6.3.21    Pt escorted to Dr Keith Guerrero office by [address] [place] [address] For assessment. Dr Keith Guerrero request ICD rep to be present DOS. A PCR COVID swab was performed during PAT. Result pending. Patient provided with and instructed on educaitonal handouts including Heart Guide to Surgery, blood transfusions, pain management, central line infection prevention, being on a ventilator and hand hygiene for the family and community, and [facility] brochure. Instructed that family must be present in building at all times. Incentive spirometry completed with return demonstration. Patient viewed pre-operative DVD. Instructed on chest-xray procedure. Instructed on type and cross match procedure and not to remove green blood bank bracelet. Instructed on medications- Amiodarone with Rx called into  [facility] [phone]. Surgical skin cleanser provided and instructions given per hospital policy. Original medication prescription bottles were not visualized during patient appointment. Patient teach back successful and patient demonstrates knowledge of instruction.

## 2021-11-16 NOTE — PERIOP NOTES
All labs reviewed. CXR, UA, and MRSA/MSSA negative. Hgb A1c 8.1 reported to [facility] at Dr Rosalina Christine. Chart flagged to have charge nurse follow up on Urine Culture.

## 2021-11-16 NOTE — PERIOP NOTES
PLEASE CONTINUE TAKING ALL PRESCRIPTION MEDICATIONS UP TO THE DAY OF SURGERY UNLESS OTHERWISE DIRECTED BELOW. DISCONTINUE all vitamins and supplements 7 days prior to surgery. DISCONTINUE Non-Steriodal Anti-Inflammatory (NSAIDS) such as Advil and Aleve 5 days prior to surgery. Home Medications to take  the day of surgery   Aspirin 81mg  Atorvastatin (Lipitor)      Metoprolol (Toprol)     Famotindine (Pepcid)     Home Medications   to Hold   No Vitamins or Supplements 7 days prior to surgery. No Anti-inflammatories such as Aleve, Ibuprofen, Excedrin, Motrin, or Advil. Rivaroxaban (Xarelto)     Comments     Amiodarone 600mg - take after 4pm the day before surgery on 11.17.21   On the day before surgery please take Acetaminophen 1000mg in the morning and then again before bed. You may substitute for Tylenol 650 mg. DYNNA-HEX shower the night before surgery and the morning of surgery. Please do not bring home medications with you on the day of surgery unless otherwise directed by your nurse. If you are instructed to bring home medications, please give them to your nurse as they will be administered by the nursing staff. If you have any questions, please call [facility] [phone] or [facility] [phone]. A copy of this note was provided to the patient for reference. How to Use Your Incentive Spirometer       About Your Incentive Spirometer  An incentive spirometer is a device that will expand your lungs by helping you to breathe more deeply and fully. The parts of your incentive spirometer are labeled in Figure 1. Using your incentive spirometer  When youre using your incentive spirometer, make sure to breathe through your mouth. If you breathe through your nose, the incentive spirometer wont work properly. You can hold your nose if you have trouble. DO NOT BLOW INTO THE DEVICE.  If you feel dizzy at any time, stop and rest. Try again at a later time.  1. Sit upright in a chair or in bed. Hold the incentive spirometer at eye level. 2. Put the mouthpiece in your mouth and close your lips tightly around it. Slowly breathe out (exhale) completely. 3. Breathe in (inhale) slowly through your mouth as deeply as you can. As you take the breath, you will see the piston rise inside the large column. While the piston rises, the indicator on the right should move upwards. It should stay in between the 2 arrows (see Figure 1). 4. Try to get the piston as high as you can, while keeping the indicator between the arrows. If the indicator doesnt stay between the arrows, youre breathing either too fast or too slow. 5. When you get it as high as you can, hold your breath for 10 seconds, or as long as possible. While youre holding your breath, the piston will slowly fall to the base of the spirometer. 6. Once the piston reaches the bottom of the spirometer, breathe out slowly through your mouth. Rest for a few seconds. 7. Repeat 10 times. Try to get the piston to the same level with each breath. 8. After each set of 10 breaths, try to cough as coughing will help loosen or clear any mucus in your lungs. 9. Put the marker at the level the piston reached on your incentive spirometer. This will be your goal next time. Repeat these steps every hour that youre awake.   Cover the mouthpiece of the incentive spirometer when you arent using it

## 2021-11-16 NOTE — ANESTHESIA PREPROCEDURE EVALUATION
Relevant Problems   No relevant active problems       Anesthetic History   No history of anesthetic complications            Review of Systems / Medical History  Patient summary reviewed and pertinent labs reviewed    Pulmonary                Comments: Left lung scarring from XRT   Neuro/Psych   Within defined limits           Cardiovascular    Hypertension: well controlled        Dysrhythmias (Eliquis held 11/12) : atrial flutter  Pacemaker (AICD for low EF placed in 2017), CAD and hyperlipidemia    Exercise tolerance: >4 METS  Comments: Cath - patent bypass with 1 graft with restrictive valve    Echo - normal EF, severe AS   GI/Hepatic/Renal     GERD: well controlled           Endo/Other    Diabetes: well controlled, type 2         Other Findings            Physical Exam    Airway  Mallampati: II  TM Distance: 4 - 6 cm  Neck ROM: normal range of motion   Mouth opening: Normal     Cardiovascular    Rhythm: regular  Rate: normal    Murmur: Grade 4, Aortic area     Dental  No notable dental hx       Pulmonary  Breath sounds clear to auscultation               Abdominal         Other Findings            Anesthetic Plan    ASA: 4  Anesthesia type: general    Monitoring Plan: Arterial line, BIS, [place] and CARLOS    Post procedure ventilation     Anesthetic plan and risks discussed with: Patient

## 2021-11-17 NOTE — PERIOP NOTES
CULTURE, URINE  Order: [id number]   Collected 11/16/2021 08:18     Status: Preliminary result     Next appt: 12/21/2021 at 10:30 AM in Cardiology (REMOTE AICD 62 GVL)    Specimen Information: Clean catch; Urine    URINE         0 Result Notes    Component Ref Range & Units 11/16/21 0818 11/16/21 0818 3/7/17 1130 2/28/17 0200 2/27/17 1950   Special Requests:   NO SPECIAL REQUESTS P[1]  NO SPECIAL REQUESTS  LEFT 1 R  NO SPECIAL REQUESTS  NO SPECIAL REQUESTS    Culture result:   NO GROWTH 1 DAY P[2]  SA target not detected.                                 A MRSA NEGATIVE, SA NEGATIVE test result does not preclude MRSA or SA nasal colonization. NO GROWTH 2 DAYS  NO GROWTH 2 DAYS   Abnormal   MRSA target DNA not detected, SA target DNA detected.   A MRSA negative, SA positive test result does not preclude MRSA nasal colonization.      Culture result:      RESULTS VERIFIED, PHONED TO AND READ BACK BY   Prasad Michel RN @6142 2/27/17 [address], [facility]   [address] [place] [address], [facility] 71. STFD STFD STFD              Specimen Collected: 11/16/21 08:18 Last Resulted: 11/17/21 06:33

## 2021-11-18 ENCOUNTER — APPOINTMENT (OUTPATIENT)
Dept: GENERAL RADIOLOGY | Age: 66
DRG: 220 | End: 2021-11-18
Attending: PHYSICIAN ASSISTANT
Payer: COMMERCIAL

## 2021-11-18 ENCOUNTER — ANESTHESIA (OUTPATIENT)
Dept: SURGERY | Age: 66
DRG: 220 | End: 2021-11-18
Payer: COMMERCIAL

## 2021-11-18 ENCOUNTER — HOSPITAL ENCOUNTER (INPATIENT)
Age: 66
LOS: 5 days | Discharge: HOME HEALTH | DRG: 220 | End: 2021-11-23
Attending: THORACIC SURGERY (CARDIOTHORACIC VASCULAR SURGERY) | Admitting: THORACIC SURGERY (CARDIOTHORACIC VASCULAR SURGERY)
Payer: COMMERCIAL

## 2021-11-18 DIAGNOSIS — I48.92 ATRIAL FLUTTER, UNSPECIFIED TYPE (HCC): Chronic | ICD-10-CM

## 2021-11-18 DIAGNOSIS — I35.0 AORTIC VALVE STENOSIS, ETIOLOGY OF CARDIAC VALVE DISEASE UNSPECIFIED: ICD-10-CM

## 2021-11-18 DIAGNOSIS — Z95.1 S/P CABG X 1: ICD-10-CM

## 2021-11-18 DIAGNOSIS — Z95.810 ICD (IMPLANTABLE CARDIOVERTER-DEFIBRILLATOR) IN PLACE: ICD-10-CM

## 2021-11-18 DIAGNOSIS — I35.0 AORTIC STENOSIS, SEVERE: ICD-10-CM

## 2021-11-18 DIAGNOSIS — Z99.11 ENCOUNTER FOR WEANING FROM VENTILATOR (HCC): ICD-10-CM

## 2021-11-18 DIAGNOSIS — Z95.2 S/P AVR: ICD-10-CM

## 2021-11-18 PROBLEM — I25.110 ATHEROSCLEROSIS OF NATIVE CORONARY ARTERY OF NATIVE HEART WITH UNSTABLE ANGINA PECTORIS (HCC): Status: ACTIVE | Noted: 2021-11-18

## 2021-11-18 PROBLEM — I25.10 CAD (CORONARY ARTERY DISEASE): Status: ACTIVE | Noted: 2021-11-18

## 2021-11-18 LAB
ACT AVERAGE - AAVG: 111 SEC
ACT AVERAGE - AAVG: 136 SEC
ACT AVERAGE - AAVG: 538 SEC
ACT AVERAGE - AAVG: 588 SEC
ACT AVERAGE - AAVG: 598 SEC
ACT AVERAGE - AAVG: 748 SEC
ANION GAP SERPL CALC-SCNC: 8 MMOL/L (ref 7–16)
APTT PPP: 41.2 SEC (ref 24.1–35.1)
APTT PPP: 56.2 SEC (ref 24.1–35.1)
ARTERIAL PATENCY WRIST A: ABNORMAL
ATRIAL RATE: 80 BPM
BACTERIA SPEC CULT: NORMAL
BASE DEFICIT BLD-SCNC: 1.5 MMOL/L
BASE DEFICIT BLD-SCNC: 1.9 MMOL/L
BASE DEFICIT BLD-SCNC: 2.4 MMOL/L
BASE DEFICIT BLD-SCNC: 2.6 MMOL/L
BASE DEFICIT BLD-SCNC: 2.8 MMOL/L
BASE DEFICIT BLD-SCNC: 2.8 MMOL/L
BASE DEFICIT BLD-SCNC: 2.9 MMOL/L
BASE DEFICIT BLD-SCNC: 3 MMOL/L
BASE DEFICIT BLD-SCNC: 3.7 MMOL/L
BASE DEFICIT BLD-SCNC: 4.7 MMOL/L
BASE EXCESS BLD CALC-SCNC: 0 MMOL/L
BASE EXCESS BLD CALC-SCNC: 0.9 MMOL/L
BASELINE ACT - BAACT: 136 SEC
BDY SITE: ABNORMAL
BUN SERPL-MCNC: 14 MG/DL (ref 8–23)
CA-I BLD-MCNC: 0.96 MMOL/L (ref 1.12–1.32)
CA-I BLD-MCNC: 1.05 MMOL/L (ref 1.12–1.32)
CA-I BLD-MCNC: 1.05 MMOL/L (ref 1.12–1.32)
CA-I BLD-MCNC: 1.06 MMOL/L (ref 1.12–1.32)
CA-I BLD-MCNC: 1.08 MMOL/L (ref 1.12–1.32)
CA-I BLD-MCNC: 1.13 MMOL/L (ref 1.12–1.32)
CA-I BLD-MCNC: 1.13 MMOL/L (ref 1.12–1.32)
CA-I BLD-MCNC: 1.16 MMOL/L (ref 1.12–1.32)
CA-I BLD-MCNC: 1.22 MMOL/L (ref 1.12–1.32)
CA-I BLD-MCNC: 1.33 MMOL/L (ref 1.12–1.32)
CA-I BLD-MCNC: 1.39 MMOL/L (ref 1.12–1.32)
CALCIUM SERPL-MCNC: 9.5 MG/DL (ref 8.3–10.4)
CALCULATED P AXIS, ECG09: 41 DEGREES
CALCULATED R AXIS, ECG10: 53 DEGREES
CALCULATED T AXIS, ECG11: -109 DEGREES
CHLORIDE SERPL-SCNC: 113 MMOL/L (ref 98–107)
CITRATED FUNCTIONAL FIBRINOGEN MAXIMUM AMPLITUDE: 13.1 MM (ref 15–32)
CITRATED FUNCTIONAL FIBRINOGEN MAXIMUM AMPLITUDE: 9.5 MM (ref 15–32)
CITRATED KAOLIN ANGLE: 65.9 DEG (ref 63–78)
CITRATED KAOLIN ANGLE: 66.5 DEG (ref 63–78)
CITRATED KAOLIN K-TIME: 2.2 MINS (ref 0.8–2.1)
CITRATED KAOLIN K-TIME: 2.3 MINS (ref 0.8–2.1)
CITRATED KAOLIN MAXIMUM AMPLITUDE: 44.6 MM (ref 52–69)
CITRATED KAOLIN MAXIMUM AMPLITUDE: 50.7 MM (ref 52–69)
CITRATED KAOLIN R-TIME: 6.3 MINS (ref 4.6–9.1)
CITRATED KAOLIN R-TIME: 9.3 MINS (ref 4.6–9.1)
CITRATED KAOLIN/HEPARINASE R-TIME: 5.9 MINS (ref 4.3–8.3)
CITRATED KAOLIN/HEPARINASE R-TIME: 5.9 MINS (ref 4.3–8.3)
CITRATED RAPIDTEG MAXIMUM AMPLITUDE: 42.5 MM (ref 52–70)
CITRATED RAPIDTEG MAXIMUM AMPLITUDE: 49.8 MM (ref 52–70)
CO2 BLD-SCNC: 22 MMOL/L (ref 13–23)
CO2 BLD-SCNC: 23 MMOL/L (ref 13–23)
CO2 BLD-SCNC: 24 MMOL/L (ref 13–23)
CO2 BLD-SCNC: 25 MMOL/L (ref 13–23)
CO2 SERPL-SCNC: 25 MMOL/L (ref 21–32)
CREAT SERPL-MCNC: 1.11 MG/DL (ref 0.8–1.5)
DIAGNOSIS, 93000: NORMAL
ERYTHROCYTE [DISTWIDTH] IN BLOOD BY AUTOMATED COUNT: 13.1 % (ref 11.9–14.6)
ERYTHROCYTE [DISTWIDTH] IN BLOOD BY AUTOMATED COUNT: 13.4 % (ref 11.9–14.6)
FIBRINOGEN PPP-MCNC: 186 MG/DL (ref 190–501)
FIBRINOGEN PPP-MCNC: 245 MG/DL (ref 190–501)
FUNCTIONAL FIBRINOGEN LEVEL: 173.4 MG/DL (ref 278–581)
FUNCTIONAL FIBRINOGEN LEVEL: 239.1 MG/DL (ref 278–581)
GAS FLOW.O2 O2 DELIVERY SYS: ABNORMAL L/MIN
GLUCOSE BLD STRIP.AUTO-MCNC: 112 MG/DL (ref 65–100)
GLUCOSE BLD STRIP.AUTO-MCNC: 116 MG/DL (ref 65–100)
GLUCOSE BLD STRIP.AUTO-MCNC: 117 MG/DL (ref 65–100)
GLUCOSE BLD STRIP.AUTO-MCNC: 119 MG/DL (ref 65–100)
GLUCOSE BLD STRIP.AUTO-MCNC: 119 MG/DL (ref 65–100)
GLUCOSE BLD STRIP.AUTO-MCNC: 121 MG/DL (ref 65–100)
GLUCOSE BLD STRIP.AUTO-MCNC: 123 MG/DL (ref 65–100)
GLUCOSE BLD STRIP.AUTO-MCNC: 129 MG/DL (ref 65–100)
GLUCOSE BLD STRIP.AUTO-MCNC: 143 MG/DL (ref 65–100)
GLUCOSE BLD STRIP.AUTO-MCNC: 147 MG/DL (ref 65–100)
GLUCOSE BLD STRIP.AUTO-MCNC: 147 MG/DL (ref 65–100)
GLUCOSE BLD STRIP.AUTO-MCNC: 148 MG/DL (ref 65–100)
GLUCOSE BLD STRIP.AUTO-MCNC: 151 MG/DL (ref 65–100)
GLUCOSE BLD STRIP.AUTO-MCNC: 91 MG/DL (ref 65–100)
GLUCOSE BLD STRIP.AUTO-MCNC: 98 MG/DL (ref 65–100)
GLUCOSE BLD STRIP.AUTO-MCNC: 98 MG/DL (ref 65–100)
GLUCOSE BLD STRIP.AUTO-MCNC: 99 MG/DL (ref 65–100)
GLUCOSE SERPL-MCNC: 120 MG/DL (ref 65–100)
HCO3 BLD-SCNC: 21.5 MMOL/L (ref 22–26)
HCO3 BLD-SCNC: 21.6 MMOL/L (ref 22–26)
HCO3 BLD-SCNC: 22.2 MMOL/L (ref 22–26)
HCO3 BLD-SCNC: 22.3 MMOL/L (ref 22–26)
HCO3 BLD-SCNC: 22.5 MMOL/L (ref 22–26)
HCO3 BLD-SCNC: 22.7 MMOL/L (ref 22–26)
HCO3 BLD-SCNC: 22.8 MMOL/L (ref 22–26)
HCO3 BLD-SCNC: 23.1 MMOL/L (ref 22–26)
HCO3 BLD-SCNC: 23.1 MMOL/L (ref 22–26)
HCO3 BLD-SCNC: 23.5 MMOL/L (ref 22–26)
HCO3 BLD-SCNC: 24.5 MMOL/L (ref 22–26)
HCO3 BLD-SCNC: 24.6 MMOL/L (ref 22–26)
HCT VFR BLD AUTO: 24.6 % (ref 41.1–50.3)
HCT VFR BLD AUTO: 26.1 % (ref 41.1–50.3)
HCT VFR BLD AUTO: 27.8 % (ref 41.1–50.3)
HEPARIN BOLUS-PATIENT - HBPAT: NORMAL UNITS
HEPARIN BOLUS-PUMP - HBPUMP: 0 UNITS
HEPARIN BOLUS-TOTAL - HBTOT: NORMAL UNITS
HEPARIN REQUIRED-PATIENT - HRPAT: 0
HEPARIN REQUIRED-PATIENT - HRPAT: NORMAL
HEPARIN REQUIRED-TOTAL - HRTOT: 0 UNITS
HEPARIN REQUIRED-TOTAL - HRTOT: NORMAL UNITS
HEPARIN TEST CONCENTRATE - HEPTC: 0 MG/KG
HEPARIN TEST CONCENTRATE - HEPTC: 2 MG/KG
HGB BLD-MCNC: 8.1 G/DL (ref 13.6–17.2)
HGB BLD-MCNC: 8.6 G/DL (ref 13.6–17.2)
HGB BLD-MCNC: 8.9 G/DL (ref 13.6–17.2)
INR PPP: 1.4
INR PPP: 1.6
MAGNESIUM SERPL-MCNC: 2.2 MG/DL (ref 1.8–2.4)
MAGNESIUM SERPL-MCNC: 2.4 MG/DL (ref 1.8–2.4)
MCH RBC QN AUTO: 28.7 PG (ref 26.1–32.9)
MCH RBC QN AUTO: 29.1 PG (ref 26.1–32.9)
MCHC RBC AUTO-ENTMCNC: 32 G/DL (ref 31.4–35)
MCHC RBC AUTO-ENTMCNC: 33 G/DL (ref 31.4–35)
MCV RBC AUTO: 88.2 FL (ref 79.6–97.8)
MCV RBC AUTO: 89.7 FL (ref 79.6–97.8)
NRBC # BLD: 0 K/UL (ref 0–0.2)
NRBC # BLD: 0 K/UL (ref 0–0.2)
O2/TOTAL GAS SETTING VFR VENT: 80 %
P-R INTERVAL, ECG05: 156 MS
PAW @ MEAN EXP FLOW ON VENT: 11 CMH2O
PCO2 BLD: 34.3 MMHG (ref 35–45)
PCO2 BLD: 38.3 MMHG (ref 35–45)
PCO2 BLD: 38.6 MMHG (ref 35–45)
PCO2 BLD: 39.2 MMHG (ref 35–45)
PCO2 BLD: 39.2 MMHG (ref 35–45)
PCO2 BLD: 39.4 MMHG (ref 35–45)
PCO2 BLD: 40 MMHG (ref 35–45)
PCO2 BLD: 40 MMHG (ref 35–45)
PCO2 BLD: 41.1 MMHG (ref 35–45)
PCO2 BLD: 41.9 MMHG (ref 35–45)
PCO2 BLD: 42.6 MMHG (ref 35–45)
PCO2 BLD: 43.8 MMHG (ref 35–45)
PEEP RESPIRATORY: 8 CMH2O
PH BLD: 7.3 [PH] (ref 7.35–7.45)
PH BLD: 7.34 [PH] (ref 7.35–7.45)
PH BLD: 7.35 [PH] (ref 7.35–7.45)
PH BLD: 7.36 [PH] (ref 7.35–7.45)
PH BLD: 7.36 [PH] (ref 7.35–7.45)
PH BLD: 7.37 [PH] (ref 7.35–7.45)
PH BLD: 7.38 [PH] (ref 7.35–7.45)
PH BLD: 7.38 [PH] (ref 7.35–7.45)
PH BLD: 7.41 [PH] (ref 7.35–7.45)
PH BLD: 7.46 [PH] (ref 7.35–7.45)
PLATELET # BLD AUTO: 111 K/UL (ref 150–450)
PLATELET # BLD AUTO: 90 K/UL (ref 150–450)
PMV BLD AUTO: 10 FL (ref 9.4–12.3)
PMV BLD AUTO: 9.9 FL (ref 9.4–12.3)
PO2 BLD: 251 MMHG (ref 75–100)
PO2 BLD: 260 MMHG (ref 75–100)
PO2 BLD: 261 MMHG (ref 75–100)
PO2 BLD: 269 MMHG (ref 75–100)
PO2 BLD: 290 MMHG (ref 75–100)
PO2 BLD: 303 MMHG (ref 75–100)
PO2 BLD: 304 MMHG (ref 75–100)
PO2 BLD: 306 MMHG (ref 75–100)
PO2 BLD: 336 MMHG (ref 75–100)
PO2 BLD: 347 MMHG (ref 75–100)
PO2 BLD: 400 MMHG (ref 75–100)
PO2 BLD: 450 MMHG (ref 75–100)
POTASSIUM BLD-SCNC: 4 MMOL/L (ref 3.5–5.1)
POTASSIUM BLD-SCNC: 4 MMOL/L (ref 3.5–5.1)
POTASSIUM BLD-SCNC: 4.2 MMOL/L (ref 3.5–5.1)
POTASSIUM BLD-SCNC: 4.2 MMOL/L (ref 3.5–5.1)
POTASSIUM BLD-SCNC: 4.3 MMOL/L (ref 3.5–5.1)
POTASSIUM BLD-SCNC: 4.4 MMOL/L (ref 3.5–5.1)
POTASSIUM BLD-SCNC: 4.8 MMOL/L (ref 3.5–5.1)
POTASSIUM BLD-SCNC: 4.9 MMOL/L (ref 3.5–5.1)
POTASSIUM BLD-SCNC: 6.8 MMOL/L (ref 3.5–5.1)
POTASSIUM SERPL-SCNC: 4.1 MMOL/L (ref 3.5–5.1)
POTASSIUM SERPL-SCNC: 4.4 MMOL/L (ref 3.5–5.1)
PROJECTED HEPARIN CONC - PHEP: 1.9 MG/KG
PROTAMINE DOSE-PUMP - PDPUMP: 0 MG
PROTAMINE DOSE-PUMP - PDPUMP: 30 MG
PROTAMINE DOSE-TOTAL - PDTOT: 0 MG
PROTAMINE DOSE-TOTAL - PDTOT: 203 MG
PROTHROMBIN TIME: 17 SEC (ref 12.6–14.5)
PROTHROMBIN TIME: 19.4 SEC (ref 12.6–14.5)
Q-T INTERVAL, ECG07: 424 MS
QRS DURATION, ECG06: 104 MS
QTC CALCULATION (BEZET), ECG08: 489 MS
RBC # BLD AUTO: 2.96 M/UL (ref 4.23–5.6)
RBC # BLD AUTO: 3.1 M/UL (ref 4.23–5.6)
SAO2 % BLD: 100 %
SAO2 % BLD: 100 % (ref 95–98)
SERVICE CMNT-IMP: ABNORMAL
SERVICE CMNT-IMP: NORMAL
SLOPE: 132
SODIUM BLD-SCNC: 135 MMOL/L (ref 136–145)
SODIUM BLD-SCNC: 137 MMOL/L (ref 136–145)
SODIUM BLD-SCNC: 139 MMOL/L (ref 136–145)
SODIUM BLD-SCNC: 139 MMOL/L (ref 136–145)
SODIUM BLD-SCNC: 140 MMOL/L (ref 136–145)
SODIUM BLD-SCNC: 140 MMOL/L (ref 136–145)
SODIUM BLD-SCNC: 141 MMOL/L (ref 136–145)
SODIUM BLD-SCNC: 142 MMOL/L (ref 136–145)
SODIUM BLD-SCNC: 143 MMOL/L (ref 136–145)
SODIUM BLD-SCNC: 144 MMOL/L (ref 136–145)
SODIUM BLD-SCNC: 144 MMOL/L (ref 136–145)
SODIUM SERPL-SCNC: 146 MMOL/L (ref 138–145)
SPECIMEN SITE: ABNORMAL
SPECIMEN TYPE: ABNORMAL
VENTILATION MODE VENT: ABNORMAL
VENTRICULAR RATE, ECG03: 80 BPM
VT SETTING VENT: 550 ML
WBC # BLD AUTO: 10.4 K/UL (ref 4.3–11.1)
WBC # BLD AUTO: 6.3 K/UL (ref 4.3–11.1)

## 2021-11-18 PROCEDURE — 74011000250 HC RX REV CODE- 250: Performed by: PHYSICIAN ASSISTANT

## 2021-11-18 PROCEDURE — 2709999900 HC NON-CHARGEABLE SUPPLY: Performed by: THORACIC SURGERY (CARDIOTHORACIC VASCULAR SURGERY)

## 2021-11-18 PROCEDURE — 77030010818: Performed by: THORACIC SURGERY (CARDIOTHORACIC VASCULAR SURGERY)

## 2021-11-18 PROCEDURE — 77030002996 HC SUT SLK J&J -A: Performed by: THORACIC SURGERY (CARDIOTHORACIC VASCULAR SURGERY)

## 2021-11-18 PROCEDURE — 88305 TISSUE EXAM BY PATHOLOGIST: CPT

## 2021-11-18 PROCEDURE — 74011250636 HC RX REV CODE- 250/636: Performed by: NURSE ANESTHETIST, CERTIFIED REGISTERED

## 2021-11-18 PROCEDURE — 77030020751 HC FLTR TBNG TRNSFUS HAEM -A: Performed by: NURSE ANESTHETIST, CERTIFIED REGISTERED

## 2021-11-18 PROCEDURE — B24BZZ4 ULTRASONOGRAPHY OF HEART WITH AORTA, TRANSESOPHAGEAL: ICD-10-PCS | Performed by: THORACIC SURGERY (CARDIOTHORACIC VASCULAR SURGERY)

## 2021-11-18 PROCEDURE — 74011000272 HC RX REV CODE- 272: Performed by: THORACIC SURGERY (CARDIOTHORACIC VASCULAR SURGERY)

## 2021-11-18 PROCEDURE — 77030002970 HC SUT PLEDG TELE -A: Performed by: THORACIC SURGERY (CARDIOTHORACIC VASCULAR SURGERY)

## 2021-11-18 PROCEDURE — 77030016688: Performed by: THORACIC SURGERY (CARDIOTHORACIC VASCULAR SURGERY)

## 2021-11-18 PROCEDURE — 36600 WITHDRAWAL OF ARTERIAL BLOOD: CPT

## 2021-11-18 PROCEDURE — 74011000258 HC RX REV CODE- 258: Performed by: NURSE ANESTHETIST, CERTIFIED REGISTERED

## 2021-11-18 PROCEDURE — 77030018673: Performed by: THORACIC SURGERY (CARDIOTHORACIC VASCULAR SURGERY)

## 2021-11-18 PROCEDURE — 77030013292 HC BOWL MX PRSM J&J -A: Performed by: NURSE ANESTHETIST, CERTIFIED REGISTERED

## 2021-11-18 PROCEDURE — 74011000258 HC RX REV CODE- 258: Performed by: THORACIC SURGERY (CARDIOTHORACIC VASCULAR SURGERY)

## 2021-11-18 PROCEDURE — 77030041469 HC VLV AORT INSPIRIS EDWD -K4: Performed by: THORACIC SURGERY (CARDIOTHORACIC VASCULAR SURGERY)

## 2021-11-18 PROCEDURE — 74011000250 HC RX REV CODE- 250: Performed by: NURSE ANESTHETIST, CERTIFIED REGISTERED

## 2021-11-18 PROCEDURE — 74011250636 HC RX REV CODE- 250/636: Performed by: THORACIC SURGERY (CARDIOTHORACIC VASCULAR SURGERY)

## 2021-11-18 PROCEDURE — 77030018548 HC SUT ETHBND2 J&J -B: Performed by: THORACIC SURGERY (CARDIOTHORACIC VASCULAR SURGERY)

## 2021-11-18 PROCEDURE — 80048 BASIC METABOLIC PNL TOTAL CA: CPT

## 2021-11-18 PROCEDURE — P9045 ALBUMIN (HUMAN), 5%, 250 ML: HCPCS | Performed by: NURSE ANESTHETIST, CERTIFIED REGISTERED

## 2021-11-18 PROCEDURE — 93010 ELECTROCARDIOGRAM REPORT: CPT | Performed by: INTERNAL MEDICINE

## 2021-11-18 PROCEDURE — 77030019908 HC STETH ESOPH SIMS -A: Performed by: NURSE ANESTHETIST, CERTIFIED REGISTERED

## 2021-11-18 PROCEDURE — 5A1221Z PERFORMANCE OF CARDIAC OUTPUT, CONTINUOUS: ICD-10-PCS | Performed by: THORACIC SURGERY (CARDIOTHORACIC VASCULAR SURGERY)

## 2021-11-18 PROCEDURE — 77030018571 HC SUT PROL1 J&J -B: Performed by: THORACIC SURGERY (CARDIOTHORACIC VASCULAR SURGERY)

## 2021-11-18 PROCEDURE — P9045 ALBUMIN (HUMAN), 5%, 250 ML: HCPCS

## 2021-11-18 PROCEDURE — 74011250637 HC RX REV CODE- 250/637: Performed by: PHYSICIAN ASSISTANT

## 2021-11-18 PROCEDURE — 76010000214: Performed by: THORACIC SURGERY (CARDIOTHORACIC VASCULAR SURGERY)

## 2021-11-18 PROCEDURE — 85347 COAGULATION TIME ACTIVATED: CPT

## 2021-11-18 PROCEDURE — 77030002987 HC SUT PROL J&J -B: Performed by: THORACIC SURGERY (CARDIOTHORACIC VASCULAR SURGERY)

## 2021-11-18 PROCEDURE — 77030019905 HC CATH URETH INTMIT MDII -A: Performed by: THORACIC SURGERY (CARDIOTHORACIC VASCULAR SURGERY)

## 2021-11-18 PROCEDURE — 77030008771 HC TU NG SALEM SUMP -A: Performed by: NURSE ANESTHETIST, CERTIFIED REGISTERED

## 2021-11-18 PROCEDURE — 77030020407 HC IV BLD WRMR ST 3M -A: Performed by: NURSE ANESTHETIST, CERTIFIED REGISTERED

## 2021-11-18 PROCEDURE — 74011636637 HC RX REV CODE- 636/637: Performed by: NURSE ANESTHETIST, CERTIFIED REGISTERED

## 2021-11-18 PROCEDURE — 74011250636 HC RX REV CODE- 250/636

## 2021-11-18 PROCEDURE — 77030013861 HC PNCH AORT CLNCUT QUES -B: Performed by: THORACIC SURGERY (CARDIOTHORACIC VASCULAR SURGERY)

## 2021-11-18 PROCEDURE — 77030039425 HC BLD LARYNG TRULITE DISP TELE -A: Performed by: NURSE ANESTHETIST, CERTIFIED REGISTERED

## 2021-11-18 PROCEDURE — 77030003037 HC SUT WRE STRNOTMY AEMC -B: Performed by: THORACIC SURGERY (CARDIOTHORACIC VASCULAR SURGERY)

## 2021-11-18 PROCEDURE — 77030018547 HC SUT ETHBND1 J&J -B: Performed by: THORACIC SURGERY (CARDIOTHORACIC VASCULAR SURGERY)

## 2021-11-18 PROCEDURE — 83735 ASSAY OF MAGNESIUM: CPT

## 2021-11-18 PROCEDURE — 36430 TRANSFUSION BLD/BLD COMPNT: CPT

## 2021-11-18 PROCEDURE — 77030018793 HC ORG SUT GAB FRAT TELE -B: Performed by: THORACIC SURGERY (CARDIOTHORACIC VASCULAR SURGERY)

## 2021-11-18 PROCEDURE — 77030018729 HC ELECTRD DEFIB PAD CARD -B: Performed by: THORACIC SURGERY (CARDIOTHORACIC VASCULAR SURGERY)

## 2021-11-18 PROCEDURE — 77030013064 HC TRNSF BLD FENW -A

## 2021-11-18 PROCEDURE — 85730 THROMBOPLASTIN TIME PARTIAL: CPT

## 2021-11-18 PROCEDURE — 02RF08Z REPLACEMENT OF AORTIC VALVE WITH ZOOPLASTIC TISSUE, OPEN APPROACH: ICD-10-PCS | Performed by: THORACIC SURGERY (CARDIOTHORACIC VASCULAR SURGERY)

## 2021-11-18 PROCEDURE — 06BQ0ZZ EXCISION OF LEFT SAPHENOUS VEIN, OPEN APPROACH: ICD-10-PCS | Performed by: THORACIC SURGERY (CARDIOTHORACIC VASCULAR SURGERY)

## 2021-11-18 PROCEDURE — 2709999900 HC NON-CHARGEABLE SUPPLY

## 2021-11-18 PROCEDURE — P9045 ALBUMIN (HUMAN), 5%, 250 ML: HCPCS | Performed by: THORACIC SURGERY (CARDIOTHORACIC VASCULAR SURGERY)

## 2021-11-18 PROCEDURE — 85018 HEMOGLOBIN: CPT

## 2021-11-18 PROCEDURE — 77030009995: Performed by: THORACIC SURGERY (CARDIOTHORACIC VASCULAR SURGERY)

## 2021-11-18 PROCEDURE — 93005 ELECTROCARDIOGRAM TRACING: CPT | Performed by: PHYSICIAN ASSISTANT

## 2021-11-18 PROCEDURE — 82947 ASSAY GLUCOSE BLOOD QUANT: CPT

## 2021-11-18 PROCEDURE — 85520 HEPARIN ASSAY: CPT

## 2021-11-18 PROCEDURE — 77030031139 HC SUT VCRL2 J&J -A: Performed by: THORACIC SURGERY (CARDIOTHORACIC VASCULAR SURGERY)

## 2021-11-18 PROCEDURE — 77030002520 HC INSRT CLMP LATIS STLTH AMR -B: Performed by: THORACIC SURGERY (CARDIOTHORACIC VASCULAR SURGERY)

## 2021-11-18 PROCEDURE — 77030010827: Performed by: THORACIC SURGERY (CARDIOTHORACIC VASCULAR SURGERY)

## 2021-11-18 PROCEDURE — C1769 GUIDE WIRE: HCPCS | Performed by: THORACIC SURGERY (CARDIOTHORACIC VASCULAR SURGERY)

## 2021-11-18 PROCEDURE — 85530 HEPARIN-PROTAMINE TOLERANCE: CPT

## 2021-11-18 PROCEDURE — 85027 COMPLETE CBC AUTOMATED: CPT

## 2021-11-18 PROCEDURE — P9035 PLATELET PHERES LEUKOREDUCED: HCPCS

## 2021-11-18 PROCEDURE — 85384 FIBRINOGEN ACTIVITY: CPT

## 2021-11-18 PROCEDURE — 82803 BLOOD GASES ANY COMBINATION: CPT

## 2021-11-18 PROCEDURE — 82330 ASSAY OF CALCIUM: CPT

## 2021-11-18 PROCEDURE — 77030041244 HC CBL PACE EXT TEMP REMG -B: Performed by: THORACIC SURGERY (CARDIOTHORACIC VASCULAR SURGERY)

## 2021-11-18 PROCEDURE — 77030002986 HC SUT PROL J&J -A: Performed by: THORACIC SURGERY (CARDIOTHORACIC VASCULAR SURGERY)

## 2021-11-18 PROCEDURE — 77030013794 HC KT TRNSDUC BLD EDWD -B: Performed by: NURSE ANESTHETIST, CERTIFIED REGISTERED

## 2021-11-18 PROCEDURE — 77030003010 HC SUT SURG STL J&J -B: Performed by: THORACIC SURGERY (CARDIOTHORACIC VASCULAR SURGERY)

## 2021-11-18 PROCEDURE — 77030040922 HC BLNKT HYPOTHRM STRY -A: Performed by: NURSE ANESTHETIST, CERTIFIED REGISTERED

## 2021-11-18 PROCEDURE — 77030012390 HC DRN CHST BTL GTNG -B: Performed by: THORACIC SURGERY (CARDIOTHORACIC VASCULAR SURGERY)

## 2021-11-18 PROCEDURE — 77030002888 HC SUT CHRMC J&J -A: Performed by: THORACIC SURGERY (CARDIOTHORACIC VASCULAR SURGERY)

## 2021-11-18 PROCEDURE — 99223 1ST HOSP IP/OBS HIGH 75: CPT | Performed by: INTERNAL MEDICINE

## 2021-11-18 PROCEDURE — 77030014007 HC SPNG HEMSTAT J&J -B: Performed by: THORACIC SURGERY (CARDIOTHORACIC VASCULAR SURGERY)

## 2021-11-18 PROCEDURE — 77030040922 HC BLNKT HYPOTHRM STRY -A

## 2021-11-18 PROCEDURE — 77030008477 HC STYL SATN SLP COVD -A: Performed by: NURSE ANESTHETIST, CERTIFIED REGISTERED

## 2021-11-18 PROCEDURE — 77030016564 HC BLD STRNL SAW4 CNMD -B: Performed by: THORACIC SURGERY (CARDIOTHORACIC VASCULAR SURGERY)

## 2021-11-18 PROCEDURE — 77030009355 HC CRDPLG DEL SET QUES -C: Performed by: THORACIC SURGERY (CARDIOTHORACIC VASCULAR SURGERY)

## 2021-11-18 PROCEDURE — 86580 TB INTRADERMAL TEST: CPT | Performed by: PHYSICIAN ASSISTANT

## 2021-11-18 PROCEDURE — 77030010813: Performed by: THORACIC SURGERY (CARDIOTHORACIC VASCULAR SURGERY)

## 2021-11-18 PROCEDURE — 77030025646 HC AUTOTRNSFUS KT TERU -C: Performed by: THORACIC SURGERY (CARDIOTHORACIC VASCULAR SURGERY)

## 2021-11-18 PROCEDURE — P9012 CRYOPRECIPITATE EACH UNIT: HCPCS

## 2021-11-18 PROCEDURE — 77030014008 HC SPNG HEMSTAT J&J -C: Performed by: THORACIC SURGERY (CARDIOTHORACIC VASCULAR SURGERY)

## 2021-11-18 PROCEDURE — 74011000250 HC RX REV CODE- 250

## 2021-11-18 PROCEDURE — 77030012890

## 2021-11-18 PROCEDURE — 65610000006 HC RM INTENSIVE CARE

## 2021-11-18 PROCEDURE — P9059 PLASMA, FRZ BETWEEN 8-24HOUR: HCPCS

## 2021-11-18 PROCEDURE — 74011250636 HC RX REV CODE- 250/636: Performed by: PHYSICIAN ASSISTANT

## 2021-11-18 PROCEDURE — 021009W BYPASS CORONARY ARTERY, ONE ARTERY FROM AORTA WITH AUTOLOGOUS VENOUS TISSUE, OPEN APPROACH: ICD-10-PCS | Performed by: THORACIC SURGERY (CARDIOTHORACIC VASCULAR SURGERY)

## 2021-11-18 PROCEDURE — C1751 CATH, INF, PER/CENT/MIDLINE: HCPCS | Performed by: NURSE ANESTHETIST, CERTIFIED REGISTERED

## 2021-11-18 PROCEDURE — 77030034888 HC SUT PROL 2 J&J -B: Performed by: THORACIC SURGERY (CARDIOTHORACIC VASCULAR SURGERY)

## 2021-11-18 PROCEDURE — 85610 PROTHROMBIN TIME: CPT

## 2021-11-18 PROCEDURE — 77030033069 HC RLD QLC SGL COR-KNOT LSIS -B: Performed by: THORACIC SURGERY (CARDIOTHORACIC VASCULAR SURGERY)

## 2021-11-18 PROCEDURE — 77030011235 HC DRN PERCRD SUMP MEDT -B: Performed by: THORACIC SURGERY (CARDIOTHORACIC VASCULAR SURGERY)

## 2021-11-18 PROCEDURE — 94002 VENT MGMT INPAT INIT DAY: CPT

## 2021-11-18 PROCEDURE — 77030005401 HC CATH RAD ARRO -A: Performed by: NURSE ANESTHETIST, CERTIFIED REGISTERED

## 2021-11-18 PROCEDURE — 74011000250 HC RX REV CODE- 250: Performed by: THORACIC SURGERY (CARDIOTHORACIC VASCULAR SURGERY)

## 2021-11-18 PROCEDURE — 77030003422 HC NDL ASPIR NOVO -A: Performed by: THORACIC SURGERY (CARDIOTHORACIC VASCULAR SURGERY)

## 2021-11-18 PROCEDURE — 74011250636 HC RX REV CODE- 250/636: Performed by: ANESTHESIOLOGY

## 2021-11-18 PROCEDURE — 71045 X-RAY EXAM CHEST 1 VIEW: CPT

## 2021-11-18 PROCEDURE — 76060000051 HC ANESTHESIA 10 TO 10.5 HR: Performed by: THORACIC SURGERY (CARDIOTHORACIC VASCULAR SURGERY)

## 2021-11-18 PROCEDURE — 77030020751 HC FLTR TBNG TRNSFUS HAEM -A: Performed by: THORACIC SURGERY (CARDIOTHORACIC VASCULAR SURGERY)

## 2021-11-18 PROCEDURE — 77030005518 HC CATH URETH FOL 2W BARD -B: Performed by: THORACIC SURGERY (CARDIOTHORACIC VASCULAR SURGERY)

## 2021-11-18 PROCEDURE — P9016 RBC LEUKOCYTES REDUCED: HCPCS

## 2021-11-18 PROCEDURE — 5A1223Z PERFORMANCE OF CARDIAC PACING, CONTINUOUS: ICD-10-PCS | Performed by: THORACIC SURGERY (CARDIOTHORACIC VASCULAR SURGERY)

## 2021-11-18 PROCEDURE — 77030037088 HC TUBE ENDOTRACH ORAL NSL COVD-A: Performed by: NURSE ANESTHETIST, CERTIFIED REGISTERED

## 2021-11-18 PROCEDURE — 77030034936 HC DEV MIN COR-KNOT KT LSIS -F: Performed by: THORACIC SURGERY (CARDIOTHORACIC VASCULAR SURGERY)

## 2021-11-18 PROCEDURE — 3E080GC INTRODUCTION OF OTHER THERAPEUTIC SUBSTANCE INTO HEART, OPEN APPROACH: ICD-10-PCS | Performed by: THORACIC SURGERY (CARDIOTHORACIC VASCULAR SURGERY)

## 2021-11-18 PROCEDURE — 77030013797 HC KT TRNSDUC PRSSR EDWD -A: Performed by: THORACIC SURGERY (CARDIOTHORACIC VASCULAR SURGERY)

## 2021-11-18 PROCEDURE — 84295 ASSAY OF SERUM SODIUM: CPT

## 2021-11-18 PROCEDURE — 82962 GLUCOSE BLOOD TEST: CPT

## 2021-11-18 PROCEDURE — 77030025827 HC BG BLD DNR AUTLG MEDT -A: Performed by: THORACIC SURGERY (CARDIOTHORACIC VASCULAR SURGERY)

## 2021-11-18 PROCEDURE — C1729 CATH, DRAINAGE: HCPCS | Performed by: THORACIC SURGERY (CARDIOTHORACIC VASCULAR SURGERY)

## 2021-11-18 PROCEDURE — 84132 ASSAY OF SERUM POTASSIUM: CPT

## 2021-11-18 PROCEDURE — P9047 ALBUMIN (HUMAN), 25%, 50ML: HCPCS

## 2021-11-18 PROCEDURE — 77030002966 HC SUT PDS J&J -A: Performed by: THORACIC SURGERY (CARDIOTHORACIC VASCULAR SURGERY)

## 2021-11-18 DEVICE — VALVE AORTIC 23MM -- INSPIRIS RESILIA: Type: IMPLANTABLE DEVICE | Site: AORTIC VALVE | Status: FUNCTIONAL

## 2021-11-18 RX ORDER — EPHEDRINE SULFATE/0.9% NACL/PF 50 MG/5 ML
SYRINGE (ML) INTRAVENOUS AS NEEDED
Status: DISCONTINUED | OUTPATIENT
Start: 2021-11-18 | End: 2021-11-18 | Stop reason: HOSPADM

## 2021-11-18 RX ORDER — ROCURONIUM BROMIDE 10 MG/ML
INJECTION, SOLUTION INTRAVENOUS AS NEEDED
Status: DISCONTINUED | OUTPATIENT
Start: 2021-11-18 | End: 2021-11-18 | Stop reason: HOSPADM

## 2021-11-18 RX ORDER — SODIUM CHLORIDE, SODIUM LACTATE, POTASSIUM CHLORIDE, CALCIUM CHLORIDE 600; 310; 30; 20 MG/100ML; MG/100ML; MG/100ML; MG/100ML
INJECTION, SOLUTION INTRAVENOUS
Status: DISCONTINUED | OUTPATIENT
Start: 2021-11-18 | End: 2021-11-18 | Stop reason: HOSPADM

## 2021-11-18 RX ORDER — NALOXONE HYDROCHLORIDE 0.4 MG/ML
0.4 INJECTION, SOLUTION INTRAMUSCULAR; INTRAVENOUS; SUBCUTANEOUS AS NEEDED
Status: DISCONTINUED | OUTPATIENT
Start: 2021-11-18 | End: 2021-11-20

## 2021-11-18 RX ORDER — ALBUMIN HUMAN 50 G/1000ML
25 SOLUTION INTRAVENOUS ONCE
Status: COMPLETED | OUTPATIENT
Start: 2021-11-18 | End: 2021-11-18

## 2021-11-18 RX ORDER — ETOMIDATE 2 MG/ML
INJECTION INTRAVENOUS AS NEEDED
Status: DISCONTINUED | OUTPATIENT
Start: 2021-11-18 | End: 2021-11-18 | Stop reason: HOSPADM

## 2021-11-18 RX ORDER — NITROGLYCERIN 20 MG/100ML
0-20 INJECTION INTRAVENOUS
Status: DISCONTINUED | OUTPATIENT
Start: 2021-11-18 | End: 2021-11-18

## 2021-11-18 RX ORDER — MORPHINE SULFATE 4 MG/ML
3-5 INJECTION INTRAVENOUS
Status: DISCONTINUED | OUTPATIENT
Start: 2021-11-18 | End: 2021-11-19 | Stop reason: ALTCHOICE

## 2021-11-18 RX ORDER — SODIUM CHLORIDE 9 MG/ML
250 INJECTION, SOLUTION INTRAVENOUS AS NEEDED
Status: DISCONTINUED | OUTPATIENT
Start: 2021-11-18 | End: 2021-11-18

## 2021-11-18 RX ORDER — NITROGLYCERIN 20 MG/100ML
0-100 INJECTION INTRAVENOUS
Status: DISCONTINUED | OUTPATIENT
Start: 2021-11-18 | End: 2021-11-20

## 2021-11-18 RX ORDER — SODIUM BICARBONATE 1 MEQ/ML
SYRINGE (ML) INTRAVENOUS AS NEEDED
Status: DISCONTINUED | OUTPATIENT
Start: 2021-11-18 | End: 2021-11-18 | Stop reason: HOSPADM

## 2021-11-18 RX ORDER — ONDANSETRON 2 MG/ML
4-8 INJECTION INTRAMUSCULAR; INTRAVENOUS
Status: DISCONTINUED | OUTPATIENT
Start: 2021-11-18 | End: 2021-11-20

## 2021-11-18 RX ORDER — ALBUMIN HUMAN 50 G/1000ML
SOLUTION INTRAVENOUS AS NEEDED
Status: DISCONTINUED | OUTPATIENT
Start: 2021-11-18 | End: 2021-11-18 | Stop reason: HOSPADM

## 2021-11-18 RX ORDER — SUFENTANIL CITRATE 50 UG/ML
INJECTION EPIDURAL; INTRAVENOUS AS NEEDED
Status: DISCONTINUED | OUTPATIENT
Start: 2021-11-18 | End: 2021-11-18 | Stop reason: HOSPADM

## 2021-11-18 RX ORDER — CEFAZOLIN SODIUM/WATER 2 G/20 ML
2 SYRINGE (ML) INTRAVENOUS ONCE
Status: COMPLETED | OUTPATIENT
Start: 2021-11-18 | End: 2021-11-18

## 2021-11-18 RX ORDER — METOPROLOL TARTRATE 25 MG/1
25 TABLET, FILM COATED ORAL 2 TIMES DAILY
Status: DISCONTINUED | OUTPATIENT
Start: 2021-11-19 | End: 2021-11-20

## 2021-11-18 RX ORDER — LIDOCAINE HYDROCHLORIDE 20 MG/ML
INJECTION, SOLUTION EPIDURAL; INFILTRATION; INTRACAUDAL; PERINEURAL AS NEEDED
Status: DISCONTINUED | OUTPATIENT
Start: 2021-11-18 | End: 2021-11-18 | Stop reason: HOSPADM

## 2021-11-18 RX ORDER — AMIODARONE HYDROCHLORIDE 200 MG/1
600 TABLET ORAL ONCE
Status: COMPLETED | OUTPATIENT
Start: 2021-11-18 | End: 2021-11-18

## 2021-11-18 RX ORDER — NOREPINEPHRINE BIT/0.9 % NACL 4MG/250ML
.01-.5 PLASTIC BAG, INJECTION (ML) INTRAVENOUS
Status: DISCONTINUED | OUTPATIENT
Start: 2021-11-18 | End: 2021-11-20

## 2021-11-18 RX ORDER — SODIUM CHLORIDE 9 MG/ML
250 INJECTION, SOLUTION INTRAVENOUS AS NEEDED
Status: DISCONTINUED | OUTPATIENT
Start: 2021-11-18 | End: 2021-11-20

## 2021-11-18 RX ORDER — GUAIFENESIN 100 MG/5ML
81 LIQUID (ML) ORAL DAILY
Status: DISCONTINUED | OUTPATIENT
Start: 2021-11-19 | End: 2021-11-20

## 2021-11-18 RX ORDER — AMIODARONE HYDROCHLORIDE 200 MG/1
200 TABLET ORAL 2 TIMES DAILY
Status: DISCONTINUED | OUTPATIENT
Start: 2021-11-18 | End: 2021-11-20

## 2021-11-18 RX ORDER — SODIUM CHLORIDE 0.9 % (FLUSH) 0.9 %
5-40 SYRINGE (ML) INJECTION EVERY 8 HOURS
Status: DISCONTINUED | OUTPATIENT
Start: 2021-11-18 | End: 2021-11-20

## 2021-11-18 RX ORDER — ATORVASTATIN CALCIUM 80 MG/1
80 TABLET, FILM COATED ORAL
Status: DISCONTINUED | OUTPATIENT
Start: 2021-11-18 | End: 2021-11-20

## 2021-11-18 RX ORDER — POTASSIUM CHLORIDE 14.9 MG/ML
10 INJECTION INTRAVENOUS AS NEEDED
Status: DISCONTINUED | OUTPATIENT
Start: 2021-11-18 | End: 2021-11-20

## 2021-11-18 RX ORDER — VECURONIUM BROMIDE FOR INJECTION 1 MG/ML
INJECTION, POWDER, LYOPHILIZED, FOR SOLUTION INTRAVENOUS AS NEEDED
Status: DISCONTINUED | OUTPATIENT
Start: 2021-11-18 | End: 2021-11-18 | Stop reason: HOSPADM

## 2021-11-18 RX ORDER — MAGNESIUM SULFATE 1 G/100ML
1 INJECTION INTRAVENOUS AS NEEDED
Status: DISCONTINUED | OUTPATIENT
Start: 2021-11-18 | End: 2021-11-20

## 2021-11-18 RX ORDER — SODIUM CHLORIDE 9 MG/ML
25 INJECTION, SOLUTION INTRAVENOUS CONTINUOUS
Status: DISCONTINUED | OUTPATIENT
Start: 2021-11-18 | End: 2021-11-20

## 2021-11-18 RX ORDER — OXYCODONE AND ACETAMINOPHEN 5; 325 MG/1; MG/1
1 TABLET ORAL
Status: DISCONTINUED | OUTPATIENT
Start: 2021-11-18 | End: 2021-11-19 | Stop reason: ALTCHOICE

## 2021-11-18 RX ORDER — DEXTROSE 50 % IN WATER (D50W) INTRAVENOUS SYRINGE
25 AS NEEDED
Status: DISCONTINUED | OUTPATIENT
Start: 2021-11-18 | End: 2021-11-20

## 2021-11-18 RX ORDER — HEPARIN SODIUM 5000 [USP'U]/ML
INJECTION, SOLUTION INTRAVENOUS; SUBCUTANEOUS AS NEEDED
Status: DISCONTINUED | OUTPATIENT
Start: 2021-11-18 | End: 2021-11-18 | Stop reason: HOSPADM

## 2021-11-18 RX ORDER — DEXTROSE MONOHYDRATE AND SODIUM CHLORIDE 5; .45 G/100ML; G/100ML
25 INJECTION, SOLUTION INTRAVENOUS CONTINUOUS
Status: DISCONTINUED | OUTPATIENT
Start: 2021-11-18 | End: 2021-11-20

## 2021-11-18 RX ORDER — LIDOCAINE HYDROCHLORIDE 10 MG/ML
0.1 INJECTION INFILTRATION; PERINEURAL AS NEEDED
Status: DISCONTINUED | OUTPATIENT
Start: 2021-11-18 | End: 2021-11-18 | Stop reason: HOSPADM

## 2021-11-18 RX ORDER — CEFAZOLIN SODIUM/WATER 2 G/20 ML
2 SYRINGE (ML) INTRAVENOUS EVERY 8 HOURS
Status: COMPLETED | OUTPATIENT
Start: 2021-11-19 | End: 2021-11-19

## 2021-11-18 RX ORDER — CALCIUM CHLORIDE INJECTION 100 MG/ML
INJECTION, SOLUTION INTRAVENOUS AS NEEDED
Status: DISCONTINUED | OUTPATIENT
Start: 2021-11-18 | End: 2021-11-18 | Stop reason: HOSPADM

## 2021-11-18 RX ORDER — DEXMEDETOMIDINE HYDROCHLORIDE 4 UG/ML
.1-1.5 INJECTION, SOLUTION INTRAVENOUS
Status: DISCONTINUED | OUTPATIENT
Start: 2021-11-18 | End: 2021-11-20

## 2021-11-18 RX ORDER — SODIUM CHLORIDE 9 MG/ML
INJECTION, SOLUTION INTRAVENOUS
Status: DISCONTINUED | OUTPATIENT
Start: 2021-11-18 | End: 2021-11-18 | Stop reason: HOSPADM

## 2021-11-18 RX ORDER — DEXTROSE, SODIUM CHLORIDE, AND POTASSIUM CHLORIDE 5; .45; .15 G/100ML; G/100ML; G/100ML
25 INJECTION INTRAVENOUS CONTINUOUS
Status: DISCONTINUED | OUTPATIENT
Start: 2021-11-18 | End: 2021-11-20

## 2021-11-18 RX ORDER — HEPARIN SODIUM 1000 [USP'U]/ML
INJECTION, SOLUTION INTRAVENOUS; SUBCUTANEOUS AS NEEDED
Status: DISCONTINUED | OUTPATIENT
Start: 2021-11-18 | End: 2021-11-18 | Stop reason: HOSPADM

## 2021-11-18 RX ORDER — PROTAMINE SULFATE 10 MG/ML
INJECTION, SOLUTION INTRAVENOUS AS NEEDED
Status: DISCONTINUED | OUTPATIENT
Start: 2021-11-18 | End: 2021-11-18 | Stop reason: HOSPADM

## 2021-11-18 RX ORDER — DOBUTAMINE HYDROCHLORIDE 200 MG/100ML
0-10 INJECTION INTRAVENOUS
Status: DISCONTINUED | OUTPATIENT
Start: 2021-11-18 | End: 2021-11-20

## 2021-11-18 RX ORDER — MIDAZOLAM HYDROCHLORIDE 1 MG/ML
1 INJECTION, SOLUTION INTRAMUSCULAR; INTRAVENOUS
Status: DISCONTINUED | OUTPATIENT
Start: 2021-11-18 | End: 2021-11-20

## 2021-11-18 RX ORDER — ACETAMINOPHEN 325 MG/1
650 TABLET ORAL
Status: DISCONTINUED | OUTPATIENT
Start: 2021-11-18 | End: 2021-11-20

## 2021-11-18 RX ORDER — MIDAZOLAM HYDROCHLORIDE 1 MG/ML
INJECTION, SOLUTION INTRAMUSCULAR; INTRAVENOUS AS NEEDED
Status: DISCONTINUED | OUTPATIENT
Start: 2021-11-18 | End: 2021-11-18 | Stop reason: HOSPADM

## 2021-11-18 RX ORDER — SODIUM CHLORIDE 0.9 % (FLUSH) 0.9 %
5-40 SYRINGE (ML) INJECTION AS NEEDED
Status: DISCONTINUED | OUTPATIENT
Start: 2021-11-18 | End: 2021-11-20

## 2021-11-18 RX ORDER — NOREPINEPHRINE BITARTRATE 1 MG/ML
INJECTION, SOLUTION INTRAVENOUS
Status: DISCONTINUED | OUTPATIENT
Start: 2021-11-18 | End: 2021-11-18 | Stop reason: HOSPADM

## 2021-11-18 RX ORDER — FENTANYL CITRATE-0.9 % NACL/PF 25 MCG/ML
0-200 PLASTIC BAG, INJECTION (ML) INJECTION
Status: DISCONTINUED | OUTPATIENT
Start: 2021-11-18 | End: 2021-11-20

## 2021-11-18 RX ORDER — MIDAZOLAM HYDROCHLORIDE 1 MG/ML
2 INJECTION, SOLUTION INTRAMUSCULAR; INTRAVENOUS
Status: DISCONTINUED | OUTPATIENT
Start: 2021-11-18 | End: 2021-11-18 | Stop reason: HOSPADM

## 2021-11-18 RX ORDER — ALBUMIN HUMAN 50 G/1000ML
SOLUTION INTRAVENOUS
Status: COMPLETED | OUTPATIENT
Start: 2021-11-18 | End: 2021-11-18

## 2021-11-18 RX ORDER — CHLORHEXIDINE GLUCONATE 1.2 MG/ML
10 RINSE ORAL 2 TIMES DAILY
Status: DISCONTINUED | OUTPATIENT
Start: 2021-11-18 | End: 2021-11-20

## 2021-11-18 RX ORDER — SODIUM CHLORIDE, SODIUM LACTATE, POTASSIUM CHLORIDE, CALCIUM CHLORIDE 600; 310; 30; 20 MG/100ML; MG/100ML; MG/100ML; MG/100ML
75 INJECTION, SOLUTION INTRAVENOUS CONTINUOUS
Status: DISCONTINUED | OUTPATIENT
Start: 2021-11-18 | End: 2021-11-18 | Stop reason: HOSPADM

## 2021-11-18 RX ADMIN — SODIUM BICARBONATE 50 MEQ: 84 INJECTION, SOLUTION INTRAVENOUS at 17:31

## 2021-11-18 RX ADMIN — SODIUM CHLORIDE, SODIUM LACTATE, POTASSIUM CHLORIDE, AND CALCIUM CHLORIDE 75 ML/HR: 600; 310; 30; 20 INJECTION, SOLUTION INTRAVENOUS at 06:12

## 2021-11-18 RX ADMIN — ROCURONIUM BROMIDE 50 MG: 10 INJECTION, SOLUTION INTRAVENOUS at 08:16

## 2021-11-18 RX ADMIN — MIDAZOLAM 1 MG: 1 INJECTION INTRAMUSCULAR; INTRAVENOUS at 18:27

## 2021-11-18 RX ADMIN — ROCURONIUM BROMIDE 30 MG: 10 INJECTION, SOLUTION INTRAVENOUS at 16:31

## 2021-11-18 RX ADMIN — CEFAZOLIN 2 G: 1 INJECTION, POWDER, FOR SOLUTION INTRAVENOUS at 08:30

## 2021-11-18 RX ADMIN — SODIUM CHLORIDE, SODIUM LACTATE, POTASSIUM CHLORIDE, AND CALCIUM CHLORIDE: 600; 310; 30; 20 INJECTION, SOLUTION INTRAVENOUS at 16:35

## 2021-11-18 RX ADMIN — MIDAZOLAM 1 MG: 1 INJECTION INTRAMUSCULAR; INTRAVENOUS at 08:07

## 2021-11-18 RX ADMIN — Medication 10 ML: at 21:08

## 2021-11-18 RX ADMIN — ROCURONIUM BROMIDE 30 MG: 10 INJECTION, SOLUTION INTRAVENOUS at 13:17

## 2021-11-18 RX ADMIN — DEXMEDETOMIDINE HYDROCHLORIDE 0.6 MCG/KG/HR: 400 INJECTION INTRAVENOUS at 20:19

## 2021-11-18 RX ADMIN — TUBERCULIN PURIFIED PROTEIN DERIVATIVE 5 UNITS: 5 INJECTION, SOLUTION INTRADERMAL at 20:45

## 2021-11-18 RX ADMIN — SODIUM CHLORIDE, SODIUM LACTATE, POTASSIUM CHLORIDE, CALCIUM CHLORIDE: 600; 310; 30; 20 INJECTION, SOLUTION INTRAVENOUS at 08:24

## 2021-11-18 RX ADMIN — VECURONIUM BROMIDE 3 MG: 1 INJECTION, POWDER, LYOPHILIZED, FOR SOLUTION INTRAVENOUS at 09:01

## 2021-11-18 RX ADMIN — SODIUM CHLORIDE 120 MCG: 900 INJECTION, SOLUTION INTRAVENOUS at 08:34

## 2021-11-18 RX ADMIN — CHLORHEXIDINE GLUCONATE 10 ML: 1.2 RINSE ORAL at 19:34

## 2021-11-18 RX ADMIN — CEFAZOLIN SODIUM 2 G: 100 INJECTION, POWDER, LYOPHILIZED, FOR SOLUTION INTRAVENOUS at 23:54

## 2021-11-18 RX ADMIN — VECURONIUM BROMIDE 2 MG: 1 INJECTION, POWDER, LYOPHILIZED, FOR SOLUTION INTRAVENOUS at 11:50

## 2021-11-18 RX ADMIN — FAMOTIDINE 20 MG: 10 INJECTION INTRAVENOUS at 20:29

## 2021-11-18 RX ADMIN — Medication 3 AMPULE: at 06:06

## 2021-11-18 RX ADMIN — Medication 5 MG: at 08:52

## 2021-11-18 RX ADMIN — SUFENTANIL CITRATE 50 MCG: 50 INJECTION EPIDURAL; INTRAVENOUS at 12:44

## 2021-11-18 RX ADMIN — DOBUTAMINE IN DEXTROSE 2.5 MCG/KG/MIN: 200 INJECTION, SOLUTION INTRAVENOUS at 21:44

## 2021-11-18 RX ADMIN — NITROGLYCERIN 30 MCG/MIN: 20 INJECTION INTRAVENOUS at 19:29

## 2021-11-18 RX ADMIN — CEFAZOLIN 2 G: 1 INJECTION, POWDER, FOR SOLUTION INTRAVENOUS at 15:30

## 2021-11-18 RX ADMIN — AMINOCAPROIC ACID 10 G: 250 INJECTION, SOLUTION INTRAVENOUS at 08:50

## 2021-11-18 RX ADMIN — VECURONIUM BROMIDE 3 MG: 1 INJECTION, POWDER, LYOPHILIZED, FOR SOLUTION INTRAVENOUS at 10:46

## 2021-11-18 RX ADMIN — SODIUM CHLORIDE: 900 INJECTION, SOLUTION INTRAVENOUS at 08:39

## 2021-11-18 RX ADMIN — SODIUM CHLORIDE 1 G/HR: 900 INJECTION, SOLUTION INTRAVENOUS at 09:28

## 2021-11-18 RX ADMIN — AMIODARONE HYDROCHLORIDE 600 MG: 200 TABLET ORAL at 06:06

## 2021-11-18 RX ADMIN — EPINEPHRINE 0.02 MCG/MIN: 1 INJECTION INTRAMUSCULAR; INTRAVENOUS; SUBCUTANEOUS at 13:46

## 2021-11-18 RX ADMIN — LIDOCAINE HYDROCHLORIDE 60 MG: 20 INJECTION, SOLUTION EPIDURAL; INFILTRATION; INTRACAUDAL; PERINEURAL at 08:16

## 2021-11-18 RX ADMIN — MIDAZOLAM 2 MG: 1 INJECTION INTRAMUSCULAR; INTRAVENOUS at 13:17

## 2021-11-18 RX ADMIN — SODIUM CHLORIDE 120 MCG: 900 INJECTION, SOLUTION INTRAVENOUS at 08:43

## 2021-11-18 RX ADMIN — ETOMIDATE 10 MG: 2 INJECTION, SOLUTION INTRAVENOUS at 08:16

## 2021-11-18 RX ADMIN — ATORVASTATIN CALCIUM 80 MG: 80 TABLET, FILM COATED ORAL at 21:07

## 2021-11-18 RX ADMIN — PROTAMINE SULFATE 150 MG: 10 INJECTION, SOLUTION INTRAVENOUS at 13:57

## 2021-11-18 RX ADMIN — CALCIUM CHLORIDE 1 G: 100 INJECTION INTRAVENOUS; INTRAVENTRICULAR at 17:30

## 2021-11-18 RX ADMIN — SODIUM CHLORIDE, SODIUM LACTATE, POTASSIUM CHLORIDE, CALCIUM CHLORIDE: 600; 310; 30; 20 INJECTION, SOLUTION INTRAVENOUS at 11:53

## 2021-11-18 RX ADMIN — CALCIUM CHLORIDE 1 G: 100 INJECTION INTRAVENOUS; INTRAVENTRICULAR at 15:37

## 2021-11-18 RX ADMIN — SUFENTANIL CITRATE 150 MCG: 50 INJECTION EPIDURAL; INTRAVENOUS at 08:16

## 2021-11-18 RX ADMIN — SODIUM CHLORIDE 120 MCG: 900 INJECTION, SOLUTION INTRAVENOUS at 08:25

## 2021-11-18 RX ADMIN — SODIUM CHLORIDE 2 UNITS/HR: 900 INJECTION, SOLUTION INTRAVENOUS at 12:42

## 2021-11-18 RX ADMIN — SODIUM CHLORIDE 2.5 MG/HR: 900 INJECTION, SOLUTION INTRAVENOUS at 22:12

## 2021-11-18 RX ADMIN — ALBUMIN (HUMAN) 25 G: 12.5 INJECTION, SOLUTION INTRAVENOUS at 20:00

## 2021-11-18 RX ADMIN — DEXMEDETOMIDINE 0.5 MCG/KG/HR: 100 INJECTION, SOLUTION, CONCENTRATE INTRAVENOUS at 13:17

## 2021-11-18 RX ADMIN — SODIUM CHLORIDE 25 ML/HR: 900 INJECTION, SOLUTION INTRAVENOUS at 18:40

## 2021-11-18 RX ADMIN — DEXTROSE AND SODIUM CHLORIDE 25 ML/HR: 5; 450 INJECTION, SOLUTION INTRAVENOUS at 20:15

## 2021-11-18 RX ADMIN — HEPARIN SODIUM 23000 UNITS: 1000 INJECTION, SOLUTION INTRAVENOUS; SUBCUTANEOUS at 11:29

## 2021-11-18 RX ADMIN — ALBUMIN (HUMAN) 250 ML: 12.5 INJECTION, SOLUTION INTRAVENOUS at 08:55

## 2021-11-18 RX ADMIN — SODIUM CHLORIDE 120 MCG: 900 INJECTION, SOLUTION INTRAVENOUS at 08:35

## 2021-11-18 RX ADMIN — SODIUM CHLORIDE 250 ML: 900 INJECTION, SOLUTION INTRAVENOUS at 23:30

## 2021-11-18 RX ADMIN — ALBUMIN (HUMAN) 250 ML: 12.5 INJECTION, SOLUTION INTRAVENOUS at 09:47

## 2021-11-18 RX ADMIN — FENTANYL CITRATE 50 MCG/HR: 0.05 INJECTION, SOLUTION INTRAMUSCULAR; INTRAVENOUS at 19:59

## 2021-11-18 RX ADMIN — SODIUM CHLORIDE, SODIUM LACTATE, POTASSIUM CHLORIDE, AND CALCIUM CHLORIDE: 600; 310; 30; 20 INJECTION, SOLUTION INTRAVENOUS at 08:03

## 2021-11-18 RX ADMIN — CEFAZOLIN 2 G: 1 INJECTION, POWDER, FOR SOLUTION INTRAVENOUS at 12:00

## 2021-11-18 RX ADMIN — MIDAZOLAM 2 MG: 1 INJECTION INTRAMUSCULAR; INTRAVENOUS at 08:03

## 2021-11-18 RX ADMIN — NOREPINEPHRINE BITARTRATE 8 MCG/MIN: 1 SOLUTION INTRAVENOUS at 14:09

## 2021-11-18 RX ADMIN — Medication 5 MG: at 08:49

## 2021-11-18 RX ADMIN — VECURONIUM BROMIDE 2 MG: 1 INJECTION, POWDER, LYOPHILIZED, FOR SOLUTION INTRAVENOUS at 10:02

## 2021-11-18 RX ADMIN — Medication 1 AMPULE: at 20:28

## 2021-11-18 NOTE — ANESTHESIA PROCEDURE NOTES
CARLOS  Date/Time: 11/18/2021 8:59 AM      Procedure Details: probe placement, image aquisition & interpretation    Site marked  Risks and benefits discussed with the patient and plans are to proceed    Procedure Note    Performed by: Fernando De Leon MD  Authorized by: Fernando De Leon MD       Indications: assessment of ascending aorta and assessment of surgical repair  Modalities: 2D, CF  Probe Type: multiplane  Insertion: atraumatic  Patient Status: intubated    Echocardiographic and Doppler Measurements   Aorta  Size  Diam(cm)  Dissection PlaqueThick(mm)  Plaque Mobile    Ascending normal   0-3 No    Arch         Descending normal   0-3 No          Valves  Annulus  Stenosis  Area/Grad  Regurg  Leaflet   Morph  Leaflet   Motion    Aortic calcified severe  0 thickened restricted    Mitral normal none  1+ normal normal    Tricuspid normal   1+            Atria  Size  SEC (smoke)  Thrombus  Tumor  Device    Rt Atrium normal        Lt Atrium dilated         Interatrial Septum Morphology: normal      Ventricle  Cavity Size  Cavity Dimension Hypertrophy  Thrombus  Gloal FXN  EF    RV normal  Yes  normal     LV normal  Yes  normal          Post Intervention Follow-up Study  Ventricular Global Function: decreased  Ventricular Regional Function: decreased     Valve  Function  Regurgitation  Area    Aortic improved 0     Mitral no change 1+     Tricuspid no change 1+     Prosthetic normal       Complications: None

## 2021-11-18 NOTE — ANESTHESIA PROCEDURE NOTES
Arterial Line Placement    Start time: 11/18/2021 8:12 AM  End time: 11/18/2021 8:14 AM  Performed by: Leigh Contreras CRNA  Authorized by: Ana George MD     Pre-Procedure    Procedure:   Prep:  ChloraPrep  Seldinger Technique?: Yes    Orientation:  Left  Location:  Radial artery  Catheter size:  20 G  Number of attempts:  1  Cont Cardiac Output Sensor: No      Assessment:   Post-procedure:  Line secured and sterile dressing applied  Patient Tolerance:  Patient tolerated the procedure well with no immediate complications  Comment:   Left arm prepped with ChloraPrep, 0.8ml of 1% lidocaine infiltrated at skin, Seldinger technique, good blood return, good waveform.

## 2021-11-18 NOTE — PERIOP NOTES
TRANSFER - OUT REPORT:    Verbal report given to Marcus Deluca on Dana Cuello  being transferred to CVICU for routine progression of care       Report consisted of patients Situation, Background, Assessment and   Recommendations(SBAR). Information from the following report(s) OR Summary was reviewed with the receiving nurse. Lines:   Double Lumen 11/18/21 Right Internal jugular (Active)       Solmon Petties Dual 11/18/21 Right Neck (Active)       Peripheral IV 11/18/21 Posterior; Right Hand (Active)   Site Assessment Clean, dry, & intact 11/18/21 0611   Dressing Status Clean, dry, & intact; New 11/18/21 0611   Dressing Type Tape; Transparent 11/18/21 0611   Hub Color/Line Status Green; Infusing 11/18/21 0611       Peripheral IV 11/18/21 Left Hand (Active)       Arterial Line 11/18/21 Left Radial artery (Active)       Arterial Line 11/18/21 Left Radial artery (Active)        Opportunity for questions and clarification was provided.       Patient transported with:   Monitor  O2 @ 15 liters  Registered Nurse   CRNA  MDA

## 2021-11-18 NOTE — BRIEF OP NOTE
Brief Postoperative Note    Patient: Phoenix Woo  YOB: 1955  MRN: [id number]    Date of Procedure: 11/18/2021     Pre-Op Diagnosis: Aortic valve stenosis, etiology of cardiac valve disease unspecified [I35.0]  Atherosclerosis of native coronary artery of native heart with unstable angina pectoris ([facility] [address] [I25.110]    Post-Op Diagnosis: Same as preoperative diagnosis. Procedure(s):  CORONARY ARTERY BYPASS GRAFT (CABGX1), WITH AVR (REPLACEMENT), LYSIS OF MYOCARDIAL ADHESIONS  STERNOTOMY REDO  ESOPHAGEAL TRANS ECHOCARDIOGRAM  VEIN HARVEST, GREATER SAPHENOUS VEIN    Surgeon(s): MD Fredo Villalobos MD    Surgical Assistant: Surg Asst-1: Sara Roblero    Anesthesia: General     Estimated Blood Loss (mL): Minimal    Complications: Bleeding    Specimens:   ID Type Source Tests Collected by Time Destination   1 : Aortic valve leaflets  Preservative Aortic Valve  Justino Carpio MD 11/18/2021 1301 Pathology        Implants:   Implant Name Type Inv.  Item Serial No.  Lot No. LRB No. Used Action   VALVE AORT 23MM REPL RESILIENT BOV PERICARD CO CHROMIUM ALLY - [id number]  VALVE AORT 23MM REPL RESILIENT BOV PERICARD CO CHROMIUM ALLY [id number] [id number]   1 Implanted       Drains:   Orogastric Tube 11/18/21 (Active)       Findings: profound adhesions, CAD, AS    Electronically Signed by Shawn Martinez MD on 11/18/2021 at 5:30 PM

## 2021-11-18 NOTE — ROUTINE PROCESS
TRANSFER - IN REPORT:    Verbal report received from Davon Paredes CRNA(name) on Edwina Mcknight  being received from CV OR for urgent transfer      Report consisted of patients Situation, Background, Assessment and   Recommendations(SBAR). Information from the following report(s) SBAR, Kardex, OR Summary, Intake/Output, MAR, Recent Results and Cardiac Rhythm NSR was reviewed with the receiving nurse. Opportunity for questions and clarification was provided. Assessment completed upon patients arrival to unit and care assumed per Rochelle Ruggiero RN.

## 2021-11-18 NOTE — ANESTHESIA POSTPROCEDURE EVALUATION
Procedure(s):  CORONARY ARTERY BYPASS GRAFT (CABGX1), WITH AVR (REPLACEMENT), LYSIS OF MYOCARDIAL ADHESIONS  STERNOTOMY REDO  ESOPHAGEAL TRANS ECHOCARDIOGRAM  VEIN HARVEST, GREATER SAPHENOUS VEIN. general    Anesthesia Post Evaluation      Multimodal analgesia: multimodal analgesia used between 6 hours prior to anesthesia start to PACU discharge  Patient location during evaluation: ICU  Patient participation: complete - patient cannot participate  Level of consciousness: obtunded/minimal responses  Pain management: adequate  Airway patency: patent  Anesthetic complications: no  Cardiovascular status: acceptable and hemodynamically stable  Respiratory status: acceptable and ETT  Hydration status: acceptable  Post anesthesia nausea and vomiting:  none  Final Post Anesthesia Temperature Assessment:  Normothermia (36.0-37.5 degrees C)      INITIAL Post-op Vital signs:   Vitals Value Taken Time   /66 11/18/21 1804   Temp 36.8 °C (98.2 °F) 11/18/21 1849   Pulse 81 11/18/21 1849   Resp 15 11/18/21 1849   SpO2 100 % 11/18/21 1849   Vitals shown include unvalidated device data.

## 2021-11-18 NOTE — ANESTHESIA PROCEDURE NOTES
Central Line Placement with PA catheter    Start time: 11/18/2021 8:25 AM  End time: 11/18/2021 8:38 AM  Performed by: Mag Alcantar MD  Authorized by: Mag Alcantar MD     Indications: central pressure monitoring, need for vasopressors and vascular access  Preanesthetic Checklist: patient identified, risks and benefits discussed, anesthesia consent, site marked, patient being monitored and timeout performed    Timeout Time: 08:24 EST       Pre-procedure: All elements of maximal sterile barrier technique followed? Yes    2% Chlorhexidine for cutaneous antisepsis, Hand hygiene performed prior to catheter insertion and Ultrasound guidance    Sterile Ultrasound Technique followed?: Yes        Ultrasound Image Stored? Image stored    Procedure:   Prep:  Chlorhexidine  Location: internal jugular  Orientation:  Right  Patient position:  Trendelenburg  Catheter type:  Double lumen  Catheter size:  9 Fr    Number of attempts:  1  Successful placement: Yes      Assessment:   Post-procedure:  Catheter secured and sterile dressing applied  Assessment:  Blood return through all ports, free fluid flow and guidewire removal verified  Insertion:  Uncomplicated  Patient tolerance:  Patient tolerated the procedure well with no immediate complications  Potential access site was examined with ultrasound and the acceptable patent access site was selected. Real-time ultrasound guidance utilized for 18G Jelco needle access to IJ. Venous placement confirmed with manometry and wire visualization in vein on ultrasound (image was recorded for permanent record). Double-lumen 9Fr catheter placed over wire using Seldinger technique. Wire removed and placed off of field. PA catheter floated without complication to depth of 47 cm.

## 2021-11-18 NOTE — PERIOP NOTES
Pt placed on 3 L O2 per NC per protocol. Allevyn dsg placed on chart per protocol. Warming blanket applied. Hair clipped chin to toe per protocol. Wiped with CHG wipes. #18g IV placed in R hand. Amio @ 0600, beta blocker, & 81mg ASA this AM @ 0300. Pt watched pre-op teaching video. All necessary studies in pt's chart, reviewed. On heart monitor. Call light given. Family at bedside.

## 2021-11-18 NOTE — PERIOP NOTES
Family updated at 9:31 AM by Calli Sheridan RN. Four digit code obtained from wife. Family updated at 11:02 AM by Calli Sheridan RN. Family updated with message from Ms. Bosch. Identity confirmed. Family updated at 1:10 PM by Calli Sheridan RN. Four digit code obtained from wife. Family updated at 2:59 PM by Calli Sheridan RN. Four digit code obtained from wife.

## 2021-11-18 NOTE — PROGRESS NOTES
11/18/21 1800   Patient Observations   Pulse (Heart Rate) 84   Resp Rate 18   O2 Sat (%) 99 %   Airway - Endotracheal Tube 11/18/21 Oral   Placement Date/Time: 11/18/21 0816   Number of Attempts: 1  Inserted By: Sunny Caro CRNA  Location: Oral  Placement Verified: BBS; EtCO2; Auscultation  Airway Types: Endotracheal, cuffed  Airway Tube Size: 8 mm  DL Glottic View: 2  Technique: Direct L. ..    Insertion Depth (cm) 24 cm   Line Spencer Lips   Side Secured Taped   Cuff Pressure 28 cmH20   Respiratory   Respiratory (WDL) X   Ventilator Initiate/Discontinue   Ventilator Initiate Yes   Bio-Med ID #   (pre use done)   Vent Settings   FIO2 (%) 80 %   SpO2/FIO2 Ratio 123.75   CMV Rate Set 18   Back-Up Rate 10   Vt Set (ml) 550 ml   PEEP/VENT (cm H2O) 8 cm H20   Insp Time (sec) 0.95 sec   Insp Rise Time % 70 %   Flow Trigger 3   Exp Time (sec) 2.38 sec   Ventilator Measurements   Resp Rate Observed 18   Vt Exhaled (Machine Breath) (ml) 0.55 ml   Ve Observed (l/min) 9.61 l/min   PIP Observed (cm H2O) 18 cm H2O   Plateau Pressure (cm H2O) 16 cm H2O   Driving Pressure 8 CFC8E   MAP (cm H2O) 11   I:E Ratio Actual 1:2.50   Auto PEEP Observed (cm H2O) 0.6 cm H2O   Dynamic Compliance (ml/cm H20) 82 ml/cm H20   Static Compliance (ml/cm H20) 75 ml/cm H20   Safety & Alarms   Backup Mode Checked/Apnea Yes   Pressure Max 50 cm H2O   Ve Min 2   Ve Max 22   RR Min 5   RR Max 50   Vent Method/Mode   Ventilation Method Conventional   Ventilator Mode VC+   Ventilator Mode ID A/C

## 2021-11-19 ENCOUNTER — APPOINTMENT (OUTPATIENT)
Dept: GENERAL RADIOLOGY | Age: 66
DRG: 220 | End: 2021-11-19
Attending: PHYSICIAN ASSISTANT
Payer: COMMERCIAL

## 2021-11-19 LAB
ABO + RH BLD: NORMAL
ACT AVERAGE - AAVG: 593 SEC
ACT AVERAGE - AAVG: 818 SEC
ANION GAP SERPL CALC-SCNC: 6 MMOL/L (ref 7–16)
ARTERIAL PATENCY WRIST A: ABNORMAL
ATRIAL RATE: 82 BPM
BASE DEFICIT BLDV-SCNC: 0.1 MMOL/L
BASE EXCESS BLD CALC-SCNC: 1.5 MMOL/L
BASELINE ACT - BAACT: 136 SEC
BDY SITE: ABNORMAL
BDY SITE: ABNORMAL
BLD PROD TYP BPU: NORMAL
BLOOD GROUP ANTIBODIES SERPL: NORMAL
BPU ID: NORMAL
BUN SERPL-MCNC: 15 MG/DL (ref 8–23)
CALCIUM SERPL-MCNC: 8.9 MG/DL (ref 8.3–10.4)
CALCULATED P AXIS, ECG09: 24 DEGREES
CALCULATED R AXIS, ECG10: 57 DEGREES
CALCULATED T AXIS, ECG11: -34 DEGREES
CHLORIDE SERPL-SCNC: 116 MMOL/L (ref 98–107)
CO2 SERPL-SCNC: 25 MMOL/L (ref 21–32)
CREAT SERPL-MCNC: 1.03 MG/DL (ref 0.8–1.5)
CROSSMATCH RESULT,%XM: NORMAL
DIAGNOSIS, 93000: NORMAL
ERYTHROCYTE [DISTWIDTH] IN BLOOD BY AUTOMATED COUNT: 13.7 % (ref 11.9–14.6)
GAS FLOW.O2 O2 DELIVERY SYS: ABNORMAL L/MIN
GAS FLOW.O2 O2 DELIVERY SYS: ABNORMAL L/MIN
GLUCOSE BLD STRIP.AUTO-MCNC: 102 MG/DL (ref 65–100)
GLUCOSE BLD STRIP.AUTO-MCNC: 105 MG/DL (ref 65–100)
GLUCOSE BLD STRIP.AUTO-MCNC: 114 MG/DL (ref 65–100)
GLUCOSE BLD STRIP.AUTO-MCNC: 85 MG/DL (ref 65–100)
GLUCOSE BLD STRIP.AUTO-MCNC: 88 MG/DL (ref 65–100)
GLUCOSE BLD STRIP.AUTO-MCNC: 91 MG/DL (ref 65–100)
GLUCOSE BLD STRIP.AUTO-MCNC: 91 MG/DL (ref 65–100)
GLUCOSE SERPL-MCNC: 109 MG/DL (ref 65–100)
HCO3 BLD-SCNC: 24.5 MMOL/L (ref 22–26)
HCO3 BLDV-SCNC: 24.4 MMOL/L (ref 23–28)
HCT VFR BLD AUTO: 28.4 % (ref 41.1–50.3)
HEPARIN BOLUS-PATIENT - HBPAT: NORMAL UNITS
HEPARIN BOLUS-PUMP - HBPUMP: 0 UNITS
HEPARIN BOLUS-TOTAL - HBTOT: NORMAL UNITS
HEPARIN REQUIRED-PATIENT - HRPAT: 0
HEPARIN REQUIRED-PATIENT - HRPAT: 3924
HEPARIN REQUIRED-PATIENT - HRPAT: 3924
HEPARIN REQUIRED-TOTAL - HRTOT: 0 UNITS
HEPARIN REQUIRED-TOTAL - HRTOT: 4604 UNITS
HEPARIN REQUIRED-TOTAL - HRTOT: 4604 UNITS
HEPARIN TEST CONCENTRATE - HEPTC: 1.5 MG/KG
HEPARIN TEST CONCENTRATE - HEPTC: 1.5 MG/KG
HEPARIN TEST CONCENTRATE - HEPTC: 2 MG/KG
HEPARIN TEST CONCENTRATE - HEPTC: 2.5 MG/KG
HGB BLD-MCNC: 9.4 G/DL (ref 13.6–17.2)
MAGNESIUM SERPL-MCNC: 2.1 MG/DL (ref 1.8–2.4)
MCH RBC QN AUTO: 28.9 PG (ref 26.1–32.9)
MCHC RBC AUTO-ENTMCNC: 33.1 G/DL (ref 31.4–35)
MCV RBC AUTO: 87.4 FL (ref 79.6–97.8)
NRBC # BLD: 0 K/UL (ref 0–0.2)
O2/TOTAL GAS SETTING VFR VENT: 40 %
O2/TOTAL GAS SETTING VFR VENT: 50 %
P-R INTERVAL, ECG05: 140 MS
PCO2 BLD: 31.5 MMHG (ref 35–45)
PCO2 BLDV: 37.8 MMHG (ref 41–51)
PEEP RESPIRATORY: 8 CMH2O
PH BLD: 7.5 [PH] (ref 7.35–7.45)
PH BLDV: 7.42 [PH] (ref 7.32–7.42)
PLATELET # BLD AUTO: 77 K/UL (ref 150–450)
PMV BLD AUTO: 10.3 FL (ref 9.4–12.3)
PO2 BLD: 162 MMHG (ref 75–100)
PO2 BLDV: 26 MMHG
POTASSIUM SERPL-SCNC: 4 MMOL/L (ref 3.5–5.1)
PROJECTED HEPARIN CONC - PHEP: 1.9 MG/KG
PROTAMINE DOSE-PUMP - PDPUMP: 22 MG
PROTAMINE DOSE-PUMP - PDPUMP: 22 MG
PROTAMINE DOSE-PUMP - PDPUMP: 37 MG
PROTAMINE DOSE-TOTAL - PDTOT: 151 MG
PROTAMINE DOSE-TOTAL - PDTOT: 151 MG
PROTAMINE DOSE-TOTAL - PDTOT: 253 MG
Q-T INTERVAL, ECG07: 432 MS
QRS DURATION, ECG06: 100 MS
QTC CALCULATION (BEZET), ECG08: 504 MS
RBC # BLD AUTO: 3.25 M/UL (ref 4.23–5.6)
SAO2 % BLD: 99.6 % (ref 95–98)
SAO2 % BLDV: 48.7 % (ref 65–88)
SERVICE CMNT-IMP: ABNORMAL
SERVICE CMNT-IMP: NORMAL
SLOPE: 132
SODIUM SERPL-SCNC: 147 MMOL/L (ref 136–145)
SPECIMEN EXP DATE BLD: NORMAL
SPECIMEN TYPE: ABNORMAL
SPECIMEN TYPE: ABNORMAL
STATUS OF UNIT,%ST: NORMAL
TOTAL RESP. RATE, ITRR: 15
UNIT DIVISION, %UDIV: 0
VENTILATION MODE VENT: ABNORMAL
VENTILATION MODE VENT: ABNORMAL
VENTRICULAR RATE, ECG03: 82 BPM
VT SETTING VENT: 550 ML
WBC # BLD AUTO: 4.6 K/UL (ref 4.3–11.1)

## 2021-11-19 PROCEDURE — 74011250636 HC RX REV CODE- 250/636: Performed by: PHYSICIAN ASSISTANT

## 2021-11-19 PROCEDURE — 74011250636 HC RX REV CODE- 250/636: Performed by: THORACIC SURGERY (CARDIOTHORACIC VASCULAR SURGERY)

## 2021-11-19 PROCEDURE — 2709999900 HC NON-CHARGEABLE SUPPLY

## 2021-11-19 PROCEDURE — 93005 ELECTROCARDIOGRAM TRACING: CPT | Performed by: PHYSICIAN ASSISTANT

## 2021-11-19 PROCEDURE — 82803 BLOOD GASES ANY COMBINATION: CPT

## 2021-11-19 PROCEDURE — 80048 BASIC METABOLIC PNL TOTAL CA: CPT

## 2021-11-19 PROCEDURE — 82962 GLUCOSE BLOOD TEST: CPT

## 2021-11-19 PROCEDURE — 74011000250 HC RX REV CODE- 250: Performed by: THORACIC SURGERY (CARDIOTHORACIC VASCULAR SURGERY)

## 2021-11-19 PROCEDURE — 74011000250 HC RX REV CODE- 250: Performed by: PHYSICIAN ASSISTANT

## 2021-11-19 PROCEDURE — 77030027138 HC INCENT SPIROMETER -A

## 2021-11-19 PROCEDURE — 83735 ASSAY OF MAGNESIUM: CPT

## 2021-11-19 PROCEDURE — 65610000006 HC RM INTENSIVE CARE

## 2021-11-19 PROCEDURE — 85027 COMPLETE CBC AUTOMATED: CPT

## 2021-11-19 PROCEDURE — 71045 X-RAY EXAM CHEST 1 VIEW: CPT

## 2021-11-19 PROCEDURE — 74011250637 HC RX REV CODE- 250/637: Performed by: PHYSICIAN ASSISTANT

## 2021-11-19 PROCEDURE — 36600 WITHDRAWAL OF ARTERIAL BLOOD: CPT

## 2021-11-19 PROCEDURE — 94003 VENT MGMT INPAT SUBQ DAY: CPT

## 2021-11-19 PROCEDURE — 74011250637 HC RX REV CODE- 250/637: Performed by: THORACIC SURGERY (CARDIOTHORACIC VASCULAR SURGERY)

## 2021-11-19 PROCEDURE — 99233 SBSQ HOSP IP/OBS HIGH 50: CPT | Performed by: INTERNAL MEDICINE

## 2021-11-19 RX ORDER — TRAMADOL HYDROCHLORIDE 50 MG/1
50 TABLET ORAL
Status: DISCONTINUED | OUTPATIENT
Start: 2021-11-19 | End: 2021-11-23 | Stop reason: HOSPADM

## 2021-11-19 RX ORDER — FUROSEMIDE 10 MG/ML
20 INJECTION INTRAMUSCULAR; INTRAVENOUS ONCE
Status: COMPLETED | OUTPATIENT
Start: 2021-11-19 | End: 2021-11-19

## 2021-11-19 RX ADMIN — CEFAZOLIN SODIUM 2 G: 100 INJECTION, POWDER, LYOPHILIZED, FOR SOLUTION INTRAVENOUS at 08:32

## 2021-11-19 RX ADMIN — Medication 1 AMPULE: at 08:32

## 2021-11-19 RX ADMIN — ONDANSETRON 4 MG: 2 INJECTION INTRAMUSCULAR; INTRAVENOUS at 16:50

## 2021-11-19 RX ADMIN — AMIODARONE HYDROCHLORIDE 200 MG: 200 TABLET ORAL at 17:17

## 2021-11-19 RX ADMIN — TRAMADOL HYDROCHLORIDE 50 MG: 50 TABLET ORAL at 19:44

## 2021-11-19 RX ADMIN — FAMOTIDINE 20 MG: 10 INJECTION INTRAVENOUS at 21:04

## 2021-11-19 RX ADMIN — AMIODARONE HYDROCHLORIDE 200 MG: 200 TABLET ORAL at 08:33

## 2021-11-19 RX ADMIN — Medication 10 ML: at 21:07

## 2021-11-19 RX ADMIN — CHLORHEXIDINE GLUCONATE 10 ML: 1.2 RINSE ORAL at 08:33

## 2021-11-19 RX ADMIN — POTASSIUM CHLORIDE 10 MEQ: 14.9 INJECTION, SOLUTION INTRAVENOUS at 08:37

## 2021-11-19 RX ADMIN — Medication 1 AMPULE: at 21:04

## 2021-11-19 RX ADMIN — Medication 10 ML: at 05:03

## 2021-11-19 RX ADMIN — NITROGLYCERIN 10 MCG/MIN: 20 INJECTION INTRAVENOUS at 05:03

## 2021-11-19 RX ADMIN — ATORVASTATIN CALCIUM 80 MG: 80 TABLET, FILM COATED ORAL at 21:04

## 2021-11-19 RX ADMIN — FUROSEMIDE 20 MG: 20 INJECTION, SOLUTION INTRAMUSCULAR; INTRAVENOUS at 08:33

## 2021-11-19 RX ADMIN — Medication 10 ML: at 13:02

## 2021-11-19 RX ADMIN — DEXMEDETOMIDINE HYDROCHLORIDE 0.9 MCG/KG/HR: 400 INJECTION INTRAVENOUS at 02:14

## 2021-11-19 RX ADMIN — FAMOTIDINE 20 MG: 10 INJECTION INTRAVENOUS at 08:32

## 2021-11-19 NOTE — CONSULTS
Cardiovascular ICU Consult Note: 2021  Franny Beckwith                                                     Admission Date: 2021     The patient's chart is reviewed and the patient is discussed with the staff. Subjective:     Patient is seen at the request of Dr. Padmaja Talley for respiratory management status post cardiac surgery. Patient had CABG x1 ,s/p AVR,sternotormy redo, lysis of myocardial adhesions. Currently is sedated in CV-ICU and orally intubated receiving  mechanical ventilation. We have been asked to see in the CV-ICU for mechanical ventilation management and weaning. Prior to Admission Medications   Prescriptions Last Dose Informant Patient Reported? Taking?   amiodarone (CORDARONE) 200 mg tablet 2021 at 1620  Yes Yes   Sig: Take 600 mg by mouth once. aspirin delayed-release 81 mg tablet 2021 at 0300  Yes Yes   Sig: Take 81 mg by mouth daily. atorvastatin (Lipitor) 40 mg tablet 2021 at 0300  Yes Yes   Sig: Take 40 mg by mouth daily. dapagliflozin (Farxiga) 10 mg tab tablet 2021 at Unknown time  No Yes   Sig: Take 1 Tab by mouth daily. famotidine (PEPCID) 20 mg tablet 2021 at 0300  No Yes   Sig: Take 1 Tablet by mouth three (3) times daily. lisinopriL (PRINIVIL, ZESTRIL) 2.5 mg tablet 2021 at Unknown time  No Yes   Sig: Take 1 Tablet by mouth daily. metFORMIN (GLUCOPHAGE) 500 mg tablet 2021 at Unknown time  Yes Yes   Sig: Take 500 mg by mouth daily (with breakfast). metoprolol succinate (TOPROL-XL) 50 mg XL tablet 2021 at 0300  No Yes   Sig: Take 1 Tablet by mouth daily. Indications: high blood pressure, myocardial reinfarction prevention   rivaroxaban (XARELTO) 20 mg tab tablet 11/15/2021  No No   Sig: Take 1 Tab by mouth daily (with breakfast). semaglutide (Ozempic) 1 mg/dose (4 mg/3 mL) pnij 2021  No No   Si mg by SubCUTAneous route every seven (7) days.       Facility-Administered Medications: None Review of Systems: unable to determine due to intubated status  Past Medical History:   Diagnosis Date    Allergic rhinitis, cause unspecified 3/7/2014    Atrial flutter ([address]     pt with ICD- pt currently taking Xarelto    CAD, multiple vessel 2/28/2017 2/28/17 (Dr Vinny Tristan) Coronary artery bypass grafting x3. Grafts consisting of  1. Left internal mammary artery to the left anterior descending. 2. Reversed LEFT saphenous vein graft to obtuse marginal.  3. Reversed LEFT saphenous vein graft to the left posterior descending  artery.      COVID-19 vaccine series completed     Pfizer vaccine completed X2 doses    Current use of long term anticoagulation     Xarelto and Aspirin 81mg    Depressed left ventricular ejection fraction 2/27/2017 2/27/17 20%    Esophageal reflux 3/7/2014    GERD (gastroesophageal reflux disease)     History of MI (myocardial infarction) 2017    History of squamous cell carcinoma     removed from nose    History of testicular cancer     met to lung & abdomen- Pt received radiation treatment    Hx of CABG 03/2017    Hypercholesteremia     ICD (implantable cardioverter-defibrillator) in place     Left [employer] ICD- pt has never received a shoke    Nonrheumatic aortic (valve) stenosis     Retinopathy of both eyes     Systolic CHF ([address]     Type 2 diabetes mellitus ([address]     oral and insulin reliant/ABG /s.s of hypoglycemia- pt not sure of BS reading/Last A1c: 9.6     Past Surgical History:   Procedure Laterality Date    HX CATARACT REMOVAL Right 04/21/2019    HX COLONOSCOPY      HX CORONARY ARTERY BYPASS GRAFT  2017    CABG X3    HX HEART CATHETERIZATION      HX HEENT  2003    SCC removal, nose    HX IMPLANTABLE CARDIOVERTER DEFIBRILLATOR      Left chest    HX UROLOGICAL Left 1979    malignant tumor of testis/RAT    HX UROLOGICAL Left 2010    groin- cyst    HX WISDOM TEETH EXTRACTION       Social History     Socioeconomic History    Marital status:      Spouse name: Nirali Martínez Number of children: 1    Years of education: Not on file    Highest education level: Not on file   Occupational History    Occupation:      Comment: [address]   Tobacco Use    Smoking status: Never Smoker    Smokeless tobacco: Never Used   Vaping Use    Vaping Use: Never used   Substance and Sexual Activity    Alcohol use: No     Alcohol/week: 0.0 standard drinks    Drug use: No    Sexual activity: Not on file   Other Topics Concern    Not on file   Social History Narrative    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 [employer]     Social Determinants of Health     Financial Resource Strain:     Difficulty of Paying Living Expenses: Not on file   Food Insecurity:     Worried About [address] in the Last Year: Not on file    [address] [email or web address] in the Last Year: Not on file   Transportation Needs:     Lack of Transportation (Medical): Not on file    Lack of Transportation (Non-Medical):  Not on file   Physical Activity:     Days of Exercise per Week: Not on file    Minutes of Exercise per Session: Not on file   Stress:     Feeling of Stress : Not on file   Social Connections:     Frequency of Communication with Friends and Family: Not on file    Frequency of Social Gatherings with Friends and Family: Not on file    Attends Islam Services: Not on file    Active Member of [address] [place] [address] or Organizations: Not on file    Attends Club or Organization Meetings: Not on file    Marital Status: Not on file   Intimate Partner Violence:     Fear of Current or Ex-Partner: Not on file    Emotionally Abused: Not on file    Physically Abused: Not on file    Sexually Abused: Not on file   Housing Stability:     Unable to Pay for Housing in the Last Year: Not on file    Number of [place] in the Last Year: Not on file    Unstable Housing in the Last Year: Not on file     Family History   Problem Relation Age of Onset    Diabetes Mother         type 2 -insulin dep    Heart Attack Mother 76    Heart Disease Mother     Diabetes Father         type 2; insulin dep    Heart Disease Father         CAD-pacer-carotid endart    Stroke Father     Heart Attack Father 67    Cancer Sister         breast    No Known Problems Brother     No Known Problems Sister     Diabetes Maternal Grandmother      Allergies   Allergen Reactions    Tomato Swelling     NO FRESH TOMATO, CAN EAT KETCHUP AND ANY CANNED/COOKED TOMATO PRODUCTS    Caffeine Other (comments)     Dizziness and may pass out     Current Facility-Administered Medications   Medication Dose Route Frequency    alcohol 62% (NOZIN) nasal  1 Ampule  1 Ampule Topical Q12H    0.9% sodium chloride infusion  25 mL/hr IntraVENous CONTINUOUS    dextrose 5% - 0.45% NaCl with KCl 20 mEq/L infusion  25 mL/hr IntraVENous CONTINUOUS    sodium chloride (NS) flush 5-40 mL  5-40 mL IntraVENous Q8H    sodium chloride (NS) flush 5-40 mL  5-40 mL IntraVENous PRN    acetaminophen (TYLENOL) tablet 650 mg  650 mg Oral Q4H PRN    oxyCODONE-acetaminophen (PERCOCET) 5-325 mg per tablet 1 Tablet  1 Tablet Oral Q4H PRN    morphine injection 3-5 mg  3-5 mg IntraVENous Q1H PRN    naloxone (NARCAN) injection 0.4 mg  0.4 mg IntraVENous PRN    [START ON 11/19/2021] ceFAZolin (ANCEF) 2 g/20 mL in sterile water IV syringe  2 g IntraVENous Q8H    DOBUTamine (DOBUTREX) 500 mg/250 mL (2,000 mcg/mL) infusion  0-10 mcg/kg/min IntraVENous TITRATE    EPINEPHrine (ADRENALIN) 4 mg in 0.9% sodium chloride 250 mL infusion  1-10 mcg/min IntraVENous TITRATE    PHENYLephrine (MARTINE-SYNEPHRINE) 30 mg in 0.9% sodium chloride 250 mL infusion   mcg/min IntraVENous TITRATE    NOREPINephrine (LEVOPHED) 4 mg/250 mL (16 mcg/mL) in NS infusion  0.01-0.5 mcg/kg/min IntraVENous TITRATE    amiodarone (CORDARONE) tablet 200 mg  200 mg Oral BID    [START ON 11/19/2021] metoprolol tartrate (LOPRESSOR) tablet 25 mg  25 mg Oral BID    atorvastatin (LIPITOR) tablet 80 mg  80 mg Oral QHS    ondansetron (ZOFRAN) injection 4-8 mg  4-8 mg IntraVENous Q4H PRN    insulin regular (NOVOLIN R, HUMULIN R) 100 Units in 0.9% sodium chloride 100 mL infusion  1 Units/hr IntraVENous TITRATE    dextrose (D50W) injection syrg 12.5 g  25 mL IntraVENous PRN    [START ON 11/19/2021] aspirin chewable tablet 81 mg  81 mg Oral DAILY    magnesium sulfate 1 g/100 ml IVPB (premix or compounded)  1 g IntraVENous PRN    potassium chloride 10 mEq in 50 ml IVPB  10 mEq IntraVENous PRN    midazolam (VERSED) injection 1 mg  1 mg IntraVENous Q1H PRN    chlorhexidine (PERIDEX) 0.12 % mouthwash 10 mL  10 mL Oral BID    tuberculin injection 5 Units  5 Units IntraDERMal ONCE    famotidine (PF) (PEPCID) 20 mg in 0.9% sodium chloride 10 mL injection  20 mg IntraVENous Q12H    dexmedeTOMidine in 0.9 % NaCl (PRECEDEX) 400 mcg/100 mL (4 mcg/mL) infusion soln  0.1-1.5 mcg/kg/hr IntraVENous TITRATE    fentaNYL in normal saline (pf) 25 mcg/mL infusion  0-200 mcg/hr IntraVENous TITRATE    niCARdipine (CARDENE) 25 mg in 0.9% sodium chloride (MBP/ADV) 250 mL infusion  0-15 mg/hr IntraVENous TITRATE    nitroglycerin (Tridil) 200 mcg/ml infusion  0-100 mcg/min IntraVENous TITRATE    albumin human 5% (BUMINATE) solution 25 g  25 g IntraVENous ONCE     Objective:   Blood pressure 134/66, pulse 79, temperature 98.4 °F (36.9 °C), resp. rate 15, height 5' 10\" (1.778 m), weight 169 lb 12.8 oz (77 kg), SpO2 100 %. Intake/Output Summary (Last 24 hours) at 11/18/2021 1924  Last data filed at 11/18/2021 1900  Gross per 24 hour   Intake 7298 ml   Output 2675 ml   Net 4623 ml     Physical Exam:          Constitutional:  Sedated, orally intubated and mechanically ventilated.   EENMT:  Sclera clear, pupils equal, oral mucosa moist and orally intubated  Respiratory: clear  Cardiovascular:  RRR with no M,G,R;  Gastrointestinal:  soft; no bowel sounds present  Musculoskeletal:  warm with no cyanosis, no lower extremity edema. Sedated with no movements. SKIN:  no jaundice or ecchymosis   Neurologic:  sedated but no gross neuro deficits  Psychiatric:  sedated and unable to assess at this time    CXR:        LINES:  ETT, smalls, swan leena, arterial line, chest tubes times 2  in epigastric area without air leak. DRIPS:     Precedex Dose (mcg/kg/hr): 0.5 mcg/kg/hr     CI:  CI (l/min/m2): 1.7 l/min/m2  CO (l/min): 3.3 l/min    Ventilator Settings  Mode FIO2 Rate Tidal Volume Pressure PEEP   VC+  50 %    0.55 ml     8 cm H20    Peak airway pressure: 18 cm H2O   Minute ventilation: 8.57 l/min   ABG:   Recent Labs     11/18/21  1813 11/18/21  1726 11/18/21  1626   PHI 7.46* 7.35 7.37   PCO2I 34.3* 39.2 38.3   PO2I 400* 290* 269*   HCO3I 24.6 21.6* 22.2        Recent Labs     11/18/21  1813 11/18/21  1532 11/16/21  0900   WBC 6.3 10.4 5.3   HGB 8.6* 8.9* 13.7   HCT 26.1* 27.8* 41.4   PLT 90* 111* 157   INR 1.4 1.6 1.0     Recent Labs     11/18/21  1813 11/16/21  0900   * 139   K 4.4 4.3   * 107   CO2 25 30   * 149*   BUN 14 16   CREA 1.11 1.20   MG 2.4 2.0   CA 9.5 9.8   ALB  --  3.9     No results for input(s): LCAD, LAC in the last 72 hours. No results found for this or any previous visit. Assessment and Plan :  (Medical Decision Making)   Impression: 63 y.o. y/o male s/p CV surgery - CABG x 1, AVR    Encounter for weaning from Ventilator:  Post op ABG and CXR review. Continue with weaning per protocol. ,will rest overnight     Hypoxemia:   Once weaned from ventilator will work on IS, mobility and weaning O2. Bronchodilators per protocol. Atelectasis:   IS, mobility after extubated. S/P Cardiovascular surgery:  Monitor chest tube output, removal per surgery. More than 50% of the time documented was spent in face-to-face contact with the patient and in the care of the patient on the floor/unit where the patient is located. Thank you for this referral.  We appreciate the opportunity to participate in this patient's care. Will follow along with you.     Isael Falk MD

## 2021-11-19 NOTE — PROGRESS NOTES
Pt assisted to standing from chair position in bed with 2 RNs, pt stated \"I'm feeling dizzy\" then was momentarily unresponsive. Assisted back to recliner, placed back on monitor, NSR, VSS. Pt alert and oriented, when asked what just happened stated \"I guess I passed out\", neuro intact, Dr. Ryder Ranks informed.

## 2021-11-19 NOTE — PROGRESS NOTES
TIMEOUT performed prior to extubation on [facility] with RT, primary RN, and charge RN present on 11/19/2021 at 9:09 AM.     Per anesthesia team on admission, patient's intubation was Normal    ABG results as follows:    Lab Results   Component Value Date/Time    pH (POC) 7.50 (H) 11/19/2021 03:34 AM    pCO2 (POC) 31.5 (L) 11/19/2021 03:34 AM    pO2 (POC) 162 (H) 11/19/2021 03:34 AM    HCO3 (POC) 24.5 11/19/2021 03:34 AM    sO2 (POC) 99.6 (H) 11/19/2021 03:34 AM    Base deficit (POC) 3.7 11/18/2021 05:26 PM         The patient is hemodynamically stable and can demonstrate the following on a consistent basis:  follow commands , elevate head off the pillow, nods appropriately to questions. Dr. Jose Lowery notified of weaning parameters and order to extubate obtained. Patient extubated by (RT name) Mary Beth Yee to (Type of O2 Device) airvo at (Amount of of O2) 40L; 66% without complication.

## 2021-11-19 NOTE — PROGRESS NOTES
POD 1 Day Post-Op    Procedure:  Procedure(s):  CORONARY ARTERY BYPASS GRAFT (CABGX1), WITH AVR (REPLACEMENT), LYSIS OF MYOCARDIAL ADHESIONS  STERNOTOMY REDO  ESOPHAGEAL TRANS ECHOCARDIOGRAM  VEIN HARVEST, GREATER SAPHENOUS VEIN      Subjective:     Awake, follows commands, intubated      Objective:     Patient Vitals for the past 8 hrs:   Temp Pulse Resp SpO2 Weight   11/19/21 0730  84 15 99 %    11/19/21 0700 99.1 °F (37.3 °C) 78 12 99 %    11/19/21 0645 99.1 °F (37.3 °C) 78 12 99 %    11/19/21 0630 99.1 °F (37.3 °C) 78 12 99 %    11/19/21 0615 99.1 °F (37.3 °C) 80 13 98 %    11/19/21 0600 99.3 °F (37.4 °C) 80 12 100 %    11/19/21 0545 99.1 °F (37.3 °C) 80 12 100 %    11/19/21 0530 99.3 °F (37.4 °C) 81 12 100 %    11/19/21 0515 99.3 °F (37.4 °C) 80 12 100 %    11/19/21 0500 99.3 °F (37.4 °C) 82 12 100 %    11/19/21 0457     187 lb 2.7 oz (84.9 kg)   11/19/21 0445 99.1 °F (37.3 °C) 82 13 98 %    11/19/21 0430 99.1 °F (37.3 °C) 81 12 98 %    11/19/21 0415 99.1 °F (37.3 °C) 82 12 98 %    11/19/21 0400 98.9 °F (37.2 °C) 81 12 98 %    11/19/21 0345 98.9 °F (37.2 °C) 81 12 98 %    11/19/21 0340  81 12 99 %    11/19/21 0330 98.9 °F (37.2 °C) 82 15 99 %    11/19/21 0315 98.9 °F (37.2 °C) 82 15 99 %    11/19/21 0300 98.9 °F (37.2 °C) 81 15 99 %    11/19/21 0245 98.9 °F (37.2 °C) 81 15 99 %    11/19/21 0230 98.9 °F (37.2 °C) 82 15 99 %    11/19/21 0215 98.9 °F (37.2 °C) 81 15 99 %    11/19/21 0200 98.9 °F (37.2 °C) 81 15 99 %    11/19/21 0145 98.9 °F (37.2 °C) 81 15 99 %    11/19/21 0130 98.9 °F (37.2 °C) 81 15 99 %    11/19/21 0115 98.9 °F (37.2 °C) 81 15 99 %    11/19/21 0100 98.7 °F (37.1 °C) 81 15 99 %    11/19/21 0045 98.7 °F (37.1 °C) 80 15 99 %    11/19/21 0030 98.7 °F (37.1 °C) 84 15 99 %    11/19/21 0015 98.7 °F (37.1 °C) 82 15 99 %    11/19/21 0011  81 15 99 %    11/19/21 0000 99.1 °F (37.3 °C) 81 15 100 %    11/18/21 2345 98.9 °F (37.2 °C) 85 15 99 %      Temp (24hrs), Av.8 °F (37.1 °C), Min:98 °F (36.7 °C), Max:99.3 °F (37.4 °C)        Hemodynamics  PAP Systolic: 18 PAP  CO (l/min): 3.3 l/min CO  CI (l/min/m2): 1.7 l/min/m2 CI    No intake/output data recorded.    1901 -  0700  In: 8577.2 [I.V.:4974.7]  Out: 3480 [Urine:3250]    CT Drainage              total of all CT's    Heart:  regular rate and rhythm, S1, S2 normal, no murmur, click, rub or gallop  Lung:  clear to auscultation bilaterally  Neuro: Grossly non focal  Incisions: Clean, dry, and intact    Labs:  Recent Results (from the past 24 hour(s))   BLOOD GAS & LYTES, ARTERIAL    Collection Time: 21  8:41 AM   Result Value Ref Range    pH (POC) 7.41 7.35 - 7.45      pCO2 (POC) 38.6 35 - 45 MMHG    pO2 (POC) 347 (H) 75 - 100 MMHG    Sodium,  136 - 145 MMOL/L    Potassium, POC 4.3 3.5 - 5.1 MMOL/L    Calcium, ionized (POC) 1.22 1.12 - 1.32 mmol/L    Glucose,  (H) 65 - 100 MG/DL    Base excess (POC) 0.0 mmol/L    HCO3 (POC) 24.5 22 - 26 MMOL/L    CO2, POC 25 (H) 13 - 23 MMOL/L    O2  %    Sample source ARTERIAL      Performed by Librado Nicholson, POC Cannot be calculated >60 ml/min/1.73m2    GFRNA, POC Cannot be calculated >60 ml/min/1.73m2   POC HEPARIN ASSAY    Collection Time: 21  9:42 AM   Result Value Ref Range    Yates 132      Projected Heparin Conc 1.9 mg/kg    Baseline  sec    Heparin Bolus-Patient 17,645 units    Heparin Bolus-Pump 0 units    Heparin Bolus-Total 17,645 units   BLOOD GAS & LYTES, ARTERIAL    Collection Time: 21 10:54 AM   Result Value Ref Range    pH (POC) 7.38 7.35 - 7.45      pCO2 (POC) 39.2 35 - 45 MMHG    pO2 (POC) 260 (H) 75 - 100 MMHG    Sodium,  136 - 145 MMOL/L    Potassium, POC 4.3 3.5 - 5.1 MMOL/L    Calcium, ionized (POC) 1.16 1.12 - 1.32 mmol/L    Glucose,  (H) 65 - 100 MG/DL    Base deficit (POC) 1.9 mmol/L    HCO3 (POC) 23.1 22 - 26 MMOL/L    CO2, POC 24 (H) 13 - 23 MMOL/L    O2  %    Sample source ARTERIAL      Performed by ScribeStorm, POC Cannot be calculated >60 ml/min/1.73m2    GFRNA, POC Cannot be calculated >60 ml/min/1.73m2   BLOOD GAS & LYTES, ARTERIAL    Collection Time: 11/18/21 11:34 AM   Result Value Ref Range    pH (POC) 7.36 7.35 - 7.45      pCO2 (POC) 40.0 35 - 45 MMHG    pO2 (POC) 336 (H) 75 - 100 MMHG    Sodium,  136 - 145 MMOL/L    Potassium, POC 4.2 3.5 - 5.1 MMOL/L    Calcium, ionized (POC) 1.13 1.12 - 1.32 mmol/L    Glucose,  (H) 65 - 100 MG/DL    Base deficit (POC) 3.0 mmol/L    HCO3 (POC) 22.3 22 - 26 MMOL/L    CO2, POC 23 13 - 23 MMOL/L    O2  %    Sample source ARTERIAL      Performed by ScribeStorm, POC Cannot be calculated >60 ml/min/1.73m2    GFRNA, POC Cannot be calculated >60 ml/min/1.73m2   BLOOD GAS & LYTES, ARTERIAL    Collection Time: 11/18/21 11:59 AM   Result Value Ref Range    pH (POC) 7.35 7.35 - 7.45      pCO2 (POC) 41.1 35 - 45 MMHG    pO2 (POC) 450 (H) 75 - 100 MMHG    Sodium,  136 - 145 MMOL/L    Potassium, POC 4.3 3.5 - 5.1 MMOL/L    Calcium, ionized (POC) 1.06 (L) 1.12 - 1.32 mmol/L    Glucose,  (H) 65 - 100 MG/DL    Base deficit (POC) 2.6 mmol/L    HCO3 (POC) 22.8 22 - 26 MMOL/L    CO2, POC 24 (H) 13 - 23 MMOL/L    O2  %    Sample source ARTERIAL      Performed by ScribeStorm, POC Cannot be calculated >60 ml/min/1.73m2    GFRNA, POC Cannot be calculated >60 ml/min/1.73m2   BLOOD GAS & LYTES, ARTERIAL    Collection Time: 11/18/21 12:33 PM   Result Value Ref Range    pH (POC) 7.34 (L) 7.35 - 7.45      pCO2 (POC) 42.6 35 - 45 MMHG    pO2 (POC) 306 (H) 75 - 100 MMHG    Sodium,  (L) 136 - 145 MMOL/L    Potassium, POC 6.8 (HH) 3.5 - 5.1 MMOL/L    Calcium, ionized (POC) 1.05 (L) 1.12 - 1.32 mmol/L    Glucose,  (H) 65 - 100 MG/DL    Base deficit (POC) 2.9 mmol/L    HCO3 (POC) 22.7 22 - 26 MMOL/L    CO2, POC 24 (H) 13 - 23 MMOL/L    O2  %    Sample source ARTERIAL Performed by Gloria Yao, POC Cannot be calculated >60 ml/min/1.73m2    GFRNA, POC Cannot be calculated >60 ml/min/1.73m2   POC ACTIVATED CLOTTING TIME    Collection Time: 11/18/21 12:34 PM   Result Value Ref Range    ACT Average 538 sec   BLOOD GAS & LYTES, ARTERIAL    Collection Time: 11/18/21  1:00 PM   Result Value Ref Range    pH (POC) 7.38 7.35 - 7.45      pCO2 (POC) 39.4 35 - 45 MMHG    pO2 (POC) 261 (H) 75 - 100 MMHG    Sodium,  136 - 145 MMOL/L    Potassium, POC 4.9 3.5 - 5.1 MMOL/L    Calcium, ionized (POC) 1.08 (L) 1.12 - 1.32 mmol/L    Glucose,  (H) 65 - 100 MG/DL    Base deficit (POC) 1.5 mmol/L    HCO3 (POC) 23.5 22 - 26 MMOL/L    CO2, POC 24 (H) 13 - 23 MMOL/L    O2  %    Sample source ARTERIAL      Performed by Gloria Yao, POC Cannot be calculated >60 ml/min/1.73m2    GFRNA, POC Cannot be calculated >60 ml/min/1.73m2   BLOOD GAS & LYTES, ARTERIAL    Collection Time: 11/18/21  1:31 PM   Result Value Ref Range    pH (POC) 7.30 (L) 7.35 - 7.45      pCO2 (POC) 43.8 35 - 45 MMHG    pO2 (POC) 251 (H) 75 - 100 MMHG    Sodium,  136 - 145 MMOL/L    Potassium, POC 4.8 3.5 - 5.1 MMOL/L    Calcium, ionized (POC) 1.05 (L) 1.12 - 1.32 mmol/L    Glucose,  (H) 65 - 100 MG/DL    Base deficit (POC) 4.7 mmol/L    HCO3 (POC) 21.5 (L) 22 - 26 MMOL/L    CO2, POC 23 13 - 23 MMOL/L    O2  %    Sample source ARTERIAL      Performed by Gloria Yao, POC Cannot be calculated >60 ml/min/1.73m2    GFRNA, POC Cannot be calculated >60 ml/min/1.73m2   POC ACTIVATED CLOTTING TIME    Collection Time: 11/18/21  1:33 PM   Result Value Ref Range    ACT Average 748 sec   TEG GLOBAL HEMOSTASIS    Collection Time: 11/18/21  2:10 PM   Result Value Ref Range    Citrated Kaolin R-Time 6.3 4.6 - 9.1 MINS    Citrated Kaolin K-Time 2.3 (H) 0.8 - 2.1 MINS    Citrated Kaolin Angle 66.5 63 - 78 deg    Citrated Kaolin Maximum Amplitude 44.6 (L) 52 - 69 mm    Citrated RapidTEG Maximum Amplitude 42.5 (L) 52 - 70 mm    Citrated Kaolin/Heparinase R-Time 5.9 4.3 - 8.3 MINS    Citrated Functional Fibrinogen Maximum Amplitude 9.5 (L) 15 - 32 mm    Functional Fibrinogen Level 173.4 (L) 278 - 581 mg/dL   BLOOD GAS & LYTES, ARTERIAL    Collection Time: 11/18/21  2:13 PM   Result Value Ref Range    pH (POC) 7.35 7.35 - 7.45      pCO2 (POC) 41.9 35 - 45 MMHG    pO2 (POC) 304 (H) 75 - 100 MMHG    Sodium,  136 - 145 MMOL/L    Potassium, POC 4.4 3.5 - 5.1 MMOL/L    Calcium, ionized (POC) 1.33 (H) 1.12 - 1.32 mmol/L    Glucose,  (H) 65 - 100 MG/DL    Base deficit (POC) 2.4 mmol/L    HCO3 (POC) 23.1 22 - 26 MMOL/L    CO2, POC 24 (H) 13 - 23 MMOL/L    O2  %    Sample source ARTERIAL      Performed by PersistIQi, POC Cannot be calculated >60 ml/min/1.73m2    GFRNA, POC Cannot be calculated >60 ml/min/1.73m2   POC ACTIVATED CLOTTING TIME    Collection Time: 11/18/21  2:18 PM   Result Value Ref Range    ACT Average 598 sec   POC HEPARIN-PROTAMINE TOLERANCE    Collection Time: 11/18/21  2:31 PM   Result Value Ref Range    ACT Average 588 sec    Heparin Required-Patient 0      Heparin Required-Total  0 units    Protamine Dose-Pump 30 mg    Heparin Test Concentrate 2.0 mg/kg    Protamine Dose-Total 203 mg   CRYOPRECIPITATE, ALLOCATE    Collection Time: 11/18/21  2:45 PM   Result Value Ref Range    Unit number [id number]     Blood component type CRYO,5U Thaw     Unit division 00     Status of unit ISSUED     Unit number [id number]     Blood component type CRYO,5U Thaw     Unit division 00     Status of unit ISSUED    PLATELETS, ALLOCATE    Collection Time: 11/18/21  3:00 PM   Result Value Ref Range    Unit number [id number]     Blood component type PLPH,LR1     Unit division 00     Status of unit ISSUED    POC ACTIVATED CLOTTING TIME    Collection Time: 11/18/21  3:10 PM   Result Value Ref Range    ACT Average 111 sec   BLOOD GAS & LYTES, ARTERIAL Collection Time: 11/18/21  3:21 PM   Result Value Ref Range    pH (POC) 7.36 7.35 - 7.45      pCO2 (POC) 40.0 35 - 45 MMHG    pO2 (POC) 303 (H) 75 - 100 MMHG    Sodium,  136 - 145 MMOL/L    Potassium, POC 4.0 3.5 - 5.1 MMOL/L    Calcium, ionized (POC) 1.13 1.12 - 1.32 mmol/L    Glucose,  (H) 65 - 100 MG/DL    Base deficit (POC) 2.8 mmol/L    HCO3 (POC) 22.5 22 - 26 MMOL/L    CO2, POC 23 13 - 23 MMOL/L    O2  %    Sample source ARTERIAL      Performed by Rajesh Tadeo, POC Cannot be calculated >60 ml/min/1.73m2    GFRNA, POC Cannot be calculated >60 ml/min/1.73m2   CBC W/O DIFF    Collection Time: 11/18/21  3:32 PM   Result Value Ref Range    WBC 10.4 4.3 - 11.1 K/uL    RBC 3.10 (L) 4.23 - 5.6 M/uL    HGB 8.9 (L) 13.6 - 17.2 g/dL    HCT 27.8 (L) 41.1 - 50.3 %    MCV 89.7 79.6 - 97.8 FL    MCH 28.7 26.1 - 32.9 PG    MCHC 32.0 31.4 - 35.0 g/dL    RDW 13.1 11.9 - 14.6 %    PLATELET 112 (L) 890 - 450 K/uL    MPV 10.0 9.4 - 12.3 FL    ABSOLUTE NRBC 0.00 0.0 - 0.2 K/uL   PROTHROMBIN TIME + INR    Collection Time: 11/18/21  3:32 PM   Result Value Ref Range    Prothrombin time 19.4 (H) 12.6 - 14.5 sec    INR 1.6     PTT    Collection Time: 11/18/21  3:32 PM   Result Value Ref Range    aPTT 56.2 (H) 24.1 - 35.1 SEC   FIBRINOGEN    Collection Time: 11/18/21  3:32 PM   Result Value Ref Range    Fibrinogen 186 (L) 190 - 501 mg/dL   TEG GLOBAL HEMOSTASIS    Collection Time: 11/18/21  3:43 PM   Result Value Ref Range    Citrated Kaolin R-Time 9.3 (H) 4.6 - 9.1 MINS    Citrated Kaolin K-Time 2.2 (H) 0.8 - 2.1 MINS    Citrated Kaolin Angle 65.9 63 - 78 deg    Citrated Kaolin Maximum Amplitude 50.7 (L) 52 - 69 mm    Citrated RapidTEG Maximum Amplitude 49.8 (L) 52 - 70 mm    Citrated Kaolin/Heparinase R-Time 5.9 4.3 - 8.3 MINS    Citrated Functional Fibrinogen Maximum Amplitude 13.1 (L) 15 - 32 mm    Functional Fibrinogen Level 239.1 (L) 278 - 581 mg/dL   BLOOD GAS & LYTES, ARTERIAL    Collection Time: 11/18/21  4:26 PM   Result Value Ref Range    pH (POC) 7.37 7.35 - 7.45      pCO2 (POC) 38.3 35 - 45 MMHG    pO2 (POC) 269 (H) 75 - 100 MMHG    Sodium,  136 - 145 MMOL/L    Potassium, POC 4.2 3.5 - 5.1 MMOL/L    Calcium, ionized (POC) 1.39 (H) 1.12 - 1.32 mmol/L    Glucose,  (H) 65 - 100 MG/DL    Base deficit (POC) 2.8 mmol/L    HCO3 (POC) 22.2 22 - 26 MMOL/L    CO2, POC 23 13 - 23 MMOL/L    O2  %    Sample source ARTERIAL      Performed by Rhys Castellanos, POC Cannot be calculated >60 ml/min/1.73m2    GFRNA, POC Cannot be calculated >60 ml/min/1.73m2   CRYOPRECIPITATE, ALLOCATE    Collection Time: 11/18/21  4:45 PM   Result Value Ref Range    Unit number [id number]     Blood component type CRYO,5U Thaw     Unit division 00     Status of unit ISSUED     Unit number [id number]     Blood component type CRYO,Pld Thaw     Unit division 00     Status of unit ISSUED    PLASMA, ALLOCATE    Collection Time: 11/18/21  4:45 PM   Result Value Ref Range    Unit number [id number]     Blood component type FP 24h, Thaw     Unit division 00     Status of unit ISSUED     Unit number [id number]     Blood component type FP 24h, Thaw     Unit division 00     Status of unit ISSUED    PLATELETS, ALLOCATE    Collection Time: 11/18/21  4:45 PM   Result Value Ref Range    Unit number [id number]     Blood component type PLPH,LR1     Unit division 00     Status of unit ISSUED    POC HEPARIN-PROTAMINE TOLERANCE    Collection Time: 11/18/21  5:24 PM   Result Value Ref Range    ACT Average 136 sec    Heparin Required-Patient 15,696      Heparin Required-Total  18,416 units    Protamine Dose-Pump 0 mg    Heparin Test Concentrate 0.0 mg/kg    Protamine Dose-Total 0 mg   BLOOD GAS & LYTES, ARTERIAL    Collection Time: 11/18/21  5:26 PM   Result Value Ref Range    pH (POC) 7.35 7.35 - 7.45      pCO2 (POC) 39.2 35 - 45 MMHG    pO2 (POC) 290 (H) 75 - 100 MMHG    Sodium,  136 - 145 MMOL/L Potassium, POC 4.0 3.5 - 5.1 MMOL/L    Calcium, ionized (POC) 0.96 (L) 1.12 - 1.32 mmol/L    Glucose,  (H) 65 - 100 MG/DL    Base deficit (POC) 3.7 mmol/L    HCO3 (POC) 21.6 (L) 22 - 26 MMOL/L    CO2, POC 22 13 - 23 MMOL/L    O2  %    Sample source ARTERIAL      Performed by Marichuy Romero, POC Cannot be calculated >60 ml/min/1.73m2    GFRNA, POC Cannot be calculated >60 ml/min/1.73m2   GLUCOSE, POC    Collection Time: 11/18/21  6:11 PM   Result Value Ref Range    Glucose (POC) 129 (H) 65 - 100 mg/dL    Performed by [facility]    METABOLIC PANEL, BASIC    Collection Time: 11/18/21  6:13 PM   Result Value Ref Range    Sodium 146 (H) 138 - 145 mmol/L    Potassium 4.4 3.5 - 5.1 mmol/L    Chloride 113 (H) 98 - 107 mmol/L    CO2 25 21 - 32 mmol/L    Anion gap 8 7 - 16 mmol/L    Glucose 120 (H) 65 - 100 mg/dL    BUN 14 8 - 23 MG/DL    Creatinine 1.11 0.8 - 1.5 MG/DL    GFR est AA >60 >60 ml/min/1.73m2    GFR est non-AA >60 >60 ml/min/1.73m2    Calcium 9.5 8.3 - 10.4 MG/DL   MAGNESIUM    Collection Time: 11/18/21  6:13 PM   Result Value Ref Range    Magnesium 2.4 1.8 - 2.4 mg/dL   CBC W/O DIFF    Collection Time: 11/18/21  6:13 PM   Result Value Ref Range    WBC 6.3 4.3 - 11.1 K/uL    RBC 2.96 (L) 4.23 - 5.6 M/uL    HGB 8.6 (L) 13.6 - 17.2 g/dL    HCT 26.1 (L) 41.1 - 50.3 %    MCV 88.2 79.6 - 97.8 FL    MCH 29.1 26.1 - 32.9 PG    MCHC 33.0 31.4 - 35.0 g/dL    RDW 13.4 11.9 - 14.6 %    PLATELET 90 (L) 875 - 450 K/uL    MPV 9.9 9.4 - 12.3 FL    ABSOLUTE NRBC 0.00 0.0 - 0.2 K/uL   PTT    Collection Time: 11/18/21  6:13 PM   Result Value Ref Range    aPTT 41.2 (H) 24.1 - 35.1 SEC   PROTHROMBIN TIME + INR    Collection Time: 11/18/21  6:13 PM   Result Value Ref Range    Prothrombin time 17.0 (H) 12.6 - 14.5 sec    INR 1.4     FIBRINOGEN    Collection Time: 11/18/21  6:13 PM   Result Value Ref Range    Fibrinogen 245 190 - 501 mg/dL   BLOOD GAS, ARTERIAL POC    Collection Time: 11/18/21  6:13 PM Result Value Ref Range    Device: ADULT VENT      FIO2 (POC) 80 %    pH (POC) 7.46 (H) 7.35 - 7.45      pCO2 (POC) 34.3 (L) 35 - 45 MMHG    pO2 (POC) 400 (H) 75 - 100 MMHG    HCO3 (POC) 24.6 22 - 26 MMOL/L    sO2 (POC) 100.0 (H) 95 - 98 %    Base excess (POC) 0.9 mmol/L    Mode ASSIST CONTROL      Tidal volume 550 ml    PEEP/CPAP (POC) 8 cmH2O    Mean Airway Pressure 11 cmH2O    Allens test (POC) NOT APPLICABLE      Site DRAWN FROM ARTERIAL LINE      Specimen type (POC) ARTERIAL      Performed by [employer]     Respiratory comment: [id number]    EKG, 12 LEAD, INITIAL    Collection Time: 11/18/21  6:34 PM   Result Value Ref Range    Ventricular Rate 80 BPM    Atrial Rate 80 BPM    P-R Interval 156 ms    QRS Duration 104 ms    Q-T Interval 424 ms    QTC Calculation (Bezet) 489 ms    Calculated P Axis 41 degrees    Calculated R Axis 53 degrees    Calculated T Axis -109 degrees    Diagnosis       Normal sinus rhythm  Nonspecific T wave abnormality  Prolonged QT  Abnormal ECG  No previous ECGs available  Confirmed by Mere Connolly ([id number]) on 11/18/2021 9:53:16 PM     GLUCOSE, POC    Collection Time: 11/18/21  7:42 PM   Result Value Ref Range    Glucose (POC) 98 65 - 100 mg/dL    Performed by [address] [place] [address] 200, POC    Collection Time: 11/18/21  9:07 PM   Result Value Ref Range    Glucose (POC) 91 65 - 100 mg/dL    Performed by [address] [place] [address] 200, POC    Collection Time: 11/18/21 10:04 PM   Result Value Ref Range    Glucose (POC) 98 65 - 100 mg/dL    Performed by Elida    HGB & HCT    Collection Time: 11/18/21 10:05 PM   Result Value Ref Range    HGB 8.1 (L) 13.6 - 17.2 g/dL    HCT 24.6 (L) 41.1 - 50.3 %   POTASSIUM    Collection Time: 11/18/21 10:05 PM   Result Value Ref Range    Potassium 4.1 3.5 - 5.1 mmol/L   MAGNESIUM    Collection Time: 11/18/21 10:05 PM   Result Value Ref Range    Magnesium 2.2 1.8 - 2.4 mg/dL   GLUCOSE, POC    Collection Time: 11/18/21 11:21 PM   Result Value Ref Range    Glucose (POC) 99 65 - 100 mg/dL    Performed by Elida    GLUCOSE, POC    Collection Time: 11/19/21  1:10 AM   Result Value Ref Range    Glucose (POC) 114 (H) 65 - 100 mg/dL    Performed by [facility] [address], POC    Collection Time: 11/19/21  3:10 AM   Result Value Ref Range    Glucose (POC) 105 (H) 65 - 100 mg/dL    Performed by [facility], BASIC    Collection Time: 11/19/21  3:11 AM   Result Value Ref Range    Sodium 147 (H) 136 - 145 mmol/L    Potassium 4.0 3.5 - 5.1 mmol/L    Chloride 116 (H) 98 - 107 mmol/L    CO2 25 21 - 32 mmol/L    Anion gap 6 (L) 7 - 16 mmol/L    Glucose 109 (H) 65 - 100 mg/dL    BUN 15 8 - 23 MG/DL    Creatinine 1.03 0.8 - 1.5 MG/DL    GFR est AA >60 >60 ml/min/1.73m2    GFR est non-AA >60 >60 ml/min/1.73m2    Calcium 8.9 8.3 - 10.4 MG/DL   MAGNESIUM    Collection Time: 11/19/21  3:11 AM   Result Value Ref Range    Magnesium 2.1 1.8 - 2.4 mg/dL   CBC W/O DIFF    Collection Time: 11/19/21  3:11 AM   Result Value Ref Range    WBC 4.6 4.3 - 11.1 K/uL    RBC 3.25 (L) 4.23 - 5.6 M/uL    HGB 9.4 (L) 13.6 - 17.2 g/dL    HCT 28.4 (L) 41.1 - 50.3 %    MCV 87.4 79.6 - 97.8 FL    MCH 28.9 26.1 - 32.9 PG    MCHC 33.1 31.4 - 35.0 g/dL    RDW 13.7 11.9 - 14.6 %    PLATELET 77 (L) 649 - 450 K/uL    MPV 10.3 9.4 - 12.3 FL    ABSOLUTE NRBC 0.00 0.0 - 0.2 K/uL   BLOOD GAS, ARTERIAL POC    Collection Time: 11/19/21  3:34 AM   Result Value Ref Range    Device: ADULT VENT      FIO2 (POC) 50 %    pH (POC) 7.50 (H) 7.35 - 7.45      pCO2 (POC) 31.5 (L) 35 - 45 MMHG    pO2 (POC) 162 (H) 75 - 100 MMHG    HCO3 (POC) 24.5 22 - 26 MMOL/L    sO2 (POC) 99.6 (H) 95 - 98 %    Base excess (POC) 1.5 mmol/L    Mode ASSIST CONTROL      Tidal volume 550 ml    PEEP/CPAP (POC) 8 cmH2O    Allens test (POC) NOT APPLICABLE      Total resp.  rate 15      Site DRAWN FROM ARTERIAL LINE      Specimen type (POC) ARTERIAL      Performed by Mireya Respiratory comment: [id number]    POC VENOUS BLOOD GAS    Collection Time: 11/19/21  3:42 AM   Result Value Ref Range    Device: ADULT VENT      FIO2 (POC) 40 %    pH, venous (POC) 7.42 7.32 - 7.42      pCO2, venous (POC) 37.8 (L) 41 - 51 MMHG    pO2, venous (POC) 26 (LL) mmHg    HCO3, venous (POC) 24.4 23 - 28 MMOL/L    sO2, venous (POC) 48.7 (L) 65 - 88 %    Base deficit, venous (POC) 0.1 mmol/L    Mode ASSIST CONTROL      Site CENTRAL LINE      Specimen type (POC) VENOUS BLOOD      Performed by Mireya     Critical value read back [facility]    GLUCOSE, POC    Collection Time: 11/19/21  5:08 AM   Result Value Ref Range    Glucose (POC) 102 (H) 65 - 100 mg/dL    Performed by Elida    EKG, 12 LEAD, SUBSEQUENT    Collection Time: 11/19/21  7:18 AM   Result Value Ref Range    Ventricular Rate 82 BPM    Atrial Rate 82 BPM    P-R Interval 140 ms    QRS Duration 100 ms    Q-T Interval 432 ms    QTC Calculation (Bezet) 504 ms    Calculated P Axis 24 degrees    Calculated R Axis 57 degrees    Calculated T Axis -34 degrees    Diagnosis       Sinus rhythm with occasional Premature ventricular complexes and Fusion   complexes  Nonspecific T wave abnormality  Prolonged QT  Abnormal ECG  When compared with ECG of 18-NOV-2021 18:34,  Fusion complexes are now Present  Premature ventricular complexes are now Present         Assessment:     Active Problems:    S/P CABG x 1 (2/28/2017)      CAD (coronary artery disease) (11/18/2021)      Aortic stenosis, severe (11/18/2021)      Aortic valve stenosis (11/18/2021)      Atherosclerosis of native coronary artery of native heart with unstable angina pectoris ([facility] [address] (11/18/2021)      Encounter for weaning from ventilator ([address] (11/18/2021)      S/P AVR (11/18/2021)        Plan/Recommendations/Medical Decision Making:     Stable, extubate    See orders

## 2021-11-19 NOTE — PROGRESS NOTES
Reviewed notes for new spiritual concerns    Per notes:    CABG X 1  Vent  Pt has 2 sisters and one brother  Spouse is Neftaly Bowen    Will follow closely

## 2021-11-19 NOTE — PROGRESS NOTES
Ventilator check complete; patient has a #8. 0 ET tube secured at the 24 at lips. .  Breath sounds are diminished. Trachea is midline, Negative for subcutaneous air, and chest excursion is symmetric. Patient is also Negative for cyanosis and is Negative for pitting edema. All alarms are set and audible. Resuscitation bag is at the head of the bed. Ventilator Settings  Mode FIO2 Rate Tidal Volume Pressure PEEP I:E Ratio   Pressure support  40 %    0.55 ml  10 cm H2O  8 cm H20         Peak airway pressure: 18 cm H2O   Minute ventilation: 7.81 l/min     ABG: No results for input(s): PH, PCO2, PO2, HCO3 in the last 72 hours.       Jeanette Pedraza, RT

## 2021-11-19 NOTE — PROGRESS NOTES
Pt's left radial art line removed at this time. Manual pressure held until hemostasis achieved. No bleeding or hematoma at site. Pressure dressing to site. Will monitor. Pt's [place] removed at this time. Pt given instructions for [place] pull and Pt v/u. [place] removed without difficulty, no ectopy seen on monitor. Line hub capped. Pt tolerated procedure well. No acute distress noted. VSS throughout.

## 2021-11-19 NOTE — PROGRESS NOTES
Pulmonary CV progress Note: 11/19/2021        Rolando Chowdary                                                     Admission Date: 11/18/2021     The patient's chart is reviewed and the patient is discussed with the staff. Background: 63 y.o. y/o male  has a past medical history of Allergic rhinitis, cause unspecified (3/7/2014), Atrial flutter ([facility] CAD, multiple vessel (2/28/2017), COVID-19 vaccine series completed, Current use of long term anticoagulation, Depressed left ventricular ejection fraction (2/27/2017), Esophageal reflux (3/7/2014), GERD (gastroesophageal reflux disease), History of MI (myocardial infarction) (2017), History of squamous cell carcinoma, History of testicular cancer, CABG (03/2017), Hypercholesteremia, ICD (implantable cardioverter-defibrillator) in place, Nonrheumatic aortic (valve) stenosis, Retinopathy of both eyes, Systolic CHF ([facility] and Type 2 diabetes mellitus ([facility] [facility] <principal problem not specified> 1 Day Post-Op    Subjective:     S/p CABG x1 ,AVR  Rested on vent overnight   Still on low dose Dobutamine    Objective:   Blood pressure 103/60, pulse 78, temperature 99.1 °F (37.3 °C), resp. rate 12, height 5' 10\" (1.778 m), weight 187 lb 2.7 oz (84.9 kg), SpO2 99 %. Intake/Output Summary (Last 24 hours) at 11/19/2021 0640  Last data filed at 11/19/2021 0600  Gross per 24 hour   Intake 8577.23 ml   Output 3480 ml   Net 5097.23 ml     Physical Exam:          Constitutional:  Awake, calm srill on vent   EENMT:  Sclera clear, pupils equal  Respiratory: clear  Cardiovascular:  RRR with no M,G,R;  Gastrointestinal:  soft; positive bowel sounds present  Musculoskeletal:  warm with no cyanosis, no lower extremity edema. SKIN:  no jaundice or ecchymosis   Neurologic:  moving all extremities,   Psychiatric:  calm    CXR:          LINES:  smalls, swan leena, arterial line, chest tubes times x in epigastric area without air leak.     DRIPS:   Epinephrine Dose (mcg/min): 0 mcg/min  Phenylephrine Dose (mcg/min): 0 mcg/min  Precedex Dose (mcg/kg/hr): 0.5 mcg/kg/hr     CI:  CI (l/min/m2): 2 l/min/m2  CO (l/min): 3.9 l/min    Ventilator Settings  Mode FIO2 Rate Tidal Volume Pressure PEEP   VC+  40 %    0.55 ml     8 cm H20    Peak airway pressure: 19 cm H2O   Minute ventilation: 8.46 l/min   Recent Labs     11/19/21 0334 11/18/21 1813 11/18/21  1726   PHI 7.50* 7.46* 7.35   PCO2I 31.5* 34.3* 39.2   PO2I 162* 400* 290*   HCO3I 24.5 24.6 21.6*      Recent Labs     11/19/21 0311 11/18/21 2205 11/18/21 1813 11/18/21  1532 11/16/21  0900 11/16/21  0900   WBC 4.6  --  6.3 10.4  --  5.3   HGB 9.4* 8.1* 8.6* 8.9*   < > 13.7   HCT 28.4* 24.6* 26.1* 27.8*   < > 41.4   PLT 77*  --  90* 111*  --  157   INR  --   --  1.4 1.6  --  1.0    < > = values in this interval not displayed. Recent Labs     11/19/21 0311 11/18/21 2205 11/18/21 1813 11/16/21  0900 11/16/21  0900   *  --  146*  --  139   K 4.0 4.1 4.4   < > 4.3   *  --  113*  --  107   CO2 25  --  25  --  30   *  --  120*  --  149*   BUN 15  --  14  --  16   CREA 1.03  --  1.11  --  1.20   MG 2.1 2.2 2.4   < > 2.0   CA 8.9  --  9.5  --  9.8   ALB  --   --   --   --  3.9    < > = values in this interval not displayed. No results for input(s): LCAD, LAC in the last 72 hours. No results found for this or any previous visit.      Assessment and Plan :  (Medical Decision Making)   Impression: 63 y.o. y/o male s/p CV surgery for <principal problem not specified>; 1 Day Post-Op    Active Problems:    S/P CABG x 1 (2/28/2017)  -per surgery        Aortic stenosis, severe (11/18/2021)  --s/pAVR      Aortic valve stenosis (11/18/2021)   -s/p AVR      Atherosclerosis of native coronary artery of native heart with unstable angina pectoris ([address] (11/18/2021)          Encounter for weaning from ventilator ([address] (11/18/2021)  - still on vent overnight but can wean to extubate      S/P AVR (11/18/2021)    -also has small R apical PTX- vanda monitor      More than 50% of the time documented was spent in face-to-face contact with the patient and in the care of the patient on the floor/unit where the patient is located.      Omayra Napoles MD

## 2021-11-19 NOTE — PROGRESS NOTES
Respiratory Mechanics completed and are as follows:  Weaning Parameters  Spontaneous Breathing Trial Complete: Yes  Resp Rate Observed: 17  Ve: 9.4  VT: 567  RSBI: 30  NIF: -40 (cm)  Frances Agitation Sedation Scale (RASS): Alert and calm  Patient extubated to 40L/40% HFNC. Patient is able to communicate and is negative for stridor. Breath sounds are diminished. No complications with extubation.      Bert Peters, RT

## 2021-11-19 NOTE — PROGRESS NOTES
Skin assessment completed with Baljit Gillespie. Pt has no pressure ulcers present on admission. Buttocks are red but blanchable. No other skin breakdown noticed.

## 2021-11-19 NOTE — PROGRESS NOTES
Ventilator check complete; patient has a #8. 0 ET tube secured at the 24 at the lip. Patient is sedated. Patient is not able to follow commands. Breath sounds are diminished. Trachea is midline, Negative for subcutaneous air, and chest excursion is symmetric. Patient is also Negative for cyanosis and is Negative for pitting edema. All alarms are set and audible. Resuscitation bag is at the head of the bed.       Ventilator Settings  Mode FIO2 Rate Tidal Volume Pressure PEEP I:E Ratio   VC+  50 %   15 500 ml    8 cm H20         Peak airway pressure: 19 cm H2O   Minute ventilation: 8.46 l/min       Gerhardt Moores, RT

## 2021-11-20 LAB
ANION GAP SERPL CALC-SCNC: 10 MMOL/L (ref 7–16)
BUN SERPL-MCNC: 27 MG/DL (ref 8–23)
CALCIUM SERPL-MCNC: 8.5 MG/DL (ref 8.3–10.4)
CHLORIDE SERPL-SCNC: 109 MMOL/L (ref 98–107)
CO2 SERPL-SCNC: 23 MMOL/L (ref 21–32)
CREAT SERPL-MCNC: 1.25 MG/DL (ref 0.8–1.5)
ERYTHROCYTE [DISTWIDTH] IN BLOOD BY AUTOMATED COUNT: 14.2 % (ref 11.9–14.6)
GLUCOSE BLD STRIP.AUTO-MCNC: 185 MG/DL (ref 65–100)
GLUCOSE BLD STRIP.AUTO-MCNC: 233 MG/DL (ref 65–100)
GLUCOSE BLD STRIP.AUTO-MCNC: 265 MG/DL (ref 65–100)
GLUCOSE SERPL-MCNC: 205 MG/DL (ref 65–100)
HCT VFR BLD AUTO: 29.1 % (ref 41.1–50.3)
HGB BLD-MCNC: 9 G/DL (ref 13.6–17.2)
MAGNESIUM SERPL-MCNC: 2.2 MG/DL (ref 1.8–2.4)
MCH RBC QN AUTO: 28 PG (ref 26.1–32.9)
MCHC RBC AUTO-ENTMCNC: 30.9 G/DL (ref 31.4–35)
MCV RBC AUTO: 90.7 FL (ref 79.6–97.8)
MM INDURATION POC: 0 MM (ref 0–5)
NRBC # BLD: 0 K/UL (ref 0–0.2)
PLATELET # BLD AUTO: 74 K/UL (ref 150–450)
PMV BLD AUTO: 11.1 FL (ref 9.4–12.3)
POTASSIUM SERPL-SCNC: 4.4 MMOL/L (ref 3.5–5.1)
PPD POC: NEGATIVE NEGATIVE
RBC # BLD AUTO: 3.21 M/UL (ref 4.23–5.6)
SERVICE CMNT-IMP: ABNORMAL
SODIUM SERPL-SCNC: 142 MMOL/L (ref 138–145)
WBC # BLD AUTO: 8.8 K/UL (ref 4.3–11.1)

## 2021-11-20 PROCEDURE — 85027 COMPLETE CBC AUTOMATED: CPT

## 2021-11-20 PROCEDURE — 83735 ASSAY OF MAGNESIUM: CPT

## 2021-11-20 PROCEDURE — 74011250636 HC RX REV CODE- 250/636: Performed by: PHYSICIAN ASSISTANT

## 2021-11-20 PROCEDURE — 97530 THERAPEUTIC ACTIVITIES: CPT

## 2021-11-20 PROCEDURE — 74011250636 HC RX REV CODE- 250/636: Performed by: THORACIC SURGERY (CARDIOTHORACIC VASCULAR SURGERY)

## 2021-11-20 PROCEDURE — 65660000004 HC RM CVT STEPDOWN

## 2021-11-20 PROCEDURE — 2709999900 HC NON-CHARGEABLE SUPPLY

## 2021-11-20 PROCEDURE — 74011250637 HC RX REV CODE- 250/637: Performed by: THORACIC SURGERY (CARDIOTHORACIC VASCULAR SURGERY)

## 2021-11-20 PROCEDURE — 82962 GLUCOSE BLOOD TEST: CPT

## 2021-11-20 PROCEDURE — 97161 PT EVAL LOW COMPLEX 20 MIN: CPT

## 2021-11-20 PROCEDURE — 74011000250 HC RX REV CODE- 250: Performed by: PHYSICIAN ASSISTANT

## 2021-11-20 PROCEDURE — 51798 US URINE CAPACITY MEASURE: CPT

## 2021-11-20 PROCEDURE — 74011636637 HC RX REV CODE- 636/637: Performed by: THORACIC SURGERY (CARDIOTHORACIC VASCULAR SURGERY)

## 2021-11-20 PROCEDURE — 80048 BASIC METABOLIC PNL TOTAL CA: CPT

## 2021-11-20 PROCEDURE — 99232 SBSQ HOSP IP/OBS MODERATE 35: CPT | Performed by: INTERNAL MEDICINE

## 2021-11-20 PROCEDURE — 74011250637 HC RX REV CODE- 250/637: Performed by: PHYSICIAN ASSISTANT

## 2021-11-20 RX ORDER — MUPIROCIN 20 MG/G
OINTMENT TOPICAL 2 TIMES DAILY
Status: DISCONTINUED | OUTPATIENT
Start: 2021-11-20 | End: 2021-11-20

## 2021-11-20 RX ORDER — LANOLIN ALCOHOL/MO/W.PET/CERES
3 CREAM (GRAM) TOPICAL
Status: DISCONTINUED | OUTPATIENT
Start: 2021-11-20 | End: 2021-11-23 | Stop reason: HOSPADM

## 2021-11-20 RX ORDER — LANOLIN ALCOHOL/MO/W.PET/CERES
400 CREAM (GRAM) TOPICAL
Status: DISCONTINUED | OUTPATIENT
Start: 2021-11-20 | End: 2021-11-23 | Stop reason: HOSPADM

## 2021-11-20 RX ORDER — INSULIN LISPRO 100 [IU]/ML
INJECTION, SOLUTION INTRAVENOUS; SUBCUTANEOUS
Status: DISCONTINUED | OUTPATIENT
Start: 2021-11-20 | End: 2021-11-23 | Stop reason: HOSPADM

## 2021-11-20 RX ORDER — SODIUM CHLORIDE 0.9 % (FLUSH) 0.9 %
5-40 SYRINGE (ML) INJECTION AS NEEDED
Status: DISCONTINUED | OUTPATIENT
Start: 2021-11-20 | End: 2021-11-23 | Stop reason: HOSPADM

## 2021-11-20 RX ORDER — MAG HYDROX/ALUMINUM HYD/SIMETH 200-200-20
30 SUSPENSION, ORAL (FINAL DOSE FORM) ORAL
Status: DISCONTINUED | OUTPATIENT
Start: 2021-11-20 | End: 2021-11-23 | Stop reason: HOSPADM

## 2021-11-20 RX ORDER — FAMOTIDINE 20 MG/1
20 TABLET, FILM COATED ORAL 2 TIMES DAILY
Status: DISCONTINUED | OUTPATIENT
Start: 2021-11-20 | End: 2021-11-23 | Stop reason: HOSPADM

## 2021-11-20 RX ORDER — ATORVASTATIN CALCIUM 80 MG/1
80 TABLET, FILM COATED ORAL
Status: DISCONTINUED | OUTPATIENT
Start: 2021-11-20 | End: 2021-11-23 | Stop reason: HOSPADM

## 2021-11-20 RX ORDER — POTASSIUM CHLORIDE 750 MG/1
10 TABLET, EXTENDED RELEASE ORAL DAILY
Status: COMPLETED | OUTPATIENT
Start: 2021-11-21 | End: 2021-11-23

## 2021-11-20 RX ORDER — ONDANSETRON 2 MG/ML
4 INJECTION INTRAMUSCULAR; INTRAVENOUS
Status: DISCONTINUED | OUTPATIENT
Start: 2021-11-20 | End: 2021-11-23 | Stop reason: HOSPADM

## 2021-11-20 RX ORDER — FUROSEMIDE 40 MG/1
40 TABLET ORAL DAILY
Status: COMPLETED | OUTPATIENT
Start: 2021-11-21 | End: 2021-11-23

## 2021-11-20 RX ORDER — NITROGLYCERIN 0.4 MG/1
0.4 TABLET SUBLINGUAL
Status: DISCONTINUED | OUTPATIENT
Start: 2021-11-20 | End: 2021-11-23 | Stop reason: HOSPADM

## 2021-11-20 RX ORDER — POTASSIUM CHLORIDE 20 MEQ/1
40 TABLET, EXTENDED RELEASE ORAL
Status: DISCONTINUED | OUTPATIENT
Start: 2021-11-20 | End: 2021-11-23 | Stop reason: HOSPADM

## 2021-11-20 RX ORDER — FUROSEMIDE 10 MG/ML
20 INJECTION INTRAMUSCULAR; INTRAVENOUS ONCE
Status: COMPLETED | OUTPATIENT
Start: 2021-11-20 | End: 2021-11-20

## 2021-11-20 RX ORDER — ACETAMINOPHEN 325 MG/1
650 TABLET ORAL
Status: DISCONTINUED | OUTPATIENT
Start: 2021-11-20 | End: 2021-11-23 | Stop reason: HOSPADM

## 2021-11-20 RX ORDER — AMOXICILLIN 250 MG
2 CAPSULE ORAL EVERY 12 HOURS
Status: DISCONTINUED | OUTPATIENT
Start: 2021-11-20 | End: 2021-11-21

## 2021-11-20 RX ORDER — ASPIRIN 81 MG/1
81 TABLET ORAL DAILY
Status: DISCONTINUED | OUTPATIENT
Start: 2021-11-21 | End: 2021-11-23 | Stop reason: HOSPADM

## 2021-11-20 RX ORDER — METOPROLOL TARTRATE 25 MG/1
25 TABLET, FILM COATED ORAL EVERY 12 HOURS
Status: DISCONTINUED | OUTPATIENT
Start: 2021-11-20 | End: 2021-11-23 | Stop reason: HOSPADM

## 2021-11-20 RX ORDER — METFORMIN HYDROCHLORIDE 500 MG/1
500 TABLET ORAL
Status: DISCONTINUED | OUTPATIENT
Start: 2021-11-21 | End: 2021-11-23 | Stop reason: HOSPADM

## 2021-11-20 RX ORDER — SODIUM CHLORIDE 0.9 % (FLUSH) 0.9 %
5-40 SYRINGE (ML) INJECTION EVERY 8 HOURS
Status: DISCONTINUED | OUTPATIENT
Start: 2021-11-20 | End: 2021-11-23 | Stop reason: HOSPADM

## 2021-11-20 RX ORDER — AMIODARONE HYDROCHLORIDE 200 MG/1
200 TABLET ORAL EVERY 12 HOURS
Status: DISCONTINUED | OUTPATIENT
Start: 2021-11-20 | End: 2021-11-23 | Stop reason: HOSPADM

## 2021-11-20 RX ORDER — DOCUSATE SODIUM 100 MG/1
100 CAPSULE, LIQUID FILLED ORAL DAILY
Status: DISCONTINUED | OUTPATIENT
Start: 2021-11-21 | End: 2021-11-20

## 2021-11-20 RX ORDER — POTASSIUM CHLORIDE 20 MEQ/1
20 TABLET, EXTENDED RELEASE ORAL
Status: DISCONTINUED | OUTPATIENT
Start: 2021-11-20 | End: 2021-11-23 | Stop reason: HOSPADM

## 2021-11-20 RX ADMIN — Medication 10 ML: at 20:48

## 2021-11-20 RX ADMIN — TRAMADOL HYDROCHLORIDE 50 MG: 50 TABLET ORAL at 16:44

## 2021-11-20 RX ADMIN — METOPROLOL TARTRATE 25 MG: 25 TABLET, FILM COATED ORAL at 20:47

## 2021-11-20 RX ADMIN — ATORVASTATIN CALCIUM 80 MG: 80 TABLET, FILM COATED ORAL at 20:47

## 2021-11-20 RX ADMIN — ACETAMINOPHEN 650 MG: 325 TABLET ORAL at 12:39

## 2021-11-20 RX ADMIN — AMIODARONE HYDROCHLORIDE 200 MG: 200 TABLET ORAL at 09:14

## 2021-11-20 RX ADMIN — Medication 10 ML: at 17:15

## 2021-11-20 RX ADMIN — FAMOTIDINE 20 MG: 10 INJECTION INTRAVENOUS at 09:14

## 2021-11-20 RX ADMIN — Medication 1 AMPULE: at 09:13

## 2021-11-20 RX ADMIN — AMIODARONE HYDROCHLORIDE 200 MG: 200 TABLET ORAL at 20:47

## 2021-11-20 RX ADMIN — INSULIN LISPRO 6 UNITS: 100 INJECTION, SOLUTION INTRAVENOUS; SUBCUTANEOUS at 16:44

## 2021-11-20 RX ADMIN — FUROSEMIDE 20 MG: 10 INJECTION, SOLUTION INTRAMUSCULAR; INTRAVENOUS at 11:52

## 2021-11-20 RX ADMIN — Medication 20 ML: at 14:30

## 2021-11-20 RX ADMIN — INSULIN HUMAN 10 UNITS: 100 INJECTION, SOLUTION PARENTERAL at 11:51

## 2021-11-20 RX ADMIN — Medication 1 AMPULE: at 20:46

## 2021-11-20 RX ADMIN — Medication 10 ML: at 05:29

## 2021-11-20 RX ADMIN — FAMOTIDINE 20 MG: 20 TABLET ORAL at 17:13

## 2021-11-20 RX ADMIN — INSULIN LISPRO 2 UNITS: 100 INJECTION, SOLUTION INTRAVENOUS; SUBCUTANEOUS at 20:47

## 2021-11-20 NOTE — PROGRESS NOTES
Pulmonary CV progress Note: 11/20/2021        [facility]                                                     Admission Date: 11/18/2021     The patient's chart is reviewed and the patient is discussed with the staff. Background: 63 y.o. y/o male  has a past medical history of Allergic rhinitis, cause unspecified (3/7/2014), Atrial flutter ([facility] [address] CAD, multiple vessel (2/28/2017), COVID-19 vaccine series completed, Current use of long term anticoagulation, Depressed left ventricular ejection fraction (2/27/2017), Esophageal reflux (3/7/2014), GERD (gastroesophageal reflux disease), History of MI (myocardial infarction) (2017), History of squamous cell carcinoma, History of testicular cancer, CABG (03/2017), Hypercholesteremia, ICD (implantable cardioverter-defibrillator) in place, Nonrheumatic aortic (valve) stenosis, Retinopathy of both eyes, Systolic CHF ([facility] [address] and Type 2 diabetes mellitus ([facility] [address] <principal problem not specified> 2 Days Post-Op    Subjective:     Extubated- up in chair    Objective:   Blood pressure (!) 141/67, pulse 84, temperature 97.6 °F (36.4 °C), resp. rate 25, height 5' 10\" (1.778 m), weight 182 lb 15.7 oz (83 kg), SpO2 94 %. Intake/Output Summary (Last 24 hours) at 11/20/2021 0718  Last data filed at 11/20/2021 0500  Gross per 24 hour   Intake 1588.43 ml   Output 2640 ml   Net -1051.57 ml     Physical Exam:          Constitutional:  Up in chair- no distress  EENMT:  Sclera clear, pupils equal  Respiratory: clear  Cardiovascular:  RRR with no M,G,R;  Gastrointestinal:  soft; positive bowel sounds present  Musculoskeletal:  warm with no cyanosis, no lower extremity edema. SKIN:  no jaundice or ecchymosis   Neurologic:  moving all extremities,   Psychiatric:  calm    CXR:              LINES:  smalls, swan leena introducer, chest tubes times x in epigastric area without air leak.     DRIPS:   Epinephrine Dose (mcg/min): 0 mcg/min  Phenylephrine Dose (mcg/min): 0 mcg/min  Precedex Dose (mcg/kg/hr): 0 mcg/kg/hr         Recent Labs     11/19/21 0334 11/18/21 1813 11/18/21  1726   PHI 7.50* 7.46* 7.35   PCO2I 31.5* 34.3* 39.2   PO2I 162* 400* 290*   HCO3I 24.5 24.6 21.6*      Recent Labs     11/20/21 0324 11/19/21 0311 11/18/21 2205 11/18/21 1813 11/18/21  1532 11/18/21  1532   WBC 8.8 4.6  --  6.3  --  10.4   HGB 9.0* 9.4* 8.1* 8.6*   < > 8.9*   HCT 29.1* 28.4* 24.6* 26.1*   < > 27.8*   PLT 74* 77*  --  90*  --  111*   INR  --   --   --  1.4  --  1.6    < > = values in this interval not displayed. Recent Labs     11/20/21 0324 11/19/21 0311 11/18/21 2205 11/18/21 1813 11/18/21 1813    147*  --   --  146*   K 4.4 4.0 4.1   < > 4.4   * 116*  --   --  113*   CO2 23 25  --   --  25   * 109*  --   --  120*   BUN 27* 15  --   --  14   CREA 1.25 1.03  --   --  1.11   MG 2.2 2.1 2.2   < > 2.4   CA 8.5 8.9  --   --  9.5    < > = values in this interval not displayed. No results for input(s): LCAD, LAC in the last 72 hours. No results found for this or any previous visit. Assessment and Plan :  (Medical Decision Making)   Impression: 63 y.o. y/o male s/p CV surgery for <principal problem not specified>; 2 Days Post-Op    Active Problems:    S/P CABG x 1 (2/28/2017)  -per surgery        Aortic stenosis, severe (11/18/2021)  --s/pAVR      Aortic valve stenosis (11/18/2021)   -s/p AVR      Atherosclerosis of native coronary artery of native heart with unstable angina pectoris ([facility] (11/18/2021)          Encounter for weaning from ventilator ([facility] (11/18/2021)  - extubated now on RA- mobilize per protocol      S/P AVR (11/18/2021)    - small L apical PTX- monitor not enlarging and likely of no consequence. To step down today      More than 50% of the time documented was spent in face-to-face contact with the patient and in the care of the patient on the floor/unit where the patient is located.      Ling Hopkins MD

## 2021-11-20 NOTE — PROGRESS NOTES
TRANSFER - OUT REPORT:    Verbal report given to Miguel/ELMA Mckinley on [facility]  being transferred to [facility] for routine progression of care       Report consisted of patients Situation, Background, Assessment and Recommendations(SBAR). Information from the following report(s) SBAR, Kardex, OR Summary, Procedure Summary, Intake/Output, Recent Results and Cardiac Rhythm NSR was reviewed with the receiving nurse. Opportunity for questions and clarification was provided.

## 2021-11-20 NOTE — PROGRESS NOTES
Chest tubes off suction for >2 hours, no air leak noted with breathing or coughing. Pt ambulatory in hallway 200 feet prior to chest tube removal with minimal output after.     Chest tubes removed per orders during maximum inspiration. Sutures triple tied. Petroleum gauze placed over site and bandaged with 4x4 and tape. Pacer wires isolated and taped.     SWAN introducer cath removed with cath tip intact. Pressure held until hemostasis achieved. Site bandaged with gauze, antibiotic ointment, and tegaderm.     Pt remains resting in NAD, no s/sx SOB, SaO2 98% on room air, breath sounds CTA bilaterally and diminished in bases as prior, no subcutaneous emphysema noted.  Will continue to monitor.

## 2021-11-20 NOTE — PROGRESS NOTES
POD 2 Days Post-Op    Procedure:  Procedure(s):  CORONARY ARTERY BYPASS GRAFT (CABGX1), WITH AVR (REPLACEMENT), LYSIS OF MYOCARDIAL ADHESIONS  STERNOTOMY REDO  ESOPHAGEAL TRANS ECHOCARDIOGRAM  VEIN HARVEST, GREATER SAPHENOUS VEIN      Subjective:     Patient has No significant medical complaints      Objective:     Patient Vitals for the past 8 hrs:   BP Temp Pulse Resp SpO2 Weight   21 0914 128/61  83      21 0800 123/60  86 20 95 %    21 0700 125/63 97.6 °F (36.4 °C) 80 20 93 %    21 0600 (!) 141/67  84 25 94 %    21 0529      182 lb 15.7 oz (83 kg)   21 0500 138/71  80 19 94 %    21 0400 137/68  80 27 94 %    21 0319  97.6 °F (36.4 °C)       21 0300 133/69  79 20 96 %    21 0200 131/68  78 19 96 %      Temp (24hrs), Av.6 °F (37 °C), Min:97.6 °F (36.4 °C), Max:99.1 °F (37.3 °C)        Hemodynamics  PAP Systolic: 14 PAP  CO (l/min): 4.2 l/min CO  CI (l/min/m2): 2.1 l/min/m2 CI    No intake/output data recorded.  1901 -  0700  In: 2851.6 [P.O.:1130;  I.V.:1417.1]  Out: 3710 [Urine:2860]    CT Drainage              total of all CT's    Heart:  regular rate and rhythm, S1, S2 normal, no murmur, click, rub or gallop  Lung:  clear to auscultation bilaterally  Neuro: Grossly non focal  Incisions: Clean, dry, and intact    Labs:  Recent Results (from the past 24 hour(s))   GLUCOSE, POC    Collection Time: 21 10:55 AM   Result Value Ref Range    Glucose (POC) 91 65 - 100 mg/dL    Performed by [employer]    GLUCOSE, POC    Collection Time: 21 12:05 PM   Result Value Ref Range    Glucose (POC) 91 65 - 100 mg/dL    Performed by [employer]    MAGNESIUM    Collection Time: 21  3:24 AM   Result Value Ref Range    Magnesium 2.2 1.8 - 2.4 mg/dL   CBC W/O DIFF    Collection Time: 21  3:24 AM   Result Value Ref Range    WBC 8.8 4.3 - 11.1 K/uL    RBC 3.21 (L) 4.23 - 5.6 M/uL    HGB 9.0 (L) 13.6 - 17.2 g/dL    HCT 29.1 (L) 41.1 - 50.3 %    MCV 90.7 79.6 - 97.8 FL    MCH 28.0 26.1 - 32.9 PG    MCHC 30.9 (L) 31.4 - 35.0 g/dL    RDW 14.2 11.9 - 14.6 %    PLATELET 74 (L) 946 - 450 K/uL    MPV 11.1 9.4 - 12.3 FL    ABSOLUTE NRBC 0.00 0.0 - 0.2 K/uL   METABOLIC PANEL, BASIC    Collection Time: 11/20/21  3:24 AM   Result Value Ref Range    Sodium 142 138 - 145 mmol/L    Potassium 4.4 3.5 - 5.1 mmol/L    Chloride 109 (H) 98 - 107 mmol/L    CO2 23 21 - 32 mmol/L    Anion gap 10 7 - 16 mmol/L    Glucose 205 (H) 65 - 100 mg/dL    BUN 27 (H) 8 - 23 MG/DL    Creatinine 1.25 0.8 - 1.5 MG/DL    GFR est AA >60 >60 ml/min/1.73m2    GFR est non-AA >60 >60 ml/min/1.73m2    Calcium 8.5 8.3 - 10.4 MG/DL       Assessment:     Active Problems:    S/P CABG x 1 (2/28/2017)      CAD (coronary artery disease) (11/18/2021)      Aortic stenosis, severe (11/18/2021)      Aortic valve stenosis (11/18/2021)      Atherosclerosis of native coronary artery of native heart with unstable angina pectoris ([facility] (11/18/2021)      Encounter for weaning from ventilator ([facility] (11/18/2021)      S/P AVR (11/18/2021)        Plan/Recommendations/Medical Decision Making:     Discontinue:  chest tubes    See orders    Stable,to floor, protocol

## 2021-11-20 NOTE — PROGRESS NOTES
ACUTE PHYSICAL THERAPY GOALS:  (Developed with and agreed upon by patient and/or caregiver.)  STERNAL PRECAUTIONS   STG:  (1.)Mr. John Baker will move from supine to sit and sit to supine , scoot up and down and roll side to side with STAND BY ASSIST within 4 treatment day(s). (2.)Mr. John Baker will transfer from bed to chair and chair to bed with STAND BY ASSIST using the least restrictive device within 4 treatment day(s). (3.)Mr. John Baker will ambulate with STAND BY ASSIST for 500 feet with the least restrictive device within 4 treatment day(s). LTG:  (1.)Mr. John Baker will move from supine to sit and sit to supine , scoot up and down and roll side to side in bed with MODIFIED INDEPENDENCE within 7 treatment day(s). (2.)Mr. John Baker will transfer from bed to chair and chair to bed with MODIFIED INDEPENDENCE using the least restrictive device within 7 treatment day(s). (3.)Mr. John Baker will ambulate with MODIFIED INDEPENDENCE for 1000 feet with the least restrictive device within 7 treatment day(s).   ________________________________________________________________________________________________    PHYSICAL THERAPY ASSESSMENT: Initial Assessment and PM PT Treatment Day # 1      Zuleyka Douglas is a 63 y.o. male   PRIMARY DIAGNOSIS: <principal problem not specified>  Aortic valve stenosis, etiology of cardiac valve disease unspecified [I35.0]  Atherosclerosis of native coronary artery of native heart with unstable angina pectoris (HCC) [I25.110]  CAD (coronary artery disease) [I25.10]  Aortic stenosis, severe [I35.0]  Procedure(s) (LRB):  CORONARY ARTERY BYPASS GRAFT (CABGX1), WITH AVR (REPLACEMENT), LYSIS OF MYOCARDIAL ADHESIONS (N/A)  STERNOTOMY REDO (N/A)  ESOPHAGEAL TRANS ECHOCARDIOGRAM (N/A)  VEIN HARVEST, GREATER SAPHENOUS VEIN (Right)  2 Days Post-Op  Reason for Referral:    ICD-10: Treatment Diagnosis: Generalized Muscle Weakness (M62.81)  Other lack of cordination (R27.8)  Difficulty in walking, Not elsewhere classified (R26.2)  Other abnormalities of gait and mobility (R26.89)  INPATIENT: Payor: [facility] / Plan: [address] HMO/CHOICE PLUS/POS / Product Type: HMO /     ASSESSMENT:     REHAB RECOMMENDATIONS:   Recommendation to date pending progress:  Setting:   cardiac rehabilitation   Equipment:    To Be Determined     PRIOR LEVEL OF FUNCTION:  (Prior to Hospitalization) INITIAL/CURRENT LEVEL OF FUNCTION:  (Most Recently Demonstrated)   Bed Mobility:   Contact Guard Assistance  Sit to Stand:   Contact Guard Assistance  Transfers:   Contact Guard Assistance  Gait/Mobility:   Contact Guard Assistance Bed Mobility:   Independent  Sit to Stand:   Independent  Transfers:   Independent  Gait/Mobility:   Independent     ASSESSMENT:  Mr. Avelino Ritchie is a pleasant male with above diagnosis who demonstrates with decreased transfers, ambulation and mobility below his prior functional baseline with sternal precautions. All transfers are currently limited at [facility] 62 x 1 out of cardiac chair to stand followed by ambulation with 4 wheel cardiac walker for 200 feet with the deficits stated below. Also transfer to the bedside commode before ambulation with good grasp of sternal precautions. Krystal Briseno then returns to seated cardiac chair with controlled descent. Skilled PT is indicated for this patient's functional mobility deficits. SUBJECTIVE:   Mr. Avelino Ritchie states, \"I have been through this before. \"    SOCIAL HISTORY/LIVING ENVIRONMENT:   Home Environment: Private residence  One/Two Story Residence: One story  Living Alone: No  Support Systems: Spouse/Significant Other  OBJECTIVE:     PAIN: VITAL SIGNS: LINES/DRAINS:   Pre Treatment: Pain Screen  Pain Scale 1: Numeric (0 - 10)  Pain Intensity 1: 0  Post Treatment: .0/10 no pain reported.     Chest Tube  O2 Device: None (Room air)     GROSS EVALUATION: Within Functional Limits Abnormal/ Functional Abnormal/ Non-Functional (see comments) Not Tested Comments:   AROM [] [x] [] []    PROM [] [x] [] []    Strength [] [x] [] []    Balance [] [x] [] []    Posture [] [x] [] []    Sensation [] [x] [] []    Coordination [] [x] [] []    Tone [x] [] [] []    Edema [x] [] [] []    Activity Tolerance [] [x] [] []     [] [] [] []      COGNITION/  PERCEPTION: Intact Impaired   (see comments) Comments:   Orientation [x] []    Vision [x] []    Hearing [x] []    Command Following [x] []    Safety Awareness [x] []     [] []      MOBILITY: I Mod I S SBA CGA Min Mod Max Total  NT x2 Comments:   Bed Mobility    Rolling [] [] [] [] [x] [] [] [] [] [] []    Supine to Sit [] [] [] [] [x] [] [] [] [] [] []    Scooting [] [] [] [] [x] [] [] [] [] [] []    Sit to Supine [] [] [] [] [x] [] [] [] [] [] []    Transfers    Sit to Stand [] [] [] [] [x] [] [] [] [] [] []    Bed to Chair [] [] [] [] [x] [] [] [] [] [] []    Stand to Sit [] [] [] [] [x] [] [] [] [] [] []    I=Independent, Mod I=Modified Independent, S=Supervision, SBA=Standby Assistance, CGA=Contact Guard Assistance,   Min=Minimal Assistance, Mod=Moderate Assistance, Max=Maximal Assistance, Total=Total Assistance, NT=Not Tested  GAIT: I Mod I S SBA CGA Min Mod Max Total  NT x2 Comments:   Level of Assistance [] [] [] [] [x] [] [] [] [] [] []    Distance 200 feet     DME cardiac 4 wheel rolling walker     Gait Quality Fair to Good quality in dynamic balance     Weightbearing Status WBAT     I=Independent, Mod I=Modified Independent, S=Supervision, SBA=Standby Assistance, CGA=Contact Guard Assistance,   Min=Minimal Assistance, Mod=Moderate Assistance, Max=Maximal Assistance, Total=Total Assistance, NT=Not Tested    [facility] [id number] [facility] Mob[facility] Inpatient Short Form       How much difficulty does the patient currently have. .. Unable A Lot A Little None   1. Turning over in bed (including adjusting bedclothes, sheets and blankets)? [] 1   [] 2   [x] 3   [] 4   2.   Sitting down on and standing up from a chair with arms ( e.g., wheelchair, bedside commode, etc.)   [] 1   [] 2   [x] 3   [] 4   3. Moving from lying on back to sitting on the side of the bed? [] 1   [] 2   [x] 3   [] 4   How much help from another person does the patient currently need. .. Total A Lot A Little None   4. Moving to and from a bed to a chair (including a wheelchair)? [] 1   [] 2   [x] 3   [] 4   5. Need to walk in hospital room? [] 1   [] 2   [x] 3   [] 4   6. Climbing 3-5 steps with a railing? [] 1   [] 2   [x] 3   [] 4   © 2007, Trustees of [facility] [employer], under license to [employer]. All rights reserved     Score:  Initial: 18 Most Recent: X (Date: -- )    Interpretation of Tool:  Represents activities that are increasingly more difficult (i.e. Bed mobility, Transfers, Gait). PLAN:   FREQUENCY/DURATION: PT Plan of Care: BID for duration of hospital stay or until stated goals are met, whichever comes first.    PROBLEM LIST:   (Skilled intervention is medically necessary to address:)  1. Decreased ADL/Functional Activities  2. Decreased Activity Tolerance  3. Decreased AROM/PROM  4. Decreased Balance  5. Decreased Cognition  6. Decreased Coordination  7. Decreased Gait Ability  8. Decreased Strength  9. Decreased Transfer Abilities   INTERVENTIONS PLANNED:   (Benefits and precautions of physical therapy have been discussed with the patient.)  1. Therapeutic Activity  2. Therapeutic Exercise/HEP  3. Neuromuscular Re-education  4. Gait Training  5. Manual Therapy  6. Education     TREATMENT:     EVALUATION: Low Complexity : (Untimed Charge)    TREATMENT:   ($$ Therapeutic Activity: 23-37 mins    )  Therapeutic Activity (23 Minutes): Therapeutic activity included Scooting, Lateral Scooting, Transfer Training, Ambulation on level ground, Sitting balance  and Standing balance to improve functional Mobility, Strength, ROM and Activity tolerance.     TREATMENT GRID:   Date:   Date:   Date:     Activity/Exercise Parameters Parameters Parameters                                                   AFTER TREATMENT POSITION/PRECAUTIONS:  Chair, Needs within reach and RN notified    INTERDISCIPLINARY COLLABORATION:  RN/PCT and PT/PTA    TOTAL TREATMENT DURATION:  PT Patient Time In/Time Out  Time In: 1456  Time Out: [address] [place], [place]

## 2021-11-20 NOTE — PROGRESS NOTES
Good visit with patient and his daughter    Pt in good spirits    He shared about his previous heart procedure    He said it was a good experience and he is confident that this will be too

## 2021-11-20 NOTE — PROGRESS NOTES
.. TRANSFER - IN REPORT:    Verbal report received from Simin martin RN(name) on [facility]  being received from CVICU(unit) for routine progression of care      Report consisted of patients Situation, Background, Assessment and   Recommendations(SBAR). Information from the following report(s) SBAR, Kardex, MAR, Recent Results and Cardiac Rhythm SInus Rythm was reviewed with the receiving nurse. Opportunity for questions and clarification was provided. Assessment completed upon patients arrival to unit and care assumed.

## 2021-11-21 ENCOUNTER — APPOINTMENT (OUTPATIENT)
Dept: GENERAL RADIOLOGY | Age: 66
DRG: 220 | End: 2021-11-21
Attending: THORACIC SURGERY (CARDIOTHORACIC VASCULAR SURGERY)
Payer: COMMERCIAL

## 2021-11-21 LAB
ANION GAP SERPL CALC-SCNC: 4 MMOL/L (ref 7–16)
BUN SERPL-MCNC: 38 MG/DL (ref 8–23)
CALCIUM SERPL-MCNC: 8.5 MG/DL (ref 8.3–10.4)
CHLORIDE SERPL-SCNC: 106 MMOL/L (ref 98–107)
CO2 SERPL-SCNC: 29 MMOL/L (ref 21–32)
CREAT SERPL-MCNC: 1.1 MG/DL (ref 0.8–1.5)
ERYTHROCYTE [DISTWIDTH] IN BLOOD BY AUTOMATED COUNT: 13.9 % (ref 11.9–14.6)
GLUCOSE BLD STRIP.AUTO-MCNC: 143 MG/DL (ref 65–100)
GLUCOSE BLD STRIP.AUTO-MCNC: 154 MG/DL (ref 65–100)
GLUCOSE BLD STRIP.AUTO-MCNC: 157 MG/DL (ref 65–100)
GLUCOSE BLD STRIP.AUTO-MCNC: 204 MG/DL (ref 65–100)
GLUCOSE SERPL-MCNC: 161 MG/DL (ref 65–100)
HCT VFR BLD AUTO: 31.1 % (ref 41.1–50.3)
HGB BLD-MCNC: 9.7 G/DL (ref 13.6–17.2)
MAGNESIUM SERPL-MCNC: 2.5 MG/DL (ref 1.8–2.4)
MCH RBC QN AUTO: 29 PG (ref 26.1–32.9)
MCHC RBC AUTO-ENTMCNC: 31.2 G/DL (ref 31.4–35)
MCV RBC AUTO: 92.8 FL (ref 79.6–97.8)
NRBC # BLD: 0 K/UL (ref 0–0.2)
PLATELET # BLD AUTO: 91 K/UL (ref 150–450)
PMV BLD AUTO: 11.1 FL (ref 9.4–12.3)
POTASSIUM SERPL-SCNC: 4.3 MMOL/L (ref 3.5–5.1)
RBC # BLD AUTO: 3.35 M/UL (ref 4.23–5.6)
SERVICE CMNT-IMP: ABNORMAL
SODIUM SERPL-SCNC: 139 MMOL/L (ref 138–145)
WBC # BLD AUTO: 8.9 K/UL (ref 4.3–11.1)

## 2021-11-21 PROCEDURE — 71046 X-RAY EXAM CHEST 2 VIEWS: CPT

## 2021-11-21 PROCEDURE — 74011250637 HC RX REV CODE- 250/637: Performed by: THORACIC SURGERY (CARDIOTHORACIC VASCULAR SURGERY)

## 2021-11-21 PROCEDURE — 36415 COLL VENOUS BLD VENIPUNCTURE: CPT

## 2021-11-21 PROCEDURE — 2709999900 HC NON-CHARGEABLE SUPPLY

## 2021-11-21 PROCEDURE — 83735 ASSAY OF MAGNESIUM: CPT

## 2021-11-21 PROCEDURE — 65660000004 HC RM CVT STEPDOWN

## 2021-11-21 PROCEDURE — 85027 COMPLETE CBC AUTOMATED: CPT

## 2021-11-21 PROCEDURE — 99232 SBSQ HOSP IP/OBS MODERATE 35: CPT | Performed by: INTERNAL MEDICINE

## 2021-11-21 PROCEDURE — 82962 GLUCOSE BLOOD TEST: CPT

## 2021-11-21 PROCEDURE — 74011636637 HC RX REV CODE- 636/637: Performed by: THORACIC SURGERY (CARDIOTHORACIC VASCULAR SURGERY)

## 2021-11-21 PROCEDURE — 80048 BASIC METABOLIC PNL TOTAL CA: CPT

## 2021-11-21 PROCEDURE — 77030012890

## 2021-11-21 PROCEDURE — 97530 THERAPEUTIC ACTIVITIES: CPT

## 2021-11-21 PROCEDURE — 97110 THERAPEUTIC EXERCISES: CPT

## 2021-11-21 RX ADMIN — METOPROLOL TARTRATE 25 MG: 25 TABLET, FILM COATED ORAL at 21:08

## 2021-11-21 RX ADMIN — Medication 10 ML: at 21:08

## 2021-11-21 RX ADMIN — ACETAMINOPHEN 650 MG: 325 TABLET ORAL at 23:08

## 2021-11-21 RX ADMIN — FUROSEMIDE 40 MG: 40 TABLET ORAL at 09:02

## 2021-11-21 RX ADMIN — Medication 10 ML: at 17:45

## 2021-11-21 RX ADMIN — FAMOTIDINE 20 MG: 20 TABLET ORAL at 17:40

## 2021-11-21 RX ADMIN — METFORMIN HYDROCHLORIDE 500 MG: 500 TABLET ORAL at 09:02

## 2021-11-21 RX ADMIN — INSULIN LISPRO 4 UNITS: 100 INJECTION, SOLUTION INTRAVENOUS; SUBCUTANEOUS at 11:36

## 2021-11-21 RX ADMIN — INSULIN LISPRO 2 UNITS: 100 INJECTION, SOLUTION INTRAVENOUS; SUBCUTANEOUS at 17:40

## 2021-11-21 RX ADMIN — INSULIN LISPRO 2 UNITS: 100 INJECTION, SOLUTION INTRAVENOUS; SUBCUTANEOUS at 07:17

## 2021-11-21 RX ADMIN — Medication 1 AMPULE: at 09:02

## 2021-11-21 RX ADMIN — POTASSIUM CHLORIDE 10 MEQ: 10 TABLET, EXTENDED RELEASE ORAL at 09:02

## 2021-11-21 RX ADMIN — AMIODARONE HYDROCHLORIDE 200 MG: 200 TABLET ORAL at 09:02

## 2021-11-21 RX ADMIN — ATORVASTATIN CALCIUM 80 MG: 80 TABLET, FILM COATED ORAL at 21:11

## 2021-11-21 RX ADMIN — AMIODARONE HYDROCHLORIDE 200 MG: 200 TABLET ORAL at 21:08

## 2021-11-21 RX ADMIN — FAMOTIDINE 20 MG: 20 TABLET ORAL at 09:02

## 2021-11-21 RX ADMIN — Medication 1 AMPULE: at 21:08

## 2021-11-21 RX ADMIN — Medication 10 ML: at 05:06

## 2021-11-21 NOTE — PROGRESS NOTES
Pulmonary CV progress Note: 11/21/2021        Honey Shake                                                     Admission Date: 11/18/2021     The patient's chart is reviewed and the patient is discussed with the staff. Background: 63 y.o. y/o male  has a past medical history of Allergic rhinitis, cause unspecified (3/7/2014), Atrial flutter ([facility] [address] CAD, multiple vessel (2/28/2017), COVID-19 vaccine series completed, Current use of long term anticoagulation, Depressed left ventricular ejection fraction (2/27/2017), Esophageal reflux (3/7/2014), GERD (gastroesophageal reflux disease), History of MI (myocardial infarction) (2017), History of squamous cell carcinoma, History of testicular cancer, CABG (03/2017), Hypercholesteremia, ICD (implantable cardioverter-defibrillator) in place, Nonrheumatic aortic (valve) stenosis, Retinopathy of both eyes, Systolic CHF ([facility] [address] and Type 2 diabetes mellitus ([facility] [address] <principal problem not specified> 3 Days Post-Op    Subjective:   Transferred to  step down uneventfully- no overnight events on RA    Objective:   Blood pressure 125/66, pulse 78, temperature 98.5 °F (36.9 °C), resp. rate 16, height 5' 10\" (1.778 m), weight 183 lb 3.2 oz (83.1 kg), SpO2 95 %. Intake/Output Summary (Last 24 hours) at 11/21/2021 0545  Last data filed at 11/21/2021 0320  Gross per 24 hour   Intake 900 ml   Output 860 ml   Net 40 ml     Physical Exam:          Constitutional:  no distress  EENMT:  Sclera clear, pupils equal  Respiratory: clear  Cardiovascular:  RRR with no M,G,R;  Gastrointestinal:  soft; positive bowel sounds present  Musculoskeletal:  warm with no cyanosis, no lower extremity edema. SKIN:  no jaundice or ecchymosis   Neurologic:  moving all extremities,   Psychiatric:  calm    CXR:              LINES:  smalls, swan leena introducer, chest tubes times x in epigastric area without air leak.     DRIPS:   Epinephrine Dose (mcg/min): 0 mcg/min  Phenylephrine Dose (mcg/min): 0 mcg/min  Precedex Dose (mcg/kg/hr): 0 mcg/kg/hr         Recent Labs     11/19/21  0334 11/18/21 1813 11/18/21  1726   PHI 7.50* 7.46* 7.35   PCO2I 31.5* 34.3* 39.2   PO2I 162* 400* 290*   HCO3I 24.5 24.6 21.6*      Recent Labs     11/21/21  0333 11/20/21 0324 11/19/21 0311 11/18/21  2205 11/18/21 1813 11/18/21 1813 11/18/21  1532 11/18/21  1532   WBC 8.9 8.8 4.6  --   --  6.3   < > 10.4   HGB 9.7* 9.0* 9.4* 8.1*   < > 8.6*   < > 8.9*   HCT 31.1* 29.1* 28.4* 24.6*   < > 26.1*   < > 27.8*   PLT 91* 74* 77*  --   --  90*   < > 111*   INR  --   --   --   --   --  1.4  --  1.6    < > = values in this interval not displayed. Recent Labs     11/21/21 0333 11/20/21 0324 11/19/21 0311    142 147*   K 4.3 4.4 4.0    109* 116*   CO2 29 23 25   * 205* 109*   BUN 38* 27* 15   CREA 1.10 1.25 1.03   MG 2.5* 2.2 2.1   CA 8.5 8.5 8.9     No results for input(s): LCAD, LAC in the last 72 hours. No results found for this or any previous visit. Assessment and Plan :  (Medical Decision Making)   Impression: 63 y.o. y/o male s/p CV surgery for <principal problem not specified>; 3 Days Post-Op    Active Problems:    S/P CABG x 1 (2/28/2017)  -per surgery        Aortic stenosis, severe (11/18/2021)  --s/pAVR      Aortic valve stenosis (11/18/2021)   -s/p AVR      Atherosclerosis of native coronary artery of native heart with unstable angina pectoris ([address] (11/18/2021)          Encounter for weaning from ventilator ([facility] [address] (11/18/2021)  - extubated now on RA- mobilize per protocol      S/P AVR (11/18/2021)    - small L apical PTX- monitor not enlarging and likely of no consequence. Nothing to add will sign off- please call as needed     More than 50% of the time documented was spent in face-to-face contact with the patient and in the care of the patient on the floor/unit where the patient is located.      Cris Stack MD

## 2021-11-21 NOTE — PROGRESS NOTES
Subjective:   No complaint    Objective:     Patient Vitals for the past 8 hrs:   BP Temp Pulse Resp SpO2 Weight   21 0723 130/60 98.3 °F (36.8 °C) 87 20 97 %    21 0320 125/66 98.5 °F (36.9 °C) 78 16 95 % 183 lb 3.2 oz (83.1 kg)     Temp (24hrs), Av.9 °F (36.6 °C), Min:97.2 °F (36.2 °C), Max:98.5 °F (36.9 °C)        Heart:  regular rate and rhythm, S1, S2 normal, no murmur, click, rub or gallop  Lung:  clear to auscultation bilaterally   Incisions: Clean, dry, and intact    Labs:  Recent Results (from the past 24 hour(s))   GLUCOSE, POC    Collection Time: 21 11:36 AM   Result Value Ref Range    Glucose (POC) 233 (H) 65 - 100 mg/dL    Performed by Román Loza    GLUCOSE, POC    Collection Time: 21  4:09 PM   Result Value Ref Range    Glucose (POC) 265 (H) 65 - 100 mg/dL    Performed by Dayana    GLUCOSE, POC    Collection Time: 21  8:28 PM   Result Value Ref Range    Glucose (POC) 185 (H) 65 - 100 mg/dL    Performed by [address] [place] [address] PPD TEST IN 48 HRS    Collection Time: 21  8:40 PM   Result Value Ref Range    PPD Negative Negative    mm Induration 0 0 - 5 mm   CBC W/O DIFF    Collection Time: 21  3:33 AM   Result Value Ref Range    WBC 8.9 4.3 - 11.1 K/uL    RBC 3.35 (L) 4.23 - 5.6 M/uL    HGB 9.7 (L) 13.6 - 17.2 g/dL    HCT 31.1 (L) 41.1 - 50.3 %    MCV 92.8 79.6 - 97.8 FL    MCH 29.0 26.1 - 32.9 PG    MCHC 31.2 (L) 31.4 - 35.0 g/dL    RDW 13.9 11.9 - 14.6 %    PLATELET 91 (L) 684 - 450 K/uL    MPV 11.1 9.4 - 12.3 FL    ABSOLUTE NRBC 0.00 0.0 - 0.2 K/uL   MAGNESIUM    Collection Time: 21  3:33 AM   Result Value Ref Range    Magnesium 2.5 (H) 1.8 - 2.4 mg/dL   METABOLIC PANEL, BASIC    Collection Time: 21  3:33 AM   Result Value Ref Range    Sodium 139 138 - 145 mmol/L    Potassium 4.3 3.5 - 5.1 mmol/L    Chloride 106 98 - 107 mmol/L    CO2 29 21 - 32 mmol/L    Anion gap 4 (L) 7 - 16 mmol/L    Glucose 161 (H) 65 - 100 mg/dL BUN 38 (H) 8 - 23 MG/DL    Creatinine 1.10 0.8 - 1.5 MG/DL    GFR est AA >60 >60 ml/min/1.73m2    GFR est non-AA >60 >60 ml/min/1.73m2    Calcium 8.5 8.3 - 10.4 MG/DL   GLUCOSE, POC    Collection Time: 11/21/21  6:01 AM   Result Value Ref Range    Glucose (POC) 154 (H) 65 - 100 mg/dL    Performed by Annalisa        Assessment:     Active Problems:    S/P CABG x 1 (2/28/2017)      CAD (coronary artery disease) (11/18/2021)      Aortic stenosis, severe (11/18/2021)      Aortic valve stenosis (11/18/2021)      Atherosclerosis of native coronary artery of native heart with unstable angina pectoris ([address] (11/18/2021)      Encounter for weaning from ventilator ([facility] [address] (11/18/2021)      S/P AVR (11/18/2021)        Plan/Recommendations/Medical Decision Making:     Discontinue:  pacing wires    See orders    Stable, ambulate, protocol

## 2021-11-21 NOTE — PROGRESS NOTES
ACUTE PHYSICAL THERAPY GOALS:  (Developed with and agreed upon by patient and/or caregiver.)  STERNAL PRECAUTIONS   STG:  (1.)Mr. Maryuri Spears will move from supine to sit and sit to supine , scoot up and down and roll side to side with STAND BY ASSIST within 4 treatment day(s). (2.)Mr. Maryuri Spears will transfer from bed to chair and chair to bed with STAND BY ASSIST using the least restrictive device within 4 treatment day(s). (3.)Mr. Maryuri Spears will ambulate with STAND BY ASSIST for 500 feet with the least restrictive device within 4 treatment day(s). LTG:  (1.)Mr. Maryuri Spears will move from supine to sit and sit to supine , scoot up and down and roll side to side in bed with MODIFIED INDEPENDENCE within 7 treatment day(s). (2.)Mr. Maryuri Spears will transfer from bed to chair and chair to bed with MODIFIED INDEPENDENCE using the least restrictive device within 7 treatment day(s). (3.)Mr. Maryuri Spears will ambulate with MODIFIED INDEPENDENCE for 1000 feet with the least restrictive device within 7 treatment day(s).   ________________________________________________________________________________________________    PHYSICAL THERAPY ASSESSMENT: Daily Note and AM PT Treatment Day # 2      Etta Young is a 63 y.o. male   PRIMARY DIAGNOSIS: <principal problem not specified>  Aortic valve stenosis, etiology of cardiac valve disease unspecified [I35.0]  Atherosclerosis of native coronary artery of native heart with unstable angina pectoris (HCC) [I25.110]  CAD (coronary artery disease) [I25.10]  Aortic stenosis, severe [I35.0]  Procedure(s) (LRB):  CORONARY ARTERY BYPASS GRAFT (CABGX1), WITH AVR (REPLACEMENT), LYSIS OF MYOCARDIAL ADHESIONS (N/A)  STERNOTOMY REDO (N/A)  ESOPHAGEAL TRANS ECHOCARDIOGRAM (N/A)  VEIN HARVEST, GREATER SAPHENOUS VEIN (Right)  3 Days Post-Op  Reason for Referral:    ICD-10: Treatment Diagnosis: Generalized Muscle Weakness (M62.81)  Other lack of cordination (R27.8)  Difficulty in walking, Not elsewhere classified (R26.2)  Other abnormalities of gait and mobility (R26.89)  INPATIENT: Payor: [facility] / Plan: [employer] [facility] HMO/CHOICE PLUS/POS / Product Type: HMO /     ASSESSMENT:     REHAB RECOMMENDATIONS:   Recommendation to date pending progress:  Setting:   cardiac rehabilitation   Equipment:    To Be Determined     PRIOR LEVEL OF FUNCTION:  (Prior to Hospitalization) INITIAL/CURRENT LEVEL OF FUNCTION:  (Most Recently Demonstrated)   Bed Mobility:   Contact Guard Assistance  Sit to Stand:   Contact Guard Assistance  Transfers:   Contact Guard Assistance  Gait/Mobility:   Contact Guard Assistance Bed Mobility:   Independent  Sit to Stand:   Independent  Transfers:   Independent  Gait/Mobility:   Independent     ASSESSMENT:  Mr. Maryuri Spears is ready for therapy upon entry. Wife present. Sit to stand with SBA. Patient ambulated 250 ft with no AD, ascended and descended 3-4 steps with rails, and performed therapeutic exercises once returned to cardiac chair. Patient left with all needs in reach and wife at bedside. Skilled PT is indicated for this patient's functional mobility deficits. SUBJECTIVE:   Mr. Maryuri Spears states, \"ready to go. This is not my first rodeo. \"    SOCIAL HISTORY/LIVING ENVIRONMENT:   Home Environment: Private residence  One/Two Story Residence: One story  Living Alone: No  Support Systems: Spouse/Significant Other  OBJECTIVE:     PAIN: VITAL SIGNS: LINES/DRAINS:   Pre Treatment:    Post Treatment: .0/10 no pain reported.     Chest Tube  O2 Device: None (Room air)     MOBILITY: I Mod I S SBA CGA Min Mod Max Total  NT x2 Comments:   Bed Mobility    Rolling [] [] [] [] [] [] [] [] [] [x] []    Supine to Sit [] [] [] [] [] [] [] [] [] [x] []    Scooting [] [] [] [] [] [] [] [] [] [x] []    Sit to Supine [] [] [] [] [] [] [] [] [] [x] []    Transfers    Sit to Stand [] [] [] [x] [] [] [] [] [] [] []    Bed to Chair [] [] [] [x] [] [] [] [] [] [] []    Stand to Sit [] [] [] [x] [] [] [] [] [] [] []    I=Independent, Mod I=Modified Independent, S=Supervision, SBA=Standby Assistance, CGA=Contact Guard Assistance,   Min=Minimal Assistance, Mod=Moderate Assistance, Max=Maximal Assistance, Total=Total Assistance, NT=Not Tested  GAIT: I Mod I S SBA CGA Min Mod Max Total  NT x2 Comments:   Level of Assistance [] [] [] [x] [x] [] [] [] [] [] [] Ascended and descended 3-4 steps with rails. Distance 250 feet     DME None    Gait Quality Fair to Good quality in dynamic balance     Weightbearing Status WBAT     I=Independent, Mod I=Modified Independent, S=Supervision, SBA=Standby Assistance, CGA=Contact Guard Assistance,   Min=Minimal Assistance, Mod=Moderate Assistance, Max=Maximal Assistance, Total=Total Assistance, NT=Not Tested      PLAN:   FREQUENCY/DURATION: PT Plan of Care: BID for duration of hospital stay or until stated goals are met, whichever comes first.    PROBLEM LIST:   (Skilled intervention is medically necessary to address:)  1. Decreased ADL/Functional Activities  2. Decreased Activity Tolerance  3. Decreased AROM/PROM  4. Decreased Balance  5. Decreased Cognition  6. Decreased Coordination  7. Decreased Gait Ability  8. Decreased Strength  9. Decreased Transfer Abilities   INTERVENTIONS PLANNED:   (Benefits and precautions of physical therapy have been discussed with the patient.)  1. Therapeutic Activity  2. Therapeutic Exercise/HEP  3. Neuromuscular Re-education  4. Gait Training  5. Manual Therapy  6. Education     TREATMENT:     EVALUATION: Low Complexity : (Untimed Charge)    TREATMENT:   ($$ Therapeutic Activity: 8-22 mins  $$ Therapeutic Exercises: 8-22 mins    )  Therapeutic Activity (13 Minutes): Therapeutic activity included Scooting, Lateral Scooting, Transfer Training, Ambulation on level ground, Sitting balance  and Standing balance to improve functional Mobility, Strength, ROM and Activity tolerance. Therapeutic Exercise (11 Minutes):  Therapeutic exercises noted below to improve functional activity tolerance, strength and mobility.      TREATMENT GRID:   Date:  11-21-21 Date:   Date:     Activity/Exercise Parameters Parameters Parameters   Toe and heel raises  X 20 B     Long arc quads X 20 B     Seated marching  X 20 B     Hip abduction /adduction  X 20 B     Shoulder shrugs X 20 B     Gluteal squeezes X 20 B                 AFTER TREATMENT POSITION/PRECAUTIONS:  Chair, Needs within reach, RN notified and Visitors at bedside    INTERDISCIPLINARY COLLABORATION:  RN/PCT and PT/PTA    TOTAL TREATMENT DURATION:  PT Patient Time In/Time Out  Time In: 0833  Time Out: 113 Enzo Vilchis PTA

## 2021-11-22 PROBLEM — D69.6 THROMBOCYTOPENIA (HCC): Status: ACTIVE | Noted: 2021-11-22

## 2021-11-22 LAB
ERYTHROCYTE [DISTWIDTH] IN BLOOD BY AUTOMATED COUNT: 13.6 % (ref 11.9–14.6)
GLUCOSE BLD STRIP.AUTO-MCNC: 132 MG/DL (ref 65–100)
GLUCOSE BLD STRIP.AUTO-MCNC: 133 MG/DL (ref 65–100)
GLUCOSE BLD STRIP.AUTO-MCNC: 142 MG/DL (ref 65–100)
GLUCOSE BLD STRIP.AUTO-MCNC: 163 MG/DL (ref 65–100)
HCT VFR BLD AUTO: 30 % (ref 41.1–50.3)
HGB BLD-MCNC: 9.6 G/DL (ref 13.6–17.2)
MAGNESIUM SERPL-MCNC: 2.3 MG/DL (ref 1.8–2.4)
MCH RBC QN AUTO: 28.9 PG (ref 26.1–32.9)
MCHC RBC AUTO-ENTMCNC: 32 G/DL (ref 31.4–35)
MCV RBC AUTO: 90.4 FL (ref 79.6–97.8)
NRBC # BLD: 0 K/UL (ref 0–0.2)
PLATELET # BLD AUTO: 83 K/UL (ref 150–450)
PMV BLD AUTO: 10.8 FL (ref 9.4–12.3)
POTASSIUM SERPL-SCNC: 4.1 MMOL/L (ref 3.5–5.1)
RBC # BLD AUTO: 3.32 M/UL (ref 4.23–5.6)
SERVICE CMNT-IMP: ABNORMAL
WBC # BLD AUTO: 6.8 K/UL (ref 4.3–11.1)

## 2021-11-22 PROCEDURE — 2709999900 HC NON-CHARGEABLE SUPPLY

## 2021-11-22 PROCEDURE — 74011250637 HC RX REV CODE- 250/637: Performed by: THORACIC SURGERY (CARDIOTHORACIC VASCULAR SURGERY)

## 2021-11-22 PROCEDURE — 85027 COMPLETE CBC AUTOMATED: CPT

## 2021-11-22 PROCEDURE — 36415 COLL VENOUS BLD VENIPUNCTURE: CPT

## 2021-11-22 PROCEDURE — 99024 POSTOP FOLLOW-UP VISIT: CPT | Performed by: PHYSICIAN ASSISTANT

## 2021-11-22 PROCEDURE — 83735 ASSAY OF MAGNESIUM: CPT

## 2021-11-22 PROCEDURE — 74011636637 HC RX REV CODE- 636/637: Performed by: THORACIC SURGERY (CARDIOTHORACIC VASCULAR SURGERY)

## 2021-11-22 PROCEDURE — 97530 THERAPEUTIC ACTIVITIES: CPT

## 2021-11-22 PROCEDURE — 65660000004 HC RM CVT STEPDOWN

## 2021-11-22 PROCEDURE — 97110 THERAPEUTIC EXERCISES: CPT

## 2021-11-22 PROCEDURE — 82962 GLUCOSE BLOOD TEST: CPT

## 2021-11-22 PROCEDURE — 77030012890

## 2021-11-22 PROCEDURE — 84132 ASSAY OF SERUM POTASSIUM: CPT

## 2021-11-22 RX ADMIN — Medication 10 ML: at 16:27

## 2021-11-22 RX ADMIN — METOPROLOL TARTRATE 25 MG: 25 TABLET, FILM COATED ORAL at 21:12

## 2021-11-22 RX ADMIN — FAMOTIDINE 20 MG: 20 TABLET ORAL at 18:41

## 2021-11-22 RX ADMIN — FUROSEMIDE 40 MG: 40 TABLET ORAL at 09:10

## 2021-11-22 RX ADMIN — METOPROLOL TARTRATE 25 MG: 25 TABLET, FILM COATED ORAL at 09:10

## 2021-11-22 RX ADMIN — POTASSIUM CHLORIDE 10 MEQ: 10 TABLET, EXTENDED RELEASE ORAL at 09:10

## 2021-11-22 RX ADMIN — FAMOTIDINE 20 MG: 20 TABLET ORAL at 09:11

## 2021-11-22 RX ADMIN — METFORMIN HYDROCHLORIDE 500 MG: 500 TABLET ORAL at 09:11

## 2021-11-22 RX ADMIN — ATORVASTATIN CALCIUM 80 MG: 80 TABLET, FILM COATED ORAL at 21:12

## 2021-11-22 RX ADMIN — Medication 10 ML: at 21:12

## 2021-11-22 RX ADMIN — Medication 10 ML: at 06:03

## 2021-11-22 RX ADMIN — Medication 1 AMPULE: at 09:10

## 2021-11-22 RX ADMIN — INSULIN LISPRO 2 UNITS: 100 INJECTION, SOLUTION INTRAVENOUS; SUBCUTANEOUS at 21:16

## 2021-11-22 RX ADMIN — AMIODARONE HYDROCHLORIDE 200 MG: 200 TABLET ORAL at 21:12

## 2021-11-22 RX ADMIN — Medication 1 AMPULE: at 21:12

## 2021-11-22 RX ADMIN — AMIODARONE HYDROCHLORIDE 200 MG: 200 TABLET ORAL at 09:10

## 2021-11-22 NOTE — PROGRESS NOTES
Problem: Diabetes Self-Management  Goal: *Disease process and treatment process  Description: Define diabetes and identify own type of diabetes; list 3 options for treating diabetes. Outcome: Progressing Towards Goal  Goal: *Incorporating nutritional management into lifestyle  Description: Describe effect of type, amount and timing of food on blood glucose; list 3 methods for planning meals. Outcome: Progressing Towards Goal  Goal: *Incorporating physical activity into lifestyle  Description: State effect of exercise on blood glucose levels. Outcome: Progressing Towards Goal  Goal: *Developing strategies to promote health/change behavior  Description: Define the ABC's of diabetes; identify appropriate screenings, schedule and personal plan for screenings. Outcome: Progressing Towards Goal  Goal: *Using medications safely  Description: State effect of diabetes medications on diabetes; name diabetes medication taking, action and side effects. Outcome: Progressing Towards Goal  Goal: *Monitoring blood glucose, interpreting and using results  Description: Identify recommended blood glucose targets  and personal targets. Outcome: Progressing Towards Goal  Goal: *Prevention, detection, treatment of acute complications  Description: List symptoms of hyper- and hypoglycemia; describe how to treat low blood sugar and actions for lowering  high blood glucose level. Outcome: Progressing Towards Goal  Goal: *Prevention, detection and treatment of chronic complications  Description: Define the natural course of diabetes and describe the relationship of blood glucose levels to long term complications of diabetes.   Outcome: Progressing Towards Goal  Goal: *Developing strategies to address psychosocial issues  Description: Describe feelings about living with diabetes; identify support needed and support network  Outcome: Progressing Towards Goal  Goal: *Insulin pump training  Outcome: Progressing Towards Goal  Goal: *Sick day guidelines  Outcome: Progressing Towards Goal  Goal: *Patient Specific Goal (EDIT GOAL, INSERT TEXT)  Outcome: Progressing Towards Goal     Problem: Patient Education: Go to Patient Education Activity  Goal: Patient/Family Education  Outcome: Progressing Towards Goal     Problem: Falls - Risk of  Goal: *Absence of Falls  Description: Document Akil [place] Fall Risk and appropriate interventions in the flowsheet. Outcome: Progressing Towards Goal  Note: Fall Risk Interventions:  Mobility Interventions: Bed/chair exit alarm         Medication Interventions: Bed/chair exit alarm    Elimination Interventions: Bed/chair exit alarm              Problem: Patient Education: Go to Patient Education Activity  Goal: Patient/Family Education  Outcome: Progressing Towards Goal     Problem: Ventilator Management  Goal: *Adequate oxygenation and ventilation  Outcome: Progressing Towards Goal  Goal: *Patient maintains clear airway/free of aspiration  Outcome: Progressing Towards Goal  Goal: *Absence of infection signs and symptoms  Outcome: Progressing Towards Goal  Goal: *Normal spontaneous ventilation  Outcome: Progressing Towards Goal     Problem: Patient Education: Go to Patient Education Activity  Goal: Patient/Family Education  Outcome: Progressing Towards Goal     Problem: Pressure Injury - Risk of  Goal: *Prevention of pressure injury  Description: Document Zev Scale and appropriate interventions in the flowsheet.   Outcome: Progressing Towards Goal  Note: Pressure Injury Interventions:  Sensory Interventions: Discuss PT/OT consult with provider, Pad between skin to skin, Pressure redistribution bed/mattress (bed type)    Moisture Interventions: Check for incontinence Q2 hours and as needed    Activity Interventions: Pressure redistribution bed/mattress(bed type)    Mobility Interventions: Pressure redistribution bed/mattress (bed type)    Nutrition Interventions: Document food/fluid/supplement intake    Friction and Shear Interventions: Minimize layers, Lift team/patient mobility team                Problem: Patient Education: Go to Patient Education Activity  Goal: Patient/Family Education  Outcome: Progressing Towards Goal     Problem: Patient Education: Go to Patient Education Activity  Goal: Patient/Family Education  Outcome: Progressing Towards Goal     Problem: CABG: Pre-Op Day  Goal: Off Pathway (Use only if patient is Off Pathway)  Outcome: Progressing Towards Goal  Goal: Activity/Safety  Outcome: Progressing Towards Goal  Goal: Consults, if ordered  Outcome: Progressing Towards Goal  Goal: Diagnostic Test/Procedures  Outcome: Progressing Towards Goal  Goal: Nutrition/Diet  Outcome: Progressing Towards Goal  Goal: Medications  Outcome: Progressing Towards Goal  Goal: Treatments/Interventions/Procedures  Outcome: Progressing Towards Goal  Goal: Discharge Planning  Outcome: Progressing Towards Goal  Goal: Psychosocial  Outcome: Progressing Towards Goal  Goal: *Hemodynamically stable  Outcome: Progressing Towards Goal  Goal: *Consent obtained, permits and diagnostics complete  Outcome: Progressing Towards Goal     Problem: CABG: Day of Surgery (Initiate SCIP measures for post-op care)  Goal: Off Pathway (Use only if patient is Off Pathway)  Outcome: Progressing Towards Goal  Goal: Activity/Safety  Outcome: Progressing Towards Goal  Goal: Consults, if ordered  Outcome: Progressing Towards Goal  Goal: Diagnostic Test/Procedures  Outcome: Progressing Towards Goal  Goal: Nutrition/Diet  Outcome: Progressing Towards Goal  Goal: Medications  Outcome: Progressing Towards Goal  Goal: Respiratory  Outcome: Progressing Towards Goal  Goal: Treatments/Interventions/Procedures  Outcome: Progressing Towards Goal  Goal: Psychosocial  Outcome: Progressing Towards Goal  Goal: *Hemodynamically stable (eg: Cardiac output/index; pulmonary arterial pressures; mixed venous oxygen saturation within set parameters)  Outcome: Progressing Towards Goal  Goal: *Lungs clear or at baseline  Outcome: Progressing Towards Goal  Goal: *Stable cardiac rhythm  Outcome: Progressing Towards Goal  Goal: *Afebrile  Outcome: Progressing Towards Goal  Goal: *Follows simple commands post anesthesia  Outcome: Progressing Towards Goal  Goal: *Orients easily following extubation  Outcome: Progressing Towards Goal  Goal: *Optimal pain control at patient's stated goal  Outcome: Progressing Towards Goal     Problem: CABG: Post-Op Day 1  Goal: Off Pathway (Use only if patient is Off Pathway)  Outcome: Progressing Towards Goal  Goal: Activity/Safety  Outcome: Progressing Towards Goal  Goal: Consults, if ordered  Outcome: Progressing Towards Goal  Goal: Diagnostic Test/Procedures  Outcome: Progressing Towards Goal  Goal: Nutrition/Diet  Outcome: Progressing Towards Goal  Goal: Medications  Outcome: Progressing Towards Goal  Goal: Respiratory  Outcome: Progressing Towards Goal  Goal: Treatments/Interventions/Procedures  Outcome: Progressing Towards Goal  Goal: Psychosocial  Outcome: Progressing Towards Goal  Goal: *Hemodynamically stable without vasoactive medications  Outcome: Progressing Towards Goal  Goal: *Lungs clear or at baseline  Outcome: Progressing Towards Goal  Goal: *Mechanical ventilation discontinued  Outcome: Progressing Towards Goal  Goal: *Optimal pain control at patient's stated goal  Outcome: Progressing Towards Goal  Goal: *Demonstrates progressive activity  Outcome: Progressing Towards Goal  Goal: *Tolerating diet  Outcome: Progressing Towards Goal  Goal: *Level of consciousness returns to baseline  Outcome: Progressing Towards Goal     Problem: CABG: Post-Op Day 2/Transfer Day  Goal: Off Pathway (Use only if patient is Off Pathway)  Outcome: Progressing Towards Goal  Goal: Activity/Safety  Outcome: Progressing Towards Goal  Goal: Consults, if ordered  Outcome: Progressing Towards Goal  Goal: Diagnostic Test/Procedures  Outcome: Progressing Towards Goal  Goal: Nutrition/Diet  Outcome: Progressing Towards Goal  Goal: Medications  Outcome: Progressing Towards Goal  Goal: Discharge Planning  Outcome: Progressing Towards Goal  Goal: Respiratory  Outcome: Progressing Towards Goal  Goal: Treatments/Interventions/Procedures  Outcome: Progressing Towards Goal  Goal: Psychosocial  Outcome: Progressing Towards Goal  Goal: *Hemodynamically stable without vasoactive medications  Outcome: Progressing Towards Goal  Goal: *Lungs clear or at baseline  Outcome: Progressing Towards Goal  Goal: *Optimal pain control at patient's stated goal  Outcome: Progressing Towards Goal  Goal: *Demonstrates progressive activity  Outcome: Progressing Towards Goal  Goal: *Tolerating diet  Outcome: Progressing Towards Goal     Problem: CABG: Post-Op Day 3  Goal: Off Pathway (Use only if patient is Off Pathway)  Outcome: Progressing Towards Goal  Goal: Activity/Safety  Outcome: Progressing Towards Goal  Goal: Consults, if ordered  Outcome: Progressing Towards Goal  Goal: Diagnostic Test/Procedures  Outcome: Progressing Towards Goal  Goal: Nutrition/Diet  Outcome: Progressing Towards Goal  Goal: Discharge Planning  Outcome: Progressing Towards Goal  Goal: Medications  Outcome: Progressing Towards Goal  Goal: Respiratory  Outcome: Progressing Towards Goal  Goal: Treatments/Interventions/Procedures  Outcome: Progressing Towards Goal  Goal: Psychosocial  Outcome: Progressing Towards Goal  Goal: *Hemodynamically stable  Outcome: Progressing Towards Goal  Goal: *Lungs clear or at baseline  Outcome: Progressing Towards Goal  Goal: *Optimal pain control at patient's stated goal  Outcome: Progressing Towards Goal  Goal: *Incisions without signs and symptoms of wound complication  Outcome: Progressing Towards Goal  Goal: *Demonstrates progressive activity  Outcome: Progressing Towards Goal  Goal: *Tolerating diet  Outcome: Progressing Towards Goal     Problem: CABG: Post-Op Day 4  Goal: Off Pathway (Use only if patient is Off Pathway)  Outcome: Progressing Towards Goal  Goal: Activity/Safety  Outcome: Progressing Towards Goal  Goal: Diagnostic Test/Procedures  Outcome: Progressing Towards Goal  Goal: Nutrition/Diet  Outcome: Progressing Towards Goal  Goal: Medications  Outcome: Progressing Towards Goal  Goal: Discharge Planning  Outcome: Progressing Towards Goal  Goal: Respiratory  Outcome: Progressing Towards Goal  Goal: Treatments/Interventions/Procedures  Outcome: Progressing Towards Goal  Goal: Psychosocial  Outcome: Progressing Towards Goal  Goal: *Hemodynamically stable  Outcome: Progressing Towards Goal  Goal: *Lungs clear or at baseline  Outcome: Progressing Towards Goal  Goal: *Optimal pain control at patient's stated goal  Outcome: Progressing Towards Goal  Goal: *Incisions without signs and symptoms of wound complication  Outcome: Progressing Towards Goal  Goal: *Demonstrates progressive activity  Outcome: Progressing Towards Goal  Goal: *Tolerating diet  Outcome: Progressing Towards Goal     Problem: CABG: Post-Op Day 5  Goal: Off Pathway (Use only if patient is Off Pathway)  Outcome: Progressing Towards Goal  Goal: Activity/Safety  Outcome: Progressing Towards Goal  Goal: Diagnostic Test/Procedures  Outcome: Progressing Towards Goal  Goal: Nutrition/Diet  Outcome: Progressing Towards Goal  Goal: Discharge Planning  Outcome: Progressing Towards Goal  Goal: Medications  Outcome: Progressing Towards Goal  Goal: Respiratory  Outcome: Progressing Towards Goal  Goal: Treatments/Interventions/Procedures  Outcome: Progressing Towards Goal  Goal: Psychosocial  Outcome: Progressing Towards Goal     Problem: CABG: Discharge Outcomes  Goal: *Hemodynamically stable  Outcome: Progressing Towards Goal  Goal: *Stable cardiac rhythm  Outcome: Progressing Towards Goal  Goal: *Lungs clear or at baseline  Outcome: Progressing Towards Goal  Goal: *Demonstrates ability to perform prescribed activity without shortness of breath or discomfort  Outcome: Progressing Towards Goal  Goal: *Verbalizes home exercise program, activity guidelines, cardiac precautions  Outcome: Progressing Towards Goal  Goal: *Optimal pain control at patient's stated goal  Outcome: Progressing Towards Goal  Goal: *Verbalizes understanding and describes prescribed diet  Outcome: Progressing Towards Goal  Goal: *Verbalizes name, dosage, time, side effects, and number of days to continue medications  Outcome: Progressing Towards Goal  Goal: *Weight  is stable  Outcome: Progressing Towards Goal  Goal: *No signs and symptoms of infection or wound complications  Outcome: Progressing Towards Goal  Goal: *Anxiety reduced or absent  Outcome: Progressing Towards Goal  Goal: *Verbalizes and demonstrates incision care  Outcome: Progressing Towards Goal  Goal: *Understands and describes signs and symptoms to report to providers(Stroke Metric)  Outcome: Progressing Towards Goal  Goal: *Describes follow-up/return visits to physicians  Outcome: Progressing Towards Goal  Goal: *Describes available resources and support systems  Outcome: Progressing Towards Goal  Goal: *Expresses feelings about diagnosis and surgery  Outcome: Progressing Towards Goal  Goal: *Influenza immunization  Outcome: Progressing Towards Goal  Goal: *Pneumococcal immunization  Outcome: Progressing Towards Goal

## 2021-11-22 NOTE — PROGRESS NOTES
ACUTE PHYSICAL THERAPY GOALS:  (Developed with and agreed upon by patient and/or caregiver.)  STG:  (1.)Mr. Ana Mauro will move from supine to sit and sit to supine , scoot up and down and roll side to side with STAND BY ASSIST within 4 treatment day(s). (2.)Mr. Ana Mauro will transfer from bed to chair and chair to bed with STAND BY ASSIST using the least restrictive device within 4 treatment day(s). (3.)Mr. Ana Mauro will ambulate with STAND BY ASSIST for 500 feet with the least restrictive device within 4 treatment day(s).      LTG:  (1.)Mr. Ana Mauro will move from supine to sit and sit to supine , scoot up and down and roll side to side in bed with MODIFIED INDEPENDENCE within 7 treatment day(s). (2.)Mr. Ana Mauro will transfer from bed to chair and chair to bed with MODIFIED INDEPENDENCE using the least restrictive device within 7 treatment day(s). (3.)Mr. Ana Mauro will ambulate with MODIFIED INDEPENDENCE for 1000 feet with the least restrictive device within 7 treatment day(s).   ________________________________________________________________________________________________        PHYSICAL THERAPY: Daily Note and AM Treatment Day # 3    Vern Norman is a 63 y.o. male   PRIMARY DIAGNOSIS: <principal problem not specified>  Aortic valve stenosis, etiology of cardiac valve disease unspecified [I35.0]  Atherosclerosis of native coronary artery of native heart with unstable angina pectoris (HCC) [I25.110]  CAD (coronary artery disease) [I25.10]  Aortic stenosis, severe [I35.0]  Procedure(s) (LRB):  CORONARY ARTERY BYPASS GRAFT (CABGX1), WITH AVR (REPLACEMENT), LYSIS OF MYOCARDIAL ADHESIONS (N/A)  STERNOTOMY REDO (N/A)  ESOPHAGEAL TRANS ECHOCARDIOGRAM (N/A)  VEIN HARVEST, GREATER SAPHENOUS VEIN (Right)  4 Days Post-Op    ASSESSMENT:     REHAB RECOMMENDATIONS: CURRENT LEVEL OF FUNCTION:  (Most Recently Demonstrated)   Recommendation to date pending progress:  [address] [place] [address]  Equipment:    None Bed Mobility:   Not tested  Sit to Stand:   Supervision  Transfers:   Supervision  Gait/Mobility:   Supervision     ASSESSMENT:  Mr. Loyda Nuñez is sitting in the recliner and agreeable to therapy. Sit to stand with supervision and is able to don his robe. Balance is good. Gait training without AD x 200 feet with slow but steady christy. Stairs. Returned to the recliner and participated in therapeutic exercises. Tolerated well. Making excellent progress towards goals. Continue PT efforts. Home with HHPT . SUBJECTIVE:   Mr. Loyda Nuñez states, \"Good morning. \"    SOCIAL HISTORY/ LIVING ENVIRONMENT: see eval  Home Environment: Private residence  One/Two Story Residence: One story  Living Alone: No  Support Systems: Spouse/Significant Other  OBJECTIVE:     PAIN: VITAL SIGNS: LINES/DRAINS:   Pre Treatment: Pain Screen  Pain Scale 1: Numeric (0 - 10)  Pain Intensity 1: 0/10  Post Treatment: 0/10   none  O2 Device: None (Room air)     MOBILITY: I Mod I S SBA CGA Min Mod Max Total  NT x2 Comments:   Bed Mobility    Rolling [] [] [] [] [] [] [] [] [] [x] []    Supine to Sit [] [] [] [] [] [] [] [] [] [x] []    Scooting [] [] [] [] [] [] [] [] [] [x] []    Sit to Supine [] [] [] [] [] [] [] [] [] [x] []    Transfers    Sit to Stand [] [] [x] [] [] [] [] [] [] [] []    Bed to Chair [] [] [] [] [] [] [] [] [] [x] []    Stand to Sit [] [] [x] [] [] [] [] [] [] [] []    I=Independent, Mod I=Modified Independent, S=Supervision, SBA=Standby Assistance, CGA=Contact Guard Assistance,   Min=Minimal Assistance, Mod=Moderate Assistance, Max=Maximal Assistance, Total=Total Assistance, NT=Not Tested    BALANCE: Good Fair+ Fair Fair- Poor NT Comments   Sitting Static [x] [] [] [] [] []    Sitting Dynamic [x] [] [] [] [] []              Standing Static [x] [] [] [] [] []    Standing Dynamic [x] [] [] [] [] []      GAIT: I Mod I S SBA CGA Min Mod Max Total  NT x2 Comments:   Level of Assistance [] [] [x] [] [] [] [] [] [] [] [] Distance 200 feet     DME None    Gait Quality steady    Weightbearing  Status N/A     I=Independent, Mod I=Modified Independent, S=Supervision, SBA=Standby Assistance, CGA=Contact Guard Assistance,   Min=Minimal Assistance, Mod=Moderate Assistance, Max=Maximal Assistance, Total=Total Assistance, NT=Not Tested    PLAN:   FREQUENCY/DURATION: PT Plan of Care: BID for duration of hospital stay or until stated goals are met, whichever comes first.  TREATMENT:     TREATMENT:   ($$ Therapeutic Activity: 8-22 mins  $$ Therapeutic Exercises: 8-22 mins    )  Therapeutic Activity (10 Minutes): Therapeutic activity included Transfer Training, Ambulation on level ground, Sitting balance  and Standing balance to improve functional Mobility, Strength and Activity tolerance. Therapeutic Exercise (9 Minutes): Therapeutic exercises noted below to improve functional activity tolerance, strength and mobility.      TREATMENT GRID:     Date:  11/22/21 Date:   Date:     ACTIVITY/EXERCISE AM PM AM PM AM PM   LAQ    20        Shoulder shrugs 20        Gluteal sets 20        marching 20        Ankle pumps 20        abduction 20                 B = bilateral; AA = active assistive; A = active; P = passive    AFTER TREATMENT POSITION/PRECAUTIONS:  Alarm Activated, Chair, Needs within reach and RN notified    INTERDISCIPLINARY COLLABORATION:  RN/PCT and PT/PTA    TOTAL TREATMENT DURATION:  PT Patient Time In/Time Out  Time In: 0847  Time Out: 0906    Jordan Jett PTA

## 2021-11-22 NOTE — PROGRESS NOTES
Today's Date: 11/22/2021  Date of Admission: 11/18/2021    Chart Reviewed. Subjective:     Patient feels ok. Appetite is good. He denies any dyspnea. Medications Reviewed. Objective:     Vitals:    11/21/21 2310 11/22/21 0320 11/22/21 0610 11/22/21 0756   BP: (!) 100/58 (!) 99/56  137/66   Pulse: 80 76  84   Resp: 18   18   Temp: 98.4 °F (36.9 °C) 97.7 °F (36.5 °C)  97.8 °F (36.6 °C)   SpO2: 95% 94%  98%   Weight:   182 lb 4.8 oz (82.7 kg)    Height:           Intake and Output  Current Shift: 11/22 0701 - 11/22 1900  In: 240 [P.O.:240]  Out: -    Last 3 Shifts: 11/20 1901 - 11/22 0700  In: 9546 [P.O.:1740]  Out: -     Physical Exam:  General: Well Developed, Well Nourished, No Acute Distress, Alert & Oriented x 3, Appropriate mood  Neck: supple, no JVD  Heart: S1S2 with RRR without murmurs or gallops  Lungs: Clear throughout auscultation bilaterally without adventitious sounds  Abd: soft, nontender, nondistended, with good bowel sounds  Ext: no edema bilaterally  Sternal incision: clean, dry, and intact  Skin: warm and dry    LABS  Data Review:   Recent Labs     11/22/21  0319 11/21/21  0333 11/20/21  0324 11/20/21  0324   NA  --  139  --  142   K 4.1 4.3   < > 4.4   MG 2.3 2.5*   < > 2.2   BUN  --  38*  --  27*   CREA  --  1.10  --  1.25   GLU  --  161*  --  205*   WBC 6.8 8.9   < > 8.8   HGB 9.6* 9.7*   < > 9.0*   HCT 30.0* 31.1*   < > 29.1*   PLT 83* 91*   < > 74*    < > = values in this interval not displayed. Estimated Creatinine Clearance: 71 mL/min (based on SCr of 1.1 mg/dL).       Assessment/Plan:     Principal Problem:    S/P redo sternotomy/AVR (11/18/2021)  Holding ASA for now, continue BB, stable on room air, continue PT    Active Problems:    S/P CABG x 1 (2/28/2017)  Holding ASA, continue BB, statin      CAD (coronary artery disease) (11/18/2021)  S/p CABG      Aortic stenosis, severe (11/18/2021)  S/p AVR       Atherosclerosis of native coronary artery of native heart with unstable angina pectoris ([facility] [address] (11/18/2021)  S/p CABG, continue medical therapy       Encounter for weaning from ventilator [facility]) (11/18/2021)        [facility] (11/22/2021)  Hold ASA, repeat CBC in AM         Yogi Wolff PA-C

## 2021-11-22 NOTE — PROGRESS NOTES
Received bedside shift report from off going nurse, Kristian Powers RN. Patient is resting quietly and denies needs at this time.

## 2021-11-22 NOTE — PROGRESS NOTES
Problem: Patient Education: Go to Patient Education Activity  Goal: Patient/Family Education  Outcome: Progressing Towards Goal     Problem: Falls - Risk of  Goal: *Absence of Falls  Description: Document [place] Fall Risk and appropriate interventions in the flowsheet.   Outcome: Progressing Towards Goal  Note: Fall Risk Interventions:  Mobility Interventions: Assess mobility with egress test, Bed/chair exit alarm, Patient to call before getting OOB, Strengthening exercises (ROM-active/passive)         Medication Interventions: Patient to call before getting OOB, Teach patient to arise slowly    Elimination Interventions: Call light in reach, Bed/chair exit alarm              Problem: Patient Education: Go to Patient Education Activity  Goal: Patient/Family Education  Outcome: Progressing Towards Goal     Problem: CABG: Post-Op Day 3  Goal: Off Pathway (Use only if patient is Off Pathway)  Outcome: Progressing Towards Goal  Goal: Activity/Safety  Outcome: Progressing Towards Goal  Goal: Consults, if ordered  Outcome: Progressing Towards Goal  Goal: Diagnostic Test/Procedures  Outcome: Progressing Towards Goal  Goal: Nutrition/Diet  Outcome: Progressing Towards Goal  Goal: Discharge Planning  Outcome: Progressing Towards Goal  Goal: Medications  Outcome: Progressing Towards Goal  Goal: Respiratory  Outcome: Progressing Towards Goal  Goal: Treatments/Interventions/Procedures  Outcome: Progressing Towards Goal  Goal: Psychosocial  Outcome: Progressing Towards Goal  Goal: *Hemodynamically stable  Outcome: Progressing Towards Goal  Goal: *Lungs clear or at baseline  Outcome: Progressing Towards Goal  Goal: *Optimal pain control at patient's stated goal  Outcome: Progressing Towards Goal  Goal: *Incisions without signs and symptoms of wound complication  Outcome: Progressing Towards Goal  Goal: *Demonstrates progressive activity  Outcome: Progressing Towards Goal  Goal: *Tolerating diet  Outcome: Progressing Towards Goal

## 2021-11-22 NOTE — PROGRESS NOTES
ACUTE PHYSICAL THERAPY GOALS:  (Developed with and agreed upon by patient and/or caregiver.)  STG:  (1.)Mr. Ana Mauro will move from supine to sit and sit to supine , scoot up and down and roll side to side with STAND BY ASSIST within 4 treatment day(s). (2.)Mr. Ana Mauro will transfer from bed to chair and chair to bed with STAND BY ASSIST using the least restrictive device within 4 treatment day(s). (3.)Mr. Ana Mauro will ambulate with STAND BY ASSIST for 500 feet with the least restrictive device within 4 treatment day(s).      LTG:  (1.)Mr. Ana Mauro will move from supine to sit and sit to supine , scoot up and down and roll side to side in bed with MODIFIED INDEPENDENCE within 7 treatment day(s). (2.)Mr. Ana Mauro will transfer from bed to chair and chair to bed with MODIFIED INDEPENDENCE using the least restrictive device within 7 treatment day(s). (3.)Mr. Ana Mauro will ambulate with MODIFIED INDEPENDENCE for 1000 feet with the least restrictive device within 7 treatment day(s).   ________________________________________________________________________________________________        PHYSICAL THERAPY: Daily Note and PM Treatment Day # 3    Vern Norman is a 63 y.o. male   PRIMARY DIAGNOSIS: S/P AVR  Aortic valve stenosis, etiology of cardiac valve disease unspecified [I35.0]  Atherosclerosis of native coronary artery of native heart with unstable angina pectoris (HCC) [I25.110]  CAD (coronary artery disease) [I25.10]  Aortic stenosis, severe [I35.0]  Procedure(s) (LRB):  CORONARY ARTERY BYPASS GRAFT (CABGX1), WITH AVR (REPLACEMENT), LYSIS OF MYOCARDIAL ADHESIONS (N/A)  STERNOTOMY REDO (N/A)  ESOPHAGEAL TRANS ECHOCARDIOGRAM (N/A)  VEIN HARVEST, GREATER SAPHENOUS VEIN (Right)  4 Days Post-Op    ASSESSMENT:     REHAB RECOMMENDATIONS: CURRENT LEVEL OF FUNCTION:  (Most Recently Demonstrated)   Recommendation to date pending progress:  [address] [place] [address]  Equipment:    None Bed Mobility:   Not tested  Sit to Stand:   Supervision  Transfers:   Supervision  Gait/Mobility:  [facility] Supervision     ASSESSMENT:  Mr. Columba Garza is sitting in the recliner and agreeable to therapy. Sit to stand with supervision and is able to don his robe. Balance is good. Gait training without AD x 200 feet with slow but steady christy. Stairs. Returned to the recliner and participated in therapeutic exercises. Tolerated well. Making excellent progress towards goals. Continue PT efforts. Home with HHPT . PM note:  Agreeable. Patient was able to maintain hi am gait distance and exercises. Making good progress. Is a pleasure to work with. Left in the recliner but the assisted to the bathroom with instructions to call.        SUBJECTIVE:   Mr. Columba Garza states, \"I'm ready\"    SOCIAL HISTORY/ LIVING ENVIRONMENT: see eval  Home Environment: Private residence  One/Two Story Residence: One story  Living Alone: No  Support Systems: Spouse/Significant Other  OBJECTIVE:     PAIN: VITAL SIGNS: LINES/DRAINS:   Pre Treatment: Pain Screen  Pain Scale 1: Numeric (0 - 10)  Pain Intensity 1: 0/10  Post Treatment: 0/10   none  O2 Device: None (Room air)     MOBILITY: I Mod I S SBA CGA Min Mod Max Total  NT x2 Comments:   Bed Mobility    Rolling [] [] [] [] [] [] [] [] [] [x] []    Supine to Sit [] [] [] [] [] [] [] [] [] [x] []    Scooting [] [] [] [] [] [] [] [] [] [x] []    Sit to Supine [] [] [] [] [] [] [] [] [] [x] []    Transfers    Sit to Stand [] [] [x] [] [] [] [] [] [] [] []    Bed to Chair [] [] [] [] [] [] [] [] [] [x] []    Stand to Sit [] [] [x] [] [] [] [] [] [] [] []    I=Independent, Mod I=Modified Independent, S=Supervision, SBA=Standby Assistance, CGA=Contact Guard Assistance,   Min=Minimal Assistance, Mod=Moderate Assistance, Max=Maximal Assistance, Total=Total Assistance, NT=Not Tested    BALANCE: Good Fair+ Fair Fair- Poor NT Comments   Sitting Static [x] [] [] [] [] []    Sitting Dynamic [x] [] [] [] [] [] Standing Static [x] [] [] [] [] []    Standing Dynamic [x] [] [] [] [] []      GAIT: I Mod I S SBA CGA Min Mod Max Total  NT x2 Comments:   Level of Assistance [] [] [x] [] [] [] [] [] [] [] []    Distance 200 feet     DME None    Gait Quality steady    Weightbearing  Status N/A     I=Independent, Mod I=Modified Independent, S=Supervision, SBA=Standby Assistance, CGA=Contact Guard Assistance,   Min=Minimal Assistance, Mod=Moderate Assistance, Max=Maximal Assistance, Total=Total Assistance, NT=Not Tested    PLAN:   FREQUENCY/DURATION: PT Plan of Care: BID for duration of hospital stay or until stated goals are met, whichever comes first.  TREATMENT:     TREATMENT:   ($$ Therapeutic Activity: 8-22 mins  $$ Therapeutic Exercises: 8-22 mins    )  Therapeutic Activity (12 Minutes): Therapeutic activity included Transfer Training, Ambulation on level ground, Sitting balance  and Standing balance to improve functional Mobility, Strength and Activity tolerance. Therapeutic Exercise (11 Minutes): Therapeutic exercises noted below to improve functional activity tolerance, strength and mobility.      TREATMENT GRID:     Date:  11/22/21 Date:   Date:     ACTIVITY/EXERCISE AM PM AM PM AM PM   LAQ    20 20       Shoulder shrugs 20 20       Gluteal sets 20 20       marching 20 20       Ankle pumps 20 20       abduction 20 20                B = bilateral; AA = active assistive; A = active; P = passive    AFTER TREATMENT POSITION/PRECAUTIONS:  Alarm Activated, Chair, Needs within reach and RN notified    INTERDISCIPLINARY COLLABORATION:  RN/PCT and PT/PTA    TOTAL TREATMENT DURATION:  PT Patient Time In/Time Out  Time In: 1510  Time Out: [address] [facility]    Mariya Mchugh PTA

## 2021-11-22 NOTE — DISCHARGE SUMMARY
Discharge Summary     Patient ID:  Vern Norman  [id number]  27 y.o.  1955    Admit date: 11/18/2021    Discharge date:  11/23/2021    Admitting Physician: Demi Donahue MD     Discharge Physician: RACHEL Santiago/Dr. Yuliya Morris    Admission Diagnoses:  Aortic valve stenosis, etiology of cardiac valve disease unspecified [I35.0]  Atherosclerosis of native coronary artery of native heart with unstable angina pectoris ([address] [I25.110]  CAD (coronary artery disease) [I25.10]  Aortic stenosis, severe [I35.0]    Discharge Diagnoses:   Patient Active Problem List    Diagnosis Date Noted    Thrombocytopenia ([address] 11/22/2021    CAD (coronary artery disease) 11/18/2021    Aortic stenosis, severe 11/18/2021    Aortic valve stenosis 11/18/2021    Atherosclerosis of native coronary artery of native heart with unstable angina pectoris ([address] 11/18/2021    Encounter for weaning from ventilator ([address] 11/18/2021    S/P AVR 11/18/2021    Nonrheumatic aortic valve stenosis 08/23/2021    Dyslipidemia 08/23/2021    Atrial flutter ([address] 08/23/2021    Moderate aortic regurgitation 08/09/2019    Uncontrolled type 2 diabetes with neuropathy ([address] 12/05/2017    Uncontrolled type 2 diabetes mellitus with right eye affected by proliferative retinopathy and macular edema, with long-term current use of insulin ([address] 08/23/2017    Uncontrolled type 2 diabetes mellitus with left eye affected by proliferative retinopathy without macular edema, with long-term current use of insulin ([address] 08/23/2017    Postsurgical cardiac pacemaker in situ 08/17/2017    ICD (implantable cardioverter-defibrillator) in place 21/34/4377    Systolic CHF, chronic ([address] 03/02/2017    CAD, multiple vessel 02/28/2017    S/P CABG x 1 02/28/2017    Vitreous hemorrhage, both eyes (HCC)--Dr. Kim 02/27/2017    Hypertriglyceridemia 02/27/2017    History of non-ST elevation myocardial infarction (NSTEMI): March 2017 02/27/2017    Depressed left ventricular ejection fraction 02/27/2017    Retinopathy of both eyes w/ macular puckering--Dr. Kim     Allergic rhinitis, cause unspecified 03/07/2014    Esophageal reflux 03/07/2014    Essential hypertension, benign 03/07/2014    History of testicular cancer 01/01/1979       Procedures this admission:  Redo sternotomy, Aortic Valve Replacement, Coronary Artery Bypass Graft Surgery   Consults: Pulmonary/Intensive Care     Hospital Course: Patient presented for elective redo sternotomy, AVR/CABG surgery. He underwent CABG X 3 in 2017 in setting of NSTEMI and VF arrest. He recovered well and his EF normalized. He is followed in the office by Dr Stephenie Knowles. Echo in March of this year showed severe AS with MICHELLE of 0.96cm2, mean gradient of 33mmHg and peak gradient of 51mmHg as well as mitral regurgitation. He denied any symptoms at the time. He underwent CARLOS in June. The MICHELLE was 0.66cm2 with peak gradient of 73.74mmHg. He had mild MR. He was seen in consultation by Dr Alicia Carvajal for possible TAVR. LHC was planned. He underwent cardiac catheterization that showed obstructive disease in the LCx. TAVR/PCI vs AVR/CABG was discussed. He was leaning towards redo sternotomy/SAVR. He was seen back in the office. He wished to proceed with redo sternotomy/SAVR/CABG. He underwent redo sternotomy, lysis of myocardial adhesions, Aortic valve replacement with a 23mm Roe valve as well as CABG x 1 on 11/18/21. He was transfused. He was extubated the morning of 11/19. He was weaned off of dobutamine. He was transferred to  stepdown on 11/20. He was weaned off of O2. He had thrombocytopenia that gradually improved. He remained in sinus rhythm. He had frequent hicccups. The morning of 11/23/21, patient was feeling well and ambulating without any symptoms. Patient was determined stable and ready for discharge.  Patient was instructed on the importance of medication compliance and outpatient follow up.  For maximized medical therapy for CAD, patient will continue ASA, BB, ACE-I and statin as well. The patient will have close transitional care follow up with [facility] Cardiology Dr. Tina Child on November 29th at 11:30am THE [facility] AT [place]). The patient will follow up with Dr. Sagrario Ring on December 15th at 2:40pm and has been referred to cardiac rehab. DISPOSITION: The patient is being discharged home with home health services in stable condition on a low saturated fat, low cholesterol and low salt diet. The patient is instructed to advance activities as tolerated to the limit of fatigue or shortness of breath. The patient is instructed to avoid all heavy lifting or activities that strain the chest or upper arm muscles. Strenuous activity should be avoided. The patient is instructed to watch for signs of infection which include: increasing area of redness, fever or purulent drainage at the incision site. The patient is instructed to call the office or return to the ER for immediate evaluation for any shortness of breath or chest pain not relieved by NTG.     Discharge Exam:   Visit Vitals  BP (!) 143/73   Pulse 87   Temp 98.3 °F (36.8 °C)   Resp 18   Ht 5' 10\" (1.778 m)   Wt 181 lb 6.4 oz (82.3 kg)   SpO2 97%   BMI 26.03 kg/m²         Physical Exam:  General: Well Developed, Well Nourished, No Acute Distress, Alert & Oriented  Neck: supple, no JVD  Heart: S1S2 with RRR without murmurs or gallops  Lungs: Clear throughout auscultation bilaterally without adventitious sounds  Abd: soft, nontender, nondistended, with good bowel sounds  Ext: warm, no edema  Sternal incision: clean, dry, and intact  Skin: warm and dry      Recent Results (from the past 24 hour(s))   GLUCOSE, POC    Collection Time: 11/22/21 11:29 AM   Result Value Ref Range    Glucose (POC) 142 (H) 65 - 100 mg/dL    Performed by [facility]    GLUCOSE, POC    Collection Time: 11/22/21  4:04 PM   Result Value Ref Range    Glucose (POC) 133 (H) 65 - 100 mg/dL    Performed by [facility]    GLUCOSE, POC    Collection Time: 11/22/21  9:11 PM   Result Value Ref Range    Glucose (POC) 163 (H) 65 - 100 mg/dL    Performed by [facility]    MAGNESIUM    Collection Time: 11/23/21  3:43 AM   Result Value Ref Range    Magnesium 2.1 1.8 - 2.4 mg/dL   POTASSIUM    Collection Time: 11/23/21  3:43 AM   Result Value Ref Range    Potassium 3.6 3.5 - 5.1 mmol/L   CBC W/O DIFF    Collection Time: 11/23/21  3:43 AM   Result Value Ref Range    WBC 6.0 4.3 - 11.1 K/uL    RBC 3.35 (L) 4.23 - 5.6 M/uL    HGB 9.5 (L) 13.6 - 17.2 g/dL    HCT 29.4 (L) 41.1 - 50.3 %    MCV 87.8 79.6 - 97.8 FL    MCH 28.4 26.1 - 32.9 PG    MCHC 32.3 31.4 - 35.0 g/dL    RDW 13.2 11.9 - 14.6 %    PLATELET 858 (L) 068 - 450 K/uL    MPV 10.4 9.4 - 12.3 FL    ABSOLUTE NRBC 0.00 0.0 - 0.2 K/uL   GLUCOSE, POC    Collection Time: 11/23/21  7:01 AM   Result Value Ref Range    Glucose (POC) 101 (H) 65 - 100 mg/dL    Performed by [facility]          Patient Instructions:   Current Discharge Medication List      START taking these medications    Details   acetaminophen (TYLENOL) 325 mg tablet Take 2 Tablets by mouth every six (6) hours as needed for Pain. Start date: 11/23/2021      nitroglycerin (NITROSTAT) 0.4 mg SL tablet 1 Tablet by SubLINGual route every five (5) minutes as needed for Chest Pain for up to 3 doses. Up to 3 doses. Qty: 25 Tablet, Refills: 3  Start date: 11/23/2021      baclofen (LIORESAL) 10 mg tablet Take 0.5 Tablets by mouth three (3) times daily as needed (hiccups). Qty: 10 Tablet, Refills: 0  Start date: 11/23/2021         CONTINUE these medications which have CHANGED    Details   atorvastatin (Lipitor) 80 mg tablet Take 1 Tablet by mouth daily. Qty: 90 Tablet, Refills: 2  Start date: 11/23/2021         CONTINUE these medications which have NOT CHANGED    Details   aspirin delayed-release 81 mg tablet Take 81 mg by mouth daily.       metFORMIN (GLUCOPHAGE) 500 mg tablet Take 500 mg by mouth daily (with breakfast). metoprolol succinate (TOPROL-XL) 50 mg XL tablet Take 1 Tablet by mouth daily. Indications: high blood pressure, myocardial reinfarction prevention  Qty: 90 Tablet, Refills: 3      famotidine (PEPCID) 20 mg tablet Take 1 Tablet by mouth three (3) times daily. Qty: 270 Tablet, Refills: 3    Comments: (Famotidine 20mg on backorder)      lisinopriL (PRINIVIL, ZESTRIL) 2.5 mg tablet Take 1 Tablet by mouth daily. Qty: 90 Tablet, Refills: 3      dapagliflozin (Farxiga) 10 mg tab tablet Take 1 Tab by mouth daily. Qty: 30 Tab, Refills: 5      semaglutide (Ozempic) 1 mg/dose (4 mg/3 mL) pnij 1 mg by SubCUTAneous route every seven (7) days. Qty: 12 Each, Refills: 3    Comments: Please d/c any orders on file for ozempic 0.5mg  Associated Diagnoses: Uncontrolled type 2 diabetes with neuropathy (HCC)      rivaroxaban (XARELTO) 20 mg tab tablet Take 1 Tab by mouth daily (with breakfast).   Qty: 30 Tab, Refills: 11         STOP taking these medications       amiodarone (CORDARONE) 200 mg tablet Comments:   Reason for Stopping:               Signed:  Cami Cervantes PA-C  11/23/2021

## 2021-11-22 NOTE — ROUTINE PROCESS
Cardiac Rehab: Spoke with patient regarding referral to cardiac rehab. Patient meets admission criteria based on CABGx1 with AVR (11/18/21). Written information about Cardiac Rehab given and reviewed with patient. Discussed lifestyle modifications to promote cardiac wellness. Patient indicated that he wants to repeat the cardiac rehab program and his orientation has been scheduled.  His Cardiologist is Dr. Carlita Blanco.      Thank you,  MYLES Gusman, RN  Cardiopulmonary Rehabilitation Nurse Liaison  [facility]

## 2021-11-22 NOTE — DISCHARGE INSTRUCTIONS
All patients with prosthetic valves are considered among those at highest risk for endocarditis (infection of a heart valve). It is important to maintain good oral health care with regular use of a manual or powered toothbrush, dental floss or other plaque-removing devices. The risk of an infection on the heart valve is generally the highest with dental procedures which includes routine dental cleaning. You will need to take antibiotics before most dental procedures or oral surgery. You will also need antibiotics before procedures such as a having tonsils removed or procedures involving the lungs. If you require surgery for a skin infection, you will also need antibiotics to prevent infection of a heart valve. Please contact your cardiologist or primary care provider for further instructions. Patient Education      Aortic Valve Replacement Surgery: What to Expect at [address] [place] [address] Drive have had surgery to replace your heart's aortic valve. Your doctor did the surgery through a cut, called an incision, in your chest.  You will feel tired and sore for the first few weeks after surgery. You may have some brief, sharp pains on either side of your chest. Your chest, shoulders, and upper back may ache. The incision in your chest may be sore or swollen. These symptoms usually get better after 4 to 6 weeks. You will probably be able to do many of your usual activities after 4 to 6 weeks. But for at least 6 weeks, you will not be able to lift heavy objects or do activities that strain your chest or upper arm muscles. At first you may notice that you get tired easily and need to rest often. It may take 1 to 2 months to get your energy back. Some people find that they are more emotional after this surgery. You may cry easily or show emotion in ways that are unusual for you. This is common and may last for up to a year. Some people get depressed after this surgery.  Talk with your doctor if you have sadness that continues or you are concerned about how you are feeling. Treatment and other support can help you feel better. Even though you have a new aortic valve, it is still important to eat a heart-healthy diet, get regular exercise, stay at a healthy weight, take your medicine, and not smoke. Your doctor may suggest that you attend a cardiac rehab program. In cardiac rehab, a team of health professionals provides education and support to help you recover and prevent problems with your heart. Ask your doctor if rehab is right for you. This care sheet gives you a general idea about how long it will take for you to recover. But each person recovers at a different pace. Follow the steps below to get better as quickly as possible. How can you care for yourself at home? Activity    · Rest when you feel tired. Getting enough sleep will help you recover. Try to sleep on your back while you heal. If your breastbone (sternum) was cut, healing usually takes about 4 to 6 weeks.     · Try to walk each day. Start by walking a little more than you did the day before. Bit by bit, increase the amount you walk. Walking boosts blood flow and helps prevent pneumonia and constipation.     · Avoid strenuous activities, such as bicycle riding, jogging, weight lifting, or heavy aerobic exercise, until your doctor says it is okay.     · For 3 months, avoid activities that strain your chest or upper arm muscles. This includes pushing a  or vacuum, mopping floors, or swinging a golf club or tennis racquet.     · For at least 6 weeks, avoid lifting anything that would make you strain. This may include a child, heavy grocery bags and milk containers, a heavy briefcase or backpack, or cat litter or dog food bags.     · For at least 6 weeks, avoid pushing yourself up out of a bed or chair using your arms.  Do not use your arms to pull yourself into or out of a vehicle.     · Hold a pillow firmly over your chest incision when you cough or take deep breaths. This will support your chest and reduce your pain.     · Do breathing exercises at home as instructed by your doctor. This will help prevent pneumonia.     · Ask your doctor when you can drive again.     · You may need to take 4 to 12 weeks off from work. It depends on the type of work you do and how you feel.     · Ask your doctor when it is okay for you to have sex. Diet    · Eat a heart-healthy diet. If you have not been eating this way, talk to your doctor. You also may want to talk to a dietitian. A dietitian can help you plan meals and learn about healthy foods.     · Drink plenty of fluids (unless your doctor tells you not to).     · You may notice that your bowel movements are not regular right after your surgery. This is common. Try to avoid constipation and straining with bowel movements. You may want to take a fiber supplement every day. If you have not had a bowel movement after a couple of days, ask your doctor about taking a mild laxative. Medicines    · Your doctor will tell you if and when you can restart your medicines. You will also be given instructions about taking any new medicines.     · If you take aspirin or some other blood thinner, ask your doctor if and when to start taking it again. Make sure that you understand exactly what your doctor wants you to do.     · Be safe with medicines. Take your medicines exactly as prescribed. Call your doctor if you think you are having a problem with your medicine.     · Take pain medicines exactly as directed. ? If the doctor gave you a prescription medicine for pain, take it as prescribed. ? If you are not taking a prescription pain medicine, ask your doctor if you can take an over-the-counter medicine.   ? Do not take aspirin, ibuprofen (Advil, Motrin), naproxen (Aleve), or other nonsteroidal anti-inflammatory drugs (NSAIDs) unless your doctor says it is okay.     · If you think your pain medicine is making you sick to your stomach:  ? Take your medicine after meals (unless your doctor has told you not to). ? Ask your doctor for a different pain medicine.     · If your doctor prescribed antibiotics, take them as directed. Do not stop taking them just because you feel better. You need to take the full course of antibiotics.     · Your doctor may give you a blood thinner to prevent blood clots. If you take a blood thinner, be sure you get instructions about how to take your medicine safely. Blood thinners can cause serious bleeding problems. Incision care    · If you have strips of tape on the incision the doctor made, leave the tape on for a week or until it falls off.     · Wash the area daily with warm, soapy water and pat it dry. Don't use hydrogen peroxide or alcohol, which may delay healing. You may cover the area with a gauze bandage if it weeps or rubs against clothing. Change the bandage every day.     · You can take showers with your back to the showerhead. Allow the warm and soapy water to run across your shoulders and down over the incision. Pat the incision dry with a clean towel.     · Do not take a bath for the first 3 weeks, or until your doctor tells you it is okay.     · Do not swim or use a hot tub for at least 1 month, or until your doctor says it is okay.     · Do not use any creams, lotions, powders, ointments, or oils unless your doctor tells you it is okay. Other instructions    · Keep track of your weight. Weigh yourself every day at the same time of day, on the same scale, in the same amount of clothing. A sudden increase in weight can be a sign of a problem with your heart. Tell your doctor if you suddenly gain weight, such as 3 pounds or more in 2 to 3 days.     · Be sure to tell all your doctors and your dentist that you have an artificial aortic valve. This is important, because you may need to take antibiotics before certain procedures to prevent infection.    Follow-up care is a key part of your treatment and safety. Be sure to make and go to all appointments, and call your doctor if you are having problems. It's also a good idea to know your test results and keep a list of the medicines you take. When should you call for help? Call 911  anytime you think you may need emergency care. For example, call if:    · You passed out (lost consciousness).     · You have severe trouble breathing.     · You have severe pain in your chest.     · You have sudden chest pain and shortness of breath, or you cough up blood.     · You have symptoms of a stroke. These may include:  ? Sudden numbness, tingling, weakness, or loss of movement in your face, arm, or leg, especially on only one side of your body. ? Sudden vision changes. ? Sudden trouble speaking. ? Sudden confusion or trouble understanding simple statements. ? Sudden problems with walking or balance. ? A sudden, severe headache that is different from past headaches.     · You have symptoms of a heart attack. These may include:  ? Chest pain or pressure, or a strange feeling in the chest.  ? Sweating. ? Shortness of breath. ? Nausea or vomiting. ? Pain, pressure, or a strange feeling in the back, neck, jaw, or upper belly or in one or both shoulders or arms. ? Lightheadedness or sudden weakness. ? A fast or irregular heartbeat. Call your doctor now or seek immediate medical care if:    · You have pain that does not get better after you take pain medicine.     · You have loose stitches, or your incision comes open.     · You are bleeding a lot from the incision.     · You have signs of infection, such as:  ? Increased pain, swelling, warmth, or redness. ? Red streaks leading from the incision. ? Pus draining from the incision. ? A fever.     · Your heartbeat feels very fast, skips beats, or flutters.     · You have signs of a blood clot, such as:  ? Pain in your calf, back of the knee, thigh, or groin. ?  Redness and swelling in your leg or groin.     · You have symptoms of heart failure, such as:  ? Swelling in your legs, ankles, or feet. ? Sudden weight gain, such as more than 2 to 3 pounds in a day or 5 pounds in a week. (Your doctor may suggest a different range of weight gain.)     · You are sick to your stomach or cannot keep fluids down. Watch closely for changes in your health, and be sure to contact your doctor if:    · You do not get better as expected. Where can you learn more? Go to http://www.gray.com/  Enter D936 in the search box to learn more about \"Aortic Valve Replacement Surgery: What to Expect at Home. \"  Current as of: April 29, 2021               Content Version: 13.0  © 2006-2021 [employer]. Care instructions adapted under license by [employer] (which disclaims liability or warranty for this information). If you have questions about a medical condition or this instruction, always ask your healthcare professional. [place] [address] any warranty or liability for your use of this information. Coronary Artery Bypass Graft: What to Expect at Home  Your Recovery     Coronary artery bypass graft (CABG) is surgery to treat coronary artery disease. The surgery helps blood make a detour, or bypass, around one or more narrowed or blocked coronary arteries. Coronary arteries are the blood vessels that bring blood to the heart muscle. Your doctor did the surgery through a cut, called an incision, in your chest.  You will feel tired and sore for the first few weeks after surgery. You may have some brief, sharp pains on either side of your chest. Your chest, shoulders, and upper back may ache. These symptoms usually get better after 4 to 6 weeks. The incision in your chest and the area where the healthy blood vessel was taken may be sore or swollen. You will probably be able to do many of your usual activities after 4 to 6 weeks.  But for at least 6 weeks, you'll avoid lifting heavy objects and doing activities that strain your chest or upper arm muscles. At first you may notice that you get tired easily and need to rest often. It may take 1 to 2 months to get your energy back. Some people find that they are more emotional after this surgery. You may cry easily or show emotion in ways that are unusual for you. This is common and may last for up to a year. Some people get depressed after the surgery. Talk with your doctor if you have sadness that continues or you are concerned about how you are feeling. Treatment and other support can help you feel better. Even though the surgery may improve your symptoms, you will still follow a heart-healthy lifestyle to help lower your risk of a heart attack or stroke. It will be important to eat a heart-healthy diet, get regular exercise, stay at a healthy weight, manage other health problems, take your medicines, and not smoke. You may start a cardiac rehabilitation (rehab) program in the hospital. You will continue with this rehab program after you go home to help you recover and prevent problems with your heart. Talk to your doctor about whether rehab is right for you. This care sheet gives you a general idea about how long it will take for you to recover. But each person recovers at a different pace. Follow the steps below to get better as quickly as possible. How can you care for yourself at home? Activity    · Rest when you feel tired. Getting enough sleep will help you recover. Try to sleep on your back while you heal. If your breastbone (sternum) was cut, healing usually takes about 4 to 6 weeks.     · Try to walk each day. Start by walking a little more than you did the day before. Bit by bit, increase the amount you walk.  Walking boosts blood flow and helps prevent pneumonia and constipation.     · Avoid strenuous activities, such as bicycle riding, jogging, weight lifting, or heavy aerobic exercise, until your doctor says it is okay.     · For 3 months, avoid activities that strain your chest or upper arm muscles. This includes pushing a  or vacuum, mopping floors, or swinging a golf club or tennis racquet.     · For at least 6 weeks, avoid lifting anything that would make you strain. This may include a child, heavy grocery bags and milk containers, a heavy briefcase or backpack, or cat litter or dog food bags.     · For at least 6 weeks, avoid pushing yourself up out of a bed or chair using your arms. Do not use your arms to pull yourself into or out of a vehicle.     · Hold a pillow firmly over your chest incision when you cough or take deep breaths. This will support your chest and reduce your pain.     · Do breathing exercises at home as instructed by your doctor. This will help prevent pneumonia.     · Ask your doctor when you can drive again.     · You may need to take 4 to 12 weeks off from work. It depends on the type of work you do and how you feel.     · Ask your doctor when it is okay for you to have sex. Diet    · Eat a heart-healthy diet. If you have not been eating this way, talk to your doctor. You also may want to talk to a dietitian. A dietitian can help you learn about healthy foods.     · Drink plenty of fluids (unless your doctor tells you not to).     · You may notice that your bowel movements are not regular right after your surgery. This is common. Try to avoid constipation and straining with bowel movements. You may want to take a fiber supplement every day. If you have not had a bowel movement after a couple of days, ask your doctor about taking a mild laxative. Medicines    · Your doctor will tell you if and when you can restart your medicines. You will also be given instructions about taking any new medicines.     · If you take aspirin or some other blood thinner, ask your doctor if and when to start taking it again.  Make sure that you understand exactly what your doctor wants you to do.     · Be safe with medicines. Take your medicines exactly as prescribed. Call your doctor if you think you are having a problem with your medicine.     · Take pain medicines exactly as directed. ? If the doctor gave you a prescription medicine for pain, take it as prescribed. ? If you are not taking a prescription pain medicine, ask your doctor if you can take an over-the-counter medicine. ? Do not take aspirin, ibuprofen (Advil, Motrin), naproxen (Aleve), or other nonsteroidal anti-inflammatory drugs (NSAIDs) unless your doctor says it is okay.     · If you think your pain medicine is making you sick to your stomach:  ? Take your medicine after meals (unless your doctor has told you not to). ? Ask your doctor for a different pain medicine.     · If your doctor prescribed antibiotics, take them as directed. Do not stop taking them just because you feel better. You need to take the full course of antibiotics.     · Your doctor may give you a blood thinner to prevent blood clots. If you take a blood thinner, be sure you get instructions about how to take your medicine safely. Blood thinners can cause serious bleeding problems. Incision care    · If you have strips of tape on the incisions the doctor made, leave the tape on for a week or until it falls off.     · Wash the area daily with warm, soapy water, and pat it dry. Don't use hydrogen peroxide or alcohol, which can slow healing. You may cover the area with a gauze bandage if it weeps or rubs against clothing. Change the bandage every day.     · You can take showers with your back to the showerhead. Allow the warm and soapy water to run across your shoulders and down over the incision.  Pat the incision dry with a clean towel.     · Do not take a bath for the first 3 weeks, or until your doctor tells you it is okay.     · Do not swim or use a hot tub for at least 1 month, or until your doctor says it is okay.     · Do not use any creams, lotions, powders, ointments, or oils unless your doctor tells you it is okay.     · If a vein was removed from your leg, you can help prevent swelling by:  ? Wearing compression stockings if your doctor recommends them. ? Elevating your legs above the level of your heart whenever you lie down. Other instructions    · Keep track of your weight. Weigh yourself every day at the same time of day, on the same scale, in the same amount of clothing. A sudden increase in weight can be a sign of a problem with your heart. Tell your doctor if you suddenly gain weight, such as 3 pounds or more in 2 to 3 days.     · Do not smoke. Smoking can make it harder for you to recover. And it will raise the chances of your arteries narrowing again. If you need help quitting, talk to your doctor about stop-smoking programs and medicines. These can increase your chances of quitting for good. Follow-up care is a key part of your treatment and safety. Be sure to make and go to all appointments, and call your doctor if you are having problems. It's also a good idea to know your test results and keep a list of the medicines you take. When should you call for help? Call 911 anytime you think you may need emergency care. For example, call if:    · You passed out (lost consciousness).     · You have severe trouble breathing.     · You have sudden chest pain and shortness of breath, or you cough up blood.     · You have severe pain in your chest.     · You have symptoms of a heart attack. These may include:  ? Chest pain or pressure, or a strange feeling in the chest.  ? Sweating. ? Shortness of breath. ? Nausea or vomiting. ? Pain, pressure, or a strange feeling in the back, neck, jaw, or upper belly or in one or both shoulders or arms. ? Lightheadedness or sudden weakness. ? A fast or irregular heartbeat. After you call 911, the  may tell you to chew 1 adult-strength or 2 to 4 low-dose aspirin. Wait for an ambulance.  Do not try to drive yourself.     · You have angina symptoms (such as chest pain or pressure) that do not go away with rest or are not getting better within 5 minutes after you take a dose of nitroglycerin.     · You have symptoms of a stroke. These may include:  ? Sudden numbness, tingling, weakness, or loss of movement in your face, arm, or leg, especially on only one side of your body. ? Sudden vision changes. ? Sudden trouble speaking. ? Sudden confusion or trouble understanding simple statements. ? Sudden problems with walking or balance. ? A sudden, severe headache that is different from past headaches. Call your doctor now or seek immediate medical care if:    · You have pain that does not get better after you take pain medicine.     · You have loose stitches, or your incision comes open.     · You are bleeding a lot from the incision.     · You have signs of infection, such as:  ? Increased pain, swelling, warmth, or redness. ? Red streaks leading from the incision. ? Pus draining from the incision. ? A fever.     · Your heartbeat feels very fast, skips beats, or flutters.     · You have signs of a blood clot in a leg. If you had a vein removed from your leg, you may have tenderness and swelling while your leg heals. But signs of a blood clot may be in a different part of your leg and may include:  ? Pain in your calf, back of the knee, thigh, or groin. ? Redness and swelling in your leg or groin.     · You have symptoms of heart failure, such as:  ? Swelling in your legs, ankles, or feet. ? Sudden weight gain, such as more than 2 to 3 pounds in a day or 5 pounds in a week. (Your doctor may suggest a different range of weight gain.)     · You are sick to your stomach or cannot keep fluids down. Watch closely for changes in your health, and be sure to contact your doctor if:    · You do not get better as expected. Where can you learn more?   Go to http://www.gray.com/  Enter F759 in the search box to learn more about \"Coronary Artery Bypass Graft: What to Expect at Home. \"  Current as of: April 29, 2021               Content Version: 13.0  © 2115-9935 [employer]. Care instructions adapted under license by [employer] (which disclaims liability or warranty for this information). If you have questions about a medical condition or this instruction, always ask your healthcare professional. [place] [address] any warranty or liability for your use of this information.

## 2021-11-23 VITALS
HEIGHT: 70 IN | RESPIRATION RATE: 18 BRPM | BODY MASS INDEX: 25.97 KG/M2 | OXYGEN SATURATION: 97 % | SYSTOLIC BLOOD PRESSURE: 143 MMHG | TEMPERATURE: 98.3 F | HEART RATE: 87 BPM | DIASTOLIC BLOOD PRESSURE: 73 MMHG | WEIGHT: 181.4 LBS

## 2021-11-23 LAB
ERYTHROCYTE [DISTWIDTH] IN BLOOD BY AUTOMATED COUNT: 13.2 % (ref 11.9–14.6)
GLUCOSE BLD STRIP.AUTO-MCNC: 101 MG/DL (ref 65–100)
HCT VFR BLD AUTO: 29.4 % (ref 41.1–50.3)
HGB BLD-MCNC: 9.5 G/DL (ref 13.6–17.2)
MAGNESIUM SERPL-MCNC: 2.1 MG/DL (ref 1.8–2.4)
MCH RBC QN AUTO: 28.4 PG (ref 26.1–32.9)
MCHC RBC AUTO-ENTMCNC: 32.3 G/DL (ref 31.4–35)
MCV RBC AUTO: 87.8 FL (ref 79.6–97.8)
NRBC # BLD: 0 K/UL (ref 0–0.2)
PLATELET # BLD AUTO: 105 K/UL (ref 150–450)
PMV BLD AUTO: 10.4 FL (ref 9.4–12.3)
POTASSIUM SERPL-SCNC: 3.6 MMOL/L (ref 3.5–5.1)
RBC # BLD AUTO: 3.35 M/UL (ref 4.23–5.6)
SERVICE CMNT-IMP: ABNORMAL
WBC # BLD AUTO: 6 K/UL (ref 4.3–11.1)

## 2021-11-23 PROCEDURE — 74011250637 HC RX REV CODE- 250/637: Performed by: THORACIC SURGERY (CARDIOTHORACIC VASCULAR SURGERY)

## 2021-11-23 PROCEDURE — 2709999900 HC NON-CHARGEABLE SUPPLY

## 2021-11-23 PROCEDURE — 33530 CORONARY ARTERY BYPASS/REOP: CPT | Performed by: THORACIC SURGERY (CARDIOTHORACIC VASCULAR SURGERY)

## 2021-11-23 PROCEDURE — 84132 ASSAY OF SERUM POTASSIUM: CPT

## 2021-11-23 PROCEDURE — 97530 THERAPEUTIC ACTIVITIES: CPT

## 2021-11-23 PROCEDURE — 33405 REPLACEMENT AORTIC VALVE OPN: CPT | Performed by: THORACIC SURGERY (CARDIOTHORACIC VASCULAR SURGERY)

## 2021-11-23 PROCEDURE — 33510 CABG VEIN SINGLE: CPT | Performed by: THORACIC SURGERY (CARDIOTHORACIC VASCULAR SURGERY)

## 2021-11-23 PROCEDURE — 82962 GLUCOSE BLOOD TEST: CPT

## 2021-11-23 PROCEDURE — 99024 POSTOP FOLLOW-UP VISIT: CPT | Performed by: PHYSICIAN ASSISTANT

## 2021-11-23 PROCEDURE — 85027 COMPLETE CBC AUTOMATED: CPT

## 2021-11-23 PROCEDURE — 36415 COLL VENOUS BLD VENIPUNCTURE: CPT

## 2021-11-23 PROCEDURE — 83735 ASSAY OF MAGNESIUM: CPT

## 2021-11-23 PROCEDURE — 97110 THERAPEUTIC EXERCISES: CPT

## 2021-11-23 RX ORDER — ATORVASTATIN CALCIUM 80 MG/1
80 TABLET, FILM COATED ORAL DAILY
Qty: 90 TABLET | Refills: 2 | Status: SHIPPED | OUTPATIENT
Start: 2021-11-23

## 2021-11-23 RX ORDER — NITROGLYCERIN 0.4 MG/1
0.4 TABLET SUBLINGUAL
Qty: 25 TABLET | Refills: 3 | Status: SHIPPED | OUTPATIENT
Start: 2021-11-23

## 2021-11-23 RX ORDER — BACLOFEN 10 MG/1
5 TABLET ORAL
Qty: 10 TABLET | Refills: 0 | Status: SHIPPED | OUTPATIENT
Start: 2021-11-23 | End: 2022-01-11

## 2021-11-23 RX ORDER — ACETAMINOPHEN 325 MG/1
650 TABLET ORAL
Status: SHIPPED | COMMUNITY
Start: 2021-11-23

## 2021-11-23 RX ADMIN — METFORMIN HYDROCHLORIDE 500 MG: 500 TABLET ORAL at 09:10

## 2021-11-23 RX ADMIN — METOPROLOL TARTRATE 25 MG: 25 TABLET, FILM COATED ORAL at 09:11

## 2021-11-23 RX ADMIN — AMIODARONE HYDROCHLORIDE 200 MG: 200 TABLET ORAL at 09:10

## 2021-11-23 RX ADMIN — FUROSEMIDE 40 MG: 40 TABLET ORAL at 09:11

## 2021-11-23 RX ADMIN — POTASSIUM CHLORIDE 10 MEQ: 10 TABLET, EXTENDED RELEASE ORAL at 09:11

## 2021-11-23 RX ADMIN — POTASSIUM CHLORIDE 20 MEQ: 20 TABLET, EXTENDED RELEASE ORAL at 09:51

## 2021-11-23 RX ADMIN — Medication 1 AMPULE: at 09:10

## 2021-11-23 RX ADMIN — Medication 10 ML: at 07:02

## 2021-11-23 RX ADMIN — FAMOTIDINE 20 MG: 20 TABLET ORAL at 09:10

## 2021-11-23 NOTE — PROGRESS NOTES
Verbal bedside report given to oncoming RNSara. Patient's situation, background, assessment and recommendations provided. Opportunity for questions provided. Oncoming RN assumed care of patient.

## 2021-11-23 NOTE — PROGRESS NOTES
Pt with discharge orders this day. This CM met with pt this day to discuss discharge planning. Pt confirms he will be returning home with support from family. Reviewed the role and recommendation of home health at discharge - pt is agreeable to referral.  Reviewed agencies - pt agreeable to [facility]. No additional CM needs at discharge. Milestones met. Care Management Interventions  PCP Verified by CM: Yes  Mode of Transport at Discharge:  Other (see comment) (family)  Transition of Care Consult (CM Consult): Discharge Planning  Discharge Durable Medical Equipment: No  Physical Therapy Consult: Yes  Occupational Therapy Consult: No  Speech Therapy Consult: No  Support Systems: Spouse/Significant Other  Confirm Follow Up Transport: Family  The Plan for Transition of Care is Related to the Following Treatment Goals : home with home health  The Patient and/or Patient Representative was Provided with a Choice of Provider and Agrees with the Discharge Plan?: Yes  Name of the Patient Representative Who was Provided with a Choice of Provider and Agrees with the Discharge Plan: Mr Emiliana Patterson of Choice List was Provided with Basic Dialogue that Supports the Patient's Individualized Plan of Care/Goals, Treatment Preferences and Shares the Quality Data Associated with the Providers?: Yes   Resource Information Provided?: No  Discharge Location  Discharge Placement: Home with home health

## 2021-11-23 NOTE — ROUTINE PROCESS
Discharge instructions, follow up appointments and prescriptions reviewed with patient and family. Both verbalize understanding. All personal belongings taken with patient. Family member will drive patient home. Patient escorted to discharge area via wheelchair. Patient is stable at discharge. IV, monitor, and 2 chest tube sutures removed.

## 2021-11-23 NOTE — PROGRESS NOTES
ACUTE PHYSICAL THERAPY GOALS:  (Developed with and agreed upon by patient and/or caregiver.)  STG:  (1.)Mr. Melba Abraham will move from supine to sit and sit to supine , scoot up and down and roll side to side with STAND BY ASSIST within 4 treatment day(s). (2.)Mr. Melba Abraham will transfer from bed to chair and chair to bed with STAND BY ASSIST using the least restrictive device within 4 treatment day(s). (3.)Mr. Melba Abraham will ambulate with STAND BY ASSIST for 500 feet with the least restrictive device within 4 treatment day(s).      LTG:  (1.)Mr. Melba Abraham will move from supine to sit and sit to supine , scoot up and down and roll side to side in bed with MODIFIED INDEPENDENCE within 7 treatment day(s). (2.)Mr. Melba Abraham will transfer from bed to chair and chair to bed with MODIFIED INDEPENDENCE using the least restrictive device within 7 treatment day(s). (3.)Mr. Melba Abraham will ambulate with MODIFIED INDEPENDENCE for 1000 feet with the least restrictive device within 7 treatment day(s).   ________________________________________________________________________________________________        PHYSICAL THERAPY: Daily Note and AM Treatment Day # 4    Linda Friend is a 63 y.o. male   PRIMARY DIAGNOSIS: S/P AVR  Aortic valve stenosis, etiology of cardiac valve disease unspecified [I35.0]  Atherosclerosis of native coronary artery of native heart with unstable angina pectoris (HCC) [I25.110]  CAD (coronary artery disease) [I25.10]  Aortic stenosis, severe [I35.0]  Procedure(s) (LRB):  CORONARY ARTERY BYPASS GRAFT (CABGX1), WITH AVR (REPLACEMENT), LYSIS OF MYOCARDIAL ADHESIONS (N/A)  STERNOTOMY REDO (N/A)  ESOPHAGEAL TRANS ECHOCARDIOGRAM (N/A)  VEIN HARVEST, GREATER SAPHENOUS VEIN (Right)  5 Days Post-Op    ASSESSMENT:     REHAB RECOMMENDATIONS: CURRENT LEVEL OF FUNCTION:  (Most Recently Demonstrated)   Recommendation to date pending progress:  [address] [place] [address]  Equipment:    None Bed Mobility:   Not tested  Sit to Stand:   Supervision  Transfers:   Supervision  Gait/Mobility:  [facility] Supervision     ASSESSMENT:  Mr. John Baker is sitting in the recliner and agreeable to therapy. Sit to stand with supervision. Balance is good. Gait training without AD x 200 feet with slow but steady christy. Stairs. Returned to the recliner and participated in therapeutic exercises. Tolerated well. Making excellent progress towards goals. Continue PT efforts. Home with HHPT . Discharging home today. SUBJECTIVE:   Mr. John Baker states, \"Good morning. \"    SOCIAL HISTORY/ LIVING ENVIRONMENT: see eval  Home Environment: Private residence  One/Two Story Residence: One story  Living Alone: No  Support Systems: Spouse/Significant Other  OBJECTIVE:     PAIN: VITAL SIGNS: LINES/DRAINS:   Pre Treatment: Pain Screen  Pain Scale 1: Numeric (0 - 10)  Pain Intensity 1: 0/10  Post Treatment: 0/10   none  O2 Device: None (Room air)     MOBILITY: I Mod I S SBA CGA Min Mod Max Total  NT x2 Comments:   Bed Mobility    Rolling [] [] [] [] [] [] [] [] [] [x] []    Supine to Sit [] [] [] [] [] [] [] [] [] [x] []    Scooting [] [] [] [] [] [] [] [] [] [x] []    Sit to Supine [] [] [] [] [] [] [] [] [] [x] []    Transfers    Sit to Stand [] [] [x] [] [] [] [] [] [] [] []    Bed to Chair [] [] [] [] [] [] [] [] [] [x] []    Stand to Sit [] [] [x] [] [] [] [] [] [] [] []    I=Independent, Mod I=Modified Independent, S=Supervision, SBA=Standby Assistance, CGA=Contact Guard Assistance,   Min=Minimal Assistance, Mod=Moderate Assistance, Max=Maximal Assistance, Total=Total Assistance, NT=Not Tested    BALANCE: Good Fair+ Fair Fair- Poor NT Comments   Sitting Static [x] [] [] [] [] []    Sitting Dynamic [x] [] [] [] [] []              Standing Static [x] [] [] [] [] []    Standing Dynamic [x] [] [] [] [] []      GAIT: I Mod I S SBA CGA Min Mod Max Total  NT x2 Comments:   Level of Assistance [] [] [x] [] [] [] [] [] [] [] []    Distance 200 feet     DME None    Gait Quality steady    Weightbearing  Status N/A     I=Independent, Mod I=Modified Independent, S=Supervision, SBA=Standby Assistance, CGA=Contact Guard Assistance,   Min=Minimal Assistance, Mod=Moderate Assistance, Max=Maximal Assistance, Total=Total Assistance, NT=Not Tested    PLAN:   FREQUENCY/DURATION: PT Plan of Care: BID for duration of hospital stay or until stated goals are met, whichever comes first.  TREATMENT:     TREATMENT:   ($$ Therapeutic Activity: 8-22 mins  $$ Therapeutic Exercises: 8-22 mins    )  Therapeutic Activity (12 Minutes): Therapeutic activity included Transfer Training, Ambulation on level ground, Sitting balance  and Standing balance to improve functional Mobility, Strength and Activity tolerance. Therapeutic Exercise (11 Minutes): Therapeutic exercises noted below to improve functional activity tolerance, strength and mobility.      TREATMENT GRID:     Date:  11/22/21 Date:  11/23/21 Date:     ACTIVITY/EXERCISE AM PM AM PM AM PM   LAQ    20  20      Shoulder shrugs 20  20      Gluteal sets 20  20      marching 20  20      Ankle pumps 20  20      abduction 20  20               B = bilateral; AA = active assistive; A = active; P = passive    AFTER TREATMENT POSITION/PRECAUTIONS:  Chair, Needs within reach and RN notified    INTERDISCIPLINARY COLLABORATION:  RN/PCT and PT/PTA    TOTAL TREATMENT DURATION:  PT Patient Time In/Time Out  Time In: 0840  Time Out: 0412    Prasanna Hammond PTA

## 2021-11-24 NOTE — OP NOTES
[facility]  OPERATIVE REPORT    Name:  Jennifer Tyler  MR#:  [id number]  :  1955  ACCOUNT #:  [de-identified]  DATE OF SERVICE:  2021    PREOPERATIVE DIAGNOSES:  1. Severe aortic stenosis. 2.  Coronary artery disease. POSTOPERATIVE DIAGNOSES:  1. Severe aortic stenosis. 2.  Coronary artery disease. 3.  Profound myocardial adhesions. PROCEDURE PERFORMED:  1. Redo sternotomy. 2.  Lysis of myocardial adhesions. 3.  Coronary artery bypass grafting x1 with graft consisting of reverse saphenous vein graft to the PDA. 4.  Aortic valve replacement with a 23-mm Inspiris Marleni pericardial valve. SURGEON:  MD Sandra La    ANESTHESIA:  General endotracheal with CARLOS. COMPLICATIONS:  None. SPECIMENS REMOVED:      IMPLANTS:      ESTIMATED BLOOD LOSS:  Minimal.    OPERATIVE FINDINGS:  Saphenous vein 3-4 mm. The patient was noted to have malignant dense adhesions requiring quite extensive takedown. Aortic valve was trileaflet, very heavily calcified. The PDA was 1.4 mm with moderate to severe distal disease. INDICATIONS:  The patient is a 26-year-old gentleman, well known to me, who is status post CABG by me in 2017. He presented with worsening exertional dyspnea. Echo showed severe AS. Left heart cath showed all previous CABG grafts were patent. However, the saphenous vein to the PDA was sluggish and recommended replacement. His ejection fraction was normal.    PROCEDURE:  After informed consent was obtained from the patient, he was brought to the operating suite and placed in supine position. General endotracheal anesthesia was induced without difficulty. He was prepped and draped in usual sterile fashion. One segment of greater saphenous vein had been taken from his right thigh. These incisions were closed in two-layer fashion with 3-0 and 4-0 Vicryl. Next, the previous incision was then excised.   Dissection was carried down to the sternum. The previous sternal wires were identified, isolated, clipped and removed. Next, using the oscillating saw, the sternum was then carefully divided taking great care to avoid any deep structures. Bone hooks were then placed and dissection of the heart off the posterior table of the sternum was then undertaken. Adhesions were noted to be quite dense with no real tissue planes. We were able to put the Stephanie Rachana retractor in and continued dissecting the adhesions. The right atrium was dissected free with room for cannulation. Next, the aorta was dissected free taking great care to avoid any damage to the grafts. At this point, the Stephanie Rachana retractor was removed and the Rultract placed and dissection was carried down over the anterior surface of the heart to identify the LIMA to the LAD. After quite extensive dissection, we were able to identify the LIMA without any damage and fashioned it for clipping during crossclamping. At this point, we had room to cannulate the aorta and right atrium which we elected to do. Systemic heparinization was then given. Pursestrings were placed in the aorta and the right atrium and the right pulmonary vein. After adequate ACT was obtained, we cannulated through these pursestrings to get cardiopulmonary bypass, good decompression of the heart. At this point, extensive lysis of the adhesions was then undertaken freeing up the entire heart. The target PDA was identified. The aorta was then crossclamped. He received a single dose of [place] cardioplegia antegrade and a left ventricular vent was placed through the pulmonary vein. The heart was then positioned for grafting of the PDA. This was a small vessel, maybe 1.3 mm in size, and the previous vein graft garcia was excised and the new saphenous vein graft was sewn on top with running 7-0 Prolene. This was then measured back to the aorta the appropriate length and divided.   The aorta was then opened in hockey-stick fashion revealing an extensively calcified three-leaflet aortic valve. This was then meticulously debrided taking great care to avoid any debris in the left ventricle. The annulus was meticulously debrided. The left ventricle was copiously irrigated with iced saline. It measured to a 23-mm Inspiris. Pledgeted sutures were placed around the annulus. The valve was brought up into the field. Sutures were passed through the sewing ring and the valve seated without difficulty. Both left and right coronary artery ostia were identified. The aortotomy was then closed in double-layer fashion with pledgeted 4-0 Prolene. The vein graft was measured back to the aorta. Punch aortotomy was created and proximal anastomosis was completed with a running 6-0 Prolene. At this point, he was placed in the steep Trendelenburg position and given a hot shot dose of cardioplegia, warmed to 37 degrees Centigrade. Meticulous de-airing maneuvers were undertaken which were checked by transesophageal echo. When noted to be adequate, crossclamp was removed. He resumed a normal sinus rhythm although he did have a permanent pacemaker implanted. Ventricular pacing wires were placed upon the heart, brought out through a separate stab incision and affixed to the skin. He was weaned from cardiopulmonary bypass without difficulty and decannulated. Protamine sulfate was given. Meticulous hemostasis was achieved. The pericardium was copiously irrigated with warm saline. He was quite coagulopathic requiring numerous blood products and extensive amount  of time in the operating room waiting for hemostasis. Two mediastinal tubes were placed through separate stab incisions and affixed to the skin, one straight anterior and one right angle posterior. The sternum was reapproximated with stainless steel wire, fascia closed with #1 PDS, subcu closed with 3-0 Vicryl, and the skin was closed with 4-0 Vicryl subcuticular.   All instrument and sponge counts were correct at the end of the case.       Giovana Jones MD      [id number]  D:  11/23/2021 12:24  T:  11/23/2021 20:52  JOB #:  [id number]

## 2022-01-11 PROBLEM — Z79.4: Status: ACTIVE | Noted: 2017-08-23

## 2022-01-11 PROBLEM — I48.3 TYPICAL ATRIAL FLUTTER (HCC): Status: ACTIVE | Noted: 2021-08-23

## 2022-01-11 PROBLEM — E11.40 CONTROLLED TYPE 2 DIABETES WITH NEUROPATHY (HCC): Status: ACTIVE | Noted: 2017-12-05

## 2022-01-14 ENCOUNTER — HOSPITAL ENCOUNTER (OUTPATIENT)
Dept: SURGERY | Age: 67
Discharge: HOME | End: 2022-01-14

## 2022-01-14 VITALS
BODY MASS INDEX: 26.17 KG/M2 | HEART RATE: 88 BPM | DIASTOLIC BLOOD PRESSURE: 79 MMHG | SYSTOLIC BLOOD PRESSURE: 141 MMHG | TEMPERATURE: 97.8 F | RESPIRATION RATE: 20 BRPM | WEIGHT: 182.8 LBS | HEIGHT: 70 IN | OXYGEN SATURATION: 99 %

## 2022-01-14 NOTE — PERIOP NOTES
PLEASE CONTINUE TAKING ALL PRESCRIPTION MEDICATIONS UP TO THE DAY OF SURGERY UNLESS OTHERWISE DIRECTED BELOW. DISCONTINUE all vitamins and supplements 7 days prior to surgery. DISCONTINUE Non-Steriodal Anti-Inflammatory (NSAIDS) such as Advil and Aleve 5 days prior to surgery. Home Medications to take  the day of surgery   Famotidine/Pepcid  Metoprolol succinate/Toprol XL      1/17/1022: On the day before surgery please take Acetaminophen 1000mg in the morning and then again before bed. You may substitute for Tylenol 650 mg. Home Medications   to Hold   DISCONTINUE all vitamins and supplements 7 days prior to surgery. DISCONTINUE Non-Steriodal Anti-Inflammatory (NSAIDS) such as Advil and Aleve 5 days prior to surgery. Patient has been holding xarelto since 12/27/2021 in preparation for this procedure. Comments                Please do not bring home medications with you on the day of surgery unless otherwise directed by your nurse. If you are instructed to bring home medications, please give them to your nurse as they will be administered by the nursing staff. If you have any questions, please call [facility] Zainab Pritchard [phone] or [facility] [phone]. A copy of this note was provided to the patient for reference.

## 2022-01-14 NOTE — PERIOP NOTES
Patient verified name and     Order for consent found in EHR and matches case posting; patient verified. Type 1B surgery, in person PAT assessment complete. Labs per surgeon: None per paperwork faxed from [id number] at [facility] per anesthesia protocol: None per protocol  EKG: done 2021: SR with occ PVCs noted, nonspecific T wave abnormality, prolonged QT. Reviewed via phone with Dr. Darvin Tilley - no new EKG required. Patient has held his xarelto and his aspirin in preparation for this surgery since  due to complications last eye surgery of blood collecting in eye and hematoma. Patient has pacemaker and had CABG on 2021. Patient is not 6 months past his CABG as of this date. Per Dr. Mary Judd, patient may REMAIN on Xarelto and ASA through and including surgery date. Explained this to patient and patient states he is still going to remain off Xarelto and Aspiring until after surgery. Called Dr. Darvin Tilley with anesthesia. Relayed that patient is refusing to stay on Xarelto and Aspirin and has had CABG on 2021. Per Dr. Darvin Tilley, patient must remain on at least ASA 81 mg through surgery. If patient refuses, he needs to call his cardiology office and speak with them regarding hold of anticoagulants. Called to patient and relayed information from Dr. Darvin Tilley. Patient states \"I will take care of it. \" No further information received. Called to Cathy at Dr. Grimes Ku office [phone]: relayed that patient has been off his Xarelto and ASA since 2021. Cathy will relay information to Dr. Mary Judd and get orders regarding next steps for patient. Patient COVID test date N/A - MAC case. Patient has had all three covid vaccines from [facility] given: 3/15/2021, 2021, and 2021. Hospital approved surgical skin cleanser and instructions given per hospital policy.     Patient provided with and instructed on educational handouts including Guide to Surgery, Pain Management, Hand Hygiene, Blood Transfusion Education, and [facility] Brochure. Patient answered medical/surgical history questions at their best of ability. All prior to admission medications documented in [facility]. Original medication prescription list reconciled with patient during patient appointment. Patient instructed to hold all vitamins 7 days prior to surgery and NSAIDS 5 days prior to surgery, patient verbalized understanding. Patient teach back successful and patient demonstrates knowledge of instructions.

## 2022-01-14 NOTE — PERIOP NOTES
Called patient with instructions and address for In Person PAT for 1/14/2022. Gave patient [address], [facility] address and time of walk in PAT appointment. Patient verbalizes understanding.

## 2022-01-17 ENCOUNTER — ANESTHESIA EVENT (OUTPATIENT)
Dept: SURGERY | Age: 67
End: 2022-01-17
Payer: COMMERCIAL

## 2022-01-18 ENCOUNTER — HOSPITAL ENCOUNTER (OUTPATIENT)
Age: 67
Setting detail: OUTPATIENT SURGERY
Discharge: HOME | End: 2022-01-18
Attending: OPHTHALMOLOGY | Admitting: OPHTHALMOLOGY
Payer: COMMERCIAL

## 2022-01-18 ENCOUNTER — ANESTHESIA (OUTPATIENT)
Dept: SURGERY | Age: 67
End: 2022-01-18
Payer: COMMERCIAL

## 2022-01-18 VITALS
DIASTOLIC BLOOD PRESSURE: 73 MMHG | SYSTOLIC BLOOD PRESSURE: 143 MMHG | TEMPERATURE: 97.9 F | WEIGHT: 182 LBS | HEART RATE: 82 BPM | OXYGEN SATURATION: 98 % | RESPIRATION RATE: 16 BRPM | BODY MASS INDEX: 26.11 KG/M2

## 2022-01-18 LAB
GLUCOSE BLD STRIP.AUTO-MCNC: 134 MG/DL (ref 65–100)
SERVICE CMNT-IMP: ABNORMAL

## 2022-01-18 PROCEDURE — 77030029764 HC VLV ENTRY OPHTH SYS ALCN -C: Performed by: OPHTHALMOLOGY

## 2022-01-18 PROCEDURE — 74011000250 HC RX REV CODE- 250: Performed by: OPHTHALMOLOGY

## 2022-01-18 PROCEDURE — 77030013673 HC FCPS OPTH ALCN -C: Performed by: OPHTHALMOLOGY

## 2022-01-18 PROCEDURE — 74011000250 HC RX REV CODE- 250: Performed by: REGISTERED NURSE

## 2022-01-18 PROCEDURE — 77030033576 HC PK VITRCMY TOT PLS ALCN -F: Performed by: OPHTHALMOLOGY

## 2022-01-18 PROCEDURE — 74011000636 HC RX REV CODE- 636: Performed by: OPHTHALMOLOGY

## 2022-01-18 PROCEDURE — 82962 GLUCOSE BLOOD TEST: CPT

## 2022-01-18 PROCEDURE — 77030017621 HC NDL OPHTH1 ALCN -B: Performed by: OPHTHALMOLOGY

## 2022-01-18 PROCEDURE — 77030018838 HC SOL IRR OPTH ALCN -B: Performed by: OPHTHALMOLOGY

## 2022-01-18 PROCEDURE — 76010000138 HC OR TIME 0.5 TO 1 HR: Performed by: OPHTHALMOLOGY

## 2022-01-18 PROCEDURE — 74011250636 HC RX REV CODE- 250/636: Performed by: OPHTHALMOLOGY

## 2022-01-18 PROCEDURE — 77030018837 HC SOL IRR OPTH ALCN -A: Performed by: OPHTHALMOLOGY

## 2022-01-18 PROCEDURE — 74011250636 HC RX REV CODE- 250/636: Performed by: ANESTHESIOLOGY

## 2022-01-18 PROCEDURE — 76060000033 HC ANESTHESIA 1 TO 1.5 HR: Performed by: OPHTHALMOLOGY

## 2022-01-18 PROCEDURE — 77030032653 HC PRB DSP DIATHRMY DISP ALCN -B: Performed by: OPHTHALMOLOGY

## 2022-01-18 PROCEDURE — 74011250636 HC RX REV CODE- 250/636: Performed by: REGISTERED NURSE

## 2022-01-18 PROCEDURE — 2709999900 HC NON-CHARGEABLE SUPPLY: Performed by: OPHTHALMOLOGY

## 2022-01-18 PROCEDURE — 76210000020 HC REC RM PH II FIRST 0.5 HR: Performed by: OPHTHALMOLOGY

## 2022-01-18 PROCEDURE — 76210000063 HC OR PH I REC FIRST 0.5 HR: Performed by: OPHTHALMOLOGY

## 2022-01-18 RX ORDER — PHENYLEPHRINE HYDROCHLORIDE 25 MG/ML
1 SOLUTION/ DROPS OPHTHALMIC
Status: COMPLETED | OUTPATIENT
Start: 2022-01-18 | End: 2022-01-18

## 2022-01-18 RX ORDER — TROPICAMIDE 10 MG/ML
1 SOLUTION/ DROPS OPHTHALMIC
Status: COMPLETED | OUTPATIENT
Start: 2022-01-18 | End: 2022-01-18

## 2022-01-18 RX ORDER — LIDOCAINE HYDROCHLORIDE 10 MG/ML
0.1 INJECTION INFILTRATION; PERINEURAL AS NEEDED
Status: DISCONTINUED | OUTPATIENT
Start: 2022-01-18 | End: 2022-01-18 | Stop reason: HOSPADM

## 2022-01-18 RX ORDER — SODIUM CHLORIDE, SODIUM LACTATE, POTASSIUM CHLORIDE, CALCIUM CHLORIDE 600; 310; 30; 20 MG/100ML; MG/100ML; MG/100ML; MG/100ML
75 INJECTION, SOLUTION INTRAVENOUS CONTINUOUS
Status: DISCONTINUED | OUTPATIENT
Start: 2022-01-18 | End: 2022-01-18 | Stop reason: HOSPADM

## 2022-01-18 RX ORDER — PROPOFOL 10 MG/ML
INJECTION, EMULSION INTRAVENOUS AS NEEDED
Status: DISCONTINUED | OUTPATIENT
Start: 2022-01-18 | End: 2022-01-18 | Stop reason: HOSPADM

## 2022-01-18 RX ORDER — SODIUM CHLORIDE 0.9 % (FLUSH) 0.9 %
5-40 SYRINGE (ML) INJECTION EVERY 8 HOURS
Status: DISCONTINUED | OUTPATIENT
Start: 2022-01-18 | End: 2022-01-18 | Stop reason: HOSPADM

## 2022-01-18 RX ORDER — CYCLOPENTOLATE HYDROCHLORIDE 20 MG/ML
1 SOLUTION/ DROPS OPHTHALMIC
Status: COMPLETED | OUTPATIENT
Start: 2022-01-18 | End: 2022-01-18

## 2022-01-18 RX ORDER — TETRACAINE HYDROCHLORIDE 5 MG/ML
SOLUTION OPHTHALMIC AS NEEDED
Status: DISCONTINUED | OUTPATIENT
Start: 2022-01-18 | End: 2022-01-18 | Stop reason: HOSPADM

## 2022-01-18 RX ORDER — BUPIVACAINE HYDROCHLORIDE 5 MG/ML
INJECTION, SOLUTION EPIDURAL; INTRACAUDAL AS NEEDED
Status: DISCONTINUED | OUTPATIENT
Start: 2022-01-18 | End: 2022-01-18 | Stop reason: HOSPADM

## 2022-01-18 RX ORDER — BETAMETHASONE SODIUM PHOSPHATE AND BETAMETHASONE ACETATE 3; 3 MG/ML; MG/ML
INJECTION, SUSPENSION INTRA-ARTICULAR; INTRALESIONAL; INTRAMUSCULAR; SOFT TISSUE AS NEEDED
Status: DISCONTINUED | OUTPATIENT
Start: 2022-01-18 | End: 2022-01-18 | Stop reason: HOSPADM

## 2022-01-18 RX ORDER — MIDAZOLAM HYDROCHLORIDE 1 MG/ML
2 INJECTION, SOLUTION INTRAMUSCULAR; INTRAVENOUS
Status: DISCONTINUED | OUTPATIENT
Start: 2022-01-18 | End: 2022-01-18 | Stop reason: HOSPADM

## 2022-01-18 RX ORDER — OXYCODONE AND ACETAMINOPHEN 5; 325 MG/1; MG/1
1 TABLET ORAL AS NEEDED
Status: DISCONTINUED | OUTPATIENT
Start: 2022-01-18 | End: 2022-01-18 | Stop reason: HOSPADM

## 2022-01-18 RX ORDER — HYDROMORPHONE HYDROCHLORIDE 2 MG/ML
0.5 INJECTION, SOLUTION INTRAMUSCULAR; INTRAVENOUS; SUBCUTANEOUS
Status: DISCONTINUED | OUTPATIENT
Start: 2022-01-18 | End: 2022-01-18 | Stop reason: HOSPADM

## 2022-01-18 RX ORDER — SODIUM CHLORIDE 0.9 % (FLUSH) 0.9 %
5-40 SYRINGE (ML) INJECTION AS NEEDED
Status: DISCONTINUED | OUTPATIENT
Start: 2022-01-18 | End: 2022-01-18 | Stop reason: HOSPADM

## 2022-01-18 RX ORDER — CEFAZOLIN SODIUM 1 G/3ML
INJECTION, POWDER, FOR SOLUTION INTRAMUSCULAR; INTRAVENOUS AS NEEDED
Status: DISCONTINUED | OUTPATIENT
Start: 2022-01-18 | End: 2022-01-18 | Stop reason: HOSPADM

## 2022-01-18 RX ORDER — LIDOCAINE HYDROCHLORIDE 20 MG/ML
INJECTION, SOLUTION INFILTRATION; PERINEURAL AS NEEDED
Status: DISCONTINUED | OUTPATIENT
Start: 2022-01-18 | End: 2022-01-18 | Stop reason: HOSPADM

## 2022-01-18 RX ORDER — NALOXONE HYDROCHLORIDE 0.4 MG/ML
0.2 INJECTION, SOLUTION INTRAMUSCULAR; INTRAVENOUS; SUBCUTANEOUS AS NEEDED
Status: DISCONTINUED | OUTPATIENT
Start: 2022-01-18 | End: 2022-01-18 | Stop reason: HOSPADM

## 2022-01-18 RX ORDER — LIDOCAINE HYDROCHLORIDE 20 MG/ML
INJECTION, SOLUTION EPIDURAL; INFILTRATION; INTRACAUDAL; PERINEURAL AS NEEDED
Status: DISCONTINUED | OUTPATIENT
Start: 2022-01-18 | End: 2022-01-18 | Stop reason: HOSPADM

## 2022-01-18 RX ADMIN — PROPOFOL 20 MG: 10 INJECTION, EMULSION INTRAVENOUS at 10:01

## 2022-01-18 RX ADMIN — PHENYLEPHRINE HYDROCHLORIDE 1 DROP: 25 SOLUTION/ DROPS OPHTHALMIC at 09:10

## 2022-01-18 RX ADMIN — TROPICAMIDE 1 DROP: 10 SOLUTION/ DROPS OPHTHALMIC at 09:10

## 2022-01-18 RX ADMIN — CYCLOPENTOLATE HYDROCHLORIDE 1 DROP: 20 SOLUTION/ DROPS OPHTHALMIC at 09:10

## 2022-01-18 RX ADMIN — PROPOFOL 20 MG: 10 INJECTION, EMULSION INTRAVENOUS at 10:00

## 2022-01-18 RX ADMIN — TROPICAMIDE 1 DROP: 10 SOLUTION/ DROPS OPHTHALMIC at 09:00

## 2022-01-18 RX ADMIN — TROPICAMIDE 1 DROP: 10 SOLUTION/ DROPS OPHTHALMIC at 09:05

## 2022-01-18 RX ADMIN — CYCLOPENTOLATE HYDROCHLORIDE 1 DROP: 20 SOLUTION/ DROPS OPHTHALMIC at 09:05

## 2022-01-18 RX ADMIN — PHENYLEPHRINE HYDROCHLORIDE 1 DROP: 25 SOLUTION/ DROPS OPHTHALMIC at 09:05

## 2022-01-18 RX ADMIN — LIDOCAINE HYDROCHLORIDE 40 MG: 20 INJECTION, SOLUTION EPIDURAL; INFILTRATION; INTRACAUDAL; PERINEURAL at 09:59

## 2022-01-18 RX ADMIN — SODIUM CHLORIDE, SODIUM LACTATE, POTASSIUM CHLORIDE, AND CALCIUM CHLORIDE 75 ML/HR: 600; 310; 30; 20 INJECTION, SOLUTION INTRAVENOUS at 09:11

## 2022-01-18 RX ADMIN — PROPOFOL 50 MG: 10 INJECTION, EMULSION INTRAVENOUS at 09:59

## 2022-01-18 RX ADMIN — PHENYLEPHRINE HYDROCHLORIDE 1 DROP: 25 SOLUTION/ DROPS OPHTHALMIC at 09:00

## 2022-01-18 RX ADMIN — CYCLOPENTOLATE HYDROCHLORIDE 1 DROP: 20 SOLUTION/ DROPS OPHTHALMIC at 09:00

## 2022-01-18 NOTE — ANESTHESIA PREPROCEDURE EVALUATION
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, with long-term current use of insulin (HCC)   (+) Controlled type 2 diabetes mellitus with right eye affected by proliferative retinopathy and macular edema, with long-term current use of insulin (HCC)   (+) Controlled type 2 diabetes with neuropathy (HCC)       Anesthetic History   No history of anesthetic complications            Review of Systems / Medical History  Patient summary reviewed and pertinent labs reviewed    Pulmonary                Comments: Left lung scarring from XRT   Neuro/Psych   Within defined limits           Cardiovascular    Hypertension: well controlled  Valvular problems/murmurs (S/P AVR  biomechanical)      Dysrhythmias (Xarelto stopped about two weeks ago) : atrial flutter  Pacemaker (AICD for low EF placed in 2017), CAD and hyperlipidemia    Exercise tolerance: >4 METS  Comments: Cath - patent bypass with 1 graft with restrictive valve    Echo - normal EF, severe AS   GI/Hepatic/Renal     GERD: well controlled    Renal disease: CRI       Endo/Other    Diabetes: well controlled, type 2         Other Findings              Physical Exam    Airway  Mallampati: II  TM Distance: 4 - 6 cm  Neck ROM: normal range of motion   Mouth opening: Normal     Cardiovascular    Rhythm: regular  Rate: normal         Dental  No notable dental hx       Pulmonary  Breath sounds clear to auscultation               Abdominal         Other Findings            Anesthetic Plan    ASA: 3  Anesthesia type: total IV anesthesia            Anesthetic plan and risks discussed with: Patient

## 2022-01-18 NOTE — ANESTHESIA POSTPROCEDURE EVALUATION
Procedure(s):  VITRECTOMY POSTERIOR 25 GAUGE/ SCAR TISSUE REMOVAL/ GAS FLUID EXCHANGE .    total IV anesthesia    Anesthesia Post Evaluation      Multimodal analgesia: multimodal analgesia used between 6 hours prior to anesthesia start to PACU discharge  Patient location during evaluation: PACU  Patient participation: complete - patient participated  Level of consciousness: awake and alert  Pain management: adequate  Airway patency: patent  Anesthetic complications: no  Cardiovascular status: acceptable  Respiratory status: acceptable  Hydration status: acceptable  Post anesthesia nausea and vomiting:  none  Final Post Anesthesia Temperature Assessment:  Normothermia (36.0-37.5 degrees C)      INITIAL Post-op Vital signs:   Vitals Value Taken Time   /73 01/18/22 1116   Temp 36.6 °C (97.9 °F) 01/18/22 1116   Pulse 82 01/18/22 1116   Resp 16 01/18/22 1116   SpO2 98 % 01/18/22 1116

## 2022-01-18 NOTE — DISCHARGE INSTRUCTIONS
[facility], [address] [facility] [place], MD Agueda Velasco. Irma Domingor, MD Jazzy Wynne. Keith Hernandez MD    [phone] or [phone] or [phone] or [phone]    Post Operative Instructions for Retina and Vitreous Surgery Patients    The following are a few guidelines that you should observe for two weeks after surgery:    1. Avoid stooping over, lifting heavy weights or bumping your head. 2.  Special positioning: May sit up but keep face down. Rest on either side with face turned down toward pillow    3. No extensive traveling except what is necessary. If a gas air bubble was put in your      eye at surgery - avoid air travel until you consult your doctor. 4.  You may watch television, read, cook and wash dishes as long as it does not interfere        with special positioning instructions. 5.  No strenuous activity or sexual intercourse. 6.  Some discharge from the operated eye is to be expected. This should gradually        improve. 7.  Wear the eye shield or glasses at all times until cleared by the doctor. 8.  If you experience increasing pain, decreasing vision, or pus like discharge, call the      Office. 9. BRING ALL EYE DROPS TO YOU POSTOPERATIVE APPOINTMENT! Medication Interaction:  During your procedure you potentially received a medication or medications which may reduce the effectiveness of oral contraceptives. Please consider other forms of contraception for 1 month following your procedure if you are currently using oral contraceptives as your primary form of birth control. In addition to this, we recommend continuing your oral contraceptive as prescribed, unless otherwise instructed by your physician, during this time.     After general anesthesia or intravenous sedation, for 24 hours or while taking prescription Narcotics:  · Limit your activities  · A responsible adult needs to be with you for the next 24 hours  · Do not drive and operate hazardous machinery  · Do not make important personal or business decisions  · Do not drink alcoholic beverages  · If you have not urinated within 8 hours after discharge, and you are experiencing discomfort from urinary retention, please go to the nearest ED. · If you have sleep apnea and have a CPAP machine, please use it for all naps and sleeping. · Please use caution when taking narcotics and any of your home medications that may cause drowsiness. *  Please give a list of your current medications to your Primary Care Provider. *  Please update this list whenever your medications are discontinued, doses are      changed, or new medications (including over-the-counter products) are added. *  Please carry medication information at all times in case of emergency situations. These are general instructions for a healthy lifestyle:  No smoking/ No tobacco products/ Avoid exposure to second hand smoke  Surgeon General's Warning:  Quitting smoking now greatly reduces serious risk to your health. Obesity, smoking, and sedentary lifestyle greatly increases your risk for illness  A healthy diet, regular physical exercise & weight monitoring are important for maintaining a healthy lifestyle    You may be retaining fluid if you have a history of heart failure or if you experience any of the following symptoms:  Weight gain of 3 pounds or more overnight or 5 pounds in a week, increased swelling in our hands or feet or shortness of breath while lying flat in bed. Please call your doctor as soon as you notice any of these symptoms; do not wait until your next office visit.

## 2022-01-18 NOTE — BRIEF OP NOTE
Brief Postoperative Note    Patient: Yumiko Mitchell  YOB: 1955  MRN: [id number]    Date of Procedure: 1/18/2022     Pre-Op Diagnosis: Macular hole of right eye [H35.341]    Post-Op Diagnosis: Same as preoperative diagnosis. Procedure(s):  VITRECTOMY POSTERIOR 25 GAUGE/ SCAR TISSUE REMOVAL/ GAS FLUID EXCHANGE     Surgeon(s):  Cathleen Kim III, MD    Surgical Assistant: None    Anesthesia: MAC     Estimated Blood Loss (mL): Minimal    Complications: None    Specimens: * No specimens in log *     Implants: * No implants in log *    Drains:   [REMOVED] Orogastric Tube 11/18/21 (Removed)       Findings: 400 x 400 micron full-thickness macular hole of the right eye. Mature panretinal photocoagulation scars throughout mid and far periphery of the right eye.     Electronically Signed by Anjelica Vázquez MD on 1/18/2022 at 11:03 AM

## 2022-01-18 NOTE — PERIOP NOTES
Discharge instructions given to Lara Lyudmilarneée, patient's wife. They verbalized understanding and was given opportunity for questions.

## 2022-01-19 NOTE — OP NOTES
[facility]  OPERATIVE REPORT    Name:  Vania Broderick  MR#:  [id number]  :  1955  ACCOUNT #:  [de-identified]  DATE OF SERVICE:  2022    PREOPERATIVE DIAGNOSES:  1.  Full-thickness macular hole of the right eye. 2.  Macular pucker of the right eye. 3.  Proliferative diabetic retinopathy right eye. POSTOPERATIVE DIAGNOSES:  1.  Full-thickness macular hole of the right eye. 2.  Macular pucker of the right eye. 3.  Proliferative diabetic retinopathy right eye    PROCEDURE PERFORMED:  Repair of macular hole of the right eye by vitrectomy, epiretinal membrane removal, gas-fluid exchange. SURGEON:  Parish Engle. Judy DAVILA MD    ASSISTANT:  None. ANESTHESIA:  MAC.    COMPLICATIONS:  None. SPECIMENS REMOVED:  None. IMPLANTS:  Used SF6 gas. ESTIMATED BLOOD LOSS:  None. INDICATIONS FOR SURGERY:  The patient has experienced loss of central vision in the right eye associated with a macular hole. Surgery was recommended to repair the macular hole in order to restore vision. The risks and benefits of the surgical procedure were thoroughly reviewed and the patient wished to proceed. SUMMARY OF PROCEDURE:  After appropriate preoperative evaluation and signing of the permit, the patient was taken to the room, placed in a supine fashion upon the procedure table. A time-out was then performed and all operating room personnel confirmed the patient's identity, the surgical site and procedures to be performed. The patient was then given IV sedation as well as a lid block on the right consisting of 5 mL of 50:50 solution of 2% Xylocaine and 0.75% Marcaine. This was again in a modified [employer] fashion. A retrobulbar injection was also given on the right consisting of 5 mL of the same solution. Once an appropriate level of akinesia of extraocular muscle was achieved, the patient was draped and prepped in sterile ophthalmic fashion with 5% Betadine solution.   The conjunctiva and periorbital tissues were carefully prepped. A 25-gauge 4-mm infusion cannula was placed 4 mm posterior to limbus at the 8 o'clock position. Once the tip of the cannula was visualized and posterior segment found to be clear, it was turned to on position and the intraocular pressure was adjusted to 30 mmHg. Two additional cannulas were placed at the 10 and 2 o'clock positions 4 mm posterior to limbus. The 2 o'clock cannula was 23-gauge cannula. The handheld fiberoptic tube was inserted in the nasal cannula site while the Microvit instrument was inserted in the temporal cannula site. Visualization of the posterior segment was achieved using the overhead microscope and a handheld contact lens. The patient had previously undergone vitrectomy surgery and therefore, the peripheral vitreous was carefully removed. Inspection of posterior pole region revealed a dense diffuse sheath of epiretinal membrane with a central full-thickness macular hole measuring approximately 400 x 400 microns in size. Microaneurysms were present in the temporal and superior macular areas. Panretinal photocoagulation scars were noted throughout the mid and far periphery in all quadrants. TissueBlue was then placed over the posterior pole region and allowed to settle for approximately 1 minute. The TissueBlue was then removed with the Microvit. This allowed staining of the persistent epiretinal and internal limiting membranes. These membranes were then dissected from the posterior pole region using ILM forceps. All traction was removed from the margins of the macular hole. The peripheral retina was inspected by indirect ophthalmoscopy and scleral depression. No retinal breaks or tears were identified. The previous laser treatment existed in the far periphery in all quadrants and therefore no additional treatment was applied. Air-fluid exchange was then performed.   All preretinal fluid was carefully removed with the Microvit instrument. A 20% solution of SF6 gas was then infused through the infusion cannula. This was vented out the superotemporal cannula site. The intraocular pressure was adjusted approximately 15 mmHg and all cannulas were removed from the eye. A subconjunctival injection consisting of 0.5 mL of Ancef and 0.5 mL of betamethasone was given in the inferotemporal quadrant. Prednisolone and ofloxacin were placed in the inferior fornix and patch and shield placed over the right eye. The patient tolerated procedure well and was taken to recovery room for routine postoperative care.       Augie Mclain MD      [id number]  D:  01/18/2022 11:01  T:  01/18/2022 19:31  JOB #:  [id number]

## 2022-03-18 PROBLEM — I25.2 HISTORY OF NON-ST ELEVATION MYOCARDIAL INFARCTION (NSTEMI): Status: ACTIVE | Noted: 2017-02-27

## 2022-03-18 PROBLEM — E78.1 HYPERTRIGLYCERIDEMIA: Status: ACTIVE | Noted: 2017-02-27

## 2022-03-18 PROBLEM — Z95.0 POSTSURGICAL CARDIAC PACEMAKER IN SITU: Status: ACTIVE | Noted: 2017-08-17

## 2022-03-19 PROBLEM — E11.40 CONTROLLED TYPE 2 DIABETES WITH NEUROPATHY (HCC): Status: ACTIVE | Noted: 2017-12-05

## 2022-03-19 PROBLEM — I25.110 ATHEROSCLEROSIS OF NATIVE CORONARY ARTERY OF NATIVE HEART WITH UNSTABLE ANGINA PECTORIS (HCC): Status: ACTIVE | Noted: 2021-11-18

## 2022-03-19 PROBLEM — I35.0 NONRHEUMATIC AORTIC VALVE STENOSIS: Status: ACTIVE | Noted: 2021-08-23

## 2022-03-19 PROBLEM — E11.3511: Status: ACTIVE | Noted: 2017-08-23

## 2022-03-19 PROBLEM — E78.5 DYSLIPIDEMIA: Status: ACTIVE | Noted: 2021-08-23

## 2022-03-19 PROBLEM — R09.89 DEPRESSED LEFT VENTRICULAR EJECTION FRACTION: Status: ACTIVE | Noted: 2017-02-27

## 2022-03-19 PROBLEM — Z95.2 S/P AVR: Status: ACTIVE | Noted: 2021-11-18

## 2022-03-19 PROBLEM — E11.3592: Status: ACTIVE | Noted: 2017-08-23

## 2022-03-19 PROBLEM — Z79.4: Status: ACTIVE | Noted: 2017-08-23

## 2022-03-19 PROBLEM — Z95.1 S/P CABG X 1: Status: ACTIVE | Noted: 2017-02-28

## 2022-03-19 PROBLEM — I50.22 SYSTOLIC CHF, CHRONIC (HCC): Status: ACTIVE | Noted: 2017-03-02

## 2022-03-19 PROBLEM — R93.1 DEPRESSED LEFT VENTRICULAR EJECTION FRACTION: Status: ACTIVE | Noted: 2017-02-27

## 2022-03-19 PROBLEM — H43.13: Status: ACTIVE | Noted: 2017-02-27

## 2022-03-19 PROBLEM — I35.0 AORTIC STENOSIS, SEVERE: Status: ACTIVE | Noted: 2021-11-18

## 2022-03-19 PROBLEM — I35.0 AORTIC VALVE STENOSIS: Status: ACTIVE | Noted: 2021-11-18

## 2022-03-19 PROBLEM — D69.6 THROMBOCYTOPENIA (HCC): Status: ACTIVE | Noted: 2021-11-22

## 2022-03-19 PROBLEM — Z95.810 ICD (IMPLANTABLE CARDIOVERTER-DEFIBRILLATOR) IN PLACE: Status: ACTIVE | Noted: 2017-03-06

## 2022-03-20 PROBLEM — I25.10 CAD, MULTIPLE VESSEL: Status: ACTIVE | Noted: 2017-02-28

## 2022-03-20 PROBLEM — I48.3 TYPICAL ATRIAL FLUTTER (HCC): Status: ACTIVE | Noted: 2021-08-23

## 2022-06-07 DIAGNOSIS — I10 ESSENTIAL HYPERTENSION, BENIGN: Primary | ICD-10-CM

## 2022-06-07 DIAGNOSIS — E78.5 DYSLIPIDEMIA: ICD-10-CM

## 2022-06-07 DIAGNOSIS — E11.40 CONTROLLED TYPE 2 DIABETES WITH NEUROPATHY (HCC): ICD-10-CM

## 2022-06-07 DIAGNOSIS — E11.3511 CONTROLLED TYPE 2 DIABETES MELLITUS WITH RIGHT EYE AFFECTED BY PROLIFERATIVE RETINOPATHY AND MACULAR EDEMA, WITH LONG-TERM CURRENT USE OF INSULIN (HCC): ICD-10-CM

## 2022-06-07 DIAGNOSIS — Z79.4 CONTROLLED TYPE 2 DIABETES MELLITUS WITH RIGHT EYE AFFECTED BY PROLIFERATIVE RETINOPATHY AND MACULAR EDEMA, WITH LONG-TERM CURRENT USE OF INSULIN (HCC): ICD-10-CM

## 2022-06-07 DIAGNOSIS — E78.1 HYPERTRIGLYCERIDEMIA: ICD-10-CM

## 2022-06-23 NOTE — PROGRESS NOTES
Charanjit Marcial  (1955) is a Established patient, here for evaluation of the following:    Assessment & Plan   Below is the assessment and plan developed based on review of pertinent history, physical exam, labs, studies, and medications. Miguelina Cavanaugh was seen today for hypertension and diabetes. Diagnoses and all orders for this visit:    Essential hypertension, benign  -     lisinopril (PRINIVIL;ZESTRIL) 2.5 MG tablet; Take 1 tablet by mouth daily  -     metoprolol succinate (TOPROL XL) 50 MG extended release tablet; Take 1 tablet by mouth daily  -     Comprehensive Metabolic Panel; Future    Controlled type 2 diabetes with neuropathy (HCC)  -     empagliflozin (JARDIANCE) 10 MG tablet; Take 1 tablet by mouth daily  -     metFORMIN (GLUCOPHAGE-XR) 500 MG extended release tablet; Take 1 tablet by mouth Daily with supper  -     Semaglutide, 1 MG/DOSE, (OZEMPIC, 1 MG/DOSE,) 4 MG/3ML SOPN; Inject 1 mg into the skin every 7 days  -     Microalbumin / Creatinine Urine Ratio; Future  -     Lipid Panel; Future  -     Hemoglobin A1C; Future  -     Comprehensive Metabolic Panel; Future    Current use of long term anticoagulation    Hypertriglyceridemia  -     atorvastatin (LIPITOR) 80 MG tablet; Take 1 tablet by mouth daily    CAD, multiple vessel  -     atorvastatin (LIPITOR) 80 MG tablet; Take 1 tablet by mouth daily    Gastroesophageal reflux disease without esophagitis  -     famotidine (PEPCID) 20 MG tablet; Take 1 tablet by mouth 3 times daily    Need for hepatitis C screening test  -     Hepatitis C Antibody; Future    Screening PSA (prostate specific antigen)  -     PSA Screening; Future      I reviewed recent lab results w/  . Ranjit Hunter. Diabetic control is declining since last December. His HgA1C has increased to 7.9%. Prescribed: Jardiance 10mg qam + Metformin XR 500mg 1 po daily + Ozempic 1mg weekly. States that his diet has not been as good due to traveling.   He only has 1 more \"big trip\" in about 3 weeks and then will be back home and will start cooking for himself. He has CKD III which is stable since last December. Mr. Emma Crum was informed of the risks of various medications that can be harmful in CKD including, but not limited to, Motrin, Ibuprofen, Advil, Naprosyn and Aleve. Tylenol is ok (but do not exceed a total of 3000 mg/day). Also encouraged to limit/avoid salt in diet, exercise, and keeping lipids under control. Advised to drink 60 oz of water each day. BP is well controlled. Prescribed: Lisinopril 2.5mg (for renal protection) + Metoprolol XL 50mg (MI prevention/risk reduction). Taking Atorvastatin 80mg for HLD mgt. Taking Pepcid 20mg TID for mgt of GERD symptoms. Symptoms have well controlled. He had sx to repair a macular hole in his rt eye on 1/18/22. Pt reports improved vision. He has nitroglycerin to take prn for chest pain. States that he hasn't needed this since being discharged from hospital last November. He sees: Dr. De Los Santos/Cardiology: followed for: HTN, HLD, A. Flutter, CAD (mult. Vessel), Nonrheumatic Aortic Valve Stenosis (Echo ordered). He is on long term anticoagulation w/ Xarelto + ASA 81mg daily. He has some amoxicillin which he takes prior to seeing the Dentist.          Follow Up    Return for 6 mos DM Mgt/prostate exam w/ labs prior to visit. Subjective     At his visit On 1/11/21, the following was discussed:     Renal function is a little decreased since last November. Advised to drink 60 oz of water each day. At today's visit:     He has no new complaints.           Patient-Reported Vitals  Patient-Reported Vitals 6/24/2022   Patient-Reported Weight 186 lbs   Patient-Reported Height 5'10\"   Patient-Reported Systolic 324   Patient-Reported Diastolic 67   Patient-Reported Pulse 78   Patient-Reported Temperature does not have   Patient-Reported SpO2 no oxygen         Component      Latest Ref Rng & Units 6/17/2022 Standing Status:   Future     Standing Expiration Date:   6/24/2023    Hemoglobin A1C     Standing Status:   Future     Standing Expiration Date:   6/24/2023    Comprehensive Metabolic Panel     Standing Status:   Future     Standing Expiration Date:   6/24/2023    amoxicillin (AMOXIL) 500 MG capsule     Sig: TAKE 4 CAPSULES BY MOUTH 1 HOUR PRIOR TO APPT    brimonidine (ALPHAGAN) 0.2 % ophthalmic solution     Sig: APPLY 1 DROP RIGHT EYE 3 TIMES A DAY    atorvastatin (LIPITOR) 80 MG tablet     Sig: Take 1 tablet by mouth daily     Dispense:  90 tablet     Refill:  3    empagliflozin (JARDIANCE) 10 MG tablet     Sig: Take 1 tablet by mouth daily     Dispense:  90 tablet     Refill:  3    famotidine (PEPCID) 20 MG tablet     Sig: Take 1 tablet by mouth 3 times daily     Dispense:  270 tablet     Refill:  3    lisinopril (PRINIVIL;ZESTRIL) 2.5 MG tablet     Sig: Take 1 tablet by mouth daily     Dispense:  90 tablet     Refill:  3    metFORMIN (GLUCOPHAGE-XR) 500 MG extended release tablet     Sig: Take 1 tablet by mouth Daily with supper     Dispense:  90 tablet     Refill:  3    metoprolol succinate (TOPROL XL) 50 MG extended release tablet     Sig: Take 1 tablet by mouth daily     Dispense:  90 tablet     Refill:  3    Semaglutide, 1 MG/DOSE, (OZEMPIC, 1 MG/DOSE,) 4 MG/3ML SOPN     Sig: Inject 1 mg into the skin every 7 days     Dispense:  9 mL     Refill:  3        Cathy Givens, was evaluated through a synchronous (real-time) audio-video encounter. The patient (or guardian if applicable) is aware that this is a billable service, which includes applicable co-pays. This Virtual Visit was conducted with patient's (and/or legal guardian's) consent. The visit was conducted pursuant to the emergency declaration under the [address] [place] [address], [address] [place] [address] authority and the [employer] Act.   Patient identification was verified, and a caregiver was present when appropriate. The patient was located at Home: [address] [place] [address],[address] [id number].    Provider was located at Home ([address]): [place] Amy-Juliano, [place]

## 2022-06-24 ENCOUNTER — TELEMEDICINE (OUTPATIENT)
Dept: FAMILY MEDICINE CLINIC | Facility: CLINIC | Age: 67
End: 2022-06-24
Payer: COMMERCIAL

## 2022-06-24 DIAGNOSIS — E11.40 CONTROLLED TYPE 2 DIABETES WITH NEUROPATHY (HCC): ICD-10-CM

## 2022-06-24 DIAGNOSIS — Z12.5 SCREENING PSA (PROSTATE SPECIFIC ANTIGEN): ICD-10-CM

## 2022-06-24 DIAGNOSIS — Z11.59 NEED FOR HEPATITIS C SCREENING TEST: ICD-10-CM

## 2022-06-24 DIAGNOSIS — I25.10 CAD, MULTIPLE VESSEL: ICD-10-CM

## 2022-06-24 DIAGNOSIS — I10 ESSENTIAL HYPERTENSION, BENIGN: Primary | ICD-10-CM

## 2022-06-24 DIAGNOSIS — Z79.01 CURRENT USE OF LONG TERM ANTICOAGULATION: ICD-10-CM

## 2022-06-24 DIAGNOSIS — E78.1 HYPERTRIGLYCERIDEMIA: ICD-10-CM

## 2022-06-24 DIAGNOSIS — K21.9 GASTROESOPHAGEAL REFLUX DISEASE WITHOUT ESOPHAGITIS: ICD-10-CM

## 2022-06-24 PROCEDURE — 2022F DILAT RTA XM EVC RTNOPTHY: CPT | Performed by: PHYSICIAN ASSISTANT

## 2022-06-24 PROCEDURE — 3051F HG A1C>EQUAL 7.0%<8.0%: CPT | Performed by: PHYSICIAN ASSISTANT

## 2022-06-24 PROCEDURE — 99214 OFFICE O/P EST MOD 30 MIN: CPT | Performed by: PHYSICIAN ASSISTANT

## 2022-06-24 PROCEDURE — 1036F TOBACCO NON-USER: CPT | Performed by: PHYSICIAN ASSISTANT

## 2022-06-24 PROCEDURE — G8419 CALC BMI OUT NRM PARAM NOF/U: HCPCS | Performed by: PHYSICIAN ASSISTANT

## 2022-06-24 PROCEDURE — 3017F COLORECTAL CA SCREEN DOC REV: CPT | Performed by: PHYSICIAN ASSISTANT

## 2022-06-24 PROCEDURE — G8427 DOCREV CUR MEDS BY ELIG CLIN: HCPCS | Performed by: PHYSICIAN ASSISTANT

## 2022-06-24 RX ORDER — FAMOTIDINE 20 MG/1
20 TABLET, FILM COATED ORAL 3 TIMES DAILY
Qty: 270 TABLET | Refills: 3 | Status: SHIPPED | OUTPATIENT
Start: 2022-06-24

## 2022-06-24 RX ORDER — BRIMONIDINE TARTRATE 2 MG/ML
SOLUTION/ DROPS OPHTHALMIC
COMMUNITY
Start: 2022-05-03

## 2022-06-24 RX ORDER — AMOXICILLIN 500 MG/1
CAPSULE ORAL
COMMUNITY
Start: 2022-06-02

## 2022-06-24 RX ORDER — ATORVASTATIN CALCIUM 80 MG/1
80 TABLET, FILM COATED ORAL DAILY
Qty: 90 TABLET | Refills: 3 | Status: SHIPPED | OUTPATIENT
Start: 2022-06-24

## 2022-06-24 RX ORDER — LISINOPRIL 2.5 MG/1
2.5 TABLET ORAL DAILY
Qty: 90 TABLET | Refills: 3 | Status: SHIPPED | OUTPATIENT
Start: 2022-06-24

## 2022-06-24 RX ORDER — METFORMIN HYDROCHLORIDE 500 MG/1
500 TABLET, EXTENDED RELEASE ORAL
Qty: 90 TABLET | Refills: 3 | Status: SHIPPED | OUTPATIENT
Start: 2022-06-24

## 2022-06-24 RX ORDER — SEMAGLUTIDE 1.34 MG/ML
1 INJECTION, SOLUTION SUBCUTANEOUS
Qty: 9 ML | Refills: 3 | Status: SHIPPED | OUTPATIENT
Start: 2022-06-24

## 2022-06-24 RX ORDER — METOPROLOL SUCCINATE 50 MG/1
50 TABLET, EXTENDED RELEASE ORAL DAILY
Qty: 90 TABLET | Refills: 3 | Status: SHIPPED | OUTPATIENT
Start: 2022-06-24

## 2022-06-24 ASSESSMENT — PATIENT HEALTH QUESTIONNAIRE - PHQ9
SUM OF ALL RESPONSES TO PHQ QUESTIONS 1-9: 0
1. LITTLE INTEREST OR PLEASURE IN DOING THINGS: 0
SUM OF ALL RESPONSES TO PHQ9 QUESTIONS 1 & 2: 0
SUM OF ALL RESPONSES TO PHQ QUESTIONS 1-9: 0
SUM OF ALL RESPONSES TO PHQ QUESTIONS 1-9: 0
2. FEELING DOWN, DEPRESSED OR HOPELESS: 0
SUM OF ALL RESPONSES TO PHQ QUESTIONS 1-9: 0

## 2022-10-03 ENCOUNTER — OFFICE VISIT (OUTPATIENT)
Dept: CARDIOLOGY CLINIC | Age: 67
End: 2022-10-03
Payer: COMMERCIAL

## 2022-10-03 VITALS
BODY MASS INDEX: 24.47 KG/M2 | DIASTOLIC BLOOD PRESSURE: 78 MMHG | HEART RATE: 88 BPM | HEIGHT: 70 IN | SYSTOLIC BLOOD PRESSURE: 126 MMHG | WEIGHT: 170.9 LBS

## 2022-10-03 DIAGNOSIS — I50.22 CHRONIC SYSTOLIC (CONGESTIVE) HEART FAILURE (HCC): ICD-10-CM

## 2022-10-03 DIAGNOSIS — I25.10 ATHEROSCLEROSIS OF NATIVE CORONARY ARTERY OF NATIVE HEART WITHOUT ANGINA PECTORIS: ICD-10-CM

## 2022-10-03 DIAGNOSIS — I10 ESSENTIAL (PRIMARY) HYPERTENSION: Primary | ICD-10-CM

## 2022-10-03 DIAGNOSIS — I35.0 AORTIC VALVE STENOSIS, ETIOLOGY OF CARDIAC VALVE DISEASE UNSPECIFIED: ICD-10-CM

## 2022-10-03 PROCEDURE — G8420 CALC BMI NORM PARAMETERS: HCPCS | Performed by: INTERNAL MEDICINE

## 2022-10-03 PROCEDURE — G8428 CUR MEDS NOT DOCUMENT: HCPCS | Performed by: INTERNAL MEDICINE

## 2022-10-03 PROCEDURE — 99214 OFFICE O/P EST MOD 30 MIN: CPT | Performed by: INTERNAL MEDICINE

## 2022-10-03 PROCEDURE — G8484 FLU IMMUNIZE NO ADMIN: HCPCS | Performed by: INTERNAL MEDICINE

## 2022-10-03 PROCEDURE — 3017F COLORECTAL CA SCREEN DOC REV: CPT | Performed by: INTERNAL MEDICINE

## 2022-10-03 PROCEDURE — 1036F TOBACCO NON-USER: CPT | Performed by: INTERNAL MEDICINE

## 2022-10-03 ASSESSMENT — ENCOUNTER SYMPTOMS
EYE PAIN: 0
NAIL CHANGES: 0
ABDOMINAL PAIN: 0
COUGH: 0
STRIDOR: 0
APHONIA: 0

## 2022-10-03 NOTE — PROGRESS NOTES
[address], [address] [facility], [address] [place] [address]  PHONE: [phone]    SUBJECTIVE:   Sonia Griffith is a 61 y.o. male 1955   seen for a follow up visit regarding the following:     Chief Complaint   Patient presents with    Congestive Heart Failure     6 month follow up         History of present illness: 61 y.o. male presented for follow-up 10/3/22    He underwent redo sternotomy, lysis  of myocardial adhesions, Aortic valve replacement with a 23mm Lucas valve as well as CABG x 1 on 11/18/21. Interval history    Previous diminished left ventricular systolic function. The patient had acute coronary syndrome in 2017 with coronary artery bypass grafting with LIMA to LAD, SVG to OM, SVG to PDA, EF 10-15% with VF arrest.  The patient underwent defibrillator placement. Today, he complains of no shortness of breath. Atrial flutter was detected on his device interrogation. Cardiac History:     Multivessel coronary disease on cardiac catheterization 2017. 2017 carotids <50% stenosis      2017 Coronary artery bypass grafting with LIMA to LAD, SVG to OM, SVG to PDA. Carotids 2017 <50%     EF 10-15% by echocardiogram.  Aortic valve appeared calcified, but gradients could not be obtained due to poor acoustic windows and aortic stenosis severity could not be graded based on his low cardiac output at that time. Device interrogation 07/2018 atrial flutter. Initiated on Eliquis. 8/2019 EF low normal Moderate AS mean gradient 19 mm Hg. 2019 patient stopped eliquis due to dizziness. Echo 7/2020 EF 50% Moderate to severe AS mean gradient 25 mm Hg DI 0.25     3/2021 echocardiogram EF 60 to 65% mean aortic valve gradient 33 mmHg dimensionless index 0.28 elevated mitral valve gradients with severe mitral annular calcification    [address] [facility] Several AS EF 55-60%    23mm Lucas valve as well as CABG x 1 on 11/18/21.           Assessment and Plan: Cardiomyopathy,     EF improved     New York Heart Association class 1 symptoms, on appropriate therapies. Defibrillator in place. Defibrillator in situ, normal function, atrial flutter detected. Key CAD CHF Meds            atorvastatin (LIPITOR) 80 MG tablet (Taking)    Sig - Route: Take 1 tablet by mouth daily - Oral    lisinopril (PRINIVIL;ZESTRIL) 2.5 MG tablet (Taking)    Sig - Route: Take 1 tablet by mouth daily - Oral    metoprolol succinate (TOPROL XL) 50 MG extended release tablet (Taking)    Sig - Route: Take 1 tablet by mouth daily - Oral    rivaroxaban (XARELTO) 20 MG TABS tablet (Taking)    Class: Historical Med             Aortic stenosis. Controlled   Hyperlipidemia. Continue lipid lowering therapies, controlled. Labs reviewed June 2022 excellent control of lipids  Diabetes. Controlled      On SLG-2 GLP-1   metFORMIN - 500 MG   Device interrogation 2/2020 reviewed 1:1 AT vs flutter     Carotid disease 2017 less than 50% stenosis  Aortic stenosis    Controlled.             Current Outpatient Medications   Medication Sig    amoxicillin (AMOXIL) 500 MG capsule TAKE 4 CAPSULES BY MOUTH 1 HOUR PRIOR TO APPT    brimonidine (ALPHAGAN) 0.2 % ophthalmic solution APPLY 1 DROP RIGHT EYE 3 TIMES A DAY    atorvastatin (LIPITOR) 80 MG tablet Take 1 tablet by mouth daily    empagliflozin (JARDIANCE) 10 MG tablet Take 1 tablet by mouth daily    famotidine (PEPCID) 20 MG tablet Take 1 tablet by mouth 3 times daily    lisinopril (PRINIVIL;ZESTRIL) 2.5 MG tablet Take 1 tablet by mouth daily    metFORMIN (GLUCOPHAGE-XR) 500 MG extended release tablet Take 1 tablet by mouth Daily with supper    metoprolol succinate (TOPROL XL) 50 MG extended release tablet Take 1 tablet by mouth daily    Semaglutide, 1 MG/DOSE, (OZEMPIC, 1 MG/DOSE,) 4 MG/3ML SOPN Inject 1 mg into the skin every 7 days    acetaminophen (TYLENOL) 325 MG tablet Take 650 mg by mouth every 6 hours as needed    aspirin 81 MG EC tablet Take 81 mg by mouth daily    nitroGLYCERIN (NITROSTAT) 0.4 MG SL tablet Place 0.4 mg under the tongue    rivaroxaban (XARELTO) 20 MG TABS tablet Take 20 mg by mouth daily (with breakfast)     No current facility-administered medications for this visit. Past Medical History, Past Surgical History, Family history, Social History, and Medications were all reviewed with the patient today and updated as necessary. Allergies   Allergen Reactions    Tomato Swelling     NO FRESH TOMATO, CAN EAT KETCHUP AND ANY CANNED/COOKED TOMATO PRODUCTS    Caffeine Other (See Comments)     Dizziness and may pass out     Past Medical History:   Diagnosis Date    Adverse effect of anesthesia     gets hiccups after anesthesia - baclofen helps     Allergic rhinitis, cause unspecified 3/7/2014    Atrial flutter ([address]     pt with ICD- pt currently taking Xarelto; pacer dependent per patient     CAD, multiple vessel 2/28/2017 2/28/17 (Dr Christa Moore) Coronary artery bypass grafting x3. Grafts consisting of  1. Left internal mammary artery to the left anterior descending. 2. Reversed LEFT saphenous vein graft to obtuse marginal.  3. Reversed LEFT saphenous vein graft to the left posterior descending  artery.      Cancer [facility])     testicular in 1979 and Squamous cell removed from nose     COVID-19 vaccine series completed 04/05/2021    Pfizer 3/15/2021; 12/27/2021 booster given     Current use of long term anticoagulation     Xarelto and Aspirin 81mg    Depressed left ventricular ejection fraction 2/27/2017 2/27/17 20%    Diabetes (HCC)     avg -150; hypoglycemic episodes does not check; 12/27/2021 6.4 Hgb A1C     Esophageal reflux 03/07/2014    controlled with pepcid - sleeps sitting in chair     History of EKG 11/19/2021    SR w/ occ PVCs, nonspecific T wave abn; prolonged QT - pt had x3 CABG on 11/18/2021 w/ AVR    History of testicular cancer 1979    met to lung & abdomen- Pt received radiation treatment    Hx of CABG 11/18/2021    x 3 vessel    Hx of cardiac catheterization 10/20/2021    left heart cath: severe 3 vessel disease patient had CABG 11/18/2021 with AVR    Hx of transesophageal echocardiography ([facility]) for monitoring 15/02/6884    systolic function normal per report; EF 55-60% per report; severe AS - patient had CABG on 11/18/2021 x 3 vessel with AVR     Hypertension     controlled     ICD (implantable cardioverter-defibrillator) in place     Left [employer] ICD- pt has never received a shock    Retinopathy of both eyes      Past Surgical History:   Procedure Laterality Date    CARDIAC CATHETERIZATION      CARDIAC DEFIBRILLATOR PLACEMENT      Left chest    CATARACT REMOVAL Right 04/21/2019    COLONOSCOPY      CORONARY ARTERY BYPASS GRAFT  2017    CABG X3    HEENT  2003    SCC removal, nose    UROLOGICAL SURGERY Left 2010    groin- cyst    UROLOGICAL SURGERY Left 1979    malignant tumor of testis/RAT    WISDOM TOOTH EXTRACTION       Family History   Problem Relation Age of Onset    Heart Disease Mother     Heart Attack Mother 76    Diabetes Mother         type 2 -insulin dep    Diabetes Father         type 2; insulin dep    Heart Disease Father         CAD-pacer-carotid endart    Stroke Father     No Known Problems Sister     No Known Problems Brother     Cancer Sister         breast    Heart Attack Father 67    Diabetes Maternal Grandmother       Social History     Tobacco Use    Smoking status: Never    Smokeless tobacco: Never   Substance Use Topics    Alcohol use: No     Alcohol/week: 0.0 standard drinks       ROS:    Review of Systems   Constitutional: Negative for fever. HENT:  Negative for stridor. Eyes:  Negative for pain. Cardiovascular:  Negative for chest pain. Respiratory:  Negative for cough. Endocrine: Negative for cold intolerance. Skin:  Negative for nail changes. Musculoskeletal:  Negative for arthritis. Gastrointestinal:  Negative for abdominal pain.    Genitourinary:  Negative for dysuria. Neurological:  Negative for aphonia. Psychiatric/Behavioral:  Negative for altered mental status. Allergic/Immunologic: Negative for hives. PHYSICAL EXAM:    /78   Pulse 88   Ht 5' 10\" (1.778 m)   Wt 170 lb 14.4 oz (77.5 kg)   BMI 24.52 kg/m²        Wt Readings from Last 3 Encounters:   10/03/22 170 lb 14.4 oz (77.5 kg)   03/29/22 179 lb 1.6 oz (81.2 kg)   01/11/22 181 lb (82.1 kg)     BP Readings from Last 3 Encounters:   10/03/22 126/78   03/29/22 122/70   01/11/22 118/68         Physical Exam  Vitals reviewed. HENT:      Head: Normocephalic. Right Ear: External ear normal.      Left Ear: External ear normal.      Nose: Nose normal.   Eyes:      General: No scleral icterus. Pulmonary:      Effort: Pulmonary effort is normal.   Abdominal:      General: There is no distension. Musculoskeletal:      Cervical back: Neck supple. Skin:     General: Skin is warm. Neurological:      Mental Status: He is alert. Mental status is at baseline. Medical problems and test results were reviewed with the patient today. No results found for this or any previous visit (from the past 672 hour(s)). Lab Results   Component Value Date/Time    CHOL 92 06/17/2022 08:47 AM    HDL 44 06/17/2022 08:47 AM    VLDL 15 12/27/2021 08:36 AM       No results found for any visits on 10/03/22. Scotty Pugh was seen today for congestive heart failure. Diagnoses and all orders for this visit:    Essential (primary) hypertension    Atherosclerosis of native coronary artery of native heart without angina pectoris    Chronic systolic (congestive) heart failure (HCC)    Aortic valve stenosis, etiology of cardiac valve disease unspecified  -     Transthoracic echocardiogram (TTE) complete with contrast, bubble, strain, and 3D PRN; Future    Return in about 6 months (around 4/3/2023).        Annamaria Betancourt MD  10/3/2022  8:39 AM

## 2022-10-10 DIAGNOSIS — I50.22 SYSTOLIC CHF, CHRONIC (HCC): Primary | ICD-10-CM

## 2022-10-10 DIAGNOSIS — Z95.810 ICD (IMPLANTABLE CARDIOVERTER-DEFIBRILLATOR) IN PLACE: ICD-10-CM

## 2022-11-04 DIAGNOSIS — Z95.810 ICD (IMPLANTABLE CARDIOVERTER-DEFIBRILLATOR) IN PLACE: Primary | ICD-10-CM

## 2022-11-04 DIAGNOSIS — I49.01 VENTRICULAR FIBRILLATION (HCC): ICD-10-CM

## 2022-11-04 DIAGNOSIS — I48.3 TYPICAL ATRIAL FLUTTER (HCC): ICD-10-CM

## 2022-12-20 ENCOUNTER — NURSE ONLY (OUTPATIENT)
Dept: FAMILY MEDICINE CLINIC | Facility: CLINIC | Age: 67
End: 2022-12-20

## 2022-12-20 DIAGNOSIS — Z11.59 NEED FOR HEPATITIS C SCREENING TEST: ICD-10-CM

## 2022-12-20 DIAGNOSIS — Z12.5 SCREENING PSA (PROSTATE SPECIFIC ANTIGEN): ICD-10-CM

## 2022-12-20 DIAGNOSIS — I10 ESSENTIAL HYPERTENSION, BENIGN: ICD-10-CM

## 2022-12-20 DIAGNOSIS — E11.40 CONTROLLED TYPE 2 DIABETES WITH NEUROPATHY (HCC): ICD-10-CM

## 2022-12-20 LAB
CREAT UR-MCNC: 121 MG/DL
MICROALBUMIN UR-MCNC: 1.09 MG/DL (ref 0–3)
MICROALBUMIN/CREAT UR-RTO: 9 MG/G (ref 0–30)

## 2022-12-21 LAB
ALBUMIN SERPL-MCNC: 4.1 G/DL (ref 3.2–4.6)
ALBUMIN/GLOB SERPL: 1.5 (ref 0.4–1.6)
ALP SERPL-CCNC: 68 U/L (ref 50–136)
ALT SERPL-CCNC: 36 U/L (ref 12–65)
ANION GAP SERPL CALC-SCNC: 6 MMOL/L (ref 2–11)
AST SERPL-CCNC: 17 U/L (ref 15–37)
BILIRUB SERPL-MCNC: 1.3 MG/DL (ref 0.2–1.1)
BUN SERPL-MCNC: 26 MG/DL (ref 8–23)
CALCIUM SERPL-MCNC: 9.5 MG/DL (ref 8.3–10.4)
CHLORIDE SERPL-SCNC: 106 MMOL/L (ref 101–110)
CHOLEST SERPL-MCNC: 88 MG/DL
CO2 SERPL-SCNC: 26 MMOL/L (ref 21–32)
CREAT SERPL-MCNC: 1.3 MG/DL (ref 0.8–1.5)
EST. AVERAGE GLUCOSE BLD GHB EST-MCNC: 192 MG/DL
GLOBULIN SER CALC-MCNC: 2.7 G/DL (ref 2.8–4.5)
GLUCOSE SERPL-MCNC: 196 MG/DL (ref 65–100)
HBA1C MFR BLD: 8.3 % (ref 4.8–5.6)
HCV AB SER QL: NONREACTIVE
HDLC SERPL-MCNC: 41 MG/DL (ref 40–60)
HDLC SERPL: 2.1
LDLC SERPL CALC-MCNC: 24 MG/DL
POTASSIUM SERPL-SCNC: 4.9 MMOL/L (ref 3.5–5.1)
PROT SERPL-MCNC: 6.8 G/DL (ref 6.3–8.2)
PSA SERPL-MCNC: 2.6 NG/ML
SODIUM SERPL-SCNC: 138 MMOL/L (ref 133–143)
TRIGL SERPL-MCNC: 115 MG/DL (ref 35–150)
VLDLC SERPL CALC-MCNC: 23 MG/DL (ref 6–23)

## 2022-12-23 NOTE — PROGRESS NOTES
Sonia Griffith (: 1955) is a 74 y.o. male, an established patient, is here for evaluation of the following chief complaint(s):  Chief Complaint   Patient presents with    Diabetes    Results            ASSESSMENT/PLAN:  Prema Epperson was seen today for diabetes and results. Diagnoses and all orders for this visit:    Essential hypertension, benign  -     Basic Metabolic Panel; Future    Type 2 diabetes mellitus with diabetic polyneuropathy, without long-term current use of insulin (HCC)  -     empagliflozin (JARDIANCE) 25 MG tablet; Take 1 tablet by mouth daily  -     Basic Metabolic Panel; Future  -     Hemoglobin A1C; Future    Dyslipidemia    History of non-ST elevation myocardial infarction (NSTEMI)    CAD, multiple vessel    S/P CABG x 1    Current use of long term anticoagulation    Needs flu shot  -     Influenza, FLUAD, (age 72 y+), IM, PF, 0.5 mL  -     [place] IMMUNIZ ADMIN,1 SINGLE/COMB VAC/TOXOID    Other orders  -     Tetanus-Diphth-Acell Pertussis (BOOSTRIX) 5-2.5-18.5 LF-MCG/0.5 injection; Inject 0.5 mLs into the muscle once for 1 dose    I reviewed recent lab results w/  Mr. Moses Diaz. PSA is treated to goal.  He denies urinary symptoms. Diabetic control is declining. His HgA1C has increased to 8.3% since last . Currently prescribed:   Key Antihyperglycemic Medications            empagliflozin (JARDIANCE) 10 MG tablet    Sig - Route: Take 1 tablet by mouth daily - Oral    metFORMIN (GLUCOPHAGE-XR) 500 MG extended release tablet    Sig - Route: Take 1 tablet by mouth Daily with supper - Oral    Semaglutide, 1 MG/DOSE, (OZEMPIC, 1 MG/DOSE,) 4 MG/3ML SOPN    Sig - Route: Inject 1 mg into the skin every 7 days - SubCUTAneous    Prior authorization: Closed - Prior Authorization not required for patient/medication   Will increase Jardiance to 25mg daily and re-eval in 3 mos. The ASCVD Risk score (Mica CHINO, et al., 2019) failed to calculate for the following reasons:     The patient has a prior MI or stroke diagnosis  Currently prescribed:   Key Hyperlipidemia Meds            atorvastatin (LIPITOR) 80 MG tablet    Sig - Route: Take 1 tablet by mouth daily - Oral   --continue as before. BP today is: well controlled. Currently prescribed:   Key Anti-Hypertensive Meds            lisinopril (PRINIVIL;ZESTRIL) 2.5 MG tablet    Sig - Route: Take 1 tablet by mouth daily - Oral    metoprolol succinate (TOPROL XL) 50 MG extended release tablet    Sig - Route: Take 1 tablet by mouth daily - Oral     Taking Pepcid 20mg with each meal of GERD symptoms. Symptoms have well controlled. He has nitroglycerin to take prn for chest pain. States that he hasn't needed this since his last hospitalization in November 2021. He has some amoxicillin which he takes prior to seeing the Dentist.     Followed by Dr. De Los Santos/Cardiology for: HTN, HLD, A. Flutter, CAD (mult. Vessel), Nonrheumatic Aortic Valve Stenosis   He is on long term anticoagulation w/ Xarelto + ASA 81mg daily. Follow Up    Return for 3 mos DM f/u w/ labs prior to visit. SUBJECTIVE/OBJECTIVE:  At his visit On 6/24/22, the following was discussed:     Diabetic control is declining since last December. His HgA1C has increased to 7.9%. Prescribed: Jardiance 10mg qam + Metformin XR 500mg 1 po daily + Ozempic 1mg weekly. States that his diet has not been as good due to traveling. He only has 1 more \"big trip\" in about 3 weeks and then will be back home and will start cooking for himself. At today's visit:     Average blood sugar reading is 90.   No new problems        Vitals:    12/27/22 0920   BP: 110/68   Pulse: 89   Resp: 16   Temp: 98.2 °F (36.8 °C)   TempSrc: Oral   SpO2: 96%   Weight: 173 lb 12.8 oz (78.8 kg)   Height: 5' 10\" (1.778 m)                    Orders Placed This Encounter    SD IMMUNIZ ADMIN,1 SINGLE/COMB VAC/TOXOID    Influenza, FLUAD, (age 72 y+), IM, PF, 0.5 mL    Basic Metabolic Panel     Standing Status: Future     Standing Expiration Date:   6/27/2023    Hemoglobin A1C     Standing Status:   Future     Standing Expiration Date:   6/27/2023    empagliflozin (JARDIANCE) 25 MG tablet     Sig: Take 1 tablet by mouth daily     Dispense:  90 tablet     Refill:  3     Please d/c any orders on file for Jardiance 10mg    Tetanus-Diphth-Acell Pertussis (BOOSTRIX) 5-2.5-18.5 LF-MCG/0.5 injection     Sig: Inject 0.5 mLs into the muscle once for 1 dose     Dispense:  1 each     Refill:  0               An electronic signature was used to authenticate this note.   -- EMILY Zazueta

## 2022-12-27 ENCOUNTER — OFFICE VISIT (OUTPATIENT)
Dept: FAMILY MEDICINE CLINIC | Facility: CLINIC | Age: 67
End: 2022-12-27
Payer: COMMERCIAL

## 2022-12-27 VITALS
HEART RATE: 89 BPM | BODY MASS INDEX: 24.88 KG/M2 | RESPIRATION RATE: 16 BRPM | TEMPERATURE: 98.2 F | WEIGHT: 173.8 LBS | HEIGHT: 70 IN | SYSTOLIC BLOOD PRESSURE: 110 MMHG | DIASTOLIC BLOOD PRESSURE: 68 MMHG | OXYGEN SATURATION: 96 %

## 2022-12-27 DIAGNOSIS — I10 ESSENTIAL HYPERTENSION, BENIGN: Primary | ICD-10-CM

## 2022-12-27 DIAGNOSIS — Z23 NEEDS FLU SHOT: ICD-10-CM

## 2022-12-27 DIAGNOSIS — I25.10 CAD, MULTIPLE VESSEL: ICD-10-CM

## 2022-12-27 DIAGNOSIS — Z95.1 S/P CABG X 1: ICD-10-CM

## 2022-12-27 DIAGNOSIS — E78.5 DYSLIPIDEMIA: ICD-10-CM

## 2022-12-27 DIAGNOSIS — Z79.01 CURRENT USE OF LONG TERM ANTICOAGULATION: ICD-10-CM

## 2022-12-27 DIAGNOSIS — I25.2 HISTORY OF NON-ST ELEVATION MYOCARDIAL INFARCTION (NSTEMI): ICD-10-CM

## 2022-12-27 DIAGNOSIS — E11.42 TYPE 2 DIABETES MELLITUS WITH DIABETIC POLYNEUROPATHY, WITHOUT LONG-TERM CURRENT USE OF INSULIN (HCC): ICD-10-CM

## 2022-12-27 PROBLEM — E11.3511: Status: ACTIVE | Noted: 2017-08-23

## 2022-12-27 PROCEDURE — 90471 IMMUNIZATION ADMIN: CPT | Performed by: PHYSICIAN ASSISTANT

## 2022-12-27 PROCEDURE — 3052F HG A1C>EQUAL 8.0%<EQUAL 9.0%: CPT | Performed by: PHYSICIAN ASSISTANT

## 2022-12-27 PROCEDURE — 3074F SYST BP LT 130 MM HG: CPT | Performed by: PHYSICIAN ASSISTANT

## 2022-12-27 PROCEDURE — 1123F ACP DISCUSS/DSCN MKR DOCD: CPT | Performed by: PHYSICIAN ASSISTANT

## 2022-12-27 PROCEDURE — 3078F DIAST BP <80 MM HG: CPT | Performed by: PHYSICIAN ASSISTANT

## 2022-12-27 PROCEDURE — 99214 OFFICE O/P EST MOD 30 MIN: CPT | Performed by: PHYSICIAN ASSISTANT

## 2022-12-27 PROCEDURE — 90694 VACC AIIV4 NO PRSRV 0.5ML IM: CPT | Performed by: PHYSICIAN ASSISTANT

## 2022-12-27 ASSESSMENT — PATIENT HEALTH QUESTIONNAIRE - PHQ9
2. FEELING DOWN, DEPRESSED OR HOPELESS: 0
SUM OF ALL RESPONSES TO PHQ QUESTIONS 1-9: 0
1. LITTLE INTEREST OR PLEASURE IN DOING THINGS: 0
SUM OF ALL RESPONSES TO PHQ QUESTIONS 1-9: 0
SUM OF ALL RESPONSES TO PHQ9 QUESTIONS 1 & 2: 0

## 2023-01-17 DIAGNOSIS — Z95.810 ICD (IMPLANTABLE CARDIOVERTER-DEFIBRILLATOR) IN PLACE: ICD-10-CM

## 2023-01-17 DIAGNOSIS — I50.22 SYSTOLIC CHF, CHRONIC (HCC): ICD-10-CM

## 2023-03-28 ENCOUNTER — NURSE ONLY (OUTPATIENT)
Dept: FAMILY MEDICINE CLINIC | Facility: CLINIC | Age: 68
End: 2023-03-28

## 2023-03-28 DIAGNOSIS — I10 ESSENTIAL HYPERTENSION, BENIGN: ICD-10-CM

## 2023-03-28 DIAGNOSIS — E11.42 TYPE 2 DIABETES MELLITUS WITH DIABETIC POLYNEUROPATHY, WITHOUT LONG-TERM CURRENT USE OF INSULIN (HCC): ICD-10-CM

## 2023-03-28 LAB
ANION GAP SERPL CALC-SCNC: 3 MMOL/L (ref 2–11)
BUN SERPL-MCNC: 20 MG/DL (ref 8–23)
CALCIUM SERPL-MCNC: 9.4 MG/DL (ref 8.3–10.4)
CHLORIDE SERPL-SCNC: 107 MMOL/L (ref 101–110)
CO2 SERPL-SCNC: 28 MMOL/L (ref 21–32)
CREAT SERPL-MCNC: 1.4 MG/DL (ref 0.8–1.5)
GLUCOSE SERPL-MCNC: 303 MG/DL (ref 65–100)
POTASSIUM SERPL-SCNC: 4.5 MMOL/L (ref 3.5–5.1)
SODIUM SERPL-SCNC: 138 MMOL/L (ref 133–143)

## 2023-03-29 LAB
EST. AVERAGE GLUCOSE BLD GHB EST-MCNC: 272 MG/DL
HBA1C MFR BLD: 11.1 % (ref 4.8–5.6)

## 2023-04-26 DIAGNOSIS — Z95.810 ICD (IMPLANTABLE CARDIOVERTER-DEFIBRILLATOR) IN PLACE: ICD-10-CM

## 2023-04-26 DIAGNOSIS — I50.22 SYSTOLIC CHF, CHRONIC (HCC): ICD-10-CM

## 2023-06-26 ENCOUNTER — NURSE ONLY (OUTPATIENT)
Dept: FAMILY MEDICINE CLINIC | Facility: CLINIC | Age: 68
End: 2023-06-26

## 2023-06-26 DIAGNOSIS — I10 ESSENTIAL HYPERTENSION, BENIGN: ICD-10-CM

## 2023-06-26 DIAGNOSIS — E11.42 TYPE 2 DIABETES MELLITUS WITH DIABETIC POLYNEUROPATHY, WITHOUT LONG-TERM CURRENT USE OF INSULIN (HCC): ICD-10-CM

## 2023-06-26 DIAGNOSIS — E78.5 DYSLIPIDEMIA: ICD-10-CM

## 2023-06-27 LAB
ALBUMIN SERPL-MCNC: 4.3 G/DL (ref 3.2–4.6)
ALBUMIN/GLOB SERPL: 1.7 (ref 0.4–1.6)
ALP SERPL-CCNC: 68 U/L (ref 50–136)
ALT SERPL-CCNC: 32 U/L (ref 12–65)
ANION GAP SERPL CALC-SCNC: 3 MMOL/L (ref 2–11)
AST SERPL-CCNC: 19 U/L (ref 15–37)
BILIRUB SERPL-MCNC: 1.8 MG/DL (ref 0.2–1.1)
BUN SERPL-MCNC: 24 MG/DL (ref 8–23)
CALCIUM SERPL-MCNC: 9.4 MG/DL (ref 8.3–10.4)
CHLORIDE SERPL-SCNC: 108 MMOL/L (ref 101–110)
CHOLEST SERPL-MCNC: 87 MG/DL
CO2 SERPL-SCNC: 28 MMOL/L (ref 21–32)
CREAT SERPL-MCNC: 1.3 MG/DL (ref 0.8–1.5)
CREAT UR-MCNC: 121 MG/DL
EST. AVERAGE GLUCOSE BLD GHB EST-MCNC: 183 MG/DL
GLOBULIN SER CALC-MCNC: 2.5 G/DL (ref 2.8–4.5)
GLUCOSE SERPL-MCNC: 161 MG/DL (ref 65–100)
HBA1C MFR BLD: 8 % (ref 4.8–5.6)
HDLC SERPL-MCNC: 47 MG/DL (ref 40–60)
HDLC SERPL: 1.9
LDLC SERPL CALC-MCNC: 23.8 MG/DL
MICROALBUMIN UR-MCNC: 1.1 MG/DL
MICROALBUMIN/CREAT UR-RTO: 9 MG/G (ref 0–30)
POTASSIUM SERPL-SCNC: 4.7 MMOL/L (ref 3.5–5.1)
PROT SERPL-MCNC: 6.8 G/DL (ref 6.3–8.2)
SODIUM SERPL-SCNC: 139 MMOL/L (ref 133–143)
TRIGL SERPL-MCNC: 81 MG/DL (ref 35–150)
VLDLC SERPL CALC-MCNC: 16.2 MG/DL (ref 6–23)

## 2023-07-13 PROBLEM — I25.10 CAD, MULTIPLE VESSEL: Status: RESOLVED | Noted: 2017-02-28 | Resolved: 2023-07-13

## 2023-07-14 ENCOUNTER — OFFICE VISIT (OUTPATIENT)
Dept: FAMILY MEDICINE CLINIC | Facility: CLINIC | Age: 68
End: 2023-07-14
Payer: COMMERCIAL

## 2023-07-14 VITALS
BODY MASS INDEX: 23.65 KG/M2 | TEMPERATURE: 97.5 F | HEART RATE: 87 BPM | DIASTOLIC BLOOD PRESSURE: 68 MMHG | OXYGEN SATURATION: 95 % | HEIGHT: 70 IN | RESPIRATION RATE: 18 BRPM | SYSTOLIC BLOOD PRESSURE: 100 MMHG | WEIGHT: 165.2 LBS

## 2023-07-14 DIAGNOSIS — E11.42 TYPE 2 DIABETES MELLITUS WITH DIABETIC POLYNEUROPATHY, WITHOUT LONG-TERM CURRENT USE OF INSULIN (HCC): ICD-10-CM

## 2023-07-14 DIAGNOSIS — K21.9 GASTROESOPHAGEAL REFLUX DISEASE, UNSPECIFIED WHETHER ESOPHAGITIS PRESENT: ICD-10-CM

## 2023-07-14 DIAGNOSIS — I10 ESSENTIAL HYPERTENSION, BENIGN: Primary | ICD-10-CM

## 2023-07-14 DIAGNOSIS — Z12.5 SCREENING PSA (PROSTATE SPECIFIC ANTIGEN): ICD-10-CM

## 2023-07-14 DIAGNOSIS — I25.110 ATHEROSCLEROSIS OF NATIVE CORONARY ARTERY OF NATIVE HEART WITH UNSTABLE ANGINA PECTORIS (HCC): ICD-10-CM

## 2023-07-14 DIAGNOSIS — R09.89 DEPRESSED LEFT VENTRICULAR EJECTION FRACTION: ICD-10-CM

## 2023-07-14 PROCEDURE — 3074F SYST BP LT 130 MM HG: CPT | Performed by: PHYSICIAN ASSISTANT

## 2023-07-14 PROCEDURE — 3078F DIAST BP <80 MM HG: CPT | Performed by: PHYSICIAN ASSISTANT

## 2023-07-14 PROCEDURE — 3052F HG A1C>EQUAL 8.0%<EQUAL 9.0%: CPT | Performed by: PHYSICIAN ASSISTANT

## 2023-07-14 PROCEDURE — 1123F ACP DISCUSS/DSCN MKR DOCD: CPT | Performed by: PHYSICIAN ASSISTANT

## 2023-07-14 PROCEDURE — 99214 OFFICE O/P EST MOD 30 MIN: CPT | Performed by: PHYSICIAN ASSISTANT

## 2023-07-14 SDOH — ECONOMIC STABILITY: FOOD INSECURITY: WITHIN THE PAST 12 MONTHS, THE FOOD YOU BOUGHT JUST DIDN'T LAST AND YOU DIDN'T HAVE MONEY TO GET MORE.: NEVER TRUE

## 2023-07-14 SDOH — ECONOMIC STABILITY: FOOD INSECURITY: WITHIN THE PAST 12 MONTHS, YOU WORRIED THAT YOUR FOOD WOULD RUN OUT BEFORE YOU GOT MONEY TO BUY MORE.: NEVER TRUE

## 2023-07-14 SDOH — ECONOMIC STABILITY: HOUSING INSECURITY
IN THE LAST 12 MONTHS, WAS THERE A TIME WHEN YOU DID NOT HAVE A STEADY PLACE TO SLEEP OR SLEPT IN A SHELTER (INCLUDING NOW)?: NO

## 2023-07-14 SDOH — ECONOMIC STABILITY: INCOME INSECURITY: HOW HARD IS IT FOR YOU TO PAY FOR THE VERY BASICS LIKE FOOD, HOUSING, MEDICAL CARE, AND HEATING?: NOT VERY HARD

## 2023-07-14 ASSESSMENT — PATIENT HEALTH QUESTIONNAIRE - PHQ9
SUM OF ALL RESPONSES TO PHQ QUESTIONS 1-9: 0
SUM OF ALL RESPONSES TO PHQ9 QUESTIONS 1 & 2: 0
SUM OF ALL RESPONSES TO PHQ QUESTIONS 1-9: 0
2. FEELING DOWN, DEPRESSED OR HOPELESS: 0
SUM OF ALL RESPONSES TO PHQ QUESTIONS 1-9: 0
1. LITTLE INTEREST OR PLEASURE IN DOING THINGS: 0
SUM OF ALL RESPONSES TO PHQ QUESTIONS 1-9: 0

## 2023-09-11 PROCEDURE — 93296 REM INTERROG EVL PM/IDS: CPT | Performed by: INTERNAL MEDICINE

## 2023-09-11 PROCEDURE — 93295 DEV INTERROG REMOTE 1/2/MLT: CPT | Performed by: INTERNAL MEDICINE

## 2023-10-03 ENCOUNTER — NURSE ONLY (OUTPATIENT)
Age: 68
End: 2023-10-03
Payer: COMMERCIAL

## 2023-10-03 DIAGNOSIS — I50.22 CHRONIC SYSTOLIC (CONGESTIVE) HEART FAILURE (HCC): Primary | ICD-10-CM

## 2023-10-03 DIAGNOSIS — Z95.810 ICD (IMPLANTABLE CARDIOVERTER-DEFIBRILLATOR) IN PLACE: ICD-10-CM

## 2023-10-03 PROCEDURE — 93283 PRGRMG EVAL IMPLANTABLE DFB: CPT | Performed by: INTERNAL MEDICINE

## 2023-12-11 PROCEDURE — 93295 DEV INTERROG REMOTE 1/2/MLT: CPT | Performed by: INTERNAL MEDICINE

## 2023-12-11 PROCEDURE — 93296 REM INTERROG EVL PM/IDS: CPT | Performed by: INTERNAL MEDICINE

## 2023-12-14 ENCOUNTER — OFFICE VISIT (OUTPATIENT)
Age: 68
End: 2023-12-14

## 2023-12-14 VITALS
SYSTOLIC BLOOD PRESSURE: 124 MMHG | HEIGHT: 70 IN | BODY MASS INDEX: 24.82 KG/M2 | DIASTOLIC BLOOD PRESSURE: 70 MMHG | WEIGHT: 173.4 LBS | HEART RATE: 78 BPM

## 2023-12-14 DIAGNOSIS — I50.22 SYSTOLIC CHF, CHRONIC (HCC): ICD-10-CM

## 2023-12-14 DIAGNOSIS — Z95.810 ICD (IMPLANTABLE CARDIOVERTER-DEFIBRILLATOR) IN PLACE: Primary | ICD-10-CM

## 2023-12-14 DIAGNOSIS — I25.10 ATHEROSCLEROSIS OF NATIVE CORONARY ARTERY OF NATIVE HEART WITHOUT ANGINA PECTORIS: ICD-10-CM

## 2023-12-14 DIAGNOSIS — E11.42 TYPE 2 DIABETES MELLITUS WITH DIABETIC POLYNEUROPATHY, WITHOUT LONG-TERM CURRENT USE OF INSULIN (HCC): ICD-10-CM

## 2023-12-14 DIAGNOSIS — Z95.2 S/P AVR: ICD-10-CM

## 2023-12-14 NOTE — PROGRESS NOTES
[address], [address]  PHONE: [phone]    SUBJECTIVE:   Belkys Rand is a 79 y.o. male 1955   seen for a follow up visit regarding the following:     Chief Complaint   Patient presents with    6 Month Follow-Up    Congestive Heart Failure    Hypertension     History of present illness: 79 y.o. male presented for follow-up 12/14/23       Interval history   He underwent redo sternotomy, lysis  of myocardial adhesions, Aortic valve replacement with a 23mm Lucas valve as well as CABG x 1 on 11/18/21. Previous diminished left ventricular systolic function. The patient had acute coronary syndrome in 2017 with coronary artery bypass grafting with LIMA to LAD, SVG to OM, SVG to PDA, EF 10-15% with VF arrest.  The patient underwent defibrillator placement. Today, he complains of no shortness of breath. Atrial flutter was detected on his device interrogation. Cardiac History:     Multivessel coronary disease on cardiac catheterization 2017. 2017 carotids <50% stenosis      2017 Coronary artery bypass grafting with LIMA to LAD, SVG to OM, SVG to PDA. Carotids 2017 <50%     EF 10-15% by echocardiogram.  Aortic valve appeared calcified, but gradients could not be obtained due to poor acoustic windows and aortic stenosis severity could not be graded based on his low cardiac output at that time. Device interrogation 07/2018 atrial flutter. Initiated on Eliquis. 8/2019 EF low normal Moderate AS mean gradient 19 mm Hg. 2019 patient stopped eliquis due to dizziness.       Echo 7/2020 EF 50% Moderate to severe AS mean gradient 25 mm Hg DI 0.25     3/2021 echocardiogram EF 60 to 65% mean aortic valve gradient 33 mmHg dimensionless index 0.28 elevated mitral valve gradients with severe mitral annular calcification    [address] [facility] Several AS EF 55-60%    23mm Lucas valve as well as CABG x 1 on 11/18/21.  2023 echocardiogram

## 2024-01-10 ENCOUNTER — NURSE ONLY (OUTPATIENT)
Dept: FAMILY MEDICINE CLINIC | Facility: CLINIC | Age: 69
End: 2024-01-10

## 2024-01-10 DIAGNOSIS — Z12.5 SCREENING PSA (PROSTATE SPECIFIC ANTIGEN): ICD-10-CM

## 2024-01-10 DIAGNOSIS — E11.42 TYPE 2 DIABETES MELLITUS WITH DIABETIC POLYNEUROPATHY, WITHOUT LONG-TERM CURRENT USE OF INSULIN (HCC): ICD-10-CM

## 2024-01-10 DIAGNOSIS — I10 ESSENTIAL HYPERTENSION, BENIGN: ICD-10-CM

## 2024-01-10 LAB
ALBUMIN SERPL-MCNC: 4.1 G/DL (ref 3.2–4.6)
ALBUMIN/GLOB SERPL: 1.3 (ref 0.4–1.6)
ALP SERPL-CCNC: 73 U/L (ref 50–136)
ALT SERPL-CCNC: 45 U/L (ref 12–65)
ANION GAP SERPL CALC-SCNC: 7 MMOL/L (ref 2–11)
AST SERPL-CCNC: 21 U/L (ref 15–37)
BILIRUB SERPL-MCNC: 1.7 MG/DL (ref 0.2–1.1)
BUN SERPL-MCNC: 25 MG/DL (ref 8–23)
CALCIUM SERPL-MCNC: 9.5 MG/DL (ref 8.3–10.4)
CHLORIDE SERPL-SCNC: 105 MMOL/L (ref 103–113)
CHOLEST SERPL-MCNC: 85 MG/DL
CO2 SERPL-SCNC: 26 MMOL/L (ref 21–32)
CREAT SERPL-MCNC: 1.3 MG/DL (ref 0.8–1.5)
GLOBULIN SER CALC-MCNC: 3.1 G/DL (ref 2.8–4.5)
GLUCOSE SERPL-MCNC: 220 MG/DL (ref 65–100)
HDLC SERPL-MCNC: 46 MG/DL (ref 40–60)
HDLC SERPL: 1.8
LDLC SERPL CALC-MCNC: 19.2 MG/DL
POTASSIUM SERPL-SCNC: 4.7 MMOL/L (ref 3.5–5.1)
PROT SERPL-MCNC: 7.2 G/DL (ref 6.3–8.2)
PSA SERPL-MCNC: 2.3 NG/ML
SODIUM SERPL-SCNC: 138 MMOL/L (ref 136–146)
TRIGL SERPL-MCNC: 99 MG/DL (ref 35–150)
VLDLC SERPL CALC-MCNC: 19.8 MG/DL (ref 6–23)

## 2024-01-11 LAB
EST. AVERAGE GLUCOSE BLD GHB EST-MCNC: 206 MG/DL
HBA1C MFR BLD: 8.8 % (ref 4.8–5.6)

## 2024-01-19 PROBLEM — D69.6 THROMBOCYTOPENIA (HCC): Status: RESOLVED | Noted: 2021-11-22 | Resolved: 2024-01-19

## 2024-01-19 NOTE — PROGRESS NOTES
Alexander Gar (: 1955) is a 68 y.o. male, an established patient, is here for evaluation of the following chief complaint(s):  Chief Complaint   Patient presents with    Hypertension    Diabetes    Results          ASSESSMENT/PLAN:  Alexander was seen today for hypertension, diabetes and results.    Diagnoses and all orders for this visit:    Essential hypertension, benign  -     lisinopril (PRINIVIL;ZESTRIL) 2.5 MG tablet; Take 1 tablet by mouth daily  -     metoprolol succinate (TOPROL XL) 50 MG extended release tablet; Take 1 tablet by mouth daily  -     Basic Metabolic Panel; Future    Type 2 diabetes mellitus with diabetic polyneuropathy, without long-term current use of insulin (HCC)  -     dapagliflozin (FARXIGA) 10 MG tablet; Take 1 tablet by mouth every morning  -     Basic Metabolic Panel; Future  -     Hemoglobin A1C; Future  -     Microalbumin / Creatinine Urine Ratio; Future    Systolic CHF, chronic ([facility])    Hypertriglyceridemia  -     atorvastatin (LIPITOR) 80 MG tablet; Take 1 tablet by mouth daily    CAD, multiple vessel  -     atorvastatin (LIPITOR) 80 MG tablet; Take 1 tablet by mouth daily    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, without long-term current use of insulin ([facility])    Gastroesophageal reflux disease without esophagitis  -     famotidine (PEPCID) 20 MG tablet; Take 1 tablet by mouth 3 times daily    Controlled type 2 diabetes with neuropathy ([facility])  -     metFORMIN (GLUCOPHAGE-XR) 500 MG extended release tablet; Take 1 tablet by mouth Daily with supper  -     Semaglutide, 1 MG/DOSE, (OZEMPIC, 1 MG/DOSE,) 4 MG/3ML SOPN; Inject 1 mg into the skin every 7 days    Pneumococcal vaccination given  -     [place] ADMIN PNEUMOCOCCAL VACCINE  -     Pneumococcal, PCV20, PREVNAR 20, (age 6w+), IM, PF    Other orders  -     Tetanus-Diphth-Acell Pertussis (BOOSTRIX) 5-2.5-18.5 LF-MCG/0.5 injection; Inject 0.5 mLs into the muscle once for 1 dose  -

## 2024-01-22 ENCOUNTER — OFFICE VISIT (OUTPATIENT)
Dept: FAMILY MEDICINE CLINIC | Facility: CLINIC | Age: 69
End: 2024-01-22
Payer: MEDICARE

## 2024-01-22 VITALS
TEMPERATURE: 98.1 F | WEIGHT: 172.9 LBS | SYSTOLIC BLOOD PRESSURE: 106 MMHG | DIASTOLIC BLOOD PRESSURE: 64 MMHG | RESPIRATION RATE: 14 BRPM | BODY MASS INDEX: 24.75 KG/M2 | HEART RATE: 90 BPM | HEIGHT: 70 IN | OXYGEN SATURATION: 95 %

## 2024-01-22 DIAGNOSIS — E78.1 HYPERTRIGLYCERIDEMIA: ICD-10-CM

## 2024-01-22 DIAGNOSIS — I10 ESSENTIAL HYPERTENSION, BENIGN: Primary | ICD-10-CM

## 2024-01-22 DIAGNOSIS — I25.10 CAD, MULTIPLE VESSEL: ICD-10-CM

## 2024-01-22 DIAGNOSIS — E11.3511 TYPE 2 DIABETES MELLITUS WITH RIGHT EYE AFFECTED BY PROLIFERATIVE RETINOPATHY AND MACULAR EDEMA, WITHOUT LONG-TERM CURRENT USE OF INSULIN (HCC): ICD-10-CM

## 2024-01-22 DIAGNOSIS — E11.40 CONTROLLED TYPE 2 DIABETES WITH NEUROPATHY (HCC): ICD-10-CM

## 2024-01-22 DIAGNOSIS — E11.42 TYPE 2 DIABETES MELLITUS WITH DIABETIC POLYNEUROPATHY, WITHOUT LONG-TERM CURRENT USE OF INSULIN (HCC): ICD-10-CM

## 2024-01-22 DIAGNOSIS — I50.22 SYSTOLIC CHF, CHRONIC (HCC): ICD-10-CM

## 2024-01-22 DIAGNOSIS — K21.9 GASTROESOPHAGEAL REFLUX DISEASE WITHOUT ESOPHAGITIS: ICD-10-CM

## 2024-01-22 DIAGNOSIS — Z23 PNEUMOCOCCAL VACCINATION GIVEN: ICD-10-CM

## 2024-01-22 PROCEDURE — 3078F DIAST BP <80 MM HG: CPT | Performed by: PHYSICIAN ASSISTANT

## 2024-01-22 PROCEDURE — 2022F DILAT RTA XM EVC RTNOPTHY: CPT | Performed by: PHYSICIAN ASSISTANT

## 2024-01-22 PROCEDURE — 1036F TOBACCO NON-USER: CPT | Performed by: PHYSICIAN ASSISTANT

## 2024-01-22 PROCEDURE — G0009 ADMIN PNEUMOCOCCAL VACCINE: HCPCS | Performed by: PHYSICIAN ASSISTANT

## 2024-01-22 PROCEDURE — G8427 DOCREV CUR MEDS BY ELIG CLIN: HCPCS | Performed by: PHYSICIAN ASSISTANT

## 2024-01-22 PROCEDURE — 3017F COLORECTAL CA SCREEN DOC REV: CPT | Performed by: PHYSICIAN ASSISTANT

## 2024-01-22 PROCEDURE — 3074F SYST BP LT 130 MM HG: CPT | Performed by: PHYSICIAN ASSISTANT

## 2024-01-22 PROCEDURE — 90677 PCV20 VACCINE IM: CPT | Performed by: PHYSICIAN ASSISTANT

## 2024-01-22 PROCEDURE — G8420 CALC BMI NORM PARAMETERS: HCPCS | Performed by: PHYSICIAN ASSISTANT

## 2024-01-22 PROCEDURE — 99214 OFFICE O/P EST MOD 30 MIN: CPT | Performed by: PHYSICIAN ASSISTANT

## 2024-01-22 PROCEDURE — G8484 FLU IMMUNIZE NO ADMIN: HCPCS | Performed by: PHYSICIAN ASSISTANT

## 2024-01-22 PROCEDURE — 1123F ACP DISCUSS/DSCN MKR DOCD: CPT | Performed by: PHYSICIAN ASSISTANT

## 2024-01-22 PROCEDURE — 3052F HG A1C>EQUAL 8.0%<EQUAL 9.0%: CPT | Performed by: PHYSICIAN ASSISTANT

## 2024-01-22 RX ORDER — LISINOPRIL 2.5 MG/1
2.5 TABLET ORAL DAILY
COMMUNITY
Start: 2023-11-01 | End: 2024-01-22 | Stop reason: SDUPTHER

## 2024-01-22 RX ORDER — METOPROLOL SUCCINATE 50 MG/1
50 TABLET, EXTENDED RELEASE ORAL DAILY
Qty: 90 TABLET | Refills: 1 | Status: SHIPPED | OUTPATIENT
Start: 2024-01-22

## 2024-01-22 RX ORDER — ATORVASTATIN CALCIUM 80 MG/1
80 TABLET, FILM COATED ORAL DAILY
Qty: 90 TABLET | Refills: 1 | Status: SHIPPED | OUTPATIENT
Start: 2024-01-22

## 2024-01-22 RX ORDER — LISINOPRIL 2.5 MG/1
2.5 TABLET ORAL DAILY
Qty: 90 TABLET | Refills: 1 | Status: SHIPPED | OUTPATIENT
Start: 2024-01-22

## 2024-01-22 RX ORDER — FAMOTIDINE 20 MG/1
20 TABLET, FILM COATED ORAL 3 TIMES DAILY
Qty: 270 TABLET | Refills: 1 | Status: SHIPPED | OUTPATIENT
Start: 2024-01-22

## 2024-01-22 RX ORDER — SEMAGLUTIDE 1.34 MG/ML
1 INJECTION, SOLUTION SUBCUTANEOUS
Qty: 9 ML | Refills: 1 | Status: SHIPPED | OUTPATIENT
Start: 2024-01-22

## 2024-01-22 RX ORDER — METFORMIN HYDROCHLORIDE 500 MG/1
500 TABLET, EXTENDED RELEASE ORAL
Qty: 90 TABLET | Refills: 1 | Status: SHIPPED | OUTPATIENT
Start: 2024-01-22

## 2024-01-22 ASSESSMENT — PATIENT HEALTH QUESTIONNAIRE - PHQ9
SUM OF ALL RESPONSES TO PHQ9 QUESTIONS 1 & 2: 0
SUM OF ALL RESPONSES TO PHQ QUESTIONS 1-9: 0
2. FEELING DOWN, DEPRESSED OR HOPELESS: 0
1. LITTLE INTEREST OR PLEASURE IN DOING THINGS: 0
SUM OF ALL RESPONSES TO PHQ QUESTIONS 1-9: 0

## 2024-04-04 DIAGNOSIS — I25.10 CAD, MULTIPLE VESSEL: ICD-10-CM

## 2024-04-04 DIAGNOSIS — E78.1 HYPERTRIGLYCERIDEMIA: ICD-10-CM

## 2024-04-04 RX ORDER — ATORVASTATIN CALCIUM 80 MG/1
80 TABLET, FILM COATED ORAL DAILY
Qty: 90 TABLET | Refills: 3 | OUTPATIENT
Start: 2024-04-04

## 2024-04-18 ENCOUNTER — NURSE ONLY (OUTPATIENT)
Dept: FAMILY MEDICINE CLINIC | Facility: CLINIC | Age: 69
End: 2024-04-18

## 2024-04-18 DIAGNOSIS — E11.42 TYPE 2 DIABETES MELLITUS WITH DIABETIC POLYNEUROPATHY, WITHOUT LONG-TERM CURRENT USE OF INSULIN (HCC): ICD-10-CM

## 2024-04-18 DIAGNOSIS — I10 ESSENTIAL HYPERTENSION, BENIGN: ICD-10-CM

## 2024-04-18 LAB
ANION GAP SERPL CALC-SCNC: 3 MMOL/L (ref 2–11)
BUN SERPL-MCNC: 27 MG/DL (ref 8–23)
CALCIUM SERPL-MCNC: 9.8 MG/DL (ref 8.3–10.4)
CHLORIDE SERPL-SCNC: 104 MMOL/L (ref 103–113)
CO2 SERPL-SCNC: 29 MMOL/L (ref 21–32)
CREAT SERPL-MCNC: 1.2 MG/DL (ref 0.8–1.5)
CREAT UR-MCNC: 39 MG/DL
GLUCOSE SERPL-MCNC: 198 MG/DL (ref 65–100)
MICROALBUMIN UR-MCNC: <0.5 MG/DL
MICROALBUMIN/CREAT UR-RTO: NORMAL MG/G (ref 0–30)
POTASSIUM SERPL-SCNC: 4.8 MMOL/L (ref 3.5–5.1)
SODIUM SERPL-SCNC: 136 MMOL/L (ref 136–146)

## 2024-04-19 LAB
EST. AVERAGE GLUCOSE BLD GHB EST-MCNC: 209 MG/DL
HBA1C MFR BLD: 8.9 % (ref 4.8–5.6)

## 2024-04-24 NOTE — PROGRESS NOTES
discussion about their Advanced Care Planning. The purpose of the planning is to make clear a person’s wishes and will usually take place in the context of an anticipated deterioration in the individual’s condition in the future, with attendant loss of capacity to make decisions and/or ability to communicate wishes to others. Below is a list of documents that were reviewed and can be included in Advanced Care Planning:  Advance Directive   A written statement of a person's wishes regarding medical treatment, often including a living will, made to ensure those wishes are carried out should the person be unable to communicate them to a doctor. Examples include state specific Advance Directive or Advance Medical Directive, Living Will, Declaration for Natural Death, and 5 Wishes.   Do Not Resuscitate   (DNR), or no code, is a legal order written either in the hospital or on a legal form to withhold cardiopulmonary resuscitation (CPR) or advanced cardiac life support (ACLS), in respect of the wishes of a patient in case their heart were to stop or they were to stop breathing. Examples include Durable Do Not Resuscitate and “Other DNR” such as indicated on a MOST/MOLST/POST/POLST form or other state specific forms.   Guardianship   This document names the person who has the legal authority (and the corresponding duty) to care for the personal and property interests of another person, called a vazquez. Guardians are typically used in three situations: guardianship for an incapacitated senior (due to old age or infirmity), guardianship for a minor, and guardianship for developmentally disabled adults.    Mr. Gar  was provided with a copy of a blank Advanced Directive, which can also be found on the [employer] website, and asked to complete it or one of the above forms.  We reviewed this document in detail during this visit.   Once this is completed, it should be notarized and copies should be provided to listed agents

## 2024-04-25 ENCOUNTER — OFFICE VISIT (OUTPATIENT)
Dept: FAMILY MEDICINE CLINIC | Facility: CLINIC | Age: 69
End: 2024-04-25
Payer: MEDICARE

## 2024-04-25 VITALS
SYSTOLIC BLOOD PRESSURE: 110 MMHG | RESPIRATION RATE: 16 BRPM | DIASTOLIC BLOOD PRESSURE: 94 MMHG | HEART RATE: 82 BPM | TEMPERATURE: 98 F | WEIGHT: 171 LBS | OXYGEN SATURATION: 98 % | HEIGHT: 70 IN | BODY MASS INDEX: 24.48 KG/M2

## 2024-04-25 DIAGNOSIS — E11.42 TYPE 2 DIABETES MELLITUS WITH DIABETIC POLYNEUROPATHY, WITHOUT LONG-TERM CURRENT USE OF INSULIN (HCC): ICD-10-CM

## 2024-04-25 DIAGNOSIS — Z12.11 SCREENING FOR COLORECTAL CANCER: ICD-10-CM

## 2024-04-25 DIAGNOSIS — E78.1 HYPERTRIGLYCERIDEMIA: ICD-10-CM

## 2024-04-25 DIAGNOSIS — Z12.12 SCREENING FOR COLORECTAL CANCER: ICD-10-CM

## 2024-04-25 DIAGNOSIS — Z71.89 ADVANCE CARE PLANNING: ICD-10-CM

## 2024-04-25 DIAGNOSIS — Z00.00 INITIAL MEDICARE ANNUAL WELLNESS VISIT: Primary | ICD-10-CM

## 2024-04-25 DIAGNOSIS — I10 ESSENTIAL HYPERTENSION, BENIGN: ICD-10-CM

## 2024-04-25 DIAGNOSIS — E11.3511 TYPE 2 DIABETES MELLITUS WITH RIGHT EYE AFFECTED BY PROLIFERATIVE RETINOPATHY AND MACULAR EDEMA, WITHOUT LONG-TERM CURRENT USE OF INSULIN (HCC): ICD-10-CM

## 2024-04-25 DIAGNOSIS — E78.5 DYSLIPIDEMIA: ICD-10-CM

## 2024-04-25 DIAGNOSIS — I25.10 CAD, MULTIPLE VESSEL: ICD-10-CM

## 2024-04-25 DIAGNOSIS — K21.9 GASTROESOPHAGEAL REFLUX DISEASE WITHOUT ESOPHAGITIS: ICD-10-CM

## 2024-04-25 PROCEDURE — 1123F ACP DISCUSS/DSCN MKR DOCD: CPT | Performed by: PHYSICIAN ASSISTANT

## 2024-04-25 PROCEDURE — 2022F DILAT RTA XM EVC RTNOPTHY: CPT | Performed by: PHYSICIAN ASSISTANT

## 2024-04-25 PROCEDURE — 99214 OFFICE O/P EST MOD 30 MIN: CPT | Performed by: PHYSICIAN ASSISTANT

## 2024-04-25 PROCEDURE — 3052F HG A1C>EQUAL 8.0%<EQUAL 9.0%: CPT | Performed by: PHYSICIAN ASSISTANT

## 2024-04-25 PROCEDURE — 3074F SYST BP LT 130 MM HG: CPT | Performed by: PHYSICIAN ASSISTANT

## 2024-04-25 PROCEDURE — 3017F COLORECTAL CA SCREEN DOC REV: CPT | Performed by: PHYSICIAN ASSISTANT

## 2024-04-25 PROCEDURE — 3080F DIAST BP >= 90 MM HG: CPT | Performed by: PHYSICIAN ASSISTANT

## 2024-04-25 PROCEDURE — G8420 CALC BMI NORM PARAMETERS: HCPCS | Performed by: PHYSICIAN ASSISTANT

## 2024-04-25 PROCEDURE — G8427 DOCREV CUR MEDS BY ELIG CLIN: HCPCS | Performed by: PHYSICIAN ASSISTANT

## 2024-04-25 PROCEDURE — 1036F TOBACCO NON-USER: CPT | Performed by: PHYSICIAN ASSISTANT

## 2024-04-25 PROCEDURE — 99497 ADVNCD CARE PLAN 30 MIN: CPT | Performed by: PHYSICIAN ASSISTANT

## 2024-04-25 PROCEDURE — G0438 PPPS, INITIAL VISIT: HCPCS | Performed by: PHYSICIAN ASSISTANT

## 2024-04-25 RX ORDER — METFORMIN HYDROCHLORIDE 500 MG/1
1000 TABLET, EXTENDED RELEASE ORAL
Qty: 180 TABLET | Refills: 1 | Status: SHIPPED | OUTPATIENT
Start: 2024-04-25

## 2024-04-25 RX ORDER — DAPAGLIFLOZIN 10 MG/1
10 TABLET, FILM COATED ORAL EVERY MORNING
Qty: 90 TABLET | Refills: 1 | Status: SHIPPED | OUTPATIENT
Start: 2024-04-25

## 2024-04-25 RX ORDER — METOPROLOL SUCCINATE 50 MG/1
50 TABLET, EXTENDED RELEASE ORAL DAILY
Qty: 90 TABLET | Refills: 1 | Status: SHIPPED | OUTPATIENT
Start: 2024-04-25

## 2024-04-25 RX ORDER — SEMAGLUTIDE 1.34 MG/ML
1 INJECTION, SOLUTION SUBCUTANEOUS
Qty: 9 ML | Refills: 1 | Status: SHIPPED | OUTPATIENT
Start: 2024-04-25

## 2024-04-25 RX ORDER — FAMOTIDINE 20 MG/1
20 TABLET, FILM COATED ORAL 3 TIMES DAILY
Qty: 270 TABLET | Refills: 1 | Status: SHIPPED | OUTPATIENT
Start: 2024-04-25

## 2024-04-25 RX ORDER — ATORVASTATIN CALCIUM 80 MG/1
80 TABLET, FILM COATED ORAL DAILY
Qty: 90 TABLET | Refills: 1 | Status: SHIPPED | OUTPATIENT
Start: 2024-04-25

## 2024-04-25 RX ORDER — LISINOPRIL 2.5 MG/1
2.5 TABLET ORAL DAILY
Qty: 90 TABLET | Refills: 1 | Status: SHIPPED | OUTPATIENT
Start: 2024-04-25

## 2024-04-25 ASSESSMENT — PATIENT HEALTH QUESTIONNAIRE - PHQ9
SUM OF ALL RESPONSES TO PHQ9 QUESTIONS 1 & 2: 0
SUM OF ALL RESPONSES TO PHQ QUESTIONS 1-9: 0
SUM OF ALL RESPONSES TO PHQ QUESTIONS 1-9: 0
1. LITTLE INTEREST OR PLEASURE IN DOING THINGS: NOT AT ALL
SUM OF ALL RESPONSES TO PHQ QUESTIONS 1-9: 0
2. FEELING DOWN, DEPRESSED OR HOPELESS: NOT AT ALL
SUM OF ALL RESPONSES TO PHQ QUESTIONS 1-9: 0

## 2024-04-25 ASSESSMENT — LIFESTYLE VARIABLES: HOW OFTEN DO YOU HAVE A DRINK CONTAINING ALCOHOL: NEVER

## 2024-04-25 NOTE — PATIENT INSTRUCTIONS
try to avoid saturated and trans fats. They increase your risk of heart disease by raising cholesterol levels.     Limit the amount of solid fat--butter, margarine, and shortening--you eat. Use olive, peanut, or canola oil when you cook. Bake, broil, and steam foods instead of frying them.     Eat a variety of fruit and vegetables every day. Dark green, deep orange, red, or yellow fruits and vegetables are especially good for you. Examples include spinach, carrots, peaches, and berries.     Foods high in fiber can reduce your cholesterol and provide important vitamins and minerals. High-fiber foods include whole-grain cereals and breads, oatmeal, beans, brown rice, citrus fruits, and apples.     Eat lean proteins. Heart-healthy proteins include seafood, lean meats and poultry, eggs, beans, peas, nuts, seeds, and soy products.     Limit drinks and foods with added sugar. These include candy, desserts, and soda pop.   Heart-healthy lifestyle    If your doctor recommends it, get more exercise. For many people, walking is a good choice. Or you may want to swim, bike, or do other activities. Bit by bit, increase the time you're active every day. Try for at least 30 minutes on most days of the week.     Try to quit or cut back on using tobacco and other nicotine products. This includes smoking and vaping. If you need help quitting, talk to your doctor about stop-smoking programs and medicines. These can increase your chances of quitting for good. Quitting is one of the most important things you can do to protect your heart. It is never too late to quit. Try to avoid secondhand smoke too.     Stay at a weight that's healthy for you. Talk to your doctor if you need help losing weight.     Try to get 7 to 9 hours of sleep each night.     Limit alcohol to 2 drinks a day for men and 1 drink a day for women. Too much alcohol can cause health problems.     Manage other health problems such as diabetes, high blood pressure, and

## 2024-04-27 DIAGNOSIS — E11.42 TYPE 2 DIABETES MELLITUS WITH DIABETIC POLYNEUROPATHY, WITHOUT LONG-TERM CURRENT USE OF INSULIN (HCC): ICD-10-CM

## 2024-04-27 DIAGNOSIS — I10 ESSENTIAL HYPERTENSION, BENIGN: ICD-10-CM

## 2024-04-29 RX ORDER — METFORMIN HYDROCHLORIDE 500 MG/1
500 TABLET, EXTENDED RELEASE ORAL
Qty: 90 TABLET | Refills: 3 | OUTPATIENT
Start: 2024-04-29

## 2024-04-29 RX ORDER — LISINOPRIL 2.5 MG/1
2.5 TABLET ORAL DAILY
Qty: 90 TABLET | Refills: 3 | OUTPATIENT
Start: 2024-04-29

## 2024-06-25 ENCOUNTER — OFFICE VISIT (OUTPATIENT)
Age: 69
End: 2024-06-25
Payer: MEDICARE

## 2024-06-25 VITALS
HEART RATE: 72 BPM | WEIGHT: 177 LBS | SYSTOLIC BLOOD PRESSURE: 126 MMHG | DIASTOLIC BLOOD PRESSURE: 70 MMHG | HEIGHT: 70 IN | BODY MASS INDEX: 25.34 KG/M2

## 2024-06-25 DIAGNOSIS — I25.10 ATHEROSCLEROSIS OF NATIVE CORONARY ARTERY OF NATIVE HEART WITHOUT ANGINA PECTORIS: Primary | ICD-10-CM

## 2024-06-25 DIAGNOSIS — E11.42 TYPE 2 DIABETES MELLITUS WITH DIABETIC POLYNEUROPATHY, WITHOUT LONG-TERM CURRENT USE OF INSULIN (HCC): ICD-10-CM

## 2024-06-25 DIAGNOSIS — Z95.2 S/P AVR: ICD-10-CM

## 2024-06-25 PROCEDURE — 3052F HG A1C>EQUAL 8.0%<EQUAL 9.0%: CPT | Performed by: INTERNAL MEDICINE

## 2024-06-25 PROCEDURE — 2022F DILAT RTA XM EVC RTNOPTHY: CPT | Performed by: INTERNAL MEDICINE

## 2024-06-25 PROCEDURE — 3017F COLORECTAL CA SCREEN DOC REV: CPT | Performed by: INTERNAL MEDICINE

## 2024-06-25 PROCEDURE — G8428 CUR MEDS NOT DOCUMENT: HCPCS | Performed by: INTERNAL MEDICINE

## 2024-06-25 PROCEDURE — G8419 CALC BMI OUT NRM PARAM NOF/U: HCPCS | Performed by: INTERNAL MEDICINE

## 2024-06-25 PROCEDURE — 3074F SYST BP LT 130 MM HG: CPT | Performed by: INTERNAL MEDICINE

## 2024-06-25 PROCEDURE — 1036F TOBACCO NON-USER: CPT | Performed by: INTERNAL MEDICINE

## 2024-06-25 PROCEDURE — 1123F ACP DISCUSS/DSCN MKR DOCD: CPT | Performed by: INTERNAL MEDICINE

## 2024-06-25 PROCEDURE — 3078F DIAST BP <80 MM HG: CPT | Performed by: INTERNAL MEDICINE

## 2024-06-25 PROCEDURE — 99214 OFFICE O/P EST MOD 30 MIN: CPT | Performed by: INTERNAL MEDICINE

## 2024-06-25 RX ORDER — SEMAGLUTIDE 1.34 MG/ML
1 INJECTION, SOLUTION SUBCUTANEOUS
Qty: 9 ML | Refills: 6 | Status: SHIPPED | OUTPATIENT
Start: 2024-06-25

## 2024-06-25 ASSESSMENT — ENCOUNTER SYMPTOMS
STRIDOR: 0
EYE PAIN: 0
APHONIA: 0
NAIL CHANGES: 0
ABDOMINAL PAIN: 0
COUGH: 0

## 2024-06-25 NOTE — PROGRESS NOTES
[address], [address]  [place] [address]  PHONE: [phone]    SUBJECTIVE:   Alexander Gar is a 68 y.o. male 1955   seen for a follow up visit regarding the following:     No chief complaint on file.    History of present illness: 68 y.o. male presented for follow-up 6/25/24       Interval history   He underwent redo sternotomy, lysis  of myocardial adhesions, Aortic valve replacement with a 23mm Lucas valve as well as CABG x 1 on 11/18/21.       Previous diminished left ventricular systolic function.  The patient had acute coronary syndrome in 2017 with coronary artery bypass grafting with LIMA to LAD, SVG to OM, SVG to PDA, EF 10-15% with VF arrest.  The patient underwent defibrillator placement.         Today, he complains of no shortness of breath.  Atrial flutter was detected on his device interrogation.        Cardiac History:     Multivessel coronary disease on cardiac catheterization 2017.    2017 carotids <50% stenosis      2017 Coronary artery bypass grafting with LIMA to LAD, SVG to OM, SVG to PDA.     Carotids 2017 <50%     EF 10-15% by echocardiogram.  Aortic valve appeared calcified, but gradients could not be obtained due to poor acoustic windows and aortic stenosis severity could not be graded based on his low cardiac output at that time.     Device interrogation 07/2018 atrial flutter.  Initiated on Eliquis.     8/2019 EF low normal Moderate AS mean gradient 19 mm Hg.     2019 patient stopped eliquis due to dizziness.      Echo 7/2020 EF 50% Moderate to severe AS mean gradient 25 mm Hg DI 0.25     3/2021 echocardiogram EF 60 to 65% mean aortic valve gradient 33 mmHg dimensionless index 0.28 elevated mitral valve gradients with severe mitral annular calcification    [address] [facility] Several AS EF 55-60%    23mm Lucas valve as well as CABG x 1 on 11/18/21.  2023 echocardiogram normal EF mild mitral stenosis mean gradient aortic valve 7 mmHg with mild

## 2024-07-11 ENCOUNTER — LAB (OUTPATIENT)
Dept: FAMILY MEDICINE CLINIC | Facility: CLINIC | Age: 69
End: 2024-07-11

## 2024-07-11 DIAGNOSIS — E11.42 TYPE 2 DIABETES MELLITUS WITH DIABETIC POLYNEUROPATHY, WITHOUT LONG-TERM CURRENT USE OF INSULIN (HCC): ICD-10-CM

## 2024-07-11 LAB
ANION GAP SERPL CALC-SCNC: 11 MMOL/L (ref 9–18)
BUN SERPL-MCNC: 22 MG/DL (ref 8–23)
CALCIUM SERPL-MCNC: 10 MG/DL (ref 8.8–10.2)
CHLORIDE SERPL-SCNC: 102 MMOL/L (ref 98–107)
CO2 SERPL-SCNC: 25 MMOL/L (ref 20–28)
CREAT SERPL-MCNC: 0.83 MG/DL (ref 0.8–1.3)
EST. AVERAGE GLUCOSE BLD GHB EST-MCNC: 275 MG/DL
GLUCOSE SERPL-MCNC: 232 MG/DL (ref 70–99)
HBA1C MFR BLD: 11.2 % (ref 0–5.6)
POTASSIUM SERPL-SCNC: 5.1 MMOL/L (ref 3.5–5.1)
SODIUM SERPL-SCNC: 138 MMOL/L (ref 136–145)

## 2024-07-17 NOTE — PROGRESS NOTES
EMILY Burdick     Part of this note was written by using a voice dictation software. The note has been proof read but may still contain some grammatical/other typographical errors.

## 2024-07-18 ENCOUNTER — OFFICE VISIT (OUTPATIENT)
Dept: FAMILY MEDICINE CLINIC | Facility: CLINIC | Age: 69
End: 2024-07-18
Payer: MEDICARE

## 2024-07-18 VITALS
SYSTOLIC BLOOD PRESSURE: 108 MMHG | HEART RATE: 80 BPM | TEMPERATURE: 97.6 F | OXYGEN SATURATION: 97 % | WEIGHT: 173.2 LBS | DIASTOLIC BLOOD PRESSURE: 68 MMHG | HEIGHT: 70 IN | RESPIRATION RATE: 16 BRPM | BODY MASS INDEX: 24.79 KG/M2

## 2024-07-18 DIAGNOSIS — E11.65 TYPE 2 DIABETES MELLITUS WITH HYPERGLYCEMIA, WITHOUT LONG-TERM CURRENT USE OF INSULIN (HCC): Primary | ICD-10-CM

## 2024-07-18 DIAGNOSIS — Z12.12 SCREENING FOR COLORECTAL CANCER: ICD-10-CM

## 2024-07-18 DIAGNOSIS — E11.3511 TYPE 2 DIABETES MELLITUS WITH RIGHT EYE AFFECTED BY PROLIFERATIVE RETINOPATHY AND MACULAR EDEMA, WITHOUT LONG-TERM CURRENT USE OF INSULIN (HCC): ICD-10-CM

## 2024-07-18 DIAGNOSIS — E11.42 TYPE 2 DIABETES MELLITUS WITH DIABETIC POLYNEUROPATHY, WITHOUT LONG-TERM CURRENT USE OF INSULIN (HCC): ICD-10-CM

## 2024-07-18 DIAGNOSIS — Z12.11 SCREENING FOR COLORECTAL CANCER: ICD-10-CM

## 2024-07-18 DIAGNOSIS — I10 ESSENTIAL HYPERTENSION, BENIGN: ICD-10-CM

## 2024-07-18 PROCEDURE — G8420 CALC BMI NORM PARAMETERS: HCPCS | Performed by: PHYSICIAN ASSISTANT

## 2024-07-18 PROCEDURE — 3078F DIAST BP <80 MM HG: CPT | Performed by: PHYSICIAN ASSISTANT

## 2024-07-18 PROCEDURE — 99214 OFFICE O/P EST MOD 30 MIN: CPT | Performed by: PHYSICIAN ASSISTANT

## 2024-07-18 PROCEDURE — 3046F HEMOGLOBIN A1C LEVEL >9.0%: CPT | Performed by: PHYSICIAN ASSISTANT

## 2024-07-18 PROCEDURE — 1036F TOBACCO NON-USER: CPT | Performed by: PHYSICIAN ASSISTANT

## 2024-07-18 PROCEDURE — 2022F DILAT RTA XM EVC RTNOPTHY: CPT | Performed by: PHYSICIAN ASSISTANT

## 2024-07-18 PROCEDURE — 1123F ACP DISCUSS/DSCN MKR DOCD: CPT | Performed by: PHYSICIAN ASSISTANT

## 2024-07-18 PROCEDURE — G2211 COMPLEX E/M VISIT ADD ON: HCPCS | Performed by: PHYSICIAN ASSISTANT

## 2024-07-18 PROCEDURE — G8427 DOCREV CUR MEDS BY ELIG CLIN: HCPCS | Performed by: PHYSICIAN ASSISTANT

## 2024-07-18 PROCEDURE — 3074F SYST BP LT 130 MM HG: CPT | Performed by: PHYSICIAN ASSISTANT

## 2024-07-18 PROCEDURE — 3017F COLORECTAL CA SCREEN DOC REV: CPT | Performed by: PHYSICIAN ASSISTANT

## 2024-07-18 RX ORDER — TETANUS AND DIPHTHERIA TOXOIDS ADSORBED 2; 2 [LF]/.5ML; [LF]/.5ML
0.5 INJECTION INTRAMUSCULAR ONCE
Qty: 0.5 ML | Refills: 0 | Status: SHIPPED | OUTPATIENT
Start: 2024-07-18 | End: 2024-07-18

## 2024-07-18 SDOH — ECONOMIC STABILITY: FOOD INSECURITY: WITHIN THE PAST 12 MONTHS, THE FOOD YOU BOUGHT JUST DIDN'T LAST AND YOU DIDN'T HAVE MONEY TO GET MORE.: NEVER TRUE

## 2024-07-18 SDOH — ECONOMIC STABILITY: FOOD INSECURITY: WITHIN THE PAST 12 MONTHS, YOU WORRIED THAT YOUR FOOD WOULD RUN OUT BEFORE YOU GOT MONEY TO BUY MORE.: NEVER TRUE

## 2024-07-18 SDOH — ECONOMIC STABILITY: INCOME INSECURITY: HOW HARD IS IT FOR YOU TO PAY FOR THE VERY BASICS LIKE FOOD, HOUSING, MEDICAL CARE, AND HEATING?: NOT HARD AT ALL

## 2024-07-26 ENCOUNTER — TELEPHONE (OUTPATIENT)
Dept: GASTROENTEROLOGY | Age: 69
End: 2024-07-26

## 2024-07-26 ENCOUNTER — TELEPHONE (OUTPATIENT)
Age: 69
End: 2024-07-26

## 2024-07-26 ENCOUNTER — PREP FOR PROCEDURE (OUTPATIENT)
Dept: GASTROENTEROLOGY | Age: 69
End: 2024-07-26

## 2024-07-26 DIAGNOSIS — Z12.11 ENCOUNTER FOR SCREENING COLONOSCOPY: ICD-10-CM

## 2024-07-26 NOTE — TELEPHONE ENCOUNTER
Spoke to Jennifer at [facility] requesting clearance for direct colon screening on 08/30/24 with Dr. Meneses - requesting 5 day hold on Xarelto

## 2024-07-26 NOTE — TELEPHONE ENCOUNTER
Cardiac Clearance        Physician or Practice RequestingDr Gideon   : Susanna   Contact Phone Number: [phone]  Fax Number: Pu in Epic   Date of Surgery/Procedure: 08/30  Type of Surgery or Procedure: colo  Type of Anesthesia: general   Type of Clearance Requested: Cardiac Clearance and Medication Hold  Medication to Hold:Xarelto   Days to Hold: 5 day

## 2024-07-26 NOTE — TELEPHONE ENCOUNTER
symptoms.  What is the best \"clear\" liquid to take? Gatorade, which comes in many flavors, is an excellent choice as it contains electrolytes, such as potassium. Avoid RED/BLUE/PURPLE liquids.  Can I have the colonoscopy done if I am having my menstrual period? Yes, the procedure can still be performed.   Is there any way that I can make this taste any better? You can rinse your mouth with water or a mouthwash. Do not eat or drink anything other than approved liquids while you are drinking this solution.   My bottom is so sore, what can I do? To clean the area, avoid rubbing. Gently pat with a wet washcloth. Apply Vaseline, preparation H, or Enrike liberally.  Why avoid red/blue/purple liquids? The color can persist in the colon and make an accurate diagnosis more difficult.  Can I drink alcoholic beverages? We strongly suggest you do not drink any alcoholic beverages prior to your procedure since they can cause dehydration and some travis may thin your blood.   Can I chew gum or suck candy? No, per anesthesia, no gum or candy is allowed the day of your procedure.   Can I brush my teeth? Yes  I see yellow color in the toilet bowl and a few flecks, what do I do? If your last bowel movements were clear enough that you were able to see the bottom of the toilet, you should be fine. It is ok if you have some flecks or material. The yellow color is a result of bile that normally colors the feces. This shouldn't interfere with the examination.  Can I wear my dentures? Yes, you may wear your dentures to the endoscopy suite, however, you will be asked to remove prior to the procedure.   I am a diabetic, is it ok to drink Gatorade? Yes, however, you should substitute with Gatorade G2 OR Powerade Zero.

## 2024-07-30 ENCOUNTER — TELEPHONE (OUTPATIENT)
Dept: GASTROENTEROLOGY | Age: 69
End: 2024-07-30

## 2024-07-30 RX ORDER — SODIUM CHLORIDE 9 MG/ML
25 INJECTION, SOLUTION INTRAVENOUS PRN
OUTPATIENT
Start: 2024-07-30

## 2024-07-30 RX ORDER — SODIUM CHLORIDE 0.9 % (FLUSH) 0.9 %
5-40 SYRINGE (ML) INJECTION EVERY 12 HOURS SCHEDULED
OUTPATIENT
Start: 2024-07-30

## 2024-07-30 RX ORDER — SODIUM CHLORIDE 0.9 % (FLUSH) 0.9 %
5-40 SYRINGE (ML) INJECTION PRN
OUTPATIENT
Start: 2024-07-30

## 2024-08-25 PROBLEM — Z12.11 ENCOUNTER FOR SCREENING COLONOSCOPY: Status: RESOLVED | Noted: 2024-07-26 | Resolved: 2024-08-25

## 2024-08-28 PROBLEM — Z12.11 ENCOUNTER FOR SCREENING COLONOSCOPY: Status: ACTIVE | Noted: 2024-07-26

## 2024-09-03 ENCOUNTER — TELEPHONE (OUTPATIENT)
Dept: GASTROENTEROLOGY | Age: 69
End: 2024-09-03

## 2024-09-18 ENCOUNTER — TELEPHONE (OUTPATIENT)
Dept: GASTROENTEROLOGY | Age: 69
End: 2024-09-18

## 2024-09-19 PROCEDURE — 93296 REM INTERROG EVL PM/IDS: CPT | Performed by: INTERNAL MEDICINE

## 2024-09-19 PROCEDURE — 93295 DEV INTERROG REMOTE 1/2/MLT: CPT | Performed by: INTERNAL MEDICINE

## 2024-09-27 PROBLEM — Z12.11 ENCOUNTER FOR SCREENING COLONOSCOPY: Status: RESOLVED | Noted: 2024-07-26 | Resolved: 2024-09-27

## 2024-10-02 ENCOUNTER — TELEPHONE (OUTPATIENT)
Dept: GASTROENTEROLOGY | Age: 69
End: 2024-10-02

## 2024-10-02 NOTE — TELEPHONE ENCOUNTER
Called patient to reschedule colon procedure with Dr Meneses - patient declined rescheduling at this time due to personal schedule - will call back when ready

## 2024-10-07 ENCOUNTER — LAB (OUTPATIENT)
Dept: FAMILY MEDICINE CLINIC | Facility: CLINIC | Age: 69
End: 2024-10-07

## 2024-10-07 DIAGNOSIS — E11.42 TYPE 2 DIABETES MELLITUS WITH DIABETIC POLYNEUROPATHY, WITHOUT LONG-TERM CURRENT USE OF INSULIN (HCC): ICD-10-CM

## 2024-10-07 DIAGNOSIS — I10 ESSENTIAL HYPERTENSION, BENIGN: ICD-10-CM

## 2024-10-07 DIAGNOSIS — E11.3511 TYPE 2 DIABETES MELLITUS WITH RIGHT EYE AFFECTED BY PROLIFERATIVE RETINOPATHY AND MACULAR EDEMA, WITHOUT LONG-TERM CURRENT USE OF INSULIN (HCC): ICD-10-CM

## 2024-10-07 LAB
ANION GAP SERPL CALC-SCNC: 9 MMOL/L (ref 9–18)
BUN SERPL-MCNC: 19 MG/DL (ref 8–23)
CALCIUM SERPL-MCNC: 9.6 MG/DL (ref 8.8–10.2)
CHLORIDE SERPL-SCNC: 103 MMOL/L (ref 98–107)
CO2 SERPL-SCNC: 27 MMOL/L (ref 20–28)
CREAT SERPL-MCNC: 1.09 MG/DL (ref 0.8–1.3)
EST. AVERAGE GLUCOSE BLD GHB EST-MCNC: 197 MG/DL
GLUCOSE SERPL-MCNC: 179 MG/DL (ref 70–99)
HBA1C MFR BLD: 8.5 % (ref 0–5.6)
POTASSIUM SERPL-SCNC: 5 MMOL/L (ref 3.5–5.1)
SODIUM SERPL-SCNC: 139 MMOL/L (ref 136–145)

## 2024-10-17 DIAGNOSIS — E78.1 HYPERTRIGLYCERIDEMIA: ICD-10-CM

## 2024-10-17 DIAGNOSIS — I10 ESSENTIAL HYPERTENSION, BENIGN: ICD-10-CM

## 2024-10-17 DIAGNOSIS — I25.10 CAD, MULTIPLE VESSEL: ICD-10-CM

## 2024-10-17 DIAGNOSIS — E11.42 TYPE 2 DIABETES MELLITUS WITH DIABETIC POLYNEUROPATHY, WITHOUT LONG-TERM CURRENT USE OF INSULIN (HCC): ICD-10-CM

## 2024-10-17 DIAGNOSIS — K21.9 GASTROESOPHAGEAL REFLUX DISEASE WITHOUT ESOPHAGITIS: ICD-10-CM

## 2024-10-17 RX ORDER — FAMOTIDINE 20 MG/1
20 TABLET, FILM COATED ORAL 3 TIMES DAILY
Qty: 270 TABLET | Refills: 1 | OUTPATIENT
Start: 2024-10-17

## 2024-10-17 RX ORDER — LISINOPRIL 2.5 MG/1
2.5 TABLET ORAL DAILY
Qty: 90 TABLET | Refills: 1 | OUTPATIENT
Start: 2024-10-17

## 2024-10-17 RX ORDER — ATORVASTATIN CALCIUM 80 MG/1
80 TABLET, FILM COATED ORAL DAILY
Qty: 90 TABLET | Refills: 1 | OUTPATIENT
Start: 2024-10-17

## 2024-10-17 RX ORDER — METFORMIN HYDROCHLORIDE 500 MG/1
TABLET, EXTENDED RELEASE ORAL
Qty: 180 TABLET | Refills: 1 | OUTPATIENT
Start: 2024-10-17

## 2024-11-22 NOTE — PROGRESS NOTES
Alexander Gar (: 1955) is a 68 y.o. male, an established patient, is here for evaluation of the following chief complaint(s):  Chief Complaint   Patient presents with    Diabetes    Results    Hypertension    Hyperlipidemia        ASSESSMENT/PLAN:  Alexander was seen today for diabetes, results, hypertension and hyperlipidemia.    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, without long-term current use of insulin ([facility])  -     semaglutide, 2 MG/DOSE, (OZEMPIC, 2 MG/DOSE,) 8 MG/3ML SOPN sc injection; Inject 2 mg into the skin every 7 days  -     Basic Metabolic Panel; Future  -     Hemoglobin A1C; Future    Type 2 diabetes mellitus with diabetic polyneuropathy, without long-term current use of insulin ([facility])  -     semaglutide, 2 MG/DOSE, (OZEMPIC, 2 MG/DOSE,) 8 MG/3ML SOPN sc injection; Inject 2 mg into the skin every 7 days  -     dapagliflozin (FARXIGA) 10 MG tablet; Take 1 tablet by mouth every morning  -     metFORMIN (GLUCOPHAGE-XR) 500 MG extended release tablet; Take 2 tablets by mouth daily  -     Basic Metabolic Panel; Future  -     Hemoglobin A1C; Future    Dyslipidemia    Hypertriglyceridemia  -     atorvastatin (LIPITOR) 80 MG tablet; Take 1 tablet by mouth daily    CAD, multiple vessel  -     atorvastatin (LIPITOR) 80 MG tablet; Take 1 tablet by mouth daily    Gastroesophageal reflux disease without esophagitis  -     famotidine (PEPCID) 20 MG tablet; Take 1 tablet by mouth 3 times daily    Screening for AAA (abdominal aortic aneurysm)  -     Vascular AAA screening [[id number]]; Future    Personal history of tobacco use, presenting hazards to health  -     Vascular

## 2024-11-26 ENCOUNTER — OFFICE VISIT (OUTPATIENT)
Dept: FAMILY MEDICINE CLINIC | Facility: CLINIC | Age: 69
End: 2024-11-26
Payer: MEDICARE

## 2024-11-26 VITALS
RESPIRATION RATE: 16 BRPM | WEIGHT: 163 LBS | OXYGEN SATURATION: 98 % | HEART RATE: 77 BPM | BODY MASS INDEX: 23.34 KG/M2 | TEMPERATURE: 98 F | HEIGHT: 70 IN | SYSTOLIC BLOOD PRESSURE: 135 MMHG | DIASTOLIC BLOOD PRESSURE: 79 MMHG

## 2024-11-26 DIAGNOSIS — E11.3511 TYPE 2 DIABETES MELLITUS WITH RIGHT EYE AFFECTED BY PROLIFERATIVE RETINOPATHY AND MACULAR EDEMA, WITHOUT LONG-TERM CURRENT USE OF INSULIN (HCC): ICD-10-CM

## 2024-11-26 DIAGNOSIS — E11.42 TYPE 2 DIABETES MELLITUS WITH DIABETIC POLYNEUROPATHY, WITHOUT LONG-TERM CURRENT USE OF INSULIN (HCC): ICD-10-CM

## 2024-11-26 DIAGNOSIS — E78.1 HYPERTRIGLYCERIDEMIA: ICD-10-CM

## 2024-11-26 DIAGNOSIS — Z23 NEEDS FLU SHOT: ICD-10-CM

## 2024-11-26 DIAGNOSIS — K21.9 GASTROESOPHAGEAL REFLUX DISEASE WITHOUT ESOPHAGITIS: ICD-10-CM

## 2024-11-26 DIAGNOSIS — I25.10 CAD, MULTIPLE VESSEL: ICD-10-CM

## 2024-11-26 DIAGNOSIS — Z13.6 SCREENING FOR AAA (ABDOMINAL AORTIC ANEURYSM): ICD-10-CM

## 2024-11-26 DIAGNOSIS — I10 ESSENTIAL HYPERTENSION, BENIGN: Primary | ICD-10-CM

## 2024-11-26 DIAGNOSIS — Z12.11 SCREENING FOR COLORECTAL CANCER: ICD-10-CM

## 2024-11-26 DIAGNOSIS — Z87.891 PERSONAL HISTORY OF TOBACCO USE, PRESENTING HAZARDS TO HEALTH: ICD-10-CM

## 2024-11-26 DIAGNOSIS — Z12.12 SCREENING FOR COLORECTAL CANCER: ICD-10-CM

## 2024-11-26 DIAGNOSIS — E78.5 DYSLIPIDEMIA: ICD-10-CM

## 2024-11-26 PROCEDURE — G8482 FLU IMMUNIZE ORDER/ADMIN: HCPCS | Performed by: PHYSICIAN ASSISTANT

## 2024-11-26 PROCEDURE — 90653 IIV ADJUVANT VACCINE IM: CPT | Performed by: PHYSICIAN ASSISTANT

## 2024-11-26 PROCEDURE — 3078F DIAST BP <80 MM HG: CPT | Performed by: PHYSICIAN ASSISTANT

## 2024-11-26 PROCEDURE — G8420 CALC BMI NORM PARAMETERS: HCPCS | Performed by: PHYSICIAN ASSISTANT

## 2024-11-26 PROCEDURE — G8427 DOCREV CUR MEDS BY ELIG CLIN: HCPCS | Performed by: PHYSICIAN ASSISTANT

## 2024-11-26 PROCEDURE — 1126F AMNT PAIN NOTED NONE PRSNT: CPT | Performed by: PHYSICIAN ASSISTANT

## 2024-11-26 PROCEDURE — 1123F ACP DISCUSS/DSCN MKR DOCD: CPT | Performed by: PHYSICIAN ASSISTANT

## 2024-11-26 PROCEDURE — 3075F SYST BP GE 130 - 139MM HG: CPT | Performed by: PHYSICIAN ASSISTANT

## 2024-11-26 PROCEDURE — 2022F DILAT RTA XM EVC RTNOPTHY: CPT | Performed by: PHYSICIAN ASSISTANT

## 2024-11-26 PROCEDURE — 3017F COLORECTAL CA SCREEN DOC REV: CPT | Performed by: PHYSICIAN ASSISTANT

## 2024-11-26 PROCEDURE — 3052F HG A1C>EQUAL 8.0%<EQUAL 9.0%: CPT | Performed by: PHYSICIAN ASSISTANT

## 2024-11-26 PROCEDURE — G0008 ADMIN INFLUENZA VIRUS VAC: HCPCS | Performed by: PHYSICIAN ASSISTANT

## 2024-11-26 PROCEDURE — 1159F MED LIST DOCD IN RCRD: CPT | Performed by: PHYSICIAN ASSISTANT

## 2024-11-26 PROCEDURE — 1036F TOBACCO NON-USER: CPT | Performed by: PHYSICIAN ASSISTANT

## 2024-11-26 PROCEDURE — 99214 OFFICE O/P EST MOD 30 MIN: CPT | Performed by: PHYSICIAN ASSISTANT

## 2024-11-26 RX ORDER — FAMOTIDINE 20 MG/1
20 TABLET, FILM COATED ORAL 3 TIMES DAILY
Qty: 270 TABLET | Refills: 0 | Status: SHIPPED | OUTPATIENT
Start: 2024-11-26

## 2024-11-26 RX ORDER — LISINOPRIL 2.5 MG/1
2.5 TABLET ORAL DAILY
Qty: 90 TABLET | Refills: 0 | Status: SHIPPED | OUTPATIENT
Start: 2024-11-26

## 2024-11-26 RX ORDER — SEMAGLUTIDE 2.68 MG/ML
2 INJECTION, SOLUTION SUBCUTANEOUS
Qty: 9 ML | Refills: 0 | Status: SHIPPED | OUTPATIENT
Start: 2024-11-26

## 2024-11-26 RX ORDER — METOPROLOL SUCCINATE 50 MG/1
50 TABLET, EXTENDED RELEASE ORAL DAILY
Qty: 90 TABLET | Refills: 0 | Status: SHIPPED | OUTPATIENT
Start: 2024-11-26

## 2024-11-26 RX ORDER — ATORVASTATIN CALCIUM 80 MG/1
80 TABLET, FILM COATED ORAL DAILY
Qty: 90 TABLET | Refills: 0 | Status: SHIPPED | OUTPATIENT
Start: 2024-11-26

## 2024-11-26 RX ORDER — DAPAGLIFLOZIN 10 MG/1
10 TABLET, FILM COATED ORAL EVERY MORNING
Qty: 90 TABLET | Refills: 0 | Status: SHIPPED | OUTPATIENT
Start: 2024-11-26

## 2024-11-26 RX ORDER — METFORMIN HYDROCHLORIDE 500 MG/1
1000 TABLET, EXTENDED RELEASE ORAL DAILY
Qty: 180 TABLET | Refills: 0 | Status: SHIPPED | OUTPATIENT
Start: 2024-11-26

## 2025-01-06 NOTE — ASSESSMENT & PLAN NOTE
Monitored by specialist- no acute findings meriting change in the plan   [No Acute Distress] : no acute distress [Normal Oropharynx] : normal oropharynx [Normal Appearance] : normal appearance [No Neck Mass] : no neck mass [Normal Rate/Rhythm] : normal rate/rhythm [Normal S1, S2] : normal s1, s2 [No Murmurs] : no murmurs [No Resp Distress] : no resp distress [Clear to Auscultation Bilaterally] : clear to auscultation bilaterally [No Abnormalities] : no abnormalities [Benign] : benign [Normal Gait] : normal gait [No Clubbing] : no clubbing [No Cyanosis] : no cyanosis [No Edema] : no edema [FROM] : FROM [Normal Color/ Pigmentation] : normal color/ pigmentation [No Focal Deficits] : no focal deficits [Oriented x3] : oriented x3 [Normal Affect] : normal affect [Rhonchi] : rhonchi

## 2025-01-07 ENCOUNTER — NURSE ONLY (OUTPATIENT)
Age: 70
End: 2025-01-07

## 2025-01-07 ENCOUNTER — OFFICE VISIT (OUTPATIENT)
Age: 70
End: 2025-01-07
Payer: MEDICARE

## 2025-01-07 VITALS
BODY MASS INDEX: 23.77 KG/M2 | DIASTOLIC BLOOD PRESSURE: 68 MMHG | HEIGHT: 70 IN | WEIGHT: 166 LBS | SYSTOLIC BLOOD PRESSURE: 122 MMHG | HEART RATE: 94 BPM

## 2025-01-07 DIAGNOSIS — I50.22 SYSTOLIC CHF, CHRONIC (HCC): Primary | ICD-10-CM

## 2025-01-07 DIAGNOSIS — Z95.810 ICD (IMPLANTABLE CARDIOVERTER-DEFIBRILLATOR) IN PLACE: ICD-10-CM

## 2025-01-07 DIAGNOSIS — Z95.2 S/P AVR: Primary | ICD-10-CM

## 2025-01-07 DIAGNOSIS — E11.42 TYPE 2 DIABETES MELLITUS WITH DIABETIC POLYNEUROPATHY, WITHOUT LONG-TERM CURRENT USE OF INSULIN (HCC): ICD-10-CM

## 2025-01-07 DIAGNOSIS — I34.2 NONRHEUMATIC MITRAL VALVE STENOSIS: ICD-10-CM

## 2025-01-07 PROCEDURE — 3078F DIAST BP <80 MM HG: CPT | Performed by: INTERNAL MEDICINE

## 2025-01-07 PROCEDURE — 3046F HEMOGLOBIN A1C LEVEL >9.0%: CPT | Performed by: INTERNAL MEDICINE

## 2025-01-07 PROCEDURE — M1299 PR FLU IMMUNIZE ORDER/ADMIN: HCPCS | Performed by: INTERNAL MEDICINE

## 2025-01-07 PROCEDURE — 99214 OFFICE O/P EST MOD 30 MIN: CPT | Performed by: INTERNAL MEDICINE

## 2025-01-07 PROCEDURE — 2022F DILAT RTA XM EVC RTNOPTHY: CPT | Performed by: INTERNAL MEDICINE

## 2025-01-07 PROCEDURE — 1036F TOBACCO NON-USER: CPT | Performed by: INTERNAL MEDICINE

## 2025-01-07 PROCEDURE — 3017F COLORECTAL CA SCREEN DOC REV: CPT | Performed by: INTERNAL MEDICINE

## 2025-01-07 PROCEDURE — 1123F ACP DISCUSS/DSCN MKR DOCD: CPT | Performed by: INTERNAL MEDICINE

## 2025-01-07 PROCEDURE — G8420 CALC BMI NORM PARAMETERS: HCPCS | Performed by: INTERNAL MEDICINE

## 2025-01-07 PROCEDURE — G8428 CUR MEDS NOT DOCUMENT: HCPCS | Performed by: INTERNAL MEDICINE

## 2025-01-07 PROCEDURE — 3074F SYST BP LT 130 MM HG: CPT | Performed by: INTERNAL MEDICINE

## 2025-01-07 PROCEDURE — 1126F AMNT PAIN NOTED NONE PRSNT: CPT | Performed by: INTERNAL MEDICINE

## 2025-01-07 NOTE — PROGRESS NOTES
[address], [address]  [place] [address]  PHONE: [phone]    SUBJECTIVE:   Alexander Gar is a 69 y.o. male 1955   seen for a follow up visit regarding the following:     Chief Complaint   Patient presents with    Hypertension    Atherosclerosis of native coronary artery of native heart w    6 Month Follow-Up         History of Present Illness  The patient is a 69-year-old male with a medical history significant for mitral stenosis, atherosclerotic cardiovascular disease (ASCVD), an implanted defibrillator, status post aortic valve replacement (AVR) with stable gradients, hyperlipidemia, diabetes mellitus, hyperglycemia, and a history of atrial flutter.    The patient reports overall good health with the exception of a recent brief episode of atrial fibrillation. He maintains an active lifestyle, engaging in regular physical exercise.    He is experiencing sinus drainage, which is considered within normal limits.    The patient has encountered difficulties in obtaining Ozempic but is currently administering 2 mg per week. He reports no financial constraints regarding his medications. His current pharmacotherapy regimen includes metformin 500 mg and Farxiga 10 mg.    He has an upcoming oral implant procedure scheduled for January 27, 2025, and plans to discontinue Xarelto to mitigate the risk of bleeding complications.    MEDICATIONS  Current: Lipitor, Farxiga, Xarelto, Metformin, Ozempic        Interval history:   He underwent redo sternotomy, lysis  of myocardial adhesions, Aortic valve replacement with a 23mm Lucas valve as well as CABG x 1 on 11/18/21.        Previous diminished left ventricular systolic function.  The patient had acute coronary syndrome in 2017 with coronary artery bypass grafting with LIMA to LAD, SVG to OM, SVG to PDA, EF 10-15% with VF arrest.  The patient underwent defibrillator placement.         Today, he complains of no shortness of

## 2025-01-14 PROBLEM — E11.3593 TYPE 2 DIABETES MELLITUS WITH BOTH EYES AFFECTED BY PROLIFERATIVE RETINOPATHY WITHOUT MACULAR EDEMA, WITHOUT LONG-TERM CURRENT USE OF INSULIN (HCC): Status: ACTIVE | Noted: 2017-08-23

## 2025-02-17 ENCOUNTER — LAB (OUTPATIENT)
Dept: FAMILY MEDICINE CLINIC | Facility: CLINIC | Age: 70
End: 2025-02-17

## 2025-02-17 DIAGNOSIS — I10 ESSENTIAL HYPERTENSION, BENIGN: ICD-10-CM

## 2025-02-17 DIAGNOSIS — E11.3511 TYPE 2 DIABETES MELLITUS WITH RIGHT EYE AFFECTED BY PROLIFERATIVE RETINOPATHY AND MACULAR EDEMA, WITHOUT LONG-TERM CURRENT USE OF INSULIN (HCC): ICD-10-CM

## 2025-02-17 DIAGNOSIS — E11.42 TYPE 2 DIABETES MELLITUS WITH DIABETIC POLYNEUROPATHY, WITHOUT LONG-TERM CURRENT USE OF INSULIN (HCC): ICD-10-CM

## 2025-02-17 LAB
ANION GAP SERPL CALC-SCNC: 11 MMOL/L (ref 7–16)
BUN SERPL-MCNC: 24 MG/DL (ref 8–23)
CALCIUM SERPL-MCNC: 9.8 MG/DL (ref 8.8–10.2)
CHLORIDE SERPL-SCNC: 103 MMOL/L (ref 98–107)
CO2 SERPL-SCNC: 26 MMOL/L (ref 20–29)
CREAT SERPL-MCNC: 1.2 MG/DL (ref 0.8–1.3)
EST. AVERAGE GLUCOSE BLD GHB EST-MCNC: 164 MG/DL
GLUCOSE SERPL-MCNC: 137 MG/DL (ref 70–99)
HBA1C MFR BLD: 7.3 % (ref 0–5.6)
POTASSIUM SERPL-SCNC: 4.6 MMOL/L (ref 3.5–5.1)
SODIUM SERPL-SCNC: 140 MMOL/L (ref 136–145)

## 2025-02-17 SDOH — ECONOMIC STABILITY: FOOD INSECURITY: WITHIN THE PAST 12 MONTHS, THE FOOD YOU BOUGHT JUST DIDN'T LAST AND YOU DIDN'T HAVE MONEY TO GET MORE.: NEVER TRUE

## 2025-02-17 SDOH — ECONOMIC STABILITY: FOOD INSECURITY: WITHIN THE PAST 12 MONTHS, YOU WORRIED THAT YOUR FOOD WOULD RUN OUT BEFORE YOU GOT MONEY TO BUY MORE.: NEVER TRUE

## 2025-02-21 NOTE — PROGRESS NOTES
Alexander Gar (: 1955) is a 69 y.o. male, an established patient, is here for evaluation of the following chief complaint(s):  Chief Complaint   Patient presents with    Diabetes    Results          ASSESSMENT/PLAN:    Assessment & Plan  Type 2 diabetes mellitus with diabetic polyneuropathy, without long-term current use of insulin (HCC)   Diabetes is, but not treated to goal.   His hemoglobin A1c has come down to 7.3% since last October.  Currently prescribed:   Diabetic Medications       Biguanides       metFORMIN (GLUCOPHAGE-XR) 500 MG extended release tablet Take 2 tablets by mouth daily       Incretin Mimetic Agents       semaglutide, 2 MG/DOSE, (OZEMPIC, 2 MG/DOSE,) 8 MG/3ML SOPN sc injection Inject 2 mg into the skin every 7 days       Sodium-Glucose Co-Transporter 2 (SGLT2) Inhibitors       dapagliflozin (FARXIGA) 10 MG tablet Take 1 tablet by mouth every morning   Continue with meds above.  Will reeval in 6 months.  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      Orders:    semaglutide, 2 MG/DOSE, (OZEMPIC, 2 MG/DOSE,) 8 MG/3ML SOPN sc injection; Inject 2 mg into the skin every 7 days    Lipid Panel; Future    Comprehensive Metabolic Panel; Future    Hemoglobin A1C; Future    Albumin/Creatinine Ratio, Urine; Future    Essential hypertension, benign   BP today is:

## 2025-02-22 DIAGNOSIS — E11.42 TYPE 2 DIABETES MELLITUS WITH DIABETIC POLYNEUROPATHY, WITHOUT LONG-TERM CURRENT USE OF INSULIN (HCC): ICD-10-CM

## 2025-02-24 ENCOUNTER — TELEPHONE (OUTPATIENT)
Age: 70
End: 2025-02-24

## 2025-02-24 ENCOUNTER — PREP FOR PROCEDURE (OUTPATIENT)
Age: 70
End: 2025-02-24

## 2025-02-24 ENCOUNTER — OFFICE VISIT (OUTPATIENT)
Dept: FAMILY MEDICINE CLINIC | Facility: CLINIC | Age: 70
End: 2025-02-24
Payer: MEDICARE

## 2025-02-24 VITALS
HEIGHT: 70 IN | WEIGHT: 159 LBS | HEART RATE: 95 BPM | SYSTOLIC BLOOD PRESSURE: 125 MMHG | DIASTOLIC BLOOD PRESSURE: 70 MMHG | TEMPERATURE: 97.8 F | RESPIRATION RATE: 16 BRPM | BODY MASS INDEX: 22.76 KG/M2 | OXYGEN SATURATION: 98 %

## 2025-02-24 DIAGNOSIS — K21.9 GASTROESOPHAGEAL REFLUX DISEASE WITHOUT ESOPHAGITIS: ICD-10-CM

## 2025-02-24 DIAGNOSIS — I10 ESSENTIAL HYPERTENSION, BENIGN: ICD-10-CM

## 2025-02-24 DIAGNOSIS — E78.5 DYSLIPIDEMIA: ICD-10-CM

## 2025-02-24 DIAGNOSIS — I25.10 CAD, MULTIPLE VESSEL: ICD-10-CM

## 2025-02-24 DIAGNOSIS — Z12.11 SCREENING FOR COLORECTAL CANCER: ICD-10-CM

## 2025-02-24 DIAGNOSIS — Z12.12 SCREENING FOR COLORECTAL CANCER: ICD-10-CM

## 2025-02-24 DIAGNOSIS — E11.3511 TYPE 2 DIABETES MELLITUS WITH RIGHT EYE AFFECTED BY PROLIFERATIVE RETINOPATHY AND MACULAR EDEMA, WITHOUT LONG-TERM CURRENT USE OF INSULIN (HCC): ICD-10-CM

## 2025-02-24 DIAGNOSIS — I50.22 SYSTOLIC CHF, CHRONIC (HCC): ICD-10-CM

## 2025-02-24 DIAGNOSIS — E11.42 TYPE 2 DIABETES MELLITUS WITH DIABETIC POLYNEUROPATHY, WITHOUT LONG-TERM CURRENT USE OF INSULIN (HCC): Primary | ICD-10-CM

## 2025-02-24 DIAGNOSIS — Z12.11 ENCOUNTER FOR SCREENING COLONOSCOPY: ICD-10-CM

## 2025-02-24 DIAGNOSIS — E78.1 HYPERTRIGLYCERIDEMIA: ICD-10-CM

## 2025-02-24 PROCEDURE — 1123F ACP DISCUSS/DSCN MKR DOCD: CPT | Performed by: PHYSICIAN ASSISTANT

## 2025-02-24 PROCEDURE — 1036F TOBACCO NON-USER: CPT | Performed by: PHYSICIAN ASSISTANT

## 2025-02-24 PROCEDURE — 1159F MED LIST DOCD IN RCRD: CPT | Performed by: PHYSICIAN ASSISTANT

## 2025-02-24 PROCEDURE — 99214 OFFICE O/P EST MOD 30 MIN: CPT | Performed by: PHYSICIAN ASSISTANT

## 2025-02-24 PROCEDURE — 3074F SYST BP LT 130 MM HG: CPT | Performed by: PHYSICIAN ASSISTANT

## 2025-02-24 PROCEDURE — G2211 COMPLEX E/M VISIT ADD ON: HCPCS | Performed by: PHYSICIAN ASSISTANT

## 2025-02-24 PROCEDURE — 3078F DIAST BP <80 MM HG: CPT | Performed by: PHYSICIAN ASSISTANT

## 2025-02-24 PROCEDURE — 1126F AMNT PAIN NOTED NONE PRSNT: CPT | Performed by: PHYSICIAN ASSISTANT

## 2025-02-24 PROCEDURE — 2022F DILAT RTA XM EVC RTNOPTHY: CPT | Performed by: PHYSICIAN ASSISTANT

## 2025-02-24 PROCEDURE — G8420 CALC BMI NORM PARAMETERS: HCPCS | Performed by: PHYSICIAN ASSISTANT

## 2025-02-24 PROCEDURE — 3017F COLORECTAL CA SCREEN DOC REV: CPT | Performed by: PHYSICIAN ASSISTANT

## 2025-02-24 PROCEDURE — 3051F HG A1C>EQUAL 7.0%<8.0%: CPT | Performed by: PHYSICIAN ASSISTANT

## 2025-02-24 PROCEDURE — G8427 DOCREV CUR MEDS BY ELIG CLIN: HCPCS | Performed by: PHYSICIAN ASSISTANT

## 2025-02-24 RX ORDER — LISINOPRIL 2.5 MG/1
2.5 TABLET ORAL DAILY
Qty: 90 TABLET | Refills: 1 | Status: SHIPPED | OUTPATIENT
Start: 2025-02-24

## 2025-02-24 RX ORDER — SODIUM CHLORIDE 0.9 % (FLUSH) 0.9 %
5-40 SYRINGE (ML) INJECTION EVERY 12 HOURS SCHEDULED
Status: CANCELLED | OUTPATIENT
Start: 2025-02-24

## 2025-02-24 RX ORDER — SODIUM CHLORIDE 0.9 % (FLUSH) 0.9 %
5-40 SYRINGE (ML) INJECTION PRN
Status: CANCELLED | OUTPATIENT
Start: 2025-02-24

## 2025-02-24 RX ORDER — METFORMIN HYDROCHLORIDE 500 MG/1
1000 TABLET, EXTENDED RELEASE ORAL DAILY
Qty: 180 TABLET | Refills: 0 | OUTPATIENT
Start: 2025-02-24

## 2025-02-24 RX ORDER — ATORVASTATIN CALCIUM 80 MG/1
80 TABLET, FILM COATED ORAL DAILY
Qty: 90 TABLET | Refills: 1 | Status: SHIPPED | OUTPATIENT
Start: 2025-02-24

## 2025-02-24 RX ORDER — METFORMIN HYDROCHLORIDE 500 MG/1
1000 TABLET, EXTENDED RELEASE ORAL DAILY
Qty: 180 TABLET | Refills: 1 | Status: SHIPPED | OUTPATIENT
Start: 2025-02-24

## 2025-02-24 RX ORDER — DAPAGLIFLOZIN 10 MG/1
10 TABLET, FILM COATED ORAL EVERY MORNING
Qty: 90 TABLET | Refills: 1 | Status: SHIPPED | OUTPATIENT
Start: 2025-02-24

## 2025-02-24 RX ORDER — FAMOTIDINE 20 MG/1
20 TABLET, FILM COATED ORAL 3 TIMES DAILY
Qty: 270 TABLET | Refills: 1 | Status: SHIPPED | OUTPATIENT
Start: 2025-02-24

## 2025-02-24 RX ORDER — METOPROLOL SUCCINATE 50 MG/1
50 TABLET, EXTENDED RELEASE ORAL DAILY
Qty: 90 TABLET | Refills: 1 | Status: SHIPPED | OUTPATIENT
Start: 2025-02-24

## 2025-02-24 RX ORDER — SODIUM CHLORIDE 9 MG/ML
25 INJECTION, SOLUTION INTRAVENOUS PRN
Status: CANCELLED | OUTPATIENT
Start: 2025-02-24

## 2025-02-24 RX ORDER — SEMAGLUTIDE 2.68 MG/ML
2 INJECTION, SOLUTION SUBCUTANEOUS
Qty: 9 ML | Refills: 1 | Status: SHIPPED | OUTPATIENT
Start: 2025-02-24

## 2025-02-24 RX ORDER — AMOXICILLIN 500 MG/1
CAPSULE ORAL
COMMUNITY
Start: 2025-01-27

## 2025-02-24 SDOH — ECONOMIC STABILITY: FOOD INSECURITY: WITHIN THE PAST 12 MONTHS, YOU WORRIED THAT YOUR FOOD WOULD RUN OUT BEFORE YOU GOT MONEY TO BUY MORE.: NEVER TRUE

## 2025-02-24 SDOH — ECONOMIC STABILITY: FOOD INSECURITY: WITHIN THE PAST 12 MONTHS, THE FOOD YOU BOUGHT JUST DIDN'T LAST AND YOU DIDN'T HAVE MONEY TO GET MORE.: NEVER TRUE

## 2025-02-24 ASSESSMENT — PATIENT HEALTH QUESTIONNAIRE - PHQ9
SUM OF ALL RESPONSES TO PHQ QUESTIONS 1-9: 0
SUM OF ALL RESPONSES TO PHQ QUESTIONS 1-9: 0
SUM OF ALL RESPONSES TO PHQ9 QUESTIONS 1 & 2: 0
SUM OF ALL RESPONSES TO PHQ QUESTIONS 1-9: 0
2. FEELING DOWN, DEPRESSED OR HOPELESS: NOT AT ALL
1. LITTLE INTEREST OR PLEASURE IN DOING THINGS: NOT AT ALL
SUM OF ALL RESPONSES TO PHQ QUESTIONS 1-9: 0

## 2025-02-24 NOTE — ASSESSMENT & PLAN NOTE
Followed by Cardiology for CHF, HTN, Mitral Stenosis, CAD, Defibrillator In Situ, S/P AVR  He continues Xarelto, but has to hold prior to dental procedures.           
 BP today is: well conrolled.    Currently prescribed:   Hypertension Medications       ACE Inhibitors       lisinopril (PRINIVIL;ZESTRIL) 2.5 MG tablet Take 1 tablet by mouth daily       Beta Blockers Cardio-Selective       metoprolol succinate (TOPROL XL) 50 MG extended release tablet Take 1 tablet by mouth daily     Orders:    lisinopril (PRINIVIL;ZESTRIL) 2.5 MG tablet; Take 1 tablet by mouth daily    metoprolol succinate (TOPROL XL) 50 MG extended release tablet; Take 1 tablet by mouth daily    
 Chronic, at goal (stable), continue current treatment plan:    Orders:    famotidine (PEPCID) 20 MG tablet; Take 1 tablet by mouth 3 times daily    
 Chronic, at goal (stable), labs ordered.     Orders:    atorvastatin (LIPITOR) 80 MG tablet; Take 1 tablet by mouth daily    
 Diabetes is, but not treated to goal.   His hemoglobin A1c has come down to 7.3% since last October.  Currently prescribed:   Diabetic Medications       Biguanides       metFORMIN (GLUCOPHAGE-XR) 500 MG extended release tablet Take 2 tablets by mouth daily       Incretin Mimetic Agents       semaglutide, 2 MG/DOSE, (OZEMPIC, 2 MG/DOSE,) 8 MG/3ML SOPN sc injection Inject 2 mg into the skin every 7 days       Sodium-Glucose Co-Transporter 2 (SGLT2) Inhibitors       dapagliflozin (FARXIGA) 10 MG tablet Take 1 tablet by mouth every morning   ***  Orders:    dapagliflozin (FARXIGA) 10 MG tablet; Take 1 tablet by mouth every morning    metFORMIN (GLUCOPHAGE-XR) 500 MG extended release tablet; Take 2 tablets by mouth daily    semaglutide, 2 MG/DOSE, (OZEMPIC, 2 MG/DOSE,) 8 MG/3ML SOPN sc injection; Inject 2 mg into the skin every 7 days    
 Monitored by specialist- no acute findings meriting change in the plan Followed by Cardiology for CHF, HTN, Mitral Stenosis, CAD, Defibrillator In Situ, S/P AVR    Orders:    atorvastatin (LIPITOR) 80 MG tablet; Take 1 tablet by mouth daily    
EMS

## 2025-02-24 NOTE — TELEPHONE ENCOUNTER
Scheduled patient for colon screening with Nicholas at [facility] on 04/09/2025 / mailed prep instructions out to patients address     SUPREP Bowel Preparation - Split Dosing    A Pre-Assessment nurse will call you to obtain your medical information in preparation for surgery.  Please make sure to tell your surgeon's office how we may contact you.      Date of surgery: ____________04/09/2025________________          Colonoscopy                                                                                                               [place] [id number] [phone]                                                             ( Entrance A beside the Emergency Room)    THE DAY BEFORE COLONOSCOPY NO SOLID FOODS,                               CLEAR LIQUIDS ONLY    Start clear diet: ________04/08/2025___________    Clear Liquid Diet Includes:    Water, clear juices, (apple or white grape juice), Sprite, 7-UP, Mayelin Dry, Gatorade or Powerade , Jell-O, popsicles, chicken or beef broth; coffee or tea--without creamer.    No drinks/liquids with red, blue or purple coloring/dyes    No milk/milk products/dairy    Taking your prep    Seven days before your appointment:  Fill prescription for the laxative SuPrep and purchase one (1)  bottle of magnesium citrate at the pharmacy.    Two days before your appointment:  Drink at least eight glasses of water today.  STOP eating high-fiber foods such as vegetables and beans until after your colonoscopy.  You can eat all other types of food today.    DRINK the bottle of Magnesium Citrate after dinner.    Day before your procedure:   Clear liquids ALL DAY.  NO solid food; No alcohol.    Drink an extra 8 ounces of clear liquid every hour from 11:00 am to 3:00 pm.   Gatorade is preferred.    Dose 1:    At 6 pm

## 2025-03-26 PROBLEM — Z12.11 ENCOUNTER FOR SCREENING COLONOSCOPY: Status: RESOLVED | Noted: 2025-02-24 | Resolved: 2025-03-26

## 2025-04-02 NOTE — PERIOP NOTE
Dear  Cong,      Thank you for completing your phone assessment with me today. Here are your requested surgery instructions. Please call #[phone] with any questions/concerns.    Your surgery is scheduled at [facility]- [address] [place] [address], [facility] (red brick [facility] with green roof).  Please arrive at Entrance A OUTPATIENT SURGERY (next door to the ER); pre-op (#[phone]) will call you on the business day before your surgery with your arrival time. If you have any questions on the day of surgery, please call the pre-op dept. at the telephone number above.     Follow diet and prep instructions per office.  You may have clear liquids until 4 hours of your arrival time to hospital.     Bath or shower the night before and the am of surgery with non-moisturizing soap. No lotions, oils, powders, cologne on skin. No make up, eye make up or jewelry. Wear loose fitting comfortable, clean clothing.     You must have adult present in building the entire time .     Please take the following medications for the day of procedure , Atorvastatin, Famotidine, Metformin and Metoprolol     Hold Farxiga x 3 days prior to procedure. Hold Xarelto per Dr. Peterson's instruction    Please hold all vitamins x 7 days prior to procedure and NSAIDS (Aspirin, Excedrin, Goody powders, Motrin, Ibuprofen, Advil, Aleve and Naproxen) x 5 days prior to procedure. Should you have a procedure date that does not allow for the amount of time instructed above, please stop taking vitamins, supplements, and NSAIDS IMMEDIATELY.        A responsible adult must drive you to the hospital, remain in the building during surgery and you will need adult supervision for 24 hours after anesthesia.    Please use an antibacterial soap (Dial, Safeguard, etc.) the night before surgery and on the morning of surgery unless you have been otherwise instructed. Do NOT wear deodorant, make-up, nail polish, lotions, cologne,

## 2025-04-02 NOTE — PERIOP NOTE
Patient verified name, , and procedure.    Type: 1a; abbreviated assessment per anesthesia guidelines    Labs per anesthesia: None    Pt is followed by Dr. Elliott López for the following: medical history significant for mitral stenosis, atherosclerotic cardiovascular disease (ASCVD), an implanted defibrillator, status post aortic valve replacement (AVR) with stable gradients, hyperlipidemia, diabetes mellitus, hyperglycemia, and a history of atrial flutter.     Latest cardiac office note 2025, latest EKG 2023, latest device check 2025 ICD Iperia 7 MARY (pt denies being shocked), latest ECHO with LVEF 55-60% done 2024, latest heart cath 10/20/2021 and all information found in Chart Review for anesthesia reference.       Instructed pt that they will be notified the day before their procedure by the GI Lab for time of arrival if their procedure is [place] and Pre-op for [place] cases. Arrival times should be called by 5 pm. If no phone is received the patient should contact their respective hospital. The GI lab telephone number is [phone] and ES Pre-op is [phone].     Follow diet and prep instructions per office. May have clear liquids up to 2 hours prior to hospital arrive time.      Bath or shower the night before and the am of surgery with non-moisturizing soap. No lotions, oils, powders, cologne on skin. No make up, eye make up or jewelry. Wear loose fitting comfortable, clean clothing.     Must have adult present in building the entire time .     Medications for the day of procedure Aspirin, Atorvastatin, Famotidine, Metformin and Metoprolol    Hold Farxiga x 3 days prior to procedure. Hold Xarelto per Dr. Peterson's instruction    Please hold all vitamins x 7 days prior to procedure and NSAIDS (Aspirin, Excedrin, Goody powders, Motrin, Ibuprofen, Advil, Aleve and Naproxen) x 5 days prior to procedure. Should you have a procedure date that does not allow for the amount of time instructed  above, please stop taking vitamins, supplements, and NSAIDS IMMEDIATELY.     The following discharge instructions reviewed with patient: medication given during procedure may cause drowsiness for several hours, therefore, do not drive or operate machinery for remainder of the day. You may not drink alcohol on the day of your procedure, please resume regular diet and activity unless otherwise directed. You may experience abdominal distention for several hours that is relieved by the passage of gas. Contact your physician if you have any of the following: fever or chills, severe abdominal pain or excessive amount of bleeding or a large amount when having a bowel movement. Occasional specks of blood with bowel movement would not be unusual.

## 2025-04-07 DIAGNOSIS — Z12.11 ENCOUNTER FOR SCREENING COLONOSCOPY: Primary | ICD-10-CM

## 2025-04-07 RX ORDER — SODIUM, POTASSIUM,MAG SULFATES 17.5-3.13G
1 SOLUTION, RECONSTITUTED, ORAL ORAL ONCE
Qty: 1 EACH | Refills: 0 | Status: SHIPPED | OUTPATIENT
Start: 2025-04-07 | End: 2025-04-07

## 2025-04-08 ENCOUNTER — ANESTHESIA EVENT (OUTPATIENT)
Dept: ENDOSCOPY | Age: 70
End: 2025-04-08
Payer: MEDICARE

## 2025-04-08 PROBLEM — Z12.11 ENCOUNTER FOR SCREENING COLONOSCOPY: Status: ACTIVE | Noted: 2025-02-24

## 2025-04-08 RX ORDER — DEXTROSE MONOHYDRATE 100 MG/ML
INJECTION, SOLUTION INTRAVENOUS CONTINUOUS PRN
Status: CANCELLED | OUTPATIENT
Start: 2025-04-08

## 2025-04-08 RX ORDER — SODIUM CHLORIDE, SODIUM LACTATE, POTASSIUM CHLORIDE, CALCIUM CHLORIDE 600; 310; 30; 20 MG/100ML; MG/100ML; MG/100ML; MG/100ML
INJECTION, SOLUTION INTRAVENOUS CONTINUOUS
Status: CANCELLED | OUTPATIENT
Start: 2025-04-08

## 2025-04-08 RX ORDER — FENTANYL CITRATE 50 UG/ML
25 INJECTION, SOLUTION INTRAMUSCULAR; INTRAVENOUS EVERY 5 MIN PRN
Refills: 0 | Status: CANCELLED | OUTPATIENT
Start: 2025-04-08

## 2025-04-08 RX ORDER — ONDANSETRON 2 MG/ML
4 INJECTION INTRAMUSCULAR; INTRAVENOUS
Status: CANCELLED | OUTPATIENT
Start: 2025-04-08

## 2025-04-08 RX ORDER — PROCHLORPERAZINE EDISYLATE 5 MG/ML
5 INJECTION INTRAMUSCULAR; INTRAVENOUS
Status: CANCELLED | OUTPATIENT
Start: 2025-04-08

## 2025-04-08 RX ORDER — DIPHENHYDRAMINE HYDROCHLORIDE 50 MG/ML
12.5 INJECTION, SOLUTION INTRAMUSCULAR; INTRAVENOUS
Status: CANCELLED | OUTPATIENT
Start: 2025-04-08

## 2025-04-08 RX ORDER — IBUPROFEN 600 MG/1
1 TABLET ORAL PRN
Status: CANCELLED | OUTPATIENT
Start: 2025-04-08

## 2025-04-08 RX ORDER — MEPERIDINE HYDROCHLORIDE 50 MG/ML
12.5 INJECTION INTRAMUSCULAR; INTRAVENOUS; SUBCUTANEOUS EVERY 5 MIN PRN
Refills: 0 | Status: CANCELLED | OUTPATIENT
Start: 2025-04-08

## 2025-04-08 RX ORDER — NALOXONE HYDROCHLORIDE 0.4 MG/ML
INJECTION, SOLUTION INTRAMUSCULAR; INTRAVENOUS; SUBCUTANEOUS PRN
Status: CANCELLED | OUTPATIENT
Start: 2025-04-08

## 2025-04-08 RX ORDER — SODIUM CHLORIDE 0.9 % (FLUSH) 0.9 %
5-40 SYRINGE (ML) INJECTION PRN
Status: CANCELLED | OUTPATIENT
Start: 2025-04-08

## 2025-04-08 RX ORDER — OXYCODONE HYDROCHLORIDE 5 MG/1
5 TABLET ORAL
Refills: 0 | Status: CANCELLED | OUTPATIENT
Start: 2025-04-08

## 2025-04-08 RX ORDER — SODIUM CHLORIDE 0.9 % (FLUSH) 0.9 %
5-40 SYRINGE (ML) INJECTION EVERY 12 HOURS SCHEDULED
Status: CANCELLED | OUTPATIENT
Start: 2025-04-08

## 2025-04-08 RX ORDER — SODIUM CHLORIDE 9 MG/ML
INJECTION, SOLUTION INTRAVENOUS PRN
Status: CANCELLED | OUTPATIENT
Start: 2025-04-08

## 2025-04-09 ENCOUNTER — ANESTHESIA (OUTPATIENT)
Dept: ENDOSCOPY | Age: 70
End: 2025-04-09
Payer: MEDICARE

## 2025-04-09 ENCOUNTER — HOSPITAL ENCOUNTER (OUTPATIENT)
Age: 70
Setting detail: OUTPATIENT SURGERY
Discharge: HOME | End: 2025-04-09
Attending: INTERNAL MEDICINE | Admitting: INTERNAL MEDICINE
Payer: MEDICARE

## 2025-04-09 VITALS
HEIGHT: 69 IN | SYSTOLIC BLOOD PRESSURE: 97 MMHG | WEIGHT: 147.7 LBS | OXYGEN SATURATION: 100 % | HEART RATE: 79 BPM | BODY MASS INDEX: 21.88 KG/M2 | TEMPERATURE: 98.6 F | DIASTOLIC BLOOD PRESSURE: 70 MMHG | RESPIRATION RATE: 17 BRPM

## 2025-04-09 DIAGNOSIS — Z12.11 ENCOUNTER FOR SCREENING COLONOSCOPY: ICD-10-CM

## 2025-04-09 LAB
GLUCOSE BLD STRIP.AUTO-MCNC: 115 MG/DL (ref 65–100)
SERVICE CMNT-IMP: ABNORMAL

## 2025-04-09 PROCEDURE — 6370000000 HC RX 637 (ALT 250 FOR IP): Performed by: ANESTHESIOLOGY

## 2025-04-09 PROCEDURE — 2709999900 HC NON-CHARGEABLE SUPPLY: Performed by: INTERNAL MEDICINE

## 2025-04-09 PROCEDURE — 6360000002 HC RX W HCPCS: Performed by: NURSE ANESTHETIST, CERTIFIED REGISTERED

## 2025-04-09 PROCEDURE — 3700000001 HC ADD 15 MINUTES (ANESTHESIA): Performed by: INTERNAL MEDICINE

## 2025-04-09 PROCEDURE — 82962 GLUCOSE BLOOD TEST: CPT

## 2025-04-09 PROCEDURE — 2580000003 HC RX 258: Performed by: ANESTHESIOLOGY

## 2025-04-09 PROCEDURE — 3609010600 HC COLONOSCOPY POLYPECTOMY SNARE/COLD BIOPSY: Performed by: INTERNAL MEDICINE

## 2025-04-09 PROCEDURE — 7100000011 HC PHASE II RECOVERY - ADDTL 15 MIN: Performed by: INTERNAL MEDICINE

## 2025-04-09 PROCEDURE — 7100000010 HC PHASE II RECOVERY - FIRST 15 MIN: Performed by: INTERNAL MEDICINE

## 2025-04-09 PROCEDURE — 88305 TISSUE EXAM BY PATHOLOGIST: CPT

## 2025-04-09 PROCEDURE — 3700000000 HC ANESTHESIA ATTENDED CARE: Performed by: INTERNAL MEDICINE

## 2025-04-09 PROCEDURE — 45380 COLONOSCOPY AND BIOPSY: CPT | Performed by: INTERNAL MEDICINE

## 2025-04-09 RX ORDER — SODIUM CHLORIDE, SODIUM LACTATE, POTASSIUM CHLORIDE, CALCIUM CHLORIDE 600; 310; 30; 20 MG/100ML; MG/100ML; MG/100ML; MG/100ML
INJECTION, SOLUTION INTRAVENOUS CONTINUOUS
Status: DISCONTINUED | OUTPATIENT
Start: 2025-04-09 | End: 2025-04-09 | Stop reason: HOSPADM

## 2025-04-09 RX ORDER — LIDOCAINE HYDROCHLORIDE 10 MG/ML
1 INJECTION, SOLUTION INFILTRATION; PERINEURAL
Status: DISCONTINUED | OUTPATIENT
Start: 2025-04-09 | End: 2025-04-09 | Stop reason: HOSPADM

## 2025-04-09 RX ORDER — ASPIRIN 81 MG/1
81 TABLET, CHEWABLE ORAL ONCE
Status: COMPLETED | OUTPATIENT
Start: 2025-04-09 | End: 2025-04-09

## 2025-04-09 RX ORDER — SODIUM CHLORIDE 0.9 % (FLUSH) 0.9 %
5-40 SYRINGE (ML) INJECTION PRN
Status: DISCONTINUED | OUTPATIENT
Start: 2025-04-09 | End: 2025-04-09 | Stop reason: HOSPADM

## 2025-04-09 RX ORDER — ASPIRIN 81 MG/1
81 TABLET, CHEWABLE ORAL DAILY
Status: DISCONTINUED | OUTPATIENT
Start: 2025-04-09 | End: 2025-04-09

## 2025-04-09 RX ORDER — SODIUM CHLORIDE 0.9 % (FLUSH) 0.9 %
5-40 SYRINGE (ML) INJECTION EVERY 12 HOURS SCHEDULED
Status: DISCONTINUED | OUTPATIENT
Start: 2025-04-09 | End: 2025-04-09 | Stop reason: HOSPADM

## 2025-04-09 RX ORDER — SODIUM CHLORIDE 9 MG/ML
INJECTION, SOLUTION INTRAVENOUS PRN
Status: DISCONTINUED | OUTPATIENT
Start: 2025-04-09 | End: 2025-04-09 | Stop reason: HOSPADM

## 2025-04-09 RX ORDER — SODIUM CHLORIDE 9 MG/ML
25 INJECTION, SOLUTION INTRAVENOUS PRN
Status: DISCONTINUED | OUTPATIENT
Start: 2025-04-09 | End: 2025-04-09 | Stop reason: HOSPADM

## 2025-04-09 RX ORDER — PROPOFOL 10 MG/ML
INJECTION, EMULSION INTRAVENOUS
Status: DISCONTINUED | OUTPATIENT
Start: 2025-04-09 | End: 2025-04-09 | Stop reason: SDUPTHER

## 2025-04-09 RX ADMIN — ASPIRIN 81 MG: 81 TABLET, CHEWABLE ORAL at 07:06

## 2025-04-09 RX ADMIN — SODIUM CHLORIDE, POTASSIUM CHLORIDE, SODIUM LACTATE AND CALCIUM CHLORIDE: 600; 310; 30; 20 INJECTION, SOLUTION INTRAVENOUS at 06:32

## 2025-04-09 RX ADMIN — PROPOFOL 50 MG: 10 INJECTION, EMULSION INTRAVENOUS at 07:50

## 2025-04-09 RX ADMIN — PROPOFOL 150 MCG/KG/MIN: 10 INJECTION, EMULSION INTRAVENOUS at 07:49

## 2025-04-09 ASSESSMENT — PAIN SCALES - GENERAL
PAINLEVEL_OUTOF10: 0

## 2025-04-09 ASSESSMENT — PAIN - FUNCTIONAL ASSESSMENT: PAIN_FUNCTIONAL_ASSESSMENT: 0-10

## 2025-04-09 NOTE — H&P
HISTORY AND PHYSICAL             Date: 4/9/2025        Patient Name: Alexander Gar     YOB: 1955      Age:  69 y.o.      History of Present Illness   Screening colon     Past Medical History     Past Medical History:   Diagnosis Date    Adverse effect of anesthesia     gets hiccups after anesthesia - baclofen helps     Allergic rhinitis, cause unspecified 3/7/2014    Atrial flutter (HCC)     pt with ICD- pt currently taking Xarelto    CAD, multiple vessel 2/28/2017 2/28/17 (Dr Lake) Coronary artery bypass grafting x3. Grafts consisting of  1. Left internal mammary artery to the left anterior descending.  2. Reversed LEFT saphenous vein graft to obtuse marginal.  3. Reversed LEFT saphenous vein graft to the left posterior descending  artery.     Cancer (HCC)     testicular in 1979 and Squamous cell removed from nose     COVID-19 vaccine series completed 04/05/2021    Pfizer 3/15/2021; 12/27/2021 booster given     Current use of long term anticoagulation     Xarelto and Aspirin 81mg    Depressed left ventricular ejection fraction 2/27/2017 2/27/17 20%    Diabetes (HCC)     avg BS 210s; s/sx of low BS at 100; A1C: 11.2 on 07/11/2024; oral meds and insulin    Esophageal reflux 03/07/2014    controlled with pepcid - sleeps sitting in chair     History of EKG 11/19/2021    SR w/ occ PVCs, nonspecific T wave abn; prolonged QT - pt had x3 CABG on 11/18/2021 w/ AVR    History of testicular cancer 1979    met to lung & abdomen- Pt received radiation treatment    Hx of CABG 11/18/2021    x 3 vessel    Hx of cardiac catheterization 10/20/2021    left heart cath: severe 3 vessel disease patient had CABG 11/18/2021 with AVR    Hx of transesophageal echocardiography (ROGE) for monitoring 06/03/2021    systolic function normal per report; EF 55-60% per report; severe AS - patient had CABG on 11/18/2021 x 3 vessel with AVR     Hypertension     controlled     ICD (implantable cardioverter-defibrillator) in

## 2025-04-09 NOTE — DISCHARGE INSTRUCTIONS
Gastrointestinal Colonoscopy/Flexible Sigmoidoscopy - Lower Exam Discharge Instructions  Call Dr. Peterson at [phone] for any problems or questions.  Contact the doctor’s office for follow up appointment as directed  Medication may cause drowsiness for several hours, therefore, do not drive or operate machinery for remainder of the day.  No alcohol today.  Ordinarily, you may resume regular diet and activity after exam unless otherwise specified by your physician.  Because of air put into your colon during exam, you may experience some abdominal distension and nausea, relieved by the passage of gas, for several hours.  Contact your physician if you have any of the following:  Excessive amount of bleeding - large amount when having a bowel movement.  Occasional specks of blood with bowel movement would not be unusual.  Severe abdominal pain  Fever or Chills    Any additional instructions:      Recommendation: Await pathology results. Repeat colonoscopy in 7-10 years for surveillance based on pathology results. Please contact the [facility] GI clinic at [phone] if you have any issues with you hemorrhoids which were seen on todays exam, as these can be treated. No aspirin, ibuprofen, naproxen, or other non-steroidal anti-inflammatory drugs for 7 days after polyp removal.

## 2025-04-09 NOTE — ANESTHESIA PRE PROCEDURE
Department of Anesthesiology  Preprocedure Note       Name:  Alexander Gar   Age:  69 y.o.  :  1955                                          MRN:  [id number]         Date:  2025      Surgeon: Surgeon(s):  Rodney Peterson MD    Procedure: Procedure(s):  COLORECTAL CANCER SCREENING, NOT HIGH RISK    Medications prior to admission:   Prior to Admission medications    Medication Sig Start Date End Date Taking? Authorizing Provider   atorvastatin (LIPITOR) 80 MG tablet Take 1 tablet by mouth daily 25  Yes Larisa Woodward PA   dapagliflozin (FARXIGA) 10 MG tablet Take 1 tablet by mouth every morning 25  Yes Larisa Woodward PA   famotidine (PEPCID) 20 MG tablet Take 1 tablet by mouth 3 times daily 25  Yes Larisa Woodward PA   lisinopril (PRINIVIL;ZESTRIL) 2.5 MG tablet Take 1 tablet by mouth daily 25  Yes Larisa Woodward PA   metFORMIN (GLUCOPHAGE-XR) 500 MG extended release tablet Take 2 tablets by mouth daily 25  Yes Larisa Woodward PA   metoprolol succinate (TOPROL XL) 50 MG extended release tablet Take 1 tablet by mouth daily 25  Yes Larisa Woodward PA   aspirin 81 MG EC tablet Take 1 tablet by mouth daily   Yes Automatic Reconciliation, Ar   semaglutide, 2 MG/DOSE, (OZEMPIC, 2 MG/DOSE,) 8 MG/3ML SOPN sc injection Inject 2 mg into the skin every 7 days  Patient taking differently: Inject 2 mg into the skin every 7 days Indications: takes on 25   Larisa Woodward PA   amoxicillin (AMOXIL) 500 MG capsule Indications: prior to dental appt 25   Jennie Shannon MD   rivaroxaban (XARELTO) 20 MG TABS tablet Take 1 tablet by mouth daily (with breakfast)  Patient taking differently: Take 1 tablet by mouth daily (with breakfast) Indications: takes on 23   Elliott De Los Santos MD   brimonidine (ALPHAGAN) 0.2 % ophthalmic solution  5/3/22   Jennie Shannon MD   acetaminophen

## 2025-04-09 NOTE — ANESTHESIA POSTPROCEDURE EVALUATION
Department of Anesthesiology  Postprocedure Note    Patient: Alexander Gar  MRN: [id number]  YOB: 1955  Date of evaluation: 4/9/2025    Procedure Summary       Date: 04/09/25 Room / Location: [facility] ENDO 01 / [facility] ENDOSCOPY    Anesthesia Start: 0745 Anesthesia Stop: 0806    Procedure: COLONOSCOPY POLYPECTOMY (Lower GI Region) Diagnosis:       Encounter for screening colonoscopy      (Encounter for screening colonoscopy [Z12.11])    Surgeons: Rodney Peterson MD Responsible Provider: Viktoria Barry MD    Anesthesia Type: TIVA ASA Status: 3            Anesthesia Type: No value filed.    Ethan Phase I: Ethan Score: 10    Etahn Phase II:      Anesthesia Post Evaluation    Patient location during evaluation: PACU  Patient participation: complete - patient participated  Level of consciousness: awake and alert  Pain scale: pain adequately controlled.  Airway patency: patent  Nausea & Vomiting: no nausea and no vomiting  Cardiovascular status: hemodynamically stable  Respiratory status: acceptable  Hydration status: euvolemic  Multimodal analgesia pain management approach  Pain management: adequate    No notable events documented.

## 2025-04-11 ENCOUNTER — RESULTS FOLLOW-UP (OUTPATIENT)
Dept: GASTROENTEROLOGY | Age: 70
End: 2025-04-11

## 2025-05-08 PROBLEM — Z12.11 ENCOUNTER FOR SCREENING COLONOSCOPY: Status: RESOLVED | Noted: 2025-02-24 | Resolved: 2025-05-08

## 2025-07-01 ENCOUNTER — OFFICE VISIT (OUTPATIENT)
Age: 70
End: 2025-07-01
Payer: MEDICARE

## 2025-07-01 VITALS
DIASTOLIC BLOOD PRESSURE: 64 MMHG | SYSTOLIC BLOOD PRESSURE: 100 MMHG | HEART RATE: 88 BPM | WEIGHT: 152 LBS | HEIGHT: 69 IN | BODY MASS INDEX: 22.51 KG/M2

## 2025-07-01 DIAGNOSIS — E11.42 TYPE 2 DIABETES MELLITUS WITH DIABETIC POLYNEUROPATHY, WITHOUT LONG-TERM CURRENT USE OF INSULIN (HCC): ICD-10-CM

## 2025-07-01 DIAGNOSIS — I34.2 NONRHEUMATIC MITRAL VALVE STENOSIS: ICD-10-CM

## 2025-07-01 DIAGNOSIS — I25.10 ATHEROSCLEROSIS OF NATIVE CORONARY ARTERY OF NATIVE HEART WITHOUT ANGINA PECTORIS: ICD-10-CM

## 2025-07-01 DIAGNOSIS — Z51.81 ANTICOAGULATION MANAGEMENT ENCOUNTER: ICD-10-CM

## 2025-07-01 DIAGNOSIS — R01.1 HEART MURMUR: ICD-10-CM

## 2025-07-01 DIAGNOSIS — Z95.2 S/P AVR: Primary | ICD-10-CM

## 2025-07-01 DIAGNOSIS — Z79.01 ANTICOAGULATION MANAGEMENT ENCOUNTER: ICD-10-CM

## 2025-07-01 DIAGNOSIS — Z95.810 ICD (IMPLANTABLE CARDIOVERTER-DEFIBRILLATOR) IN PLACE: ICD-10-CM

## 2025-07-01 PROCEDURE — 1126F AMNT PAIN NOTED NONE PRSNT: CPT | Performed by: INTERNAL MEDICINE

## 2025-07-01 PROCEDURE — 2022F DILAT RTA XM EVC RTNOPTHY: CPT | Performed by: INTERNAL MEDICINE

## 2025-07-01 PROCEDURE — G8428 CUR MEDS NOT DOCUMENT: HCPCS | Performed by: INTERNAL MEDICINE

## 2025-07-01 PROCEDURE — 99214 OFFICE O/P EST MOD 30 MIN: CPT | Performed by: INTERNAL MEDICINE

## 2025-07-01 PROCEDURE — 3017F COLORECTAL CA SCREEN DOC REV: CPT | Performed by: INTERNAL MEDICINE

## 2025-07-01 PROCEDURE — 1036F TOBACCO NON-USER: CPT | Performed by: INTERNAL MEDICINE

## 2025-07-01 PROCEDURE — 3074F SYST BP LT 130 MM HG: CPT | Performed by: INTERNAL MEDICINE

## 2025-07-01 PROCEDURE — 1123F ACP DISCUSS/DSCN MKR DOCD: CPT | Performed by: INTERNAL MEDICINE

## 2025-07-01 PROCEDURE — 3051F HG A1C>EQUAL 7.0%<8.0%: CPT | Performed by: INTERNAL MEDICINE

## 2025-07-01 PROCEDURE — G8420 CALC BMI NORM PARAMETERS: HCPCS | Performed by: INTERNAL MEDICINE

## 2025-07-01 PROCEDURE — 3078F DIAST BP <80 MM HG: CPT | Performed by: INTERNAL MEDICINE

## 2025-07-01 NOTE — PROGRESS NOTES
[address], [facility] [address]  [place] [address]  PHONE: [phone]    SUBJECTIVE:   Alexander Gar is a 69 y.o. male 1955   seen for a follow up visit regarding the following:     Chief Complaint   Patient presents with    Coronary Artery Disease    Hypertension         History of Present Illness  The patient is a 69-year-old individual with a medical history significant for mitral stenosis, atherosclerotic cardiovascular disease (ASCVD), status post aortic valve replacement (AVR), hyperlipidemia, diabetes mellitus with hyperglycemia, atrial flutter, an implantable cardioverter-defibrillator (ICD) in situ, and heart failure with reduced ejection fraction (HFrEF) with recovery of left ventricular systolic function.    The patient reports feeling well overall with no significant changes in symptoms since the last visit. There are no complaints of dyspnea or angina. Glycemic control has improved, with recent hemoglobin A1c readings around 7.3-7.4, down from a previous level of 12.5. Current pharmacotherapy includes Ozempic 2 mg weekly, Farxiga 10 mg daily, and metformin 500 mg twice daily.    The patient has a notable surgical history, including procedures performed in 2017 and 2021. The primary concern at present is the longevity of the pacemaker, with a recall notice received three days post-discharge following the initial surgery. The patient is aware of the low battery level of the device.    The patient is on anticoagulation therapy with Xarelto, taken once weekly. This medication has caused prolonged bleeding (2-3 days) following dental procedures, leading to a dosage reduction to manage this side effect.    PAST SURGICAL HISTORY:  First surgery in 2017  Second surgery in 2021    SOCIAL HISTORY  - Occupation:   - Exercise: Yard work        Interval history:       Results      Multivessel coronary disease on cardiac catheterization 2017.      2017 carotids <50%

## 2025-08-04 ENCOUNTER — LAB (OUTPATIENT)
Dept: FAMILY MEDICINE CLINIC | Facility: CLINIC | Age: 70
End: 2025-08-04

## 2025-08-04 DIAGNOSIS — E78.5 DYSLIPIDEMIA: ICD-10-CM

## 2025-08-04 DIAGNOSIS — E11.42 TYPE 2 DIABETES MELLITUS WITH DIABETIC POLYNEUROPATHY, WITHOUT LONG-TERM CURRENT USE OF INSULIN (HCC): ICD-10-CM

## 2025-08-04 DIAGNOSIS — I10 ESSENTIAL HYPERTENSION, BENIGN: ICD-10-CM

## 2025-08-04 DIAGNOSIS — E11.3511 TYPE 2 DIABETES MELLITUS WITH RIGHT EYE AFFECTED BY PROLIFERATIVE RETINOPATHY AND MACULAR EDEMA, WITHOUT LONG-TERM CURRENT USE OF INSULIN (HCC): ICD-10-CM

## 2025-08-04 LAB
ALBUMIN SERPL-MCNC: 3.9 G/DL (ref 3.2–4.6)
ALBUMIN/GLOB SERPL: 1.3 (ref 1–1.9)
ALP SERPL-CCNC: 61 U/L (ref 40–129)
ALT SERPL-CCNC: 26 U/L (ref 8–55)
ANION GAP SERPL CALC-SCNC: 11 MMOL/L (ref 7–16)
AST SERPL-CCNC: 19 U/L (ref 15–37)
BILIRUB SERPL-MCNC: 1.7 MG/DL (ref 0–1.2)
BUN SERPL-MCNC: 24 MG/DL (ref 8–23)
CALCIUM SERPL-MCNC: 10.1 MG/DL (ref 8.8–10.2)
CHLORIDE SERPL-SCNC: 101 MMOL/L (ref 98–107)
CHOLEST SERPL-MCNC: 90 MG/DL (ref 0–200)
CO2 SERPL-SCNC: 25 MMOL/L (ref 20–29)
CREAT SERPL-MCNC: 1.26 MG/DL (ref 0.8–1.3)
CREAT UR-MCNC: 44.8 MG/DL (ref 39–259)
EST. AVERAGE GLUCOSE BLD GHB EST-MCNC: 150 MG/DL
GLOBULIN SER CALC-MCNC: 3 G/DL (ref 2.3–3.5)
GLUCOSE SERPL-MCNC: 136 MG/DL (ref 70–99)
HBA1C MFR BLD: 6.9 % (ref 0–5.6)
HDLC SERPL-MCNC: 52 MG/DL (ref 40–60)
HDLC SERPL: 1.7 (ref 0–5)
LDLC SERPL CALC-MCNC: 30 MG/DL (ref 0–100)
MICROALBUMIN UR-MCNC: <1.2 MG/DL (ref 0–20)
MICROALBUMIN/CREAT UR-RTO: NORMAL MG/G (ref 0–30)
POTASSIUM SERPL-SCNC: 5 MMOL/L (ref 3.5–5.1)
PROT SERPL-MCNC: 6.9 G/DL (ref 6.3–8.2)
SODIUM SERPL-SCNC: 137 MMOL/L (ref 136–145)
TRIGL SERPL-MCNC: 42 MG/DL (ref 0–150)
VLDLC SERPL CALC-MCNC: 8 MG/DL (ref 6–23)

## 2025-08-06 ENCOUNTER — INITIAL CONSULT (OUTPATIENT)
Age: 70
End: 2025-08-06
Payer: MEDICARE

## 2025-08-06 VITALS
HEART RATE: 84 BPM | BODY MASS INDEX: 22.66 KG/M2 | SYSTOLIC BLOOD PRESSURE: 110 MMHG | DIASTOLIC BLOOD PRESSURE: 68 MMHG | HEIGHT: 69 IN | WEIGHT: 153 LBS

## 2025-08-06 DIAGNOSIS — I50.22 SYSTOLIC CHF, CHRONIC (HCC): ICD-10-CM

## 2025-08-06 DIAGNOSIS — I25.5 ISCHEMIC CARDIOMYOPATHY: Primary | ICD-10-CM

## 2025-08-06 PROCEDURE — 1123F ACP DISCUSS/DSCN MKR DOCD: CPT | Performed by: INTERNAL MEDICINE

## 2025-08-06 PROCEDURE — 1036F TOBACCO NON-USER: CPT | Performed by: INTERNAL MEDICINE

## 2025-08-06 PROCEDURE — 3074F SYST BP LT 130 MM HG: CPT | Performed by: INTERNAL MEDICINE

## 2025-08-06 PROCEDURE — 99204 OFFICE O/P NEW MOD 45 MIN: CPT | Performed by: INTERNAL MEDICINE

## 2025-08-06 PROCEDURE — 3078F DIAST BP <80 MM HG: CPT | Performed by: INTERNAL MEDICINE

## 2025-08-06 PROCEDURE — G8428 CUR MEDS NOT DOCUMENT: HCPCS | Performed by: INTERNAL MEDICINE

## 2025-08-06 PROCEDURE — G8420 CALC BMI NORM PARAMETERS: HCPCS | Performed by: INTERNAL MEDICINE

## 2025-08-06 PROCEDURE — 3017F COLORECTAL CA SCREEN DOC REV: CPT | Performed by: INTERNAL MEDICINE

## 2025-08-06 PROCEDURE — 93000 ELECTROCARDIOGRAM COMPLETE: CPT | Performed by: INTERNAL MEDICINE

## 2025-08-06 PROCEDURE — 1126F AMNT PAIN NOTED NONE PRSNT: CPT | Performed by: INTERNAL MEDICINE

## 2025-08-08 ENCOUNTER — TELEPHONE (OUTPATIENT)
Dept: CARDIAC CATH/INVASIVE PROCEDURES | Age: 70
End: 2025-08-08

## 2025-08-11 ENCOUNTER — OFFICE VISIT (OUTPATIENT)
Dept: FAMILY MEDICINE CLINIC | Facility: CLINIC | Age: 70
End: 2025-08-11
Payer: MEDICARE

## 2025-08-11 VITALS
SYSTOLIC BLOOD PRESSURE: 112 MMHG | HEIGHT: 69 IN | BODY MASS INDEX: 22.57 KG/M2 | WEIGHT: 152.4 LBS | HEART RATE: 91 BPM | TEMPERATURE: 98 F | DIASTOLIC BLOOD PRESSURE: 68 MMHG | OXYGEN SATURATION: 96 % | RESPIRATION RATE: 14 BRPM

## 2025-08-11 DIAGNOSIS — Z00.00 MEDICARE ANNUAL WELLNESS VISIT, SUBSEQUENT: Primary | ICD-10-CM

## 2025-08-11 DIAGNOSIS — E78.5 DYSLIPIDEMIA: ICD-10-CM

## 2025-08-11 DIAGNOSIS — E11.3511 TYPE 2 DIABETES MELLITUS WITH RIGHT EYE AFFECTED BY PROLIFERATIVE RETINOPATHY AND MACULAR EDEMA, WITHOUT LONG-TERM CURRENT USE OF INSULIN (HCC): ICD-10-CM

## 2025-08-11 DIAGNOSIS — I10 ESSENTIAL HYPERTENSION, BENIGN: ICD-10-CM

## 2025-08-11 DIAGNOSIS — I25.10 CAD, MULTIPLE VESSEL: ICD-10-CM

## 2025-08-11 DIAGNOSIS — K21.9 GASTROESOPHAGEAL REFLUX DISEASE WITHOUT ESOPHAGITIS: ICD-10-CM

## 2025-08-11 DIAGNOSIS — L98.9 BENIGN SKIN LESION: ICD-10-CM

## 2025-08-11 DIAGNOSIS — Z87.891 PERSONAL HISTORY OF TOBACCO USE, PRESENTING HAZARDS TO HEALTH: ICD-10-CM

## 2025-08-11 DIAGNOSIS — E78.1 HYPERTRIGLYCERIDEMIA: ICD-10-CM

## 2025-08-11 DIAGNOSIS — Z13.6 SCREENING FOR AAA (ABDOMINAL AORTIC ANEURYSM): ICD-10-CM

## 2025-08-11 DIAGNOSIS — Z71.89 ADVANCE CARE PLANNING: ICD-10-CM

## 2025-08-11 DIAGNOSIS — E11.42 TYPE 2 DIABETES MELLITUS WITH DIABETIC POLYNEUROPATHY, WITHOUT LONG-TERM CURRENT USE OF INSULIN (HCC): ICD-10-CM

## 2025-08-11 PROCEDURE — 1159F MED LIST DOCD IN RCRD: CPT | Performed by: PHYSICIAN ASSISTANT

## 2025-08-11 PROCEDURE — G2211 COMPLEX E/M VISIT ADD ON: HCPCS | Performed by: PHYSICIAN ASSISTANT

## 2025-08-11 PROCEDURE — G0439 PPPS, SUBSEQ VISIT: HCPCS | Performed by: PHYSICIAN ASSISTANT

## 2025-08-11 PROCEDURE — 3074F SYST BP LT 130 MM HG: CPT | Performed by: PHYSICIAN ASSISTANT

## 2025-08-11 PROCEDURE — G8420 CALC BMI NORM PARAMETERS: HCPCS | Performed by: PHYSICIAN ASSISTANT

## 2025-08-11 PROCEDURE — 3017F COLORECTAL CA SCREEN DOC REV: CPT | Performed by: PHYSICIAN ASSISTANT

## 2025-08-11 PROCEDURE — 99497 ADVNCD CARE PLAN 30 MIN: CPT | Performed by: PHYSICIAN ASSISTANT

## 2025-08-11 PROCEDURE — 3078F DIAST BP <80 MM HG: CPT | Performed by: PHYSICIAN ASSISTANT

## 2025-08-11 PROCEDURE — 1126F AMNT PAIN NOTED NONE PRSNT: CPT | Performed by: PHYSICIAN ASSISTANT

## 2025-08-11 PROCEDURE — 99213 OFFICE O/P EST LOW 20 MIN: CPT | Performed by: PHYSICIAN ASSISTANT

## 2025-08-11 PROCEDURE — G8427 DOCREV CUR MEDS BY ELIG CLIN: HCPCS | Performed by: PHYSICIAN ASSISTANT

## 2025-08-11 PROCEDURE — 1123F ACP DISCUSS/DSCN MKR DOCD: CPT | Performed by: PHYSICIAN ASSISTANT

## 2025-08-11 PROCEDURE — 2022F DILAT RTA XM EVC RTNOPTHY: CPT | Performed by: PHYSICIAN ASSISTANT

## 2025-08-11 PROCEDURE — 1036F TOBACCO NON-USER: CPT | Performed by: PHYSICIAN ASSISTANT

## 2025-08-11 PROCEDURE — 1160F RVW MEDS BY RX/DR IN RCRD: CPT | Performed by: PHYSICIAN ASSISTANT

## 2025-08-11 PROCEDURE — 3044F HG A1C LEVEL LT 7.0%: CPT | Performed by: PHYSICIAN ASSISTANT

## 2025-08-11 RX ORDER — FAMOTIDINE 20 MG/1
20 TABLET, FILM COATED ORAL 3 TIMES DAILY
Qty: 270 TABLET | Refills: 1 | Status: SHIPPED | OUTPATIENT
Start: 2025-08-11

## 2025-08-11 RX ORDER — LISINOPRIL 2.5 MG/1
2.5 TABLET ORAL DAILY
Qty: 90 TABLET | Refills: 1 | Status: SHIPPED | OUTPATIENT
Start: 2025-08-11

## 2025-08-11 RX ORDER — ATORVASTATIN CALCIUM 80 MG/1
80 TABLET, FILM COATED ORAL DAILY
Qty: 90 TABLET | Refills: 1 | Status: SHIPPED | OUTPATIENT
Start: 2025-08-11

## 2025-08-11 RX ORDER — SEMAGLUTIDE 2.68 MG/ML
2 INJECTION, SOLUTION SUBCUTANEOUS
Qty: 9 ML | Refills: 1 | Status: SHIPPED | OUTPATIENT
Start: 2025-08-11

## 2025-08-11 RX ORDER — METFORMIN HYDROCHLORIDE 500 MG/1
1000 TABLET, EXTENDED RELEASE ORAL DAILY
Qty: 180 TABLET | Refills: 1 | Status: SHIPPED | OUTPATIENT
Start: 2025-08-11

## 2025-08-11 RX ORDER — METOPROLOL SUCCINATE 50 MG/1
50 TABLET, EXTENDED RELEASE ORAL DAILY
Qty: 90 TABLET | Refills: 1 | Status: SHIPPED | OUTPATIENT
Start: 2025-08-11

## 2025-08-11 RX ORDER — DAPAGLIFLOZIN 10 MG/1
10 TABLET, FILM COATED ORAL EVERY MORNING
Qty: 90 TABLET | Refills: 1 | Status: SHIPPED | OUTPATIENT
Start: 2025-08-11

## 2025-08-11 ASSESSMENT — PATIENT HEALTH QUESTIONNAIRE - PHQ9
SUM OF ALL RESPONSES TO PHQ QUESTIONS 1-9: 0
2. FEELING DOWN, DEPRESSED OR HOPELESS: NOT AT ALL
1. LITTLE INTEREST OR PLEASURE IN DOING THINGS: NOT AT ALL
SUM OF ALL RESPONSES TO PHQ QUESTIONS 1-9: 0

## 2025-08-11 ASSESSMENT — LIFESTYLE VARIABLES: HOW OFTEN DO YOU HAVE A DRINK CONTAINING ALCOHOL: MONTHLY OR LESS

## (undated) DEVICE — 1/4 FORCE SURGICAL SPRING CLIP: Brand: STEALTH® SPRING CLIP

## (undated) DEVICE — SUTURE ETHBND EXCEL SZ 0 L18IN NONABSORBABLE GRN L36MM CT-1 CX21D

## (undated) DEVICE — MEDI-TRACE CADENCE ADULT, DEFIBRILLATION ELECTRODE -RTS  (10 PR/PK) - ZOLL: Brand: MEDI-TRACE CADENCE

## (undated) DEVICE — SOLUTION IRRIGATION BAL SALT SOLUTION 500 ML STRL BSS

## (undated) DEVICE — COVER,MAYO STAND,STERILE: Brand: MEDLINE

## (undated) DEVICE — CONNECTOR IV 3/8X3/8X3/8 Y

## (undated) DEVICE — SOLUTION IRRIG 3000ML 0.9% SOD CHL FLX CONT 0797208] ICU MEDICAL INC]

## (undated) DEVICE — SUTURE TEMP PACE WIRE SZ 2-0 L24IN NONABSORBABLE LIGHT/DARK

## (undated) DEVICE — SYR 5ML 1/5 GRAD LL NSAF LF --

## (undated) DEVICE — Device: Brand: JELCO

## (undated) DEVICE — SUTURE PERMAHAND SZ 2-0 L12X18IN NONABSORBABLE BLK SILK A185H

## (undated) DEVICE — COR-KNOT MINI® COMBO KITBASE PACKAGE TYPE - KITEACH STERILE PACKAGE KIT CONTAINS (2) SINGLE PATIENT USE COR-KNOT MINI® DEVICES AND (12) COR-KNOT® QUICK LOADS®.: Brand: COR-KNOT MINI®

## (undated) DEVICE — SOLUTION IV STRL H2O 500 ML AQUALITE POUR BTL

## (undated) DEVICE — SS SUTURE, 3 PER SLEEVE: Brand: MYO/WIRE II

## (undated) DEVICE — CATHETER THOR 28FR L23CM DIA9.3MM POLYVI CHL TAPR CONN TIP

## (undated) DEVICE — DRAPE SLUSH DISC W44XL66IN ST FOR RND BSIN HUSH SLUSH SYS

## (undated) DEVICE — SUTURE S STL SZ 6 L18IN NONABSORBABLE SIL L48MM CCS 1/2 CIR M654G

## (undated) DEVICE — BIPOLAR FORCEPS CORD,BANANA LEADS: Brand: VALLEYLAB

## (undated) DEVICE — Device

## (undated) DEVICE — SOLUTION IRRIGATION BAL SALT SOLUTION 500 ML BTL 6/CA BSS +

## (undated) DEVICE — SUT PROL 4-0 30IN SH1 DA BLU --

## (undated) DEVICE — SUTURE NONABSORBABLE L24IN SZ 7-0 M0-5 BV175-8 EP 24 BLU M8745

## (undated) DEVICE — 48" PROBE COVER W/GEL, ULTRASOUND, STERILE: Brand: SITE-RITE

## (undated) DEVICE — 3000CC GUARDIAN II: Brand: GUARDIAN

## (undated) DEVICE — SUT CHRMC 4-0 27IN SH BRN --

## (undated) DEVICE — CATH URETH INTMIT ROB 14FR FUN -- USE ITEM 179521

## (undated) DEVICE — SUTURE SILK PERMAHAND PRECUT 6 X 30 IN SZ 1 BLK BRAID A307H

## (undated) DEVICE — CONNECTOR TBNG OD5-7MM O2 END DISP

## (undated) DEVICE — 3M™ STERI-DRAPE™ INSTRUMENT POUCH 1018: Brand: STERI-DRAPE™

## (undated) DEVICE — AORTIC PUNCHES ARE USED TO CREATE A UNIFORM OPENING IN BLOOD VESSELS DURING CARDIOVASCULAR SURGERY. THE VESSEL GRAFT IS INSERTED INTO THE CREATED OPENING AND SUTURED TO THE VESSEL WALL. AORTIC LANCETS ARE USED TO MAKE A SMALL UNIFORM CUT IN A BLOOD VESSEL TO FACILITATE INSERTION OF AN AORTIC PUNCH.  PUNCHES COME IN VARIOUS LENGTHS, DIAMETERS AND TIP CONFIGURATIONS.: Brand: CLEANCUT ROTATING AORTIC PUNCH

## (undated) DEVICE — CANNULA INJ L2.5IN BLNT TIP 3MM CLR BODY W/ 1 W VLV DLP

## (undated) DEVICE — SPONGE LAP 18X18IN STRL -- 5/PK

## (undated) DEVICE — BUTTON SWITCH PENCIL BLADE ELECTRODE, HOLSTER: Brand: EDGE

## (undated) DEVICE — AMD ANTIMICROBIAL GAUZE SPONGES,12 PLY USP TYPE VII, 0.2% POLYHEXAMETHYLENE BIGUANIDE HCI (PHMB): Brand: CURITY

## (undated) DEVICE — CATHETER THOR 32FR L23IN PVC 6 EYELET STR ATRAUM

## (undated) DEVICE — CABG-AVR DR WILLIAMS: Brand: MEDLINE INDUSTRIES, INC.

## (undated) DEVICE — PLEDGET VASC W3/16XL3/8IN THK1/16IN PTFE SFT

## (undated) DEVICE — GLOVE SURG SZ 7 L11.2IN THK9.8MIL STRW LTX POLYMER BEAD CUF

## (undated) DEVICE — GUIDEWIRE 035IN 210CM PTFE COAT FIX COR J TIP 15MM FIRM BODY

## (undated) DEVICE — SUTURE VCRL SZ 3-0 L36IN ABSRB UD L36MM CT-1 1/2 CIR J944H

## (undated) DEVICE — BASIC SINGLE BASIN-LF: Brand: MEDLINE INDUSTRIES, INC.

## (undated) DEVICE — SURGICAL PROCEDURE PACK EYE CDS

## (undated) DEVICE — DRAIN CHEST ADL/PED DRY/WET -- PLEUR-EVAC

## (undated) DEVICE — KENDALL RADIOLUCENT FOAM MONITORING ELECTRODE RECTANGULAR SHAPE: Brand: KENDALL

## (undated) DEVICE — BAND RADIAL COMPR ARTERY 24CM -- REG BX/10

## (undated) DEVICE — 3M™ TEGADERM™ TRANSPARENT FILM DRESSING FRAME STYLE, 1626W, 4 IN X 4-3/4 IN (10 CM X 12 CM), 50/CT 4CT/CASE: Brand: 3M™ TEGADERM™

## (undated) DEVICE — STRIP,CLOSURE,WOUND,MEDI-STRIP,1/2X4: Brand: MEDLINE

## (undated) DEVICE — SUTURE PROL SZ 6-0 L30IN NONABSORBABLE BLU L13MM CC-1 3/8 M8707

## (undated) DEVICE — SUTURE VCRL SZ 4-0 L27IN ABSRB UD L19MM PS-2 3/8 CIR PRIM J426H

## (undated) DEVICE — 3M™ STERI-STRIP™ REINFORCED ADHESIVE SKIN CLOSURES, R1547, 1/2 IN X 4 IN (12 MM X 100 MM), 6 STRIPS/ENVELOPE: Brand: 3M™ STERI-STRIP™

## (undated) DEVICE — CATHETER DIAG AD 5FR L100CM COR GRY HYDRPHLC NYL IM W/O

## (undated) DEVICE — SOLUTION IRRIG 1000ML 09% SOD CHL USP PIC PLAS CONTAINER

## (undated) DEVICE — SURGIFOAM SPNG SZ 100

## (undated) DEVICE — NEEDLE HYPO 25GA L5/8IN ORNG HUB S STL LATCH BVL UP

## (undated) DEVICE — CANNULA PERF L1.8MM TIP L1MM S STL SHFT BLB SHP TIP FEM

## (undated) DEVICE — SOLUTION IRRIG 1000ML H2O STRL BLT

## (undated) DEVICE — Device: Brand: MILLEX-OR

## (undated) DEVICE — CATHETER THOR 24FR L22IN PVC 5 EYELET STR ATRAUM

## (undated) DEVICE — ENDOSCOPIC KIT 1.1+ OP4 CA DE 2 GWN AAMI LEVEL 3

## (undated) DEVICE — SUTURE PROL DBL ARMED 8 0 BV130 5 24IN N ABSRB MFIL BLU M8739

## (undated) DEVICE — YANKAUER,BULB TIP,W/O VENT,RIGID,STERILE: Brand: MEDLINE

## (undated) DEVICE — DISPOSABLE BIPOLAR PENCIL 5" (12.7CM) 25 GAUGE STRAIGHT: Brand: KIRWAN

## (undated) DEVICE — SUTURE ETHBND EXCEL 2-0 L30IN NONABSORBABLE GRN L26MM SH MX563

## (undated) DEVICE — REM POLYHESIVE ADULT PATIENT RETURN ELECTRODE: Brand: VALLEYLAB

## (undated) DEVICE — (D)STRIP SKN CLSR 0.5X4IN WHT --

## (undated) DEVICE — PAD,EYE,1-5/8X2 5/8,STERILE,LF,1/PK: Brand: MEDLINE

## (undated) DEVICE — NEEDLE HYPO 27GA L1.25IN GRY POLYPR HUB S STL REG BVL STR

## (undated) DEVICE — CLIP INT SM TI EZ LD LIG SYS WECK HORZ

## (undated) DEVICE — APPLICATOR COT TIP 3IN WOOD --

## (undated) DEVICE — SUTURE PDS II SZ 1 L36IN ABSRB VLT L48MM CTX 1/2 CIR Z371T

## (undated) DEVICE — TUBING OPHTHALMIC DOUBLE IRRIGATION 96IN W/.22U FILTER

## (undated) DEVICE — BLADE SAW W10XL54MM FOR PRI REPEAT STRNOTMY

## (undated) DEVICE — CATHETER DIAG AD 5FR L125CM COR NYL MP AMPLATZ 2 W/ 2 SIDE

## (undated) DEVICE — BLADE OPHTH MINI BEAV SHRP --

## (undated) DEVICE — Z DISCONTINUED NO SUB IDED SHIELD EYE PLAS RT BGE M 7.5CMX5.7CM VISITEC

## (undated) DEVICE — DISPOSABLE BIOPSY VALVE MAJ-1555: Brand: SINGLE USE BIOPSY VALVE (STERILE)

## (undated) DEVICE — CATHETER,URETHRAL,REDRUBBER,STRL,14FR: Brand: MEDLINE INDUSTRIES, INC.

## (undated) DEVICE — SUT PROL 3-0 36IN SH DA BLU --

## (undated) DEVICE — APPLIER CLP L9.375IN APER 2.1MM CLS L3.8MM 20 SM TI CLP

## (undated) DEVICE — IRRIGATION KT OPHTH 80IN --

## (undated) DEVICE — AMD ANTIMICROBIAL NON-ADHERENT PAD,0.2% POLYHEXAMETHYLENE BIGUANIDE HCI (PHMB): Brand: TELFA

## (undated) DEVICE — 3M™ IOBAN™ 2 ANTIMICROBIAL INCISE DRAPE 6650EZ: Brand: IOBAN™ 2

## (undated) DEVICE — COR-KNOT® QUICK LOAD® SINGLES: Brand: COR-KNOT® QUICK LOAD®

## (undated) DEVICE — TRAY FOLEY 16FR TEMP SENS F400 --

## (undated) DEVICE — PREP SKN CHLRAPRP APL 26ML STR --

## (undated) DEVICE — SYR 50ML LR LCK 1ML GRAD NSAF --

## (undated) DEVICE — BETADINE 5% EYE SOL

## (undated) DEVICE — CATHETER ETER TY TEMP SENS F MBO W URIN M FOLLOWS CDC GUIDELINES TO

## (undated) DEVICE — SYRINGE, LUER LOCK, 60ML: Brand: MEDLINE

## (undated) DEVICE — LIGHT GLOVE: Brand: DEVON

## (undated) DEVICE — MINOR SPLIT GENERAL: Brand: MEDLINE INDUSTRIES, INC.

## (undated) DEVICE — CATHETER DIAG SM AD 6FR L100CM 0.038IN COR POLYUR JUDKINS L

## (undated) DEVICE — 6 FOOT DISPOSABLE EXTENSION CABLE WITH SAFE CONNECT / SCREW-DOWN

## (undated) DEVICE — PAD,NON-ADHERENT,3X8,STERILE,LF,1/PK: Brand: MEDLINE

## (undated) DEVICE — STERILE HOOK LOCK LATEX FREE ELASTIC BANDAGE 4INX5YD: Brand: HOOK LOCK™

## (undated) DEVICE — AGENT HEMSTAT W4XL4IN OXIDIZED REGENERATED CELOS ABSRB SFT

## (undated) DEVICE — REVOLUTION DSP 23G ILM FORCEPS: Brand: ALCON GRIESHABER REVOLUTION

## (undated) DEVICE — (D)PREP SKN CHLRAPRP APPL 26ML -- CONVERT TO ITEM 371833

## (undated) DEVICE — GEL MEDC ULTRASOUND 5L -- REPLACED BY 326862

## (undated) DEVICE — CLAMP INSERT: Brand: STEALTH® CLAMP INSERT

## (undated) DEVICE — SUTURE S STL SZ 5 L18IN NONABSORBABLE SIL CCS L48MM 1/2 CIR M653G

## (undated) DEVICE — AMD ANTIMICROBIAL BANDAGE ROLL,6 PLY: Brand: KERLIX

## (undated) DEVICE — STERILE HOOK LOCK LATEX FREE ELASTIC BANDAGE 6INX5YD: Brand: HOOK LOCK™

## (undated) DEVICE — KIT THORCENT 8FR L5IN POLYUR W/ 18/22/25GA NDL 3 W STPCOCK

## (undated) DEVICE — CONTAINER FORMALIN PREFILLED 10% NBF 60ML

## (undated) DEVICE — GAUZE,SPONGE,4"X4",12PLY,WOVEN,NS,LF: Brand: MEDLINE

## (undated) DEVICE — DISPOSABLE BIPOLAR CODE, 12' (3.66 M): Brand: CONMED

## (undated) DEVICE — EYE SPEAR / FINE DISSECTOR: Brand: DEROYAL

## (undated) DEVICE — SOLUTION IV 1000ML 0.9% SOD CHL

## (undated) DEVICE — 1840 FOAM BLOCK NEEDLE COUNTER: Brand: DEVON

## (undated) DEVICE — 23 GA VALVED TROCAR CANNULA ENTRY SYSTEM, 3 CT: Brand: ALCON

## (undated) DEVICE — 3M™ STERI-DRAPE™ MEDIUM DRAPE WITH INCISE FILM AND POUCH 1061: Brand: STERI-DRAPE™

## (undated) DEVICE — BANDAGE,GAUZE,BULKEE II,4.5"X4.1YD,STRL: Brand: MEDLINE

## (undated) DEVICE — X-RAY SPONGES,12 PLY: Brand: DERMACEA

## (undated) DEVICE — CONSTELLATION VISION SYSTEM 7500 CPM ULTRAVIT PROBE STRAIGHT ENDOILLUMINATOR 25+ TOTALPLUS VITRECTOMY PAK EDGEPLUS BLADE VALVED ENTRY SYSTEM: Brand: CONSTELLATION, ULTRAVIT, 25+, TOTALPLUS, EDGEPLUS

## (undated) DEVICE — SINGLE PORT MANIFOLD: Brand: NEPTUNE 2

## (undated) DEVICE — AGENT HEMSTAT W2XL14IN OXIDIZED REGENERATED CELOS ABSRB FOR

## (undated) DEVICE — AIRLIFE™ OXYGEN TUBING 7 FEET (2.1 M) CRUSH RESISTANT OXYGEN TUBING, VINYL TIPPED: Brand: AIRLIFE™

## (undated) DEVICE — FORCEPS BX JUMBO L240CM DIA2.8MM WRK CHN 3.2MM ORNG W/ NDL

## (undated) DEVICE — CONSTELLATION VISION SYSTEM 5000 CPM ULTRAVIT PROBE STRAIGHT ENDOILLUMINATOR 25+ TOTALPLUS VITRECTOMY PAK EDGEPLUS BLADE VALVED ENTRY SYSTEM: Brand: CONSTELLATION, ULTRAVIT, 25+, TOTALPLUS, EDGEPLUS

## (undated) DEVICE — BLADE SCALP SURG BARD-PARK 10 --

## (undated) DEVICE — GLIDESHEATH SLENDER STAINLESS STEEL KIT: Brand: GLIDESHEATH SLENDER

## (undated) DEVICE — STERILE POLYISOPRENE POWDER-FREE SURGICAL GLOVES: Brand: PROTEXIS

## (undated) DEVICE — DRAPE,CHEST,FENES,15X10,STERIL: Brand: MEDLINE

## (undated) DEVICE — DIATHERMY PROBE DSP, 25G: Brand: ALCON GRIESHABER

## (undated) DEVICE — BLADE SCALP SURG BARD-PARK 11 --

## (undated) DEVICE — BLADE SURG NO15 S STL STR DISP GLASSVAN

## (undated) DEVICE — SYRINGE MEDICAL 3ML CLEAR PLASTIC STANDARD NON CONTROL LUERLOCK TIP DISPOSABLE

## (undated) DEVICE — SYRINGE MED 10ML LUERLOCK TIP W/O SFTY DISP

## (undated) DEVICE — NEEDLE SYRINGE 18GA L1.5IN RED PLAS HUB S STL BLNT FILL W/O

## (undated) DEVICE — ADVANCED DSP BACKFLUSH BLUNT, 25G: Brand: ALCON GRIESHABER

## (undated) DEVICE — BLADE SURG NO20 S STL STR DISP GLASSVAN

## (undated) DEVICE — APPLIER CLP L9.38IN M LIG TI DISP STR RNG HNDL LIGACLP